# Patient Record
Sex: MALE | Race: WHITE | NOT HISPANIC OR LATINO | Employment: OTHER | URBAN - NONMETROPOLITAN AREA
[De-identification: names, ages, dates, MRNs, and addresses within clinical notes are randomized per-mention and may not be internally consistent; named-entity substitution may affect disease eponyms.]

---

## 2017-01-13 ENCOUNTER — LAB (OUTPATIENT)
Dept: INTERNAL MEDICINE | Facility: CLINIC | Age: 72
End: 2017-01-13

## 2017-01-13 ENCOUNTER — PATIENT OUTREACH (OUTPATIENT)
Dept: INTERNAL MEDICINE | Facility: CLINIC | Age: 72
End: 2017-01-13

## 2017-01-13 DIAGNOSIS — R79.9 ABNORMAL BLOOD CHEMISTRY: Primary | ICD-10-CM

## 2017-01-13 LAB
CRP SERPL-MCNC: <0.12 MG/DL (ref 0–10)
HBA1C MFR BLD: 9.8 % (ref 4.8–5.6)

## 2017-01-13 PROCEDURE — 86140 C-REACTIVE PROTEIN: CPT | Performed by: INTERNAL MEDICINE

## 2017-01-13 PROCEDURE — 83036 HEMOGLOBIN GLYCOSYLATED A1C: CPT | Performed by: INTERNAL MEDICINE

## 2017-01-13 PROCEDURE — 36415 COLL VENOUS BLD VENIPUNCTURE: CPT | Performed by: INTERNAL MEDICINE

## 2017-01-16 ENCOUNTER — OFFICE VISIT (OUTPATIENT)
Dept: INTERNAL MEDICINE | Facility: CLINIC | Age: 72
End: 2017-01-16

## 2017-01-16 VITALS
DIASTOLIC BLOOD PRESSURE: 88 MMHG | SYSTOLIC BLOOD PRESSURE: 148 MMHG | OXYGEN SATURATION: 95 % | TEMPERATURE: 97.7 F | BODY MASS INDEX: 24.5 KG/M2 | WEIGHT: 175 LBS | HEIGHT: 71 IN | HEART RATE: 80 BPM | RESPIRATION RATE: 14 BRPM

## 2017-01-16 DIAGNOSIS — B35.1 ONYCHOMYCOSIS: ICD-10-CM

## 2017-01-16 DIAGNOSIS — N40.0 ENLARGED PROSTATE: ICD-10-CM

## 2017-01-16 DIAGNOSIS — E78.5 HYPERLIPIDEMIA, UNSPECIFIED HYPERLIPIDEMIA TYPE: Primary | ICD-10-CM

## 2017-01-16 DIAGNOSIS — Z79.4 TYPE 2 DIABETES MELLITUS WITH COMPLICATION, WITH LONG-TERM CURRENT USE OF INSULIN (HCC): ICD-10-CM

## 2017-01-16 DIAGNOSIS — E11.8 TYPE 2 DIABETES MELLITUS WITH COMPLICATION, WITH LONG-TERM CURRENT USE OF INSULIN (HCC): ICD-10-CM

## 2017-01-16 DIAGNOSIS — F41.1 GENERALIZED ANXIETY DISORDER: ICD-10-CM

## 2017-01-16 DIAGNOSIS — G45.9 TRANSIENT CEREBRAL ISCHEMIA, UNSPECIFIED TYPE: ICD-10-CM

## 2017-01-16 DIAGNOSIS — I10 ESSENTIAL HYPERTENSION: ICD-10-CM

## 2017-01-16 DIAGNOSIS — I48.91 ATRIAL FIBRILLATION, UNSPECIFIED TYPE (HCC): ICD-10-CM

## 2017-01-16 LAB — GLUCOSE BLDC GLUCOMTR-MCNC: 86 MG/DL (ref 70–130)

## 2017-01-16 PROCEDURE — 99214 OFFICE O/P EST MOD 30 MIN: CPT | Performed by: INTERNAL MEDICINE

## 2017-01-16 PROCEDURE — 82962 GLUCOSE BLOOD TEST: CPT | Performed by: INTERNAL MEDICINE

## 2017-01-16 RX ORDER — LISINOPRIL 40 MG/1
TABLET ORAL
Qty: 30 TABLET | Refills: 1 | Status: SHIPPED | OUTPATIENT
Start: 2017-01-16 | End: 2017-08-18 | Stop reason: SDUPTHER

## 2017-01-16 NOTE — PROGRESS NOTES
Subjective   Que Lagunas is a 71 y.o. male.     Chief Complaint   Patient presents with   • Diabetes     Here for follow up       History of Present Illness   Recent TIA doing well. CT scan showed a small vessel ischemic changes, holter monitor showed a paroxysmal atrial fibrillation, carotid artery duplex minimal plaques echocardiogram ejection fraction normal. A1c in the hospital 9.8. Patient states his sugar now reasonable morning about 125 after meal 150. Patient lost 4 pounds recently. Diabetes much improved. Hypertension stable medication. Hyperlipidemia stable medication.     Current Outpatient Prescriptions:   •  HUMALOG 100 UNIT/ML injection, inject 5 units 3 times daily before meals, Disp: 10 mL, Rfl: 5  •  HUMALOG KWIKPEN 100 UNIT/ML solution pen-injector, INJECT 10 UNITS SUBCUTANEOUSLY BEFORE MEALS AS DIRECTED, Disp: 15 mL, Rfl: 5  •  hydrochlorothiazide (HYDRODIURIL) 25 MG tablet, Take 1 tablet by mouth  daily as directed, Disp: 90 tablet, Rfl: 3  •  lisinopril (PRINIVIL,ZESTRIL) 40 MG tablet, TAKE 1 TABLET BY MOUTH ONE TIME A DAY , Disp: 30 tablet, Rfl: 1  •  metFORMIN (GLUCOPHAGE) 500 MG tablet, Take 1 tablet by mouth Daily With Breakfast., Disp: 90 tablet, Rfl: 3  •  PARoxetine (PAXIL) 20 MG tablet, Take 1 tablet by mouth one  time a day as directed, Disp: 90 tablet, Rfl: 3  •  pravastatin (PRAVACHOL) 40 MG tablet, Take 1 tablet by mouth daily., Disp: 30 tablet, Rfl: 5  •  rivaroxaban (XARELTO) 20 MG tablet, Take 1 tablet by mouth daily., Disp: 30 tablet, Rfl: 11  •  insulin glargine (LANTUS) 100 UNIT/ML injection, Inject under the skin, Disp: , Rfl:     The following portions of the patient's history were reviewed and updated as appropriate: allergies, current medications, past family history, past medical history, past social history, past surgical history and problem list.    Review of Systems   Constitutional: Negative.    Respiratory: Negative.    Cardiovascular: Negative.     Gastrointestinal: Negative.    Musculoskeletal: Negative.    Skin: Negative.    Neurological: Negative.    Psychiatric/Behavioral: Negative.        Objective   Physical Exam   Constitutional: He is oriented to person, place, and time. He appears well-nourished.   Neck: Neck supple.   Cardiovascular: Normal rate, regular rhythm and normal heart sounds.    Pulmonary/Chest: Effort normal and breath sounds normal.   Abdominal: Bowel sounds are normal.   Neurological: He is alert and oriented to person, place, and time.   Skin: Skin is warm.   Psychiatric: He has a normal mood and affect.       All tests have been reviewed.    Assessment/Plan               TIA continue xarelto, Echo and carotid duplex and CT head only small vessel ischemia   Hypertension continue on medication   BPH,s/p laser surgery doing better follow up with dr bhatia,PSA in 9/2013 normal  Diabetes a1c 9.8 on humalog 20u each meal bid with meal, toujeo qhs, need to adjust after patient writes down log book reviewed very good for a month, repeat fast glucose 86, repeat in 3 mo  dyslipidemia continue medicine  vitD low continue vitD3 2000u daily  Onychomycosis improved after lamisil  Anxiety continue medication  colon:divertic polyp and hemorroid done4/2015  Vaccinations up-to-date  Hematuria seen by uro s/p scope  Balancing low, PT patient refuses  follow up in 3 months log book reviwed today

## 2017-01-18 RX ORDER — HYDROCHLOROTHIAZIDE 25 MG/1
TABLET ORAL
Qty: 30 TABLET | Refills: 1 | Status: SHIPPED | OUTPATIENT
Start: 2017-01-18 | End: 2017-09-25 | Stop reason: SDUPTHER

## 2017-03-27 RX ORDER — PRAVASTATIN SODIUM 40 MG
TABLET ORAL
Qty: 90 TABLET | Refills: 1 | Status: SHIPPED | OUTPATIENT
Start: 2017-03-27 | End: 2017-08-15 | Stop reason: SDUPTHER

## 2017-03-28 RX ORDER — INSULIN GLARGINE 100 [IU]/ML
INJECTION, SOLUTION SUBCUTANEOUS
Qty: 10 ML | Refills: 3 | Status: SHIPPED | OUTPATIENT
Start: 2017-03-28 | End: 2017-11-15 | Stop reason: SDUPTHER

## 2017-07-24 ENCOUNTER — OFFICE VISIT (OUTPATIENT)
Dept: CARDIOLOGY | Facility: CLINIC | Age: 72
End: 2017-07-24

## 2017-07-24 VITALS
DIASTOLIC BLOOD PRESSURE: 80 MMHG | BODY MASS INDEX: 25.17 KG/M2 | SYSTOLIC BLOOD PRESSURE: 126 MMHG | HEART RATE: 70 BPM | HEIGHT: 71 IN | WEIGHT: 179.8 LBS

## 2017-07-24 DIAGNOSIS — I25.10 CORONARY ARTERY DISEASE INVOLVING NATIVE CORONARY ARTERY OF NATIVE HEART WITHOUT ANGINA PECTORIS: Primary | ICD-10-CM

## 2017-07-24 DIAGNOSIS — E78.2 MIXED HYPERLIPIDEMIA: ICD-10-CM

## 2017-07-24 DIAGNOSIS — I10 ESSENTIAL HYPERTENSION: ICD-10-CM

## 2017-07-24 DIAGNOSIS — I48.91 ATRIAL FIBRILLATION, UNSPECIFIED TYPE (HCC): ICD-10-CM

## 2017-07-24 PROCEDURE — 99214 OFFICE O/P EST MOD 30 MIN: CPT | Performed by: INTERNAL MEDICINE

## 2017-07-24 NOTE — PROGRESS NOTES
[facility] at [facility]  Office Progress Note  Que Lagunas  1945  [phone]      Visit Date: 07/24/2017    PCP: Evans Blackburn MD  [address] [place] [id number]    IDENTIFICATION: A 72 y.o. 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 TIA:   a. April 2016, evaluation at [facility] with paroxysmal atrial fibrillation and negative CT of the head and neck.   2. Abnormal EKG:  a. Bifascicular block and no prior ischemia evaluation.   b. 4/16 echo EF 60%  3. Dyslipidemia  a. April 2016: LDL 54, total 114.   b. 12/20/16 lipids: , , HDL 37, LDL 61  4. Diabetes mellitus, onset at age 49 and is requiring insulin x12 years.  a. A1c of 9.7, February 2014.   b. 6/16 A1c 8.5  c. 1/13/17 A1c 9.8  5. Hypertension, presumed essential.   6. Prostatism.   7. Remote tonsillectomy and eye surgery.  8. Nicotine addiction, cessation 2011 with a 56 pack year history previously.        Allergies  No Known Allergies    Current Medications    Current Outpatient Prescriptions:   •  hydrochlorothiazide (HYDRODIURIL) 25 MG tablet, TAKE 1 TABLET BY MOUTH ONE TIME A DAY AS DIRECTED, Disp: 30 tablet, Rfl: 1  •  LANTUS 100 UNIT/ML injection, inject 45 units every night at bedtime (Patient taking differently: inject 40 units every night at bedtime), Disp: 10 mL, Rfl: 3  •  lisinopril (PRINIVIL,ZESTRIL) 40 MG tablet, TAKE 1 TABLET BY MOUTH ONE TIME A DAY , Disp: 30 tablet, Rfl: 1  •  metFORMIN (GLUCOPHAGE) 500 MG tablet, Take 1 tablet by mouth Daily With Breakfast., Disp: 90 tablet, Rfl: 3  •  PARoxetine (PAXIL) 20 MG tablet, Take 1 tablet by mouth one  time a day as directed, Disp: 90 tablet, Rfl: 3  •  pravastatin (PRAVACHOL) 40 MG tablet, Take 1 tablet by mouth one  time a day, Disp: 90 tablet, Rfl: 1  •  rivaroxaban (XARELTO) 20 MG tablet, Take 1  "tablet by mouth daily., Disp: 30 tablet, Rfl: 11  •  HUMALOG 100 UNIT/ML injection, inject 5 units 3 times daily before meals, Disp: 10 mL, Rfl: 5  •  HUMALOG KWIKPEN 100 UNIT/ML solution pen-injector, INJECT 10 UNITS SUBCUTANEOUSLY BEFORE MEALS AS DIRECTED, Disp: 15 mL, Rfl: 5  •  Insulin Glargine (TOUJEO SOLOSTAR) 300 UNIT/ML solution pen-injector, Inject 40 unit marking on U-100 syringe under the skin Every Night., Disp: 5 pen, Rfl: 6      History of Present Illness   Poor control of DM. 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 Takes lantus, but cannot always take humalog. He had taken too much toujeo in the past not realizing it was stronger than lantus so is hesitant to take that again. States its difficult for him to eat healthy and afford medicine.   Pt denies any chest pain, dyspnea, dyspnea on exertion, orthopnea, PND, palpitations, lower extremity edema, or claudication.    ROS:  All systems have been reviewed and are negative with the exception of those mentioned in the HPI.    OBJECTIVE:  Vitals:    07/24/17 0943   BP: 126/80   BP Location: Right arm   Patient Position: Sitting   Pulse: 70   Weight: 179 lb 12.8 oz (81.6 kg)   Height: 71\" (180.3 cm)     Physical Exam   Constitutional: He is oriented to person, place, and time. He appears well-developed and well-nourished. No distress.   Neck: Neck supple. No JVD present.   Cardiovascular: Normal rate, regular rhythm, normal heart sounds and intact distal pulses.    No murmur heard.  Pulses:       Radial pulses are 2+ on the right side, and 2+ on the left side.        Dorsalis pedis pulses are 2+ on the right side, and 2+ on the left side.        Posterior tibial pulses are 2+ on the right side, and 2+ on the left side.   Pulmonary/Chest: Effort normal and breath sounds normal. He has no wheezes. He has no rales.   Abdominal: Soft. Bowel sounds are normal. There is no tenderness. There " is no guarding.   Musculoskeletal: He exhibits no edema or tenderness.   Neurological: He is alert and oriented to person, place, and time.   Nursing note and vitals reviewed.      Diagnostic Data:  Procedures      ASSESSMENT:   Diagnosis Plan   1. Coronary artery disease involving native coronary artery of native heart without angina pectoris     2. Mixed hyperlipidemia     3. Essential hypertension     4. Atrial fibrillation, unspecified type         PLAN:  1. Medically managed with statin, ACEi  2. Statin therapy with pravstatin  3. Controlled, continue antihypertensives  4. [id number] 5, a/c with Xarelto, samples given today  5. DM poor control, counseled to avoid pasta, potatoes, bread, sweets. Counseled on the importance of glycemic control and goal A1C <7.    Evans Blackburn MD, thank you for referring Mr. Lagunas for evaluation.  I have forwarded my electronically generated recommendations to you for review.  Please do not hesitate to call with any questions.    Scribed for Charles Topete MD by Libby Feliciano PA-C. 7/24/2017  9:58 AM  I, Charles Topete MD, personally performed the services described in this documentation as scribed by the above named individual in my presence, and it is both accurate and complete.  7/24/2017  12:58 PM    Charles Topete MD, [facility]

## 2017-07-31 RX ORDER — PAROXETINE HYDROCHLORIDE 20 MG/1
TABLET, FILM COATED ORAL
Qty: 30 TABLET | Refills: 3 | Status: SHIPPED | OUTPATIENT
Start: 2017-07-31 | End: 2017-12-11 | Stop reason: SDUPTHER

## 2017-08-15 RX ORDER — PRAVASTATIN SODIUM 40 MG
TABLET ORAL
Qty: 30 TABLET | Refills: 1 | Status: SHIPPED | OUTPATIENT
Start: 2017-08-15 | End: 2017-10-19 | Stop reason: SDUPTHER

## 2017-08-18 RX ORDER — LISINOPRIL 40 MG/1
TABLET ORAL
Qty: 30 TABLET | Refills: 1 | Status: SHIPPED | OUTPATIENT
Start: 2017-08-18 | End: 2017-11-27 | Stop reason: SDUPTHER

## 2017-09-26 RX ORDER — HYDROCHLOROTHIAZIDE 25 MG/1
TABLET ORAL
Qty: 30 TABLET | Refills: 0 | Status: SHIPPED | OUTPATIENT
Start: 2017-09-26 | End: 2017-10-29 | Stop reason: SDUPTHER

## 2017-10-11 ENCOUNTER — OFFICE VISIT (OUTPATIENT)
Dept: UROLOGY | Facility: CLINIC | Age: 72
End: 2017-10-11

## 2017-10-11 VITALS
HEIGHT: 71 IN | OXYGEN SATURATION: 95 % | HEART RATE: 101 BPM | TEMPERATURE: 96.2 F | BODY MASS INDEX: 24.5 KG/M2 | WEIGHT: 175 LBS

## 2017-10-11 DIAGNOSIS — N13.8 BPH WITH URINARY OBSTRUCTION: Primary | ICD-10-CM

## 2017-10-11 DIAGNOSIS — N40.1 BPH WITH URINARY OBSTRUCTION: Primary | ICD-10-CM

## 2017-10-11 DIAGNOSIS — N32.81 OVERACTIVE BLADDER: ICD-10-CM

## 2017-10-11 LAB
BILIRUB BLD-MCNC: NEGATIVE MG/DL
CLARITY, POC: CLEAR
COLOR UR: YELLOW
GLUCOSE UR STRIP-MCNC: NEGATIVE MG/DL
KETONES UR QL: NEGATIVE
LEUKOCYTE EST, POC: NEGATIVE
NITRITE UR-MCNC: NEGATIVE MG/ML
PH UR: 6 [PH] (ref 5–8)
PROT UR STRIP-MCNC: NEGATIVE MG/DL
PSA SERPL-MCNC: 0.98 NG/ML (ref 0.06–4)
RBC # UR STRIP: NEGATIVE /UL
SP GR UR: 1.01 (ref 1–1.03)
UROBILINOGEN UR QL: NORMAL

## 2017-10-11 PROCEDURE — 99213 OFFICE O/P EST LOW 20 MIN: CPT | Performed by: UROLOGY

## 2017-10-11 PROCEDURE — 81003 URINALYSIS AUTO W/O SCOPE: CPT | Performed by: UROLOGY

## 2017-10-11 NOTE — PROGRESS NOTES
Chief Complaint  Annual Exam (PATIENT IS HERE FOR A YEARLY FOLLOW UP)        GABRIEL Lagunas is a 72 y.o. male who returns today for annual checkup originally concerned about gross hematuria when he was taking Xarelto and Plavix.  With his past history of tobacco use he underwent cystoscopy but no tumor was seen.  He's had a previous laser treatment on his prostate per Dr. Fischer and today describes an AUA index of only 10.  He does have urgency incontinence and is wearing a diaper although he admits to drinking caffeine constantly during the day.    Vitals:    10/11/17 1611   Pulse: 101   Temp: 96.2 °F (35.7 °C)   SpO2: 95%       Past Medical History  Past Medical History:   Diagnosis Date   • Abnormal EKG     Bifascicular block and no prior ischemia evaluation.    • Anemia    • Anxiety    • Atrial fibrillation     CHADS VASc=3.  Continue Xarelto 20.  vas continue Zaroxolyn 20 has a relative 20 Knobloch can add Lantus 40   • CAD (coronary artery disease)    • Contact dermatitis    • Diabetes    • Dyslipidemia    • Hypertension    • Prostatism    • Stroke    • Tattoo        Past Surgical History  Past Surgical History:   Procedure Laterality Date   • EYE SURGERY     • PROSTATE SURGERY     • TONSILLECTOMY         Medications  has a current medication list which includes the following prescription(s): humalog, humalog kwikpen, hydrochlorothiazide, insulin glargine, lantus, lisinopril, metformin, paroxetine, pravastatin, and rivaroxaban.      Allergies  No Known Allergies    Social History  Social History     Social History Narrative       Family History  He has no family history of bladder or kidney cancer  He has no family history of kidney stones      AUA Symptom Score:      Review of Systems  Review of Systems   Constitutional: Negative.    Genitourinary: Positive for frequency and urgency.   All other systems reviewed and are negative.      Physical Exam  Physical Exam   Constitutional: He is oriented to person,  place, and time. He appears well-developed and well-nourished.   HENT:   Head: Normocephalic and atraumatic.   Neck: Normal range of motion.   Pulmonary/Chest: Effort normal. No respiratory distress.   Abdominal: Soft. He exhibits no distension and no mass. There is no tenderness. No hernia.   Genitourinary: Rectum normal and prostate normal.   Musculoskeletal: Normal range of motion.   Lymphadenopathy:     He has no cervical adenopathy.   Neurological: He is alert and oriented to person, place, and time.   Skin: Skin is warm and dry.   Psychiatric: He has a normal mood and affect. His behavior is normal.   Vitals reviewed.      Labs Recent and today in the office:  Results for orders placed or performed in visit on 10/11/17   POC Urinalysis Dipstick, Automated   Result Value Ref Range    Color Yellow Yellow, Straw, Dark Yellow, Nasima    Clarity, UA Clear Clear    Glucose, UA Negative Negative, 1000 mg/dL (3+) mg/dL    Bilirubin Negative Negative    Ketones, UA Negative Negative    Specific Gravity  1.015 1.005 - 1.030    Blood, UA Negative Negative    pH, Urine 6.0 5.0 - 8.0    Protein, POC Negative Negative mg/dL    Urobilinogen, UA Normal Normal    Leukocytes Negative Negative    Nitrite, UA Negative Negative         Assessment & Plan  BPH: He is currently voiding with minimal difficulty except for the urgency and urge incontinence.    Overactive bladder: I've offered anticholinergic medication but he will need to cut back on his large intake of caffeine first.  Digital rectal exam is benign and PSA is obtained.

## 2017-10-19 RX ORDER — PRAVASTATIN SODIUM 40 MG
TABLET ORAL
Qty: 30 TABLET | Refills: 2 | Status: SHIPPED | OUTPATIENT
Start: 2017-10-19 | End: 2018-03-05 | Stop reason: SDUPTHER

## 2017-10-30 RX ORDER — HYDROCHLOROTHIAZIDE 25 MG/1
TABLET ORAL
Qty: 30 TABLET | Refills: 0 | Status: SHIPPED | OUTPATIENT
Start: 2017-10-30 | End: 2018-04-12

## 2017-11-15 RX ORDER — INSULIN GLARGINE 100 [IU]/ML
INJECTION, SOLUTION SUBCUTANEOUS
Qty: 10 ML | Refills: 2 | Status: SHIPPED | OUTPATIENT
Start: 2017-11-15 | End: 2018-02-15 | Stop reason: SDUPTHER

## 2017-11-27 RX ORDER — LISINOPRIL 40 MG/1
TABLET ORAL
Qty: 30 TABLET | Refills: 0 | Status: SHIPPED | OUTPATIENT
Start: 2017-11-27 | End: 2017-12-31 | Stop reason: SDUPTHER

## 2017-12-11 RX ORDER — PAROXETINE HYDROCHLORIDE 20 MG/1
TABLET, FILM COATED ORAL
Qty: 30 TABLET | Refills: 2 | Status: SHIPPED | OUTPATIENT
Start: 2017-12-11 | End: 2018-03-19 | Stop reason: SDUPTHER

## 2017-12-18 ENCOUNTER — OFFICE VISIT (OUTPATIENT)
Dept: UROLOGY | Facility: CLINIC | Age: 72
End: 2017-12-18

## 2017-12-18 VITALS
HEIGHT: 71 IN | DIASTOLIC BLOOD PRESSURE: 80 MMHG | BODY MASS INDEX: 24.5 KG/M2 | TEMPERATURE: 97.8 F | SYSTOLIC BLOOD PRESSURE: 140 MMHG | OXYGEN SATURATION: 98 % | WEIGHT: 175 LBS | HEART RATE: 95 BPM

## 2017-12-18 DIAGNOSIS — N48.89 PENILE SWELLING: Primary | ICD-10-CM

## 2017-12-18 DIAGNOSIS — R73.9 HYPERGLYCEMIA: ICD-10-CM

## 2017-12-18 LAB
BILIRUB BLD-MCNC: NEGATIVE MG/DL
BUN SERPL-MCNC: 13 MG/DL (ref 7–20)
BUN/CREAT SERPL: 18.6 (ref 6.3–21.9)
CALCIUM SERPL-MCNC: 9.7 MG/DL (ref 8.4–10.2)
CHLORIDE SERPL-SCNC: 97 MMOL/L (ref 98–107)
CLARITY, POC: CLEAR
CO2 SERPL-SCNC: 30 MMOL/L (ref 26–30)
COLOR UR: YELLOW
CREAT SERPL-MCNC: 0.7 MG/DL (ref 0.6–1.3)
GFR SERPLBLD CREATININE-BSD FMLA CKD-EPI: 111 ML/MIN/1.73
GFR SERPLBLD CREATININE-BSD FMLA CKD-EPI: 134 ML/MIN/1.73
GLUCOSE SERPL-MCNC: 232 MG/DL (ref 74–98)
GLUCOSE UR STRIP-MCNC: ABNORMAL MG/DL
KETONES UR QL: NEGATIVE
LEUKOCYTE EST, POC: NEGATIVE
NITRITE UR-MCNC: NEGATIVE MG/ML
PH UR: 6 [PH] (ref 5–8)
POTASSIUM SERPL-SCNC: 4.5 MMOL/L (ref 3.5–5.1)
PROT UR STRIP-MCNC: NEGATIVE MG/DL
RBC # UR STRIP: NEGATIVE /UL
SODIUM SERPL-SCNC: 138 MMOL/L (ref 137–145)
SP GR UR: 1.02 (ref 1–1.03)
UROBILINOGEN UR QL: NORMAL

## 2017-12-18 PROCEDURE — 81003 URINALYSIS AUTO W/O SCOPE: CPT | Performed by: UROLOGY

## 2017-12-18 PROCEDURE — 99213 OFFICE O/P EST LOW 20 MIN: CPT | Performed by: UROLOGY

## 2017-12-18 RX ORDER — NYSTATIN AND TRIAMCINOLONE ACETONIDE 100000; 1 [USP'U]/G; MG/G
OINTMENT TOPICAL
Qty: 30 G | Refills: 1 | Status: SHIPPED | OUTPATIENT
Start: 2017-12-18 | End: 2018-04-12

## 2017-12-18 NOTE — PROGRESS NOTES
Chief Complaint  Groin Swelling (Penile)        GABRIEL Lagunas is a 72 y.o. male who is today complaining of a painful itchy swelling of the penis.  He is alert and acute balanitis although he has been circumcised there is some edema of the residual foreskin but not enough to cause a paraphimosis.  Of more importance is the heavy glucosuria and suggestion that his diabetes is under poor control.  He states that he try to see Dr. Blackburn but he's out for the week.    Vitals:    12/18/17 1424   BP: 140/80   Pulse: 95   Temp: 97.8 °F (36.6 °C)   SpO2: 98%       Past Medical History  Past Medical History:   Diagnosis Date   • Abnormal EKG     Bifascicular block and no prior ischemia evaluation.    • Anemia    • Anxiety    • Atrial fibrillation     CHADS VASc=3.  Continue Xarelto 20.  vas continue Zaroxolyn 20 has a relative 20 Knobloch can add Lantus 40   • CAD (coronary artery disease)    • Contact dermatitis    • Diabetes    • Dyslipidemia    • Hypertension    • Prostatism    • Stroke    • Tattoo        Past Surgical History  Past Surgical History:   Procedure Laterality Date   • EYE SURGERY     • PROSTATE SURGERY     • TONSILLECTOMY         Medications  has a current medication list which includes the following prescription(s): humalog, humalog kwikpen, hydrochlorothiazide, insulin glargine, lantus, lisinopril, metformin, paroxetine, pravastatin, and rivaroxaban.      Allergies  No Known Allergies    Social History  Social History     Social History Narrative       Family History  He has no family history of bladder or kidney cancer  He has no family history of kidney stones      AUA Symptom Score:      Review of Systems  Review of Systems    Physical Exam  Physical Exam   Constitutional: He is oriented to person, place, and time. He appears well-developed and well-nourished.   HENT:   Head: Normocephalic and atraumatic.   Neck: Normal range of motion.   Pulmonary/Chest: Effort normal. No respiratory distress.   Abdominal:  Soft. He exhibits no distension and no mass. There is no tenderness. No hernia.   Genitourinary:         Musculoskeletal: Normal range of motion.   Lymphadenopathy:     He has no cervical adenopathy.   Neurological: He is alert and oriented to person, place, and time.   Skin: Skin is warm and dry.   Psychiatric: He has a normal mood and affect. His behavior is normal.   Vitals reviewed.      Labs Recent and today in the office:  Results for orders placed or performed in visit on 12/18/17   POC Urinalysis Dipstick, Automated   Result Value Ref Range    Color Yellow Yellow, Straw, Dark Yellow, Nasima    Clarity, UA Clear Clear    Glucose, UA 3+ (A) Negative, 1000 mg/dL (3+) mg/dL    Bilirubin Negative Negative    Ketones, UA Negative Negative    Specific Gravity  1.025 1.005 - 1.030    Blood, UA Negative Negative    pH, Urine 6.0 5.0 - 8.0    Protein, POC Negative Negative mg/dL    Urobilinogen, UA Normal Normal    Leukocytes Negative Negative    Nitrite, UA Negative Negative         Assessment & Plan  Acute balanitis/hyperglycemia: BMP is obtained and he is encouraged to see Dr. Bere parnell for better control of his diabetes.  I think his main problem is he can't afford his insulin.  Mycolog cream as prescribed.

## 2018-01-03 RX ORDER — LISINOPRIL 40 MG/1
40 TABLET ORAL DAILY
Qty: 30 TABLET | Refills: 0 | Status: SHIPPED | OUTPATIENT
Start: 2018-01-03 | End: 2018-02-01 | Stop reason: SDUPTHER

## 2018-02-01 RX ORDER — LISINOPRIL 40 MG/1
40 TABLET ORAL DAILY
Qty: 30 TABLET | Refills: 0 | Status: SHIPPED | OUTPATIENT
Start: 2018-02-01 | End: 2018-02-26 | Stop reason: SDUPTHER

## 2018-02-01 NOTE — TELEPHONE ENCOUNTER
PATIENT CALLED STATING THAT HE IS NEEDING LISINOPRIL REFILL AND WOULD LIKE ADDITIONAL REFILLS SO THAT HE DOES NOT HAVE TO CALL BACK EVERY MONTH.

## 2018-02-15 ENCOUNTER — TELEPHONE (OUTPATIENT)
Dept: INTERNAL MEDICINE | Facility: CLINIC | Age: 73
End: 2018-02-15

## 2018-02-15 RX ORDER — INSULIN GLARGINE 100 [IU]/ML
40 INJECTION, SOLUTION SUBCUTANEOUS NIGHTLY
Qty: 10 ML | Refills: 2 | Status: SHIPPED | OUTPATIENT
Start: 2018-02-15 | End: 2018-02-15 | Stop reason: SDUPTHER

## 2018-02-15 RX ORDER — INSULIN GLARGINE 100 [IU]/ML
40 INJECTION, SOLUTION SUBCUTANEOUS NIGHTLY
Qty: 10 ML | Refills: 5 | Status: SHIPPED | OUTPATIENT
Start: 2018-02-15 | End: 2018-04-12 | Stop reason: SDUPTHER

## 2018-02-26 RX ORDER — LISINOPRIL 40 MG/1
TABLET ORAL
Qty: 30 TABLET | Refills: 0 | Status: SHIPPED | OUTPATIENT
Start: 2018-02-26 | End: 2018-04-09 | Stop reason: SDUPTHER

## 2018-03-05 RX ORDER — PRAVASTATIN SODIUM 40 MG
40 TABLET ORAL NIGHTLY
Qty: 10 TABLET | Refills: 0 | Status: SHIPPED | OUTPATIENT
Start: 2018-03-05 | End: 2018-03-13 | Stop reason: SDUPTHER

## 2018-03-13 RX ORDER — PRAVASTATIN SODIUM 40 MG
TABLET ORAL
Qty: 10 TABLET | Refills: 0 | Status: SHIPPED | OUTPATIENT
Start: 2018-03-13 | End: 2018-04-01 | Stop reason: SDUPTHER

## 2018-03-19 RX ORDER — PAROXETINE HYDROCHLORIDE 20 MG/1
20 TABLET, FILM COATED ORAL DAILY
Qty: 90 TABLET | Refills: 3 | Status: SHIPPED | OUTPATIENT
Start: 2018-03-19 | End: 2018-04-12 | Stop reason: SDUPTHER

## 2018-03-22 RX ORDER — PEN NEEDLE, DIABETIC 30 GX5/16"
NEEDLE, DISPOSABLE MISCELLANEOUS
Qty: 100 EACH | Refills: 5 | Status: SHIPPED | OUTPATIENT
Start: 2018-03-22 | End: 2020-03-04 | Stop reason: SDUPTHER

## 2018-04-02 RX ORDER — PRAVASTATIN SODIUM 40 MG
TABLET ORAL
Qty: 10 TABLET | Refills: 0 | Status: SHIPPED | OUTPATIENT
Start: 2018-04-02 | End: 2018-04-12 | Stop reason: SDUPTHER

## 2018-04-09 ENCOUNTER — TELEPHONE (OUTPATIENT)
Dept: INTERNAL MEDICINE | Facility: CLINIC | Age: 73
End: 2018-04-09

## 2018-04-09 RX ORDER — LISINOPRIL 40 MG/1
40 TABLET ORAL DAILY
Qty: 30 TABLET | Refills: 0 | Status: SHIPPED | OUTPATIENT
Start: 2018-04-09 | End: 2018-04-12 | Stop reason: SDUPTHER

## 2018-04-12 ENCOUNTER — OFFICE VISIT (OUTPATIENT)
Dept: INTERNAL MEDICINE | Facility: CLINIC | Age: 73
End: 2018-04-12

## 2018-04-12 VITALS
OXYGEN SATURATION: 96 % | TEMPERATURE: 97.5 F | WEIGHT: 178 LBS | BODY MASS INDEX: 24.92 KG/M2 | RESPIRATION RATE: 14 BRPM | HEART RATE: 80 BPM | SYSTOLIC BLOOD PRESSURE: 142 MMHG | HEIGHT: 71 IN | DIASTOLIC BLOOD PRESSURE: 88 MMHG

## 2018-04-12 DIAGNOSIS — Z12.11 COLON CANCER SCREENING: ICD-10-CM

## 2018-04-12 DIAGNOSIS — K63.5 POLYP OF COLON, UNSPECIFIED PART OF COLON, UNSPECIFIED TYPE: ICD-10-CM

## 2018-04-12 DIAGNOSIS — E11.8 TYPE 2 DIABETES MELLITUS WITH COMPLICATION, WITH LONG-TERM CURRENT USE OF INSULIN (HCC): ICD-10-CM

## 2018-04-12 DIAGNOSIS — I10 ESSENTIAL HYPERTENSION: ICD-10-CM

## 2018-04-12 DIAGNOSIS — G45.9 TRANSIENT CEREBRAL ISCHEMIA, UNSPECIFIED TYPE: ICD-10-CM

## 2018-04-12 DIAGNOSIS — F41.1 GENERALIZED ANXIETY DISORDER: ICD-10-CM

## 2018-04-12 DIAGNOSIS — N40.0 ENLARGED PROSTATE: ICD-10-CM

## 2018-04-12 DIAGNOSIS — E55.9 VITAMIN D DEFICIENCY: ICD-10-CM

## 2018-04-12 DIAGNOSIS — I25.10 CORONARY ARTERY DISEASE INVOLVING NATIVE CORONARY ARTERY OF NATIVE HEART WITHOUT ANGINA PECTORIS: ICD-10-CM

## 2018-04-12 DIAGNOSIS — Z79.4 TYPE 2 DIABETES MELLITUS WITH COMPLICATION, WITH LONG-TERM CURRENT USE OF INSULIN (HCC): ICD-10-CM

## 2018-04-12 DIAGNOSIS — E78.2 MIXED HYPERLIPIDEMIA: Primary | ICD-10-CM

## 2018-04-12 DIAGNOSIS — B35.1 ONYCHOMYCOSIS: ICD-10-CM

## 2018-04-12 DIAGNOSIS — I48.91 ATRIAL FIBRILLATION, UNSPECIFIED TYPE (HCC): ICD-10-CM

## 2018-04-12 PROCEDURE — 99214 OFFICE O/P EST MOD 30 MIN: CPT | Performed by: INTERNAL MEDICINE

## 2018-04-12 RX ORDER — PRAVASTATIN SODIUM 40 MG
40 TABLET ORAL EVERY EVENING
Qty: 30 TABLET | Refills: 11 | Status: SHIPPED | OUTPATIENT
Start: 2018-04-12 | End: 2018-07-17 | Stop reason: SDUPTHER

## 2018-04-12 RX ORDER — LISINOPRIL 40 MG/1
40 TABLET ORAL DAILY
Qty: 30 TABLET | Refills: 11 | Status: SHIPPED | OUTPATIENT
Start: 2018-04-12 | End: 2018-08-10 | Stop reason: SDUPTHER

## 2018-04-12 RX ORDER — PAROXETINE HYDROCHLORIDE 20 MG/1
20 TABLET, FILM COATED ORAL DAILY
Qty: 90 TABLET | Refills: 3 | Status: SHIPPED | OUTPATIENT
Start: 2018-04-12 | End: 2018-08-10 | Stop reason: SDUPTHER

## 2018-04-12 RX ORDER — INSULIN GLARGINE 100 [IU]/ML
40 INJECTION, SOLUTION SUBCUTANEOUS NIGHTLY
Qty: 15 ML | Refills: 11 | Status: SHIPPED | OUTPATIENT
Start: 2018-04-12 | End: 2018-07-17 | Stop reason: SDUPTHER

## 2018-04-12 NOTE — PROGRESS NOTES
"Subjective   Que Lagunas is a 73 y.o. male.     Chief Complaint   Patient presents with   • Follow-up   • Diabetes       History of Present Illness   Patient here for follow-up.  Atrial fibrillation stable medication.  Hypertension stable medication.  BPH is stable PSA within normal limits.  Diabetes on medication need blood tests.  Dyslipidemia on medication stable.  Vitamin D low on supplement to stable..  Anxiety stable medication.    Current Outpatient Prescriptions:   •  insulin glargine (LANTUS) 100 UNIT/ML injection, Inject 40 Units under the skin Every Night., Disp: 15 mL, Rfl: 11  •  insulin lispro (HUMALOG) 100 UNIT/ML injection, Inject 10 Units under the skin 3 (Three) Times a Day Before Meals., Disp: 10 mL, Rfl: 11  •  Insulin Pen Needle (PEN NEEDLES 3/16\") 31G X 5 MM misc, Use with insulin daily E11.9, Disp: 100 each, Rfl: 5  •  lisinopril (PRINIVIL,ZESTRIL) 40 MG tablet, Take 1 tablet by mouth Daily. 200001, Disp: 30 tablet, Rfl: 11  •  metFORMIN (GLUCOPHAGE) 500 MG tablet, Take 1 tablet by mouth Daily With Breakfast., Disp: 30 tablet, Rfl: 11  •  PARoxetine (PAXIL) 20 MG tablet, Take 1 tablet by mouth Daily., Disp: 90 tablet, Rfl: 3  •  pravastatin (PRAVACHOL) 40 MG tablet, Take 1 tablet by mouth Every Evening., Disp: 30 tablet, Rfl: 11  •  rivaroxaban (XARELTO) 20 MG tablet, Take 1 tablet by mouth Daily., Disp: 30 tablet, Rfl: 11    The following portions of the patient's history were reviewed and updated as appropriate: allergies, current medications, past family history, past medical history, past social history, past surgical history and problem list.    Review of Systems   Constitutional: Negative.    Respiratory: Negative.    Cardiovascular: Negative.    Gastrointestinal: Negative.    Musculoskeletal: Negative.    Skin: Negative.    Neurological: Negative.    Psychiatric/Behavioral: Negative.        Objective   Physical Exam   Constitutional: He is oriented to person, place, and time. He " appears well-nourished.   Neck: Neck supple.   Cardiovascular: Normal rate, regular rhythm and normal heart sounds.    Pulmonary/Chest: Effort normal and breath sounds normal.   Abdominal: Bowel sounds are normal.   Neurological: He is alert and oriented to person, place, and time.   Skin: Skin is warm.   Psychiatric: He has a normal mood and affect.       All tests have been reviewed.    Assessment/Plan   There are no diagnoses linked to this encounter.           TIA continue xarelto, Echo and carotid duplex and CT head only small vessel ischemia   A.fib continue med , follow up with cardio  Hypertension continue on medication, good diet , no soda  BPH,s/p laser surgery doing better follow up with dr mclain,   Diabetes  on humalog 10u each meal tid with meal, lantus 40u qhs  dyslipidemia continue medicine  vitD low continue vitD3 2000u daily  Onychomycosis improved after lamisil  Anxiety continue medication  colon:divertic polyp and hemorroid done4/2015  Vaccinations up-to-date  Hematuria seen by uro s/p scope  Balancing low, PT patient refuses      2week after labs

## 2018-04-30 ENCOUNTER — OFFICE VISIT (OUTPATIENT)
Dept: CARDIOLOGY | Facility: CLINIC | Age: 73
End: 2018-04-30

## 2018-04-30 VITALS
SYSTOLIC BLOOD PRESSURE: 158 MMHG | DIASTOLIC BLOOD PRESSURE: 90 MMHG | HEIGHT: 71 IN | WEIGHT: 178 LBS | BODY MASS INDEX: 24.92 KG/M2 | HEART RATE: 86 BPM

## 2018-04-30 DIAGNOSIS — E78.2 MIXED HYPERLIPIDEMIA: ICD-10-CM

## 2018-04-30 DIAGNOSIS — E11.65 TYPE 2 DIABETES MELLITUS WITH HYPERGLYCEMIA, WITH LONG-TERM CURRENT USE OF INSULIN (HCC): ICD-10-CM

## 2018-04-30 DIAGNOSIS — I10 ESSENTIAL HYPERTENSION: ICD-10-CM

## 2018-04-30 DIAGNOSIS — Z79.4 TYPE 2 DIABETES MELLITUS WITH HYPERGLYCEMIA, WITH LONG-TERM CURRENT USE OF INSULIN (HCC): ICD-10-CM

## 2018-04-30 DIAGNOSIS — I25.10 CORONARY ARTERY DISEASE INVOLVING NATIVE CORONARY ARTERY OF NATIVE HEART WITHOUT ANGINA PECTORIS: Primary | ICD-10-CM

## 2018-04-30 DIAGNOSIS — I48.0 PAROXYSMAL ATRIAL FIBRILLATION (HCC): ICD-10-CM

## 2018-04-30 PROCEDURE — 99214 OFFICE O/P EST MOD 30 MIN: CPT | Performed by: INTERNAL MEDICINE

## 2018-04-30 NOTE — PROGRESS NOTES
"[facility] at [facility]  Office Progress Note  Que Lagunas  1945  [phone]      Visit Date: 4/30/2018     PCP: Evans Blackburn MD  [address] [place] [id number]    IDENTIFICATION: A 73 y.o. male former [employer] employee,  ,retired  from [place], fishing enthusiast     Chief Complaint   Patient presents with   • Coronary Artery Disease   • Hypertension   • Hyperlipidemia       PROBLEM LIST:   1. TIA:   a. April 2016, evaluation at [facility] with paroxysmal atrial fibrillation and negative CT of the head and neck.   2. Abnormal EKG:  a. Bifascicular block and no prior ischemia evaluation.   b. 4/16 echo EF 60%  3. Dyslipidemia  a. April 2016: LDL 54, total 114.   b. 12/20/16 lipids: , , HDL 37, LDL 61  4. Diabetes mellitus, onset at age 49 and is requiring insulin x12 years.  a. A1c of 9.7, February 2014.   b. 6/16 A1c 8.5  c. 1/13/17 A1c 9.8  5. Hypertension, presumed essential.   6. Prostatism.   7. Remote tonsillectomy and eye surgery.  8. Nicotine addiction, cessation 2011 with a 56 pack year history previously.        Allergies  No Known Allergies    Current Medications    Current Outpatient Prescriptions:   •  insulin glargine (LANTUS) 100 UNIT/ML injection, Inject 40 Units under the skin Every Night., Disp: 15 mL, Rfl: 11  •  insulin lispro (HUMALOG) 100 UNIT/ML injection, Inject 10 Units under the skin 3 (Three) Times a Day Before Meals., Disp: 10 mL, Rfl: 11  •  Insulin Pen Needle (PEN NEEDLES 3/16\") 31G X 5 MM misc, Use with insulin daily E11.9, Disp: 100 each, Rfl: 5  •  lisinopril (PRINIVIL,ZESTRIL) 40 MG tablet, Take 1 tablet by mouth Daily. 200001, Disp: 30 tablet, Rfl: 11  •  metFORMIN (GLUCOPHAGE) 500 MG tablet, Take 1 tablet by mouth Daily With Breakfast., Disp: 30 tablet, Rfl: 11  •  PARoxetine (PAXIL) 20 MG tablet, Take 1 tablet by mouth Daily., Disp: 90 tablet, Rfl: 3  •  pravastatin (PRAVACHOL) 40 MG tablet, Take 1 " "tablet by mouth Every Evening., Disp: 30 tablet, Rfl: 11  •  rivaroxaban (XARELTO) 20 MG tablet, Take 1 tablet by mouth Daily., Disp: 30 tablet, Rfl: 11      History of Present Illness   Poor control of DM has trouble affording insulin. Takes lantus, but cannot always take humalog.. States its difficult for him to eat healthy and afford medicine.   Pt denies any new chest pain, dyspnea, dyspnea on exertion, orthopnea, PND, palpitations, lower extremity edema, or claudication.    ROS:  All systems have been reviewed and are negative with the exception of those mentioned in the HPI.    OBJECTIVE:  Vitals:    04/30/18 1011   BP: 158/90   BP Location: Right arm   Patient Position: Sitting   Pulse: 86   Weight: 80.7 kg (178 lb)   Height: 180.3 cm (71\")     Physical Exam   Constitutional: He is oriented to person, place, and time. He appears well-developed and well-nourished. No distress.   Neck: Neck supple. No JVD present.   Cardiovascular: Normal rate, regular rhythm, normal heart sounds and intact distal pulses.    No murmur heard.  Pulses:       Radial pulses are 2+ on the right side, and 2+ on the left side.        Dorsalis pedis pulses are 2+ on the right side, and 2+ on the left side.        Posterior tibial pulses are 2+ on the right side, and 2+ on the left side.   Pulmonary/Chest: Effort normal and breath sounds normal. He has no wheezes. He has no rales.   Abdominal: Soft. Bowel sounds are normal. There is no tenderness. There is no guarding.   Musculoskeletal: He exhibits no edema or tenderness.   Neurological: He is alert and oriented to person, place, and time.   Nursing note and vitals reviewed.      Diagnostic Data:  Procedures      ASSESSMENT:   Diagnosis Plan   1. Coronary artery disease involving native coronary artery of native heart without angina pectoris     2. Mixed hyperlipidemia     3. Essential hypertension     4. Paroxysmal atrial fibrillation     5. Type 2 diabetes mellitus with " hyperglycemia, with long-term current use of insulin         PLAN:  1. Medically managed with statin, ACEi  2. Statin therapy with pravstatin  3. Controlled, continue antihypertensives  4. [id number] 5, a/c with Xarelto, samples given today  5. DM poor control, counseled to avoid pasta, potatoes, bread, sweets. Counseled on the importance of glycemic control and goal A1C <7.    Evans Blackburn MD, thank you for referring Mr. Lagunas for evaluation.  I have forwarded my electronically generated recommendations to you for review.  Please do not hesitate to call with any questions.    Scribed for Charles Topete MD by Libby Feliciano PA-C. 4/30/2018  10:38 AM  I, Charles Topete MD, personally performed the services described in this documentation as scribed by the above named individual in my presence, and it is both accurate and complete.  4/30/2018  10:38 AM    Charles Topete MD, FACC

## 2018-05-01 ENCOUNTER — OFFICE VISIT (OUTPATIENT)
Dept: INTERNAL MEDICINE | Facility: CLINIC | Age: 73
End: 2018-05-01

## 2018-05-01 VITALS
OXYGEN SATURATION: 92 % | HEART RATE: 80 BPM | DIASTOLIC BLOOD PRESSURE: 80 MMHG | HEIGHT: 71 IN | TEMPERATURE: 97 F | SYSTOLIC BLOOD PRESSURE: 122 MMHG | BODY MASS INDEX: 24.83 KG/M2 | RESPIRATION RATE: 14 BRPM

## 2018-05-01 DIAGNOSIS — E11.8 TYPE 2 DIABETES MELLITUS WITH COMPLICATION, WITH LONG-TERM CURRENT USE OF INSULIN (HCC): ICD-10-CM

## 2018-05-01 DIAGNOSIS — N40.0 ENLARGED PROSTATE: ICD-10-CM

## 2018-05-01 DIAGNOSIS — I48.91 ATRIAL FIBRILLATION, UNSPECIFIED TYPE (HCC): ICD-10-CM

## 2018-05-01 DIAGNOSIS — G45.9 TRANSIENT CEREBRAL ISCHEMIA, UNSPECIFIED TYPE: ICD-10-CM

## 2018-05-01 DIAGNOSIS — I10 ESSENTIAL HYPERTENSION: ICD-10-CM

## 2018-05-01 DIAGNOSIS — Z79.4 TYPE 2 DIABETES MELLITUS WITH COMPLICATION, WITH LONG-TERM CURRENT USE OF INSULIN (HCC): ICD-10-CM

## 2018-05-01 DIAGNOSIS — I25.10 CORONARY ARTERY DISEASE INVOLVING NATIVE CORONARY ARTERY OF NATIVE HEART WITHOUT ANGINA PECTORIS: ICD-10-CM

## 2018-05-01 DIAGNOSIS — Z12.11 COLON CANCER SCREENING: ICD-10-CM

## 2018-05-01 DIAGNOSIS — F41.1 GENERALIZED ANXIETY DISORDER: ICD-10-CM

## 2018-05-01 DIAGNOSIS — E55.9 VITAMIN D DEFICIENCY: ICD-10-CM

## 2018-05-01 DIAGNOSIS — B35.1 ONYCHOMYCOSIS: ICD-10-CM

## 2018-05-01 DIAGNOSIS — E78.2 MIXED HYPERLIPIDEMIA: Primary | ICD-10-CM

## 2018-05-01 LAB
25(OH)D3+25(OH)D2 SERPL-MCNC: 13.9 NG/ML
ALBUMIN SERPL-MCNC: 4.3 G/DL (ref 3.5–5)
ALBUMIN/GLOB SERPL: 1.5 G/DL (ref 1–2)
ALP SERPL-CCNC: 95 U/L (ref 38–126)
ALT SERPL-CCNC: 29 U/L (ref 13–69)
APPEARANCE UR: CLEAR
AST SERPL-CCNC: 19 U/L (ref 15–46)
BASOPHILS # BLD AUTO: 0.06 10*3/MM3 (ref 0–0.2)
BASOPHILS NFR BLD AUTO: 0.7 % (ref 0–2.5)
BILIRUB SERPL-MCNC: 1 MG/DL (ref 0.2–1.3)
BILIRUB UR QL STRIP: NEGATIVE
BUN SERPL-MCNC: 15 MG/DL (ref 7–20)
BUN/CREAT SERPL: 21.4 (ref 6.3–21.9)
CALCIUM SERPL-MCNC: 10.2 MG/DL (ref 8.4–10.2)
CHLORIDE SERPL-SCNC: 96 MMOL/L (ref 98–107)
CHOLEST SERPL-MCNC: 132 MG/DL (ref 0–199)
CO2 SERPL-SCNC: 30 MMOL/L (ref 26–30)
COLOR UR: YELLOW
CREAT SERPL-MCNC: 0.7 MG/DL (ref 0.6–1.3)
EOSINOPHIL # BLD AUTO: 0.23 10*3/MM3 (ref 0–0.7)
EOSINOPHIL NFR BLD AUTO: 2.7 % (ref 0–7)
ERYTHROCYTE [DISTWIDTH] IN BLOOD BY AUTOMATED COUNT: 13.7 % (ref 11.5–14.5)
GFR SERPLBLD CREATININE-BSD FMLA CKD-EPI: 110 ML/MIN/1.73
GFR SERPLBLD CREATININE-BSD FMLA CKD-EPI: 134 ML/MIN/1.73
GLOBULIN SER CALC-MCNC: 2.8 GM/DL
GLUCOSE SERPL-MCNC: 193 MG/DL (ref 74–98)
GLUCOSE UR QL: ABNORMAL
HBA1C MFR BLD: 8.9 %
HCT VFR BLD AUTO: 46.9 % (ref 42–52)
HDLC SERPL-MCNC: 59 MG/DL (ref 40–60)
HGB BLD-MCNC: 15.3 G/DL (ref 14–18)
HGB UR QL STRIP: NEGATIVE
IMM GRANULOCYTES # BLD: 0.03 10*3/MM3 (ref 0–0.06)
IMM GRANULOCYTES NFR BLD: 0.4 % (ref 0–0.6)
KETONES UR QL STRIP: NEGATIVE
LDLC SERPL CALC-MCNC: 62 MG/DL (ref 0–99)
LEUKOCYTE ESTERASE UR QL STRIP: NEGATIVE
LYMPHOCYTES # BLD AUTO: 3.09 10*3/MM3 (ref 0.6–3.4)
LYMPHOCYTES NFR BLD AUTO: 36.7 % (ref 10–50)
MCH RBC QN AUTO: 29.8 PG (ref 27–31)
MCHC RBC AUTO-ENTMCNC: 32.6 G/DL (ref 30–37)
MCV RBC AUTO: 91.4 FL (ref 80–94)
MICROALBUMIN UR-MCNC: 4.6 UG/ML
MONOCYTES # BLD AUTO: 0.8 10*3/MM3 (ref 0–0.9)
MONOCYTES NFR BLD AUTO: 9.5 % (ref 0–12)
NEUTROPHILS # BLD AUTO: 4.2 10*3/MM3 (ref 2–6.9)
NEUTROPHILS NFR BLD AUTO: 50 % (ref 37–80)
NITRITE UR QL STRIP: NEGATIVE
NRBC BLD AUTO-RTO: 0 /100 WBC (ref 0–0)
PH UR STRIP: 7 [PH] (ref 5–8)
PLATELET # BLD AUTO: 324 10*3/MM3 (ref 130–400)
POTASSIUM SERPL-SCNC: 4.3 MMOL/L (ref 3.5–5.1)
PROT SERPL-MCNC: 7.1 G/DL (ref 6.3–8.2)
PROT UR QL STRIP: NEGATIVE
RBC # BLD AUTO: 5.13 10*6/MM3 (ref 4.7–6.1)
SODIUM SERPL-SCNC: 139 MMOL/L (ref 137–145)
SP GR UR: 1.02 (ref 1–1.03)
TRIGL SERPL-MCNC: 56 MG/DL
TSH SERPL DL<=0.005 MIU/L-ACNC: 2.55 MIU/ML (ref 0.47–4.68)
UROBILINOGEN UR STRIP-MCNC: ABNORMAL MG/DL
VLDLC SERPL CALC-MCNC: 11.2 MG/DL
WBC # BLD AUTO: 8.41 10*3/MM3 (ref 4.8–10.8)

## 2018-05-01 PROCEDURE — 99214 OFFICE O/P EST MOD 30 MIN: CPT | Performed by: INTERNAL MEDICINE

## 2018-05-06 ENCOUNTER — RESULTS ENCOUNTER (OUTPATIENT)
Dept: INTERNAL MEDICINE | Facility: CLINIC | Age: 73
End: 2018-05-06

## 2018-05-06 DIAGNOSIS — I10 ESSENTIAL HYPERTENSION: ICD-10-CM

## 2018-05-06 DIAGNOSIS — I25.10 CORONARY ARTERY DISEASE INVOLVING NATIVE CORONARY ARTERY OF NATIVE HEART WITHOUT ANGINA PECTORIS: ICD-10-CM

## 2018-05-06 DIAGNOSIS — E55.9 VITAMIN D DEFICIENCY: ICD-10-CM

## 2018-05-06 DIAGNOSIS — E11.8 TYPE 2 DIABETES MELLITUS WITH COMPLICATION, WITH LONG-TERM CURRENT USE OF INSULIN (HCC): ICD-10-CM

## 2018-05-06 DIAGNOSIS — Z79.4 TYPE 2 DIABETES MELLITUS WITH COMPLICATION, WITH LONG-TERM CURRENT USE OF INSULIN (HCC): ICD-10-CM

## 2018-05-06 DIAGNOSIS — N40.0 ENLARGED PROSTATE: ICD-10-CM

## 2018-05-06 DIAGNOSIS — E78.2 MIXED HYPERLIPIDEMIA: ICD-10-CM

## 2018-05-21 ENCOUNTER — OFFICE VISIT (OUTPATIENT)
Dept: GASTROENTEROLOGY | Facility: CLINIC | Age: 73
End: 2018-05-21

## 2018-05-21 VITALS
BODY MASS INDEX: 25.34 KG/M2 | SYSTOLIC BLOOD PRESSURE: 145 MMHG | HEART RATE: 89 BPM | DIASTOLIC BLOOD PRESSURE: 95 MMHG | HEIGHT: 71 IN | WEIGHT: 181 LBS | TEMPERATURE: 96.2 F | RESPIRATION RATE: 18 BRPM

## 2018-05-21 DIAGNOSIS — Z12.11 COLON CANCER SCREENING: Primary | ICD-10-CM

## 2018-05-21 PROCEDURE — S0260 H&P FOR SURGERY: HCPCS | Performed by: NURSE PRACTITIONER

## 2018-05-21 RX ORDER — MELATONIN
1000 DAILY
COMMUNITY

## 2018-05-21 NOTE — PROGRESS NOTES
Chief Complaint   Patient presents with   • Colon Cancer Screening     HPI     The patient denies recent change in bowel habits. There is no diarrhea or constipation. There is no history of abdominal pain. There is no history of overt GI bleed (hematemesis melena or hematochezia). The patient denies nausea or vomiting. There is no history of reflux. The patient denies dysphagia or odynophagia. There is no history of recent significant weight loss. There is no history of liver disease in the past. There is no family history of colon cancer. Previously it was noted in the chart patient had a family history of colon cancer and stomach cancer in the patient's mother and father. The patient states there is NO family history of colon cancer or stomach cancer in his mother or father, or any other family member. The patient's last colonoscopy was in 2015 with polyps, but no dysplasia.    Review of Systems   Constitutional: Negative for appetite change, chills, fatigue, fever and unexpected weight change.   HENT: Negative for mouth sores, nosebleeds and trouble swallowing.    Eyes: Negative for discharge and redness.   Respiratory: Negative for apnea, cough and shortness of breath.    Cardiovascular: Positive for palpitations. Negative for chest pain and leg swelling.   Gastrointestinal: Negative for abdominal distention, abdominal pain, anal bleeding, blood in stool, constipation, diarrhea, nausea and vomiting.   Endocrine: Negative for cold intolerance, heat intolerance and polydipsia.   Genitourinary: Negative for dysuria, hematuria and urgency.   Musculoskeletal: Negative for arthralgias, joint swelling and myalgias.   Skin: Negative for rash.   Allergic/Immunologic: Negative for food allergies and immunocompromised state.   Neurological: Negative for dizziness, seizures, syncope and headaches.   Hematological: Negative for adenopathy. Bruises/bleeds easily.   Psychiatric/Behavioral: Negative for dysphoric mood. The  patient is not nervous/anxious and is not hyperactive.      Patient Active Problem List   Diagnosis   • Colon polyp   • Diabetes mellitus   • Enlarged prostate   • Generalized anxiety disorder   • Hyperlipidemia   • Hypertension   • Onychomycosis   • TIA (transient ischemic attack)   • CAD (coronary artery disease)   • Atrial fibrillation   • Vitamin D deficiency   • Colon cancer screening     Past Medical History:   Diagnosis Date   • Abnormal EKG     Bifascicular block and no prior ischemia evaluation.    • Anemia    • Anxiety    • Atrial fibrillation     CHADS VASc=3.  Continue Xarelto 20.  vas continue Zaroxolyn 20 has a relative 20 Knobloch can add Lantus 40   • CAD (coronary artery disease)    • Colon polyp 04/09/2015   • Contact dermatitis    • Diabetes    • Dyslipidemia    • Hypertension    • Palpitations    • Prostatism    • Stroke    • Tattoo      Past Surgical History:   Procedure Laterality Date   • COLONOSCOPY  04/09/2015   • EYE SURGERY     • PROSTATE SURGERY     • TONSILLECTOMY  1947     Family History   Problem Relation Age of Onset   • Diabetes Other    • Cancer Other         NO PROSTATE CANCER HISTORY   • Hypertension Other    • Cancer Other    • Diabetes Other    • Liver disease Mother    • Liver disease Father    • Stomach cancer Father    • Colon cancer Neg Hx      Social History   Substance Use Topics   • Smoking status: Former Smoker     Types: Cigarettes     Quit date: 2001   • Smokeless tobacco: Never Used      Comment: STOPPED 5 YEARS AGO   • Alcohol use No       Current Outpatient Prescriptions:   •  cholecalciferol (VITAMIN D3) 1000 units tablet, Take 1,000 Units by mouth Daily., Disp: , Rfl:   •  insulin glargine (LANTUS) 100 UNIT/ML injection, Inject 40 Units under the skin Every Night., Disp: 15 mL, Rfl: 11  •  insulin lispro (HUMALOG) 100 UNIT/ML injection, Inject 10 Units under the skin 3 (Three) Times a Day Before Meals., Disp: 10 mL, Rfl: 11  •  Insulin Pen Needle (PEN NEEDLES  "3/16\") 31G X 5 MM misc, Use with insulin daily E11.9, Disp: 100 each, Rfl: 5  •  lisinopril (PRINIVIL,ZESTRIL) 40 MG tablet, Take 1 tablet by mouth Daily. 200001, Disp: 30 tablet, Rfl: 11  •  metFORMIN (GLUCOPHAGE) 500 MG tablet, Take 1 tablet by mouth Daily With Breakfast., Disp: 30 tablet, Rfl: 11  •  PARoxetine (PAXIL) 20 MG tablet, Take 1 tablet by mouth Daily., Disp: 90 tablet, Rfl: 3  •  pravastatin (PRAVACHOL) 40 MG tablet, Take 1 tablet by mouth Every Evening., Disp: 30 tablet, Rfl: 11  •  rivaroxaban (XARELTO) 20 MG tablet, Take 1 tablet by mouth Daily., Disp: 30 tablet, Rfl: 11    No Known Allergies     /95   Pulse 89   Temp 96.2 °F (35.7 °C)   Resp 18   Ht 180.3 cm (71\")   Wt 82.1 kg (181 lb)   BMI 25.24 kg/m²     Physical Exam   Constitutional: He is oriented to person, place, and time. He appears well-developed and well-nourished. No distress.   HENT:   Head: Normocephalic and atraumatic.   Right Ear: Hearing and external ear normal.   Left Ear: Hearing and external ear normal.   Nose: Nose normal.   Mouth/Throat: Oropharynx is clear and moist and mucous membranes are normal. Mucous membranes are not pale, not dry and not cyanotic. No oral lesions. No oropharyngeal exudate.   Eyes: Conjunctivae and EOM are normal. Right eye exhibits no discharge. Left eye exhibits no discharge.   Neck: Trachea normal. Neck supple. No JVD present. No edema present. No thyroid mass and no thyromegaly present.   Cardiovascular: Normal rate, regular rhythm, S2 normal and normal heart sounds.  Exam reveals no gallop, no S3 and no friction rub.    No murmur heard.  Pulmonary/Chest: Effort normal and breath sounds normal. No respiratory distress. He exhibits no tenderness.   Abdominal: Normal appearance and bowel sounds are normal. He exhibits no distension, no ascites and no mass. There is no splenomegaly or hepatomegaly. There is no tenderness. There is no rigidity, no rebound and no guarding. No hernia. "     Vascular Status -  His right foot exhibits no edema. His left foot exhibits no edema.  Lymphadenopathy:     He has no cervical adenopathy.        Left: No supraclavicular adenopathy present.   Neurological: He is alert and oriented to person, place, and time. He has normal strength. No cranial nerve deficit or sensory deficit.   Skin: No rash noted. He is not diaphoretic. No cyanosis. No pallor. Nails show no clubbing.   Psychiatric: He has a normal mood and affect.   Nursing note and vitals reviewed.  Stigmata of chronic liver disease:  None.  Asterixis:  None.  Laboratory Tests:   Upon review of records:     Dated 4/30/2018 glucose 193 BUN 15 creatinine 0.7 sodium 139 potassium 4.3 chloride 96 CO2 30 calcium 10.2 albumin 4.3 total bilirubin 1.0 alkaline phosphatase 95 AST 19 ALT 29 WBC 8.41 hemoglobin 15.3 hematocrit 46.9 platelet count 324 MCV 91.4 TSH 2.550 hemoglobin A1c 8.9 vitamin D 13.9    Procedures:  Upon review of records:    Colonoscopy dated 4/9/2015 reveals left-sided diverticulosis.  Colon polyps.  Internal hemorrhoids.  Transverse colon polyp biopsy reveals tubular adenoma, no high grade dysplasia or invasive neoplasia identified.  Ascending colon polyp biopsy reveals tubular adenoma.  No high grade dysplasia or invasive neoplasia identified.    Assessment:      ICD-10-CM ICD-9-CM   1. Colon cancer screening Z12.11 V76.51     Discussion:  1. Last colonoscopy was in 2015 with polyps, no high grade dysplasia identified. No family history of colon cancer.    Plan/  Patient Instructions   1. Colonoscopy: Description of the procedure, risks, benefits, alternatives and options, including nonoperative options, were discussed with the patient in detail. The patient understands and wishes to wait at this time. Of interest, the patient does not have a family history of colon cancer as was previously noted in chart. It is recommended to have a screening colonoscopy in 5 years if history of polyps without  dysplasia and no family history of colon cancer.  2. The patient will call back if any change in bowel habits, bright red blood per rectum, melena, or hemoccult positive stools.  3. Follow up: The patient will call back     SUKI Carrillo

## 2018-05-21 NOTE — PATIENT INSTRUCTIONS
1. Colonoscopy: Description of the procedure, risks, benefits, alternatives and options, including nonoperative options, were discussed with the patient in detail. The patient understands and wishes to wait at this time. Of interest, the patient does not have a family history of colon cancer as was previously noted in chart. It is recommended to have a screening colonoscopy in 5 years if history of polyps without dysplasia and no family history of colon cancer.  2. The patient will call back if any change in bowel habits, bright red blood per rectum, melena, or hemoccult positive stools.  3. Follow up: The patient will call back

## 2018-07-02 ENCOUNTER — TELEPHONE (OUTPATIENT)
Dept: INTERNAL MEDICINE | Facility: CLINIC | Age: 73
End: 2018-07-02

## 2018-07-02 NOTE — TELEPHONE ENCOUNTER
Patient is requesting a prescription for Lancets and meter. Patient uses [facility] in [place].

## 2018-07-03 RX ORDER — BLOOD-GLUCOSE METER
KIT MISCELLANEOUS
Qty: 100 EACH | Refills: 5 | Status: SHIPPED | OUTPATIENT
Start: 2018-07-03 | End: 2018-07-06 | Stop reason: SDUPTHER

## 2018-07-03 RX ORDER — BLOOD-GLUCOSE METER
KIT MISCELLANEOUS
Qty: 100 EACH | Refills: 5 | Status: SHIPPED | OUTPATIENT
Start: 2018-07-03 | End: 2018-07-03 | Stop reason: SDUPTHER

## 2018-07-06 ENCOUNTER — TELEPHONE (OUTPATIENT)
Dept: INTERNAL MEDICINE | Facility: CLINIC | Age: 73
End: 2018-07-06

## 2018-07-06 DIAGNOSIS — E13.8 DIABETES MELLITUS OF OTHER TYPE WITH COMPLICATION, UNSPECIFIED LONG TERM INSULIN USE STATUS: Primary | ICD-10-CM

## 2018-07-06 RX ORDER — BLOOD-GLUCOSE METER
KIT MISCELLANEOUS
Qty: 100 EACH | Refills: 5 | Status: SHIPPED | OUTPATIENT
Start: 2018-07-06 | End: 2018-08-10

## 2018-07-06 NOTE — TELEPHONE ENCOUNTER
Needs a prescription for test strips for FreeStyle Lite and it has to have the diagnosis code E11.9 in the prescription.

## 2018-07-17 ENCOUNTER — TELEPHONE (OUTPATIENT)
Dept: INTERNAL MEDICINE | Facility: CLINIC | Age: 73
End: 2018-07-17

## 2018-07-17 RX ORDER — INSULIN GLARGINE 100 [IU]/ML
40 INJECTION, SOLUTION SUBCUTANEOUS NIGHTLY
Qty: 15 ML | Refills: 11 | Status: SHIPPED | OUTPATIENT
Start: 2018-07-17 | End: 2019-07-17 | Stop reason: SDUPTHER

## 2018-07-17 RX ORDER — PRAVASTATIN SODIUM 40 MG
40 TABLET ORAL EVERY EVENING
Qty: 30 TABLET | Refills: 11 | Status: SHIPPED | OUTPATIENT
Start: 2018-07-17 | End: 2019-03-06 | Stop reason: SDUPTHER

## 2018-07-17 NOTE — TELEPHONE ENCOUNTER
PT CAME IN TODAY AND HE NEEDS THE FOLLOWING REFILLS CALLED INTO [facility] IN [place].    CONFIRMED PT PH #[phone]    LANTUS 100 UNIT/ML     PRAVACHOL 40 MG    PT ONLY HAS 1 DOSE OF LANTUS LEFT FOR TONIGHT.  TWO DOSES OF PRAVACHOL LEFT.

## 2018-08-08 LAB
25(OH)D3+25(OH)D2 SERPL-MCNC: 32.8 NG/ML
ALBUMIN SERPL-MCNC: 4.2 G/DL (ref 3.5–5)
ALBUMIN/GLOB SERPL: 1.6 G/DL (ref 1–2)
ALP SERPL-CCNC: 90 U/L (ref 38–126)
ALT SERPL-CCNC: 27 U/L (ref 13–69)
APPEARANCE UR: CLEAR
AST SERPL-CCNC: 22 U/L (ref 15–46)
BASOPHILS # BLD AUTO: 0.07 10*3/MM3 (ref 0–0.2)
BASOPHILS NFR BLD AUTO: 0.9 % (ref 0–2.5)
BILIRUB SERPL-MCNC: 0.9 MG/DL (ref 0.2–1.3)
BILIRUB UR QL STRIP: NEGATIVE
BUN SERPL-MCNC: 13 MG/DL (ref 7–20)
BUN/CREAT SERPL: 21.7 (ref 6.3–21.9)
CALCIUM SERPL-MCNC: 9.9 MG/DL (ref 8.4–10.2)
CHLORIDE SERPL-SCNC: 98 MMOL/L (ref 98–107)
CO2 SERPL-SCNC: 29 MMOL/L (ref 26–30)
COLOR UR: YELLOW
CREAT SERPL-MCNC: 0.6 MG/DL (ref 0.6–1.3)
EOSINOPHIL # BLD AUTO: 0.2 10*3/MM3 (ref 0–0.7)
EOSINOPHIL NFR BLD AUTO: 2.5 % (ref 0–7)
ERYTHROCYTE [DISTWIDTH] IN BLOOD BY AUTOMATED COUNT: 13.5 % (ref 11.5–14.5)
GLOBULIN SER CALC-MCNC: 2.6 GM/DL
GLUCOSE SERPL-MCNC: 139 MG/DL (ref 74–98)
GLUCOSE UR QL: ABNORMAL
HBA1C MFR BLD: 8.3 %
HCT VFR BLD AUTO: 46 % (ref 42–52)
HGB BLD-MCNC: 15.2 G/DL (ref 14–18)
HGB UR QL STRIP: NEGATIVE
IMM GRANULOCYTES # BLD: 0.02 10*3/MM3 (ref 0–0.06)
IMM GRANULOCYTES NFR BLD: 0.2 % (ref 0–0.6)
KETONES UR QL STRIP: NEGATIVE
LEUKOCYTE ESTERASE UR QL STRIP: NEGATIVE
LYMPHOCYTES # BLD AUTO: 3.19 10*3/MM3 (ref 0.6–3.4)
LYMPHOCYTES NFR BLD AUTO: 39.7 % (ref 10–50)
MCH RBC QN AUTO: 29.7 PG (ref 27–31)
MCHC RBC AUTO-ENTMCNC: 33 G/DL (ref 30–37)
MCV RBC AUTO: 89.8 FL (ref 80–94)
MONOCYTES # BLD AUTO: 0.7 10*3/MM3 (ref 0–0.9)
MONOCYTES NFR BLD AUTO: 8.7 % (ref 0–12)
NEUTROPHILS # BLD AUTO: 3.86 10*3/MM3 (ref 2–6.9)
NEUTROPHILS NFR BLD AUTO: 48 % (ref 37–80)
NITRITE UR QL STRIP: NEGATIVE
NRBC BLD AUTO-RTO: 0 /100 WBC (ref 0–0)
PH UR STRIP: 7 [PH] (ref 5–8)
PLATELET # BLD AUTO: 318 10*3/MM3 (ref 130–400)
POTASSIUM SERPL-SCNC: 5.6 MMOL/L (ref 3.5–5.1)
PROT SERPL-MCNC: 6.8 G/DL (ref 6.3–8.2)
PROT UR QL STRIP: NEGATIVE
RBC # BLD AUTO: 5.12 10*6/MM3 (ref 4.7–6.1)
SODIUM SERPL-SCNC: 137 MMOL/L (ref 137–145)
SP GR UR: 1.01 (ref 1–1.03)
TSH SERPL DL<=0.005 MIU/L-ACNC: 1.75 MIU/ML (ref 0.47–4.68)
UROBILINOGEN UR STRIP-MCNC: ABNORMAL MG/DL
WBC # BLD AUTO: 8.04 10*3/MM3 (ref 4.8–10.8)

## 2018-08-10 ENCOUNTER — OFFICE VISIT (OUTPATIENT)
Dept: INTERNAL MEDICINE | Facility: CLINIC | Age: 73
End: 2018-08-10

## 2018-08-10 VITALS
WEIGHT: 171 LBS | DIASTOLIC BLOOD PRESSURE: 80 MMHG | SYSTOLIC BLOOD PRESSURE: 122 MMHG | RESPIRATION RATE: 14 BRPM | OXYGEN SATURATION: 96 % | HEART RATE: 88 BPM | HEIGHT: 71 IN | BODY MASS INDEX: 23.94 KG/M2 | TEMPERATURE: 97.2 F

## 2018-08-10 DIAGNOSIS — E55.9 VITAMIN D DEFICIENCY: ICD-10-CM

## 2018-08-10 DIAGNOSIS — E11.8 TYPE 2 DIABETES MELLITUS WITH COMPLICATION, WITH LONG-TERM CURRENT USE OF INSULIN (HCC): ICD-10-CM

## 2018-08-10 DIAGNOSIS — G45.9 TRANSIENT CEREBRAL ISCHEMIA, UNSPECIFIED TYPE: ICD-10-CM

## 2018-08-10 DIAGNOSIS — Z79.4 TYPE 2 DIABETES MELLITUS WITH COMPLICATION, WITH LONG-TERM CURRENT USE OF INSULIN (HCC): ICD-10-CM

## 2018-08-10 DIAGNOSIS — I10 ESSENTIAL HYPERTENSION: ICD-10-CM

## 2018-08-10 DIAGNOSIS — E78.2 MIXED HYPERLIPIDEMIA: Primary | ICD-10-CM

## 2018-08-10 DIAGNOSIS — E87.5 HYPERKALEMIA: ICD-10-CM

## 2018-08-10 DIAGNOSIS — I48.91 ATRIAL FIBRILLATION, UNSPECIFIED TYPE (HCC): ICD-10-CM

## 2018-08-10 DIAGNOSIS — I25.10 CORONARY ARTERY DISEASE INVOLVING NATIVE CORONARY ARTERY OF NATIVE HEART WITHOUT ANGINA PECTORIS: ICD-10-CM

## 2018-08-10 DIAGNOSIS — F41.1 GENERALIZED ANXIETY DISORDER: ICD-10-CM

## 2018-08-10 PROCEDURE — 99214 OFFICE O/P EST MOD 30 MIN: CPT | Performed by: INTERNAL MEDICINE

## 2018-08-10 RX ORDER — PAROXETINE HYDROCHLORIDE 20 MG/1
20 TABLET, FILM COATED ORAL DAILY
Qty: 30 TABLET | Refills: 11 | Status: SHIPPED | OUTPATIENT
Start: 2018-08-10 | End: 2019-03-06 | Stop reason: SDUPTHER

## 2018-08-10 RX ORDER — LISINOPRIL 40 MG/1
40 TABLET ORAL DAILY
Qty: 30 TABLET | Refills: 11 | Status: SHIPPED | OUTPATIENT
Start: 2018-08-10 | End: 2019-03-06 | Stop reason: SDUPTHER

## 2018-08-10 NOTE — PROGRESS NOTES
"Subjective   Que Lagunas is a 73 y.o. male.     Chief Complaint   Patient presents with   • Follow-up     3 month follow up        History of Present Illness   Patient here for follow-up.  Hypertension stable medication.  The diabetes morning sugar decreased patient is taking Lantus 35 units and Humalog 10 units each meal.  But A1c 8.3. The major meal is lunch.  Vitamin D stable on supplement.  Anxiety stable medication.  Weight loss the 10 pound on purpose TIA history A. fib stable on medication.  Potassium elevated need to repeat.    Current Outpatient Prescriptions:   •  cholecalciferol (VITAMIN D3) 1000 units tablet, Take 1,000 Units by mouth Daily., Disp: , Rfl:   •  insulin glargine (LANTUS) 100 UNIT/ML injection, Inject 40 Units under the skin Every Night., Disp: 15 mL, Rfl: 11  •  insulin lispro (HUMALOG) 100 UNIT/ML injection, Inject 10 Units under the skin into the appropriate area as directed 3 (Three) Times a Day Before Meals., Disp: 10 mL, Rfl: 11  •  Insulin Pen Needle (PEN NEEDLES 3/16\") 31G X 5 MM misc, Use with insulin daily E11.9, Disp: 100 each, Rfl: 5  •  lisinopril (PRINIVIL,ZESTRIL) 40 MG tablet, Take 1 tablet by mouth Daily. 200001, Disp: 30 tablet, Rfl: 11  •  metFORMIN (GLUCOPHAGE) 500 MG tablet, Take 1 tablet by mouth Daily With Breakfast., Disp: 30 tablet, Rfl: 11  •  PARoxetine (PAXIL) 20 MG tablet, Take 1 tablet by mouth Daily., Disp: 30 tablet, Rfl: 11  •  pravastatin (PRAVACHOL) 40 MG tablet, Take 1 tablet by mouth Every Evening., Disp: 30 tablet, Rfl: 11  •  rivaroxaban (XARELTO) 20 MG tablet, Take 1 tablet by mouth Daily., Disp: 30 tablet, Rfl: 11    The following portions of the patient's history were reviewed and updated as appropriate: allergies, current medications, past family history, past medical history, past social history, past surgical history and problem list.    Review of Systems   Constitutional: Negative.    Respiratory: Negative.    Cardiovascular: Negative.  "   Gastrointestinal: Negative.    Musculoskeletal: Negative.    Skin: Negative.    Neurological: Negative.    Psychiatric/Behavioral: Negative.        Objective   Physical Exam   Constitutional: He is oriented to person, place, and time. He appears well-nourished.   Neck: Neck supple.   Cardiovascular: Normal rate, regular rhythm and normal heart sounds.    Pulmonary/Chest: Effort normal and breath sounds normal.   Abdominal: Bowel sounds are normal.   Neurological: He is alert and oriented to person, place, and time.   Skin: Skin is warm.   Psychiatric: He has a normal mood and affect.       All tests have been reviewed.    Assessment/Plan   There are no diagnoses linked to this encounter.           TIA continue xarelto, Echo and carotid duplex and CT head only small vessel ischemia   A.fib continue med , follow up with cardio  Hypertension continue on medication, good diet , no soda  BPH,s/p laser surgery doing better, hematuria s/p scope,  follow up with dr mclain,   Diabetes  on humalog 10u each meal tid with meal, increase lunch humalog to 15u, decrease lantus to 30u qhs  dyslipidemia continue medicine  vitD low continue  vitD3 1000u daily  Onychomycosis improved after lamisil  Anxiety continue medication  colon:divertic polyp and hemorroid done 4/2015  Vaccinations up-to-date, patient declined shingrix  Hyperkalemia repeat  Balancing low, PT patient refuses  Weight loss on purpose       8week after labs need log book

## 2018-09-19 ENCOUNTER — TELEPHONE (OUTPATIENT)
Dept: INTERNAL MEDICINE | Facility: CLINIC | Age: 73
End: 2018-09-19

## 2018-09-19 RX ORDER — BLOOD-GLUCOSE METER
KIT MISCELLANEOUS
Qty: 1 EACH | Refills: 0 | Status: SHIPPED | OUTPATIENT
Start: 2018-09-19 | End: 2019-09-10

## 2018-09-19 RX ORDER — LANCETS 30 GAUGE
EACH MISCELLANEOUS
Qty: 100 EACH | Refills: 12 | Status: SHIPPED | OUTPATIENT
Start: 2018-09-19

## 2018-09-19 NOTE — TELEPHONE ENCOUNTER
Patient came in the office and said that his Insurance will not cover the test strips that were prescribed and he needs some new ones called in to [facility].

## 2018-10-10 ENCOUNTER — OFFICE VISIT (OUTPATIENT)
Dept: INTERNAL MEDICINE | Facility: CLINIC | Age: 73
End: 2018-10-10

## 2018-10-10 VITALS
SYSTOLIC BLOOD PRESSURE: 128 MMHG | HEART RATE: 78 BPM | HEIGHT: 71 IN | RESPIRATION RATE: 16 BRPM | DIASTOLIC BLOOD PRESSURE: 90 MMHG | OXYGEN SATURATION: 98 % | TEMPERATURE: 98.5 F | BODY MASS INDEX: 25.34 KG/M2 | WEIGHT: 181 LBS

## 2018-10-10 DIAGNOSIS — G45.9 TIA (TRANSIENT ISCHEMIC ATTACK): ICD-10-CM

## 2018-10-10 DIAGNOSIS — I48.91 ATRIAL FIBRILLATION, UNSPECIFIED TYPE (HCC): ICD-10-CM

## 2018-10-10 DIAGNOSIS — Z79.4 TYPE 2 DIABETES MELLITUS WITH COMPLICATION, WITH LONG-TERM CURRENT USE OF INSULIN (HCC): ICD-10-CM

## 2018-10-10 DIAGNOSIS — I25.10 CORONARY ARTERY DISEASE INVOLVING NATIVE CORONARY ARTERY OF NATIVE HEART WITHOUT ANGINA PECTORIS: ICD-10-CM

## 2018-10-10 DIAGNOSIS — E11.8 TYPE 2 DIABETES MELLITUS WITH COMPLICATION, WITH LONG-TERM CURRENT USE OF INSULIN (HCC): ICD-10-CM

## 2018-10-10 DIAGNOSIS — Z23 NEED FOR VACCINATION FOR STREP PNEUMONIAE: ICD-10-CM

## 2018-10-10 DIAGNOSIS — K63.5 POLYP OF COLON, UNSPECIFIED PART OF COLON, UNSPECIFIED TYPE: ICD-10-CM

## 2018-10-10 DIAGNOSIS — I10 ESSENTIAL HYPERTENSION: ICD-10-CM

## 2018-10-10 DIAGNOSIS — F41.1 GENERALIZED ANXIETY DISORDER: ICD-10-CM

## 2018-10-10 DIAGNOSIS — N40.0 ENLARGED PROSTATE: ICD-10-CM

## 2018-10-10 DIAGNOSIS — E78.2 MIXED HYPERLIPIDEMIA: Primary | ICD-10-CM

## 2018-10-10 DIAGNOSIS — E55.9 VITAMIN D DEFICIENCY: ICD-10-CM

## 2018-10-10 LAB
EXPIRATION DATE: NORMAL
HBA1C MFR BLD: 7.9 %
Lab: NORMAL
POTASSIUM SERPL-SCNC: 4.4 MMOL/L (ref 3.5–5.1)

## 2018-10-10 PROCEDURE — G0009 ADMIN PNEUMOCOCCAL VACCINE: HCPCS | Performed by: INTERNAL MEDICINE

## 2018-10-10 PROCEDURE — 99214 OFFICE O/P EST MOD 30 MIN: CPT | Performed by: INTERNAL MEDICINE

## 2018-10-10 PROCEDURE — 83036 HEMOGLOBIN GLYCOSYLATED A1C: CPT | Performed by: INTERNAL MEDICINE

## 2018-10-10 PROCEDURE — 90732 PPSV23 VACC 2 YRS+ SUBQ/IM: CPT | Performed by: INTERNAL MEDICINE

## 2018-10-10 RX ORDER — LANCETS
1 EACH MISCELLANEOUS 3 TIMES DAILY
Qty: 100 EACH | Refills: 5 | Status: SHIPPED | OUTPATIENT
Start: 2018-10-10 | End: 2022-08-03 | Stop reason: SDUPTHER

## 2018-10-10 RX ORDER — BLOOD-GLUCOSE METER
1 KIT MISCELLANEOUS AS NEEDED
Qty: 1 EACH | Refills: 0 | Status: SHIPPED | OUTPATIENT
Start: 2018-10-10

## 2018-10-10 NOTE — PROGRESS NOTES
"Subjective   Que Lagunas is a 73 y.o. male.     Chief Complaint   Patient presents with   • Hyperlipidemia   • Follow-up   • Hypertension       History of Present Illness   Patient here for follow-up of.  A. fib stable medication.  History of a TIA on medication stable.  The diabetes reviewed home after meal and fasting sugar.  Numbers are reasonable.  Anxiety stable on medication.  Potassium elevated and needs to be repeated.  Flu shot already done.  Dyslipidemia stable medication.    Current Outpatient Prescriptions:   •  cholecalciferol (VITAMIN D3) 1000 units tablet, Take 1,000 Units by mouth Daily., Disp: , Rfl:   •  glucose blood test strip, Use to test twice daily. E11.9, Disp: 100 each, Rfl: 12  •  glucose blood test strip, Use as instructed, Disp: 100 each, Rfl: 5  •  glucose monitor monitoring kit, Use to test twice daily. E11.9, Disp: 1 each, Rfl: 0  •  glucose monitor monitoring kit, 1 each As Needed (prn)., Disp: 1 each, Rfl: 0  •  insulin glargine (LANTUS) 100 UNIT/ML injection, Inject 40 Units under the skin Every Night., Disp: 15 mL, Rfl: 11  •  insulin lispro (HUMALOG) 100 UNIT/ML injection, Inject 10 Units under the skin into the appropriate area as directed 3 (Three) Times a Day Before Meals., Disp: 10 mL, Rfl: 11  •  Insulin Pen Needle (PEN NEEDLES 3/16\") 31G X 5 MM misc, Use with insulin daily E11.9, Disp: 100 each, Rfl: 5  •  Lancets misc, Use to test twice daily. E11.9, Disp: 100 each, Rfl: 12  •  LIFESCAN UNISTIK II LANCETS misc, 1 Units 3 (Three) Times a Day., Disp: 100 each, Rfl: 5  •  lisinopril (PRINIVIL,ZESTRIL) 40 MG tablet, Take 1 tablet by mouth Daily. 200001, Disp: 30 tablet, Rfl: 11  •  metFORMIN (GLUCOPHAGE) 500 MG tablet, Take 1 tablet by mouth Daily With Breakfast., Disp: 30 tablet, Rfl: 11  •  PARoxetine (PAXIL) 20 MG tablet, Take 1 tablet by mouth Daily., Disp: 30 tablet, Rfl: 11  •  pravastatin (PRAVACHOL) 40 MG tablet, Take 1 tablet by mouth Every Evening., Disp: 30 " tablet, Rfl: 11  •  rivaroxaban (XARELTO) 20 MG tablet, Take 1 tablet by mouth Daily., Disp: 30 tablet, Rfl: 11    The following portions of the patient's history were reviewed and updated as appropriate: allergies, current medications, past family history, past medical history, past social history, past surgical history and problem list.    Review of Systems   Constitutional: Negative.    Respiratory: Negative.    Cardiovascular: Negative.    Gastrointestinal: Negative.    Musculoskeletal: Negative.    Skin: Negative.    Neurological: Negative.    Psychiatric/Behavioral: Negative.        Objective   Physical Exam   Constitutional: He is oriented to person, place, and time. He appears well-nourished.   Neck: Neck supple.   Cardiovascular: Normal rate, regular rhythm and normal heart sounds.    Pulmonary/Chest: Effort normal and breath sounds normal.   Abdominal: Bowel sounds are normal.   Neurological: He is alert and oriented to person, place, and time.   Skin: Skin is warm.   Psychiatric: He has a normal mood and affect.       All tests have been reviewed.    Assessment/Plan   There are no diagnoses linked to this encounter.            TIA continue xarelto, Echo and carotid duplex and CT head only small vessel ischemia   A.fib continue med , follow up with cardio  Hypertension continue on medication, good diet , no soda  BPH,s/p laser surgery doing better, hematuria s/p scope,  follow up with dr mclain,   Diabetes  on humalog 10u each meal morning and dinner meal, lunch humalog 15u,  lantus 30u qhs  dyslipidemia continue medicine  vitD low continue  vitD3 1000u daily  Onychomycosis improved after lamisil  Anxiety continue medication  colon:divertic polyp and hemorroid done 4/2015  Vaccinations up-to-date, patient declined shingrix, pneumovax today  Hyperkalemia repeat  Balancing low, PT patient refuses        8week wellness

## 2018-11-01 ENCOUNTER — OFFICE VISIT (OUTPATIENT)
Dept: INTERNAL MEDICINE | Facility: CLINIC | Age: 73
End: 2018-11-01

## 2018-11-01 VITALS
DIASTOLIC BLOOD PRESSURE: 92 MMHG | HEART RATE: 63 BPM | SYSTOLIC BLOOD PRESSURE: 150 MMHG | OXYGEN SATURATION: 96 % | BODY MASS INDEX: 25.2 KG/M2 | HEIGHT: 71 IN | WEIGHT: 180 LBS

## 2018-11-01 DIAGNOSIS — I10 ESSENTIAL HYPERTENSION: ICD-10-CM

## 2018-11-01 DIAGNOSIS — G89.29 CHRONIC PAIN OF LEFT KNEE: Primary | ICD-10-CM

## 2018-11-01 DIAGNOSIS — Z79.4 TYPE 2 DIABETES MELLITUS WITH COMPLICATION, WITH LONG-TERM CURRENT USE OF INSULIN (HCC): ICD-10-CM

## 2018-11-01 DIAGNOSIS — M25.562 CHRONIC PAIN OF LEFT KNEE: Primary | ICD-10-CM

## 2018-11-01 DIAGNOSIS — L72.3 SEBACEOUS CYST: ICD-10-CM

## 2018-11-01 DIAGNOSIS — E11.8 TYPE 2 DIABETES MELLITUS WITH COMPLICATION, WITH LONG-TERM CURRENT USE OF INSULIN (HCC): ICD-10-CM

## 2018-11-01 PROCEDURE — 99214 OFFICE O/P EST MOD 30 MIN: CPT | Performed by: INTERNAL MEDICINE

## 2018-11-01 NOTE — PROGRESS NOTES
"Subjective   Que Lagunas is a 73 y.o. male.     Chief Complaint   Patient presents with   • Knee Pain     left knee        Knee Pain    Incident onset: 4 mo without injury  There was no injury mechanism. The pain is present in the left knee. The pain is at a severity of 4/10. The pain has been intermittent since onset. Associated symptoms include an inability to bear weight. The symptoms are aggravated by movement. He has tried nothing for the symptoms. The treatment provided no relief.    left knee give out and without lock up.  Blood pressure moderately elevated today.  Patient is on blood pressure medicine.  Also complains left cheek cyst patient wants to be removed.  Patient also has diabetes and need the samples.  Morning sugar is around .    Current Outpatient Prescriptions:   •  cholecalciferol (VITAMIN D3) 1000 units tablet, Take 1,000 Units by mouth Daily., Disp: , Rfl:   •  glucose blood test strip, Use to test twice daily. E11.9, Disp: 100 each, Rfl: 12  •  glucose blood test strip, Use as instructed, Disp: 100 each, Rfl: 5  •  glucose monitor monitoring kit, Use to test twice daily. E11.9, Disp: 1 each, Rfl: 0  •  glucose monitor monitoring kit, 1 each As Needed (prn)., Disp: 1 each, Rfl: 0  •  insulin glargine (LANTUS) 100 UNIT/ML injection, Inject 40 Units under the skin Every Night., Disp: 15 mL, Rfl: 11  •  insulin lispro (HUMALOG) 100 UNIT/ML injection, Inject 10 Units under the skin into the appropriate area as directed 3 (Three) Times a Day Before Meals., Disp: 10 mL, Rfl: 11  •  Insulin Pen Needle (PEN NEEDLES 3/16\") 31G X 5 MM misc, Use with insulin daily E11.9, Disp: 100 each, Rfl: 5  •  Lancets misc, Use to test twice daily. E11.9, Disp: 100 each, Rfl: 12  •  LIFESCAN UNISTIK II LANCETS misc, 1 Units 3 (Three) Times a Day., Disp: 100 each, Rfl: 5  •  lisinopril (PRINIVIL,ZESTRIL) 40 MG tablet, Take 1 tablet by mouth Daily. 200001, Disp: 30 tablet, Rfl: 11  •  metFORMIN (GLUCOPHAGE) " 500 MG tablet, Take 1 tablet by mouth Daily With Breakfast., Disp: 30 tablet, Rfl: 11  •  PARoxetine (PAXIL) 20 MG tablet, Take 1 tablet by mouth Daily., Disp: 30 tablet, Rfl: 11  •  pravastatin (PRAVACHOL) 40 MG tablet, Take 1 tablet by mouth Every Evening., Disp: 30 tablet, Rfl: 11  •  rivaroxaban (XARELTO) 20 MG tablet, Take 1 tablet by mouth Daily., Disp: 30 tablet, Rfl: 11    The following portions of the patient's history were reviewed and updated as appropriate: allergies, current medications, past family history, past medical history, past social history, past surgical history and problem list.    Review of Systems   Constitutional: Negative.    Respiratory: Negative.    Cardiovascular: Negative.    Gastrointestinal: Negative.    Musculoskeletal: Positive for arthralgias.   Skin: Negative.         Left cheek mass   Neurological: Negative.    Psychiatric/Behavioral: Negative.        Objective   Physical Exam   Constitutional: He is oriented to person, place, and time. He appears well-developed and well-nourished.   HENT:   Head: Normocephalic and atraumatic.   Neck: Neck supple.   Cardiovascular: Normal rate, regular rhythm and normal heart sounds.    Pulmonary/Chest: Effort normal and breath sounds normal.   Abdominal: Soft. Bowel sounds are normal.   Musculoskeletal: He exhibits tenderness (mild).   Neurological: He is alert and oriented to person, place, and time.   Skin: Skin is warm.   Left cheek probable sebaceous cyst   Psychiatric: He has a normal mood and affect. His behavior is normal.       All tests have been reviewed.    Assessment/Plan   Diagnoses and all orders for this visit:    Chronic pain of left knee  -     XR Knee 3 View Left        Tylenol prn and PT   Diabetes sample given  Decrease night time insulin to 35u   Left cheek cyst refer to derm   HTN watch and diet

## 2018-11-09 ENCOUNTER — OFFICE VISIT (OUTPATIENT)
Dept: UROLOGY | Facility: CLINIC | Age: 73
End: 2018-11-09

## 2018-11-09 VITALS
WEIGHT: 180 LBS | OXYGEN SATURATION: 98 % | DIASTOLIC BLOOD PRESSURE: 82 MMHG | SYSTOLIC BLOOD PRESSURE: 140 MMHG | HEIGHT: 71 IN | BODY MASS INDEX: 25.2 KG/M2 | HEART RATE: 70 BPM

## 2018-11-09 DIAGNOSIS — Z87.438 HISTORY OF BALANITIS: Primary | ICD-10-CM

## 2018-11-09 DIAGNOSIS — N40.1 BPH WITH URINARY OBSTRUCTION: ICD-10-CM

## 2018-11-09 DIAGNOSIS — N13.8 BPH WITH URINARY OBSTRUCTION: ICD-10-CM

## 2018-11-09 LAB
BILIRUB BLD-MCNC: NEGATIVE MG/DL
CLARITY, POC: CLEAR
COLOR UR: YELLOW
GLUCOSE UR STRIP-MCNC: NEGATIVE MG/DL
KETONES UR QL: NEGATIVE
LEUKOCYTE EST, POC: NEGATIVE
NITRITE UR-MCNC: NEGATIVE MG/ML
PH UR: 6 [PH] (ref 5–8)
PROT UR STRIP-MCNC: NEGATIVE MG/DL
PSA SERPL-MCNC: 0.96 NG/ML (ref 0.06–4)
RBC # UR STRIP: NEGATIVE /UL
SP GR UR: 1.01 (ref 1–1.03)
UROBILINOGEN UR QL: NORMAL

## 2018-11-09 PROCEDURE — 99213 OFFICE O/P EST LOW 20 MIN: CPT | Performed by: UROLOGY

## 2018-11-09 PROCEDURE — 81003 URINALYSIS AUTO W/O SCOPE: CPT | Performed by: UROLOGY

## 2018-11-09 RX ORDER — NYSTATIN AND TRIAMCINOLONE ACETONIDE 100000; 1 [USP'U]/G; MG/G
OINTMENT TOPICAL
Qty: 30 G | Refills: 1 | Status: SHIPPED | OUTPATIENT
Start: 2018-11-09 | End: 2020-08-19

## 2018-11-09 NOTE — PROGRESS NOTES
"Chief Complaint  History of Balanitis (Yearly)        GABRIEL Lagunas is a 73 y.o. male who  returns today for annual checkup with a history of enlarged prostate and bladder outlet obstruction.  He's been voiding much better since a laser prostatectomy but unfortunately has urgent incontinence and has to wear a diaper.  Between being down from leaking and having glucosuria from poorly controlled diabetes he's had problems with balanitis in the past.  Fortunately his diabetes under better control and his urine today is negative for glucose.    Vitals:    11/09/18 1037   BP: 140/82   Pulse: 70   SpO2: 98%       Past Medical History  Past Medical History:   Diagnosis Date   • Abnormal EKG     Bifascicular block and no prior ischemia evaluation.    • Anemia    • Anxiety    • Atrial fibrillation (CMS/HCC)     CHADS VASc=3.  Continue Xarelto 20.  vas continue Zaroxolyn 20 has a relative 20 Knobloch can add Lantus 40   • CAD (coronary artery disease)    • Colon polyp 04/09/2015   • Contact dermatitis    • Diabetes (CMS/HCC)    • Dyslipidemia    • Hypertension    • Palpitations    • Prostatism    • Stroke (CMS/HCC)    • Tattoo        Past Surgical History  Past Surgical History:   Procedure Laterality Date   • COLONOSCOPY  04/09/2015   • EYE SURGERY     • PROSTATE SURGERY     • TONSILLECTOMY  1947       Medications  has a current medication list which includes the following prescription(s): cholecalciferol, glucose blood, glucose blood, glucose monitor, glucose monitor, insulin glargine, insulin lispro, pen needles 3/16\", lancets, lifescan unistik ii lancets, lisinopril, metformin, paroxetine, pravastatin, and rivaroxaban.      Allergies  No Known Allergies    Social History  Social History     Social History Narrative   • No narrative on file       Family History  He has no family history of bladder or kidney cancer  He has no family history of kidney stones      AUA Symptom Score:      Review of Systems  Review of " Systems    Physical Exam  Physical Exam   Constitutional: He is oriented to person, place, and time. He appears well-developed and well-nourished.   HENT:   Head: Normocephalic and atraumatic.   Neck: Normal range of motion.   Pulmonary/Chest: Effort normal. No respiratory distress.   Abdominal: Soft. He exhibits no distension and no mass. There is no tenderness. No hernia.   Genitourinary: Rectum normal, prostate normal and penis normal.   Musculoskeletal: Normal range of motion.   Lymphadenopathy:     He has no cervical adenopathy.   Neurological: He is alert and oriented to person, place, and time.   Skin: Skin is warm and dry.   Psychiatric: He has a normal mood and affect. His behavior is normal.   Vitals reviewed.      Labs Recent and today in the office:  Results for orders placed or performed in visit on 11/09/18   POC Urinalysis Dipstick, Automated   Result Value Ref Range    Color Yellow Yellow, Straw, Dark Yellow, Nasima    Clarity, UA Clear Clear    Specific Gravity  1.015 1.005 - 1.030    pH, Urine 6.0 5.0 - 8.0    Leukocytes Negative Negative    Nitrite, UA Negative Negative    Protein, POC Negative Negative mg/dL    Glucose, UA Negative Negative, 1000 mg/dL (3+) mg/dL    Ketones, UA Negative Negative    Urobilinogen, UA Normal Normal    Bilirubin Negative Negative    Blood, UA Negative Negative         Assessment & Plan  #1 BPH with bladder outlet obstruction: Currently voiding with modest difficulty describing an AUA obstructive index of 16.  His digital rectal exam is benign and a PSA is submitted.    #2 balanitis/phimosis: His foreskin is easily reduced and the balanitis is minimal on the Mycolog cream.  He is encouraged to continue the better control of his diabetes.  He can return on an annual basis.

## 2018-11-16 ENCOUNTER — TREATMENT (OUTPATIENT)
Dept: PHYSICAL THERAPY | Facility: CLINIC | Age: 73
End: 2018-11-16

## 2018-11-16 DIAGNOSIS — M25.562 CHRONIC PAIN OF LEFT KNEE: Primary | ICD-10-CM

## 2018-11-16 DIAGNOSIS — G89.29 CHRONIC PAIN OF LEFT KNEE: Primary | ICD-10-CM

## 2018-11-16 PROCEDURE — 97110 THERAPEUTIC EXERCISES: CPT | Performed by: PHYSICAL THERAPIST

## 2018-11-16 PROCEDURE — G8978 MOBILITY CURRENT STATUS: HCPCS | Performed by: PHYSICAL THERAPIST

## 2018-11-16 PROCEDURE — 97162 PT EVAL MOD COMPLEX 30 MIN: CPT | Performed by: PHYSICAL THERAPIST

## 2018-11-16 PROCEDURE — G8979 MOBILITY GOAL STATUS: HCPCS | Performed by: PHYSICAL THERAPIST

## 2018-11-16 PROCEDURE — 97140 MANUAL THERAPY 1/> REGIONS: CPT | Performed by: PHYSICAL THERAPIST

## 2018-11-16 NOTE — PROGRESS NOTES
Physical Therapy Initial Evaluation and Plan of Care      Patient: Que Lagunas   : 1945  Diagnosis/ICD-10 Code:  Chronic pain of left knee [M25.562, G89.29]  Referring practitioner: Evans Blackburn MD    Subjective Evaluation    History of Present Illness  Mechanism of injury: Patient states he stepped in a hole about 3 months ago while walking the dog and it's hurt since then. States it occasionally gives out on him and when he loses his balance now he falls. Has had 4-5 falls in the past 6 months.       Patient Occupation: Retired   Pain  Current pain ratin  At best pain ratin  At worst pain ratin  Location: Left knee cap  Quality: dull ache  Aggravating factors: ambulation and sleeping (movement after prolonged rest)    Patient Goals  Patient goals for therapy: decreased edema, decreased pain, improved balance, increased motion, return to sport/leisure activities, independence with ADLs/IADLs and increased strength  Patient goal: Prevent falls            Objective       Tenderness   Left Knee   Tenderness in the inferior patella and medial patella.     Active Range of Motion   Left Knee   Flexion: 110 degrees   Extension: WFL    Right Knee   Flexion: 130 degrees   Extension: WFL    Patellar Mobility   Left Knee Hypomobile in the left medial and left lateral patellar tendon(s).     Strength/Myotome Testing     Left Knee   Flexion: 4-  Extension: 4  Quadriceps contraction: good    Tests     Additional Tests Details  Neg for ligamentous instability or onset of symptoms     Functional Assessment     Comments  Patient had great difficulty getting into tandem stance but was able to hold it for 20 sec bilaterally.   Patient demonstrates difficulty with balance during transitions such as sit<>stand and static balance.          Assessment & Plan     Assessment  Impairments: abnormal coordination, abnormal gait, abnormal muscle firing, abnormal muscle tone, abnormal or restricted ROM,  activity intolerance, impaired balance, impaired physical strength, lacks appropriate home exercise program and pain with function  Assessment details: Patient is a 73 year old male presenting with left knee pain for 3 months following a fall. Patient reports multiple falls and decreased balance as he has aged. Patient presents with decreased knee flexion with mild pain, decreased knee and hip strength, decreased static and dynamic balance, and risk of falls. Patient is appropriate for physical therapy to address the above issues.   Prognosis: good  Prognosis details: Short Term Goals (2 weeks):  1. Patient will be independent with home exercise program.  2. Patient will demonstrate improved hip strength by 50%.  3. Patient will demonstrate knee mobility of WFL.     Long Term Goals (6 weeks):  1. Patient will be able to walk at least 20 minutes with knee pain no greater than 2/10.  2. Patient will demonstrate functional squat with knee pain no greater than 2/10.  3. Patient will be able to assume and hold single leg stance on a static surface for at least 10 seconds.     Functional Limitations: walking, uncomfortable because of pain and sitting  Plan  Therapy options: will be seen for skilled physical therapy services  Planned modality interventions: ultrasound, traction, thermotherapy (hydrocollator packs), TENS, high voltage pulsed current (spasm management), high voltage pulsed current (pain management), electrical stimulation/Russian stimulation and cryotherapy  Planned therapy interventions: therapeutic activities, stretching, strengthening, spinal/joint mobilization, soft tissue mobilization, postural training, neuromuscular re-education, manual therapy, motor coordination training, abdominal trunk stabilization, ADL retraining, body mechanics training, balance/weight-bearing training, fine motor coordination training, flexibility, functional ROM exercises, gait training, home exercise program, IADL retraining  and joint mobilization  Frequency: 2-3x/week.  Duration in visits: 12  Treatment plan discussed with: patient        Manual Therapy:         mins  60769;  Therapeutic Exercise:    33     mins  85364;     Neuromuscular Lisa:        mins  05933;    Therapeutic Activity:     8     mins  00984;     Gait Training:           mins  13468;     Ultrasound:          mins  61059;    Electrical Stimulation:         mins  29262 ( );  Dry Needling          mins self-pay    Timed Treatment:   41   mins   Total Treatment:     58   mins    PT SIGNATURE: Pallavi Villa, PT   DATE TREATMENT INITIATED: 11/16/2018    Medicare Initial Certification  Certification Period: 2/14/2019  I certify that the therapy services are furnished while this patient is under my care.  The services outlined above are required by this patient, and will be reviewed every 90 days.     PHYSICIAN: Evans Blackburn MD      DATE:     Please sign and return via fax to [phone].. Thank you, [facility].

## 2018-11-26 DIAGNOSIS — D23.30 CYST, DERMOID, FACE: Primary | ICD-10-CM

## 2018-11-27 ENCOUNTER — TREATMENT (OUTPATIENT)
Dept: PHYSICAL THERAPY | Facility: CLINIC | Age: 73
End: 2018-11-27

## 2018-11-27 DIAGNOSIS — G89.29 CHRONIC PAIN OF LEFT KNEE: Primary | ICD-10-CM

## 2018-11-27 DIAGNOSIS — M25.562 CHRONIC PAIN OF LEFT KNEE: Primary | ICD-10-CM

## 2018-11-27 PROCEDURE — 97110 THERAPEUTIC EXERCISES: CPT | Performed by: PHYSICAL THERAPIST

## 2018-11-27 PROCEDURE — 97530 THERAPEUTIC ACTIVITIES: CPT | Performed by: PHYSICAL THERAPIST

## 2018-11-27 NOTE — PROGRESS NOTES
Subjective   Que Lagunas reports: Pain was much reduced after initial visit. Still having a little pain at knee cap but not as much. Hasn't had any instances of buckling or falls. Did not do home exercises due to travelling for the holiday.     Objective   Patient demonstrates appropriate step response to perturbations.     Tandem walk: pt demo's difficulty with foot placement and balance with multiple missteps.   Patient able to walk backwards without upset in balance.     See Exercise, Manual, and Modality Logs for complete treatment.       Assessment/Plan  Patient has responded well to knee exercises. Will continue working on knee strength and mobility as well as static and dynamic balance and fall prevention.   Progress per Plan of Care           Manual Therapy:         mins  76410;  Therapeutic Exercise:    42     mins  09099;     Neuromuscular Lisa:        mins  55899;    Therapeutic Activity:     12     mins  44471;     Gait Training:           mins  32749;     Ultrasound:          mins  12239;   Iontophoresis          mins  59285   Electrical Stimulation:         mins  30533 ( );  Dry Needling          mins self-pay  Fluidotherapy          mins 55018    Timed Treatment:   54   mins   Total Treatment:     54   mins    Pallavi Villa, PT  Physical Therapist

## 2018-11-29 ENCOUNTER — TREATMENT (OUTPATIENT)
Dept: PHYSICAL THERAPY | Facility: CLINIC | Age: 73
End: 2018-11-29

## 2018-11-29 DIAGNOSIS — G89.29 CHRONIC PAIN OF LEFT KNEE: Primary | ICD-10-CM

## 2018-11-29 DIAGNOSIS — M25.562 CHRONIC PAIN OF LEFT KNEE: Primary | ICD-10-CM

## 2018-11-29 PROCEDURE — 97530 THERAPEUTIC ACTIVITIES: CPT | Performed by: PHYSICAL THERAPIST

## 2018-11-29 PROCEDURE — 97110 THERAPEUTIC EXERCISES: CPT | Performed by: PHYSICAL THERAPIST

## 2018-11-29 NOTE — PROGRESS NOTES
Subjective   Que Lagunas reports: Has been very sore throughout legs since last visit. No knee buckling. Knee pain continues to be better.     Objective   Left knee AAROM: 125 deg     Tandem walk:  Patient had one major loss of balance laterally and req max assist x1 to stay upright. Several minor loss of balance.     See Exercise, Manual, and Modality Logs for complete treatment.       Assessment/Plan  Will continue working on knee mobility and strengthening as well as static and walking balance.   Progress per Plan of Care           Manual Therapy:         mins  50788;  Therapeutic Exercise:    43     mins  67654;     Neuromuscular Lisa:        mins  18551;    Therapeutic Activity:     13     mins  05975;     Gait Training:           mins  58886;     Ultrasound:          mins  96892;   Iontophoresis          mins  21683   Electrical Stimulation:         mins  92380 ( );  Dry Needling          mins self-pay  Fluidotherapy          mins 82855    Timed Treatment:   56   mins   Total Treatment:     56   mins    Pallavi Villa, PT  Physical Therapist

## 2018-12-06 ENCOUNTER — TREATMENT (OUTPATIENT)
Dept: PHYSICAL THERAPY | Facility: CLINIC | Age: 73
End: 2018-12-06

## 2018-12-06 DIAGNOSIS — M25.562 CHRONIC PAIN OF LEFT KNEE: Primary | ICD-10-CM

## 2018-12-06 DIAGNOSIS — G89.29 CHRONIC PAIN OF LEFT KNEE: Primary | ICD-10-CM

## 2018-12-06 PROCEDURE — 97530 THERAPEUTIC ACTIVITIES: CPT | Performed by: PHYSICAL THERAPIST

## 2018-12-06 NOTE — PROGRESS NOTES
Subjective   Que Lagunas reports: Physical therapy has not been helpful so far and he would like to stop at this time. States nothing is worse but it has not gotten better either.     Objective   Knee flexion 120 deg    Left hip flex, abd: 4+/5, ext 4/5    Patient able to squat approximately 40% of normal range.    See Exercise, Manual, and Modality Logs for complete treatment.       Assessment/Plan  Discussed expectations with this type of therapy, we have only had 2 treatment visits and would usually require more to see changes. Patient has reported improved symptoms in past treatment sessions and has made objective improvements. This was discussed with patient and he determined he would not like to continue although still appropriate for physical therapy. He was agreeable, however, to continuing home exercise program and additional exercises were provided. Patient agreed to perform these as instructed and potentially follow up with therapy in 2-3 weeks.     Holding therapy at this time at patient's request. Will discharge if not returned in 4 weeks.            Manual Therapy:         mins  67419;  Therapeutic Exercise:         mins  84754;     Neuromuscular Lisa:        mins  87477;    Therapeutic Activity:     16     mins  60664;     Gait Training:           mins  10337;     Ultrasound:          mins  46428;   Iontophoresis          mins  87081   Electrical Stimulation:         mins  66562 ( );  Dry Needling          mins self-pay  Fluidotherapy          mins 57832    Timed Treatment:   16   mins   Total Treatment:     16   mins    Pallavi Villa PT  Physical Therapist

## 2018-12-10 ENCOUNTER — OFFICE VISIT (OUTPATIENT)
Dept: INTERNAL MEDICINE | Facility: CLINIC | Age: 73
End: 2018-12-10

## 2018-12-10 VITALS
WEIGHT: 180 LBS | HEART RATE: 65 BPM | SYSTOLIC BLOOD PRESSURE: 144 MMHG | OXYGEN SATURATION: 99 % | HEIGHT: 71 IN | BODY MASS INDEX: 25.2 KG/M2 | DIASTOLIC BLOOD PRESSURE: 88 MMHG

## 2018-12-10 DIAGNOSIS — M25.562 CHRONIC PAIN OF LEFT KNEE: ICD-10-CM

## 2018-12-10 DIAGNOSIS — L72.3 SEBACEOUS CYST: ICD-10-CM

## 2018-12-10 DIAGNOSIS — G45.9 TIA (TRANSIENT ISCHEMIC ATTACK): ICD-10-CM

## 2018-12-10 DIAGNOSIS — B35.1 ONYCHOMYCOSIS: ICD-10-CM

## 2018-12-10 DIAGNOSIS — E11.8 TYPE 2 DIABETES MELLITUS WITH COMPLICATION, WITH LONG-TERM CURRENT USE OF INSULIN (HCC): ICD-10-CM

## 2018-12-10 DIAGNOSIS — K63.5 POLYP OF COLON, UNSPECIFIED PART OF COLON, UNSPECIFIED TYPE: ICD-10-CM

## 2018-12-10 DIAGNOSIS — E78.2 MIXED HYPERLIPIDEMIA: Primary | ICD-10-CM

## 2018-12-10 DIAGNOSIS — F41.1 GENERALIZED ANXIETY DISORDER: ICD-10-CM

## 2018-12-10 DIAGNOSIS — I10 ESSENTIAL HYPERTENSION: ICD-10-CM

## 2018-12-10 DIAGNOSIS — G89.29 CHRONIC PAIN OF LEFT KNEE: ICD-10-CM

## 2018-12-10 DIAGNOSIS — E55.9 VITAMIN D DEFICIENCY: ICD-10-CM

## 2018-12-10 DIAGNOSIS — Z79.4 TYPE 2 DIABETES MELLITUS WITH COMPLICATION, WITH LONG-TERM CURRENT USE OF INSULIN (HCC): ICD-10-CM

## 2018-12-10 DIAGNOSIS — N40.0 ENLARGED PROSTATE: ICD-10-CM

## 2018-12-10 DIAGNOSIS — I25.10 CORONARY ARTERY DISEASE INVOLVING NATIVE CORONARY ARTERY OF NATIVE HEART WITHOUT ANGINA PECTORIS: ICD-10-CM

## 2018-12-10 DIAGNOSIS — Z87.891 PERSONAL HISTORY OF TOBACCO USE, PRESENTING HAZARDS TO HEALTH: ICD-10-CM

## 2018-12-10 DIAGNOSIS — I48.91 ATRIAL FIBRILLATION, UNSPECIFIED TYPE (HCC): ICD-10-CM

## 2018-12-10 PROCEDURE — G0439 PPPS, SUBSEQ VISIT: HCPCS | Performed by: INTERNAL MEDICINE

## 2018-12-10 RX ORDER — AMLODIPINE BESYLATE 2.5 MG/1
2.5 TABLET ORAL DAILY
Qty: 30 TABLET | Refills: 1 | Status: SHIPPED | OUTPATIENT
Start: 2018-12-10 | End: 2019-02-05 | Stop reason: SDUPTHER

## 2018-12-10 NOTE — PROGRESS NOTES
QUICK REFERENCE INFORMATION:  The ABCs of the Annual Wellness Visit    Subsequent Medicare Wellness Visit    HEALTH RISK ASSESSMENT    1945    Recent Hospitalizations:  No hospitalization(s) within the last year..        Current Medical Providers:  Patient Care Team:  Evans Blackburn MD as PCP - General  Evans Blackburn MD as PCP - Family Medicine  Evans Blackburn MD as PCP - Claims Attributed  Alayna Lynch MD as Surgeon (General Surgery)        Smoking Status:  Social History     Tobacco Use   Smoking Status Former Smoker   • Types: Cigarettes   • Last attempt to quit:    • Years since quittin.9   Smokeless Tobacco Never Used   Tobacco Comment    STOPPED 5 YEARS AGO       Alcohol Consumption:  Social History     Substance and Sexual Activity   Alcohol Use No       Depression Screen:   PHQ-2/PHQ-9 Depression Screening 12/10/2018   Little interest or pleasure in doing things 0   Feeling down, depressed, or hopeless 0   Total Score 0       Health Habits and Functional and Cognitive Screening:  Functional & Cognitive Status 12/10/2018   Do you have difficulty preparing food and eating? No   Do you have difficulty bathing yourself, getting dressed or grooming yourself? No   Do you have difficulty using the toilet? No   Do you have difficulty moving around from place to place? No   Do you have trouble with steps or getting out of a bed or a chair? No   In the past year have you fallen or experienced a near fall? No   Current Diet Well Balanced Diet   Dental Exam Up to date   Eye Exam Up to date   Exercise (times per week) 3 times per week   Current Exercise Activities Include Cardiovasular Workout on Exercise Equipment   Do you need help using the phone?  No   Are you deaf or do you have serious difficulty hearing?  No   Do you need help with transportation? No   Do you need help shopping? No   Do you need help preparing meals?  No   Do you need help with housework?  No   Do you need help with laundry? No    Do you need help taking your medications? No   Do you need help managing money? No   Do you ever drive or ride in a car without wearing a seat belt? No   Have you felt unusual stress, anger or loneliness in the last month? No   Who do you live with? Alone   If you need help, do you have trouble finding someone available to you? No   Have you been bothered in the last four weeks by sexual problems? No   Do you have difficulty concentrating, remembering or making decisions? No           Does the patient have evidence of cognitive impairment? No    Aspirin use counseling: Does not need ASA (and currently is not on it)      Recent Lab Results:  CMP:  Lab Results   Component Value Date     (H) 08/08/2018    BUN 13 08/08/2018    CREATININE 0.60 08/08/2018    EGFRIFNONA 132 08/08/2018    EGFRIFAFRI >150 08/08/2018    BCR 21.7 08/08/2018     08/08/2018    K 4.4 10/10/2018    CO2 29.0 08/08/2018    CALCIUM 9.9 08/08/2018    PROTENTOTREF 6.8 08/08/2018    ALBUMIN 4.20 08/08/2018    LABGLOBREF 2.6 08/08/2018    LABIL2 1.6 08/08/2018    BILITOT 0.9 08/08/2018    ALKPHOS 90 08/08/2018    AST 22 08/08/2018    ALT 27 08/08/2018     Lipid Panel:  Lab Results   Component Value Date    CHOL 121 12/20/2016    TRIG 56 04/30/2018    HDL 59 04/30/2018    VLDL 11.2 04/30/2018    LDLHDL 1.52 06/29/2016     HbA1c:  Lab Results   Component Value Date    HGBA1C 7.9 10/10/2018       Visual Acuity:  No exam data present    Age-appropriate Screening Schedule:  Refer to the list below for future screening recommendations based on patient's age, sex and/or medical conditions. Orders for these recommended tests are listed in the plan section. The patient has been provided with a written plan.    Health Maintenance   Topic Date Due   • TDAP/TD VACCINES (1 - Tdap) 03/18/1963   • ZOSTER VACCINE (1 of 2) 03/18/1994   • DIABETIC FOOT EXAM  05/18/2016   • DIABETIC EYE EXAM  05/18/2016   • HEMOGLOBIN A1C  04/10/2019   • LIPID PANEL   "04/30/2019   • URINE MICROALBUMIN  04/30/2019   • COLONOSCOPY  04/09/2025   • INFLUENZA VACCINE  Completed   • PNEUMOCOCCAL VACCINES (65+ LOW/MEDIUM RISK)  Completed        Subjective   History of Present Illness    Que Lagunas is a 73 y.o. male who presents for an Subsequent Wellness Visit.    The following portions of the patient's history were reviewed and updated as appropriate: allergies, current medications, past family history, past medical history, past social history, past surgical history and problem list.    Outpatient Medications Prior to Visit   Medication Sig Dispense Refill   • cholecalciferol (VITAMIN D3) 1000 units tablet Take 1,000 Units by mouth Daily.     • glucose blood test strip Use to test twice daily. E11.9 100 each 12   • glucose blood test strip Use as instructed 100 each 5   • glucose monitor monitoring kit Use to test twice daily. E11.9 1 each 0   • glucose monitor monitoring kit 1 each As Needed (prn). 1 each 0   • insulin glargine (LANTUS) 100 UNIT/ML injection Inject 40 Units under the skin Every Night. 15 mL 11   • insulin lispro (HUMALOG) 100 UNIT/ML injection Inject 10 Units under the skin into the appropriate area as directed 3 (Three) Times a Day Before Meals. 10 mL 11   • Insulin Pen Needle (PEN NEEDLES 3/16\") 31G X 5 MM misc Use with insulin daily E11.9 100 each 5   • Lancets misc Use to test twice daily. E11.9 100 each 12   • LIFESCAN UNISTIK II LANCETS misc 1 Units 3 (Three) Times a Day. 100 each 5   • lisinopril (PRINIVIL,ZESTRIL) 40 MG tablet Take 1 tablet by mouth Daily. 200001 30 tablet 11   • metFORMIN (GLUCOPHAGE) 500 MG tablet Take 1 tablet by mouth Daily With Breakfast. 30 tablet 11   • nystatin-triamcinolone (MYCOLOG) 741038-5.1 UNIT/GM-% ointment Apply to affected area 2 times daily 30 g 1   • PARoxetine (PAXIL) 20 MG tablet Take 1 tablet by mouth Daily. 30 tablet 11   • pravastatin (PRAVACHOL) 40 MG tablet Take 1 tablet by mouth Every Evening. 30 tablet 11   • " "rivaroxaban (XARELTO) 20 MG tablet Take 1 tablet by mouth Daily. 30 tablet 11     No facility-administered medications prior to visit.        Patient Active Problem List   Diagnosis   • Colon polyp   • Diabetes mellitus (CMS/HCC)   • Enlarged prostate   • Generalized anxiety disorder   • Hyperlipidemia   • Hypertension   • Onychomycosis   • TIA (transient ischemic attack)   • CAD (coronary artery disease)   • Atrial fibrillation (CMS/HCC)   • Vitamin D deficiency   • Colon cancer screening   • Chronic pain of left knee   • Sebaceous cyst       Advance Care Planning:  has an advance directive - a copy HAS NOT been provided    Identification of Risk Factors:  Risk factors include: financial stress.    Review of Systems   Constitutional: Negative.    Respiratory: Negative.    Cardiovascular: Negative.    Gastrointestinal: Negative.    Skin: Negative.    Psychiatric/Behavioral: Negative.        Compared to one year ago, the patient feels his physical health is the same.  Compared to one year ago, the patient feels his mental health is the same.    Objective     Physical Exam   Constitutional: He is oriented to person, place, and time. He appears well-developed and well-nourished.   Neck: Neck supple.   Cardiovascular: Normal rate, regular rhythm and normal heart sounds.   Pulmonary/Chest: Effort normal and breath sounds normal.   Abdominal: Soft. Bowel sounds are normal.   Neurological: He is alert and oriented to person, place, and time.   Psychiatric: He has a normal mood and affect. His behavior is normal.       Vitals:    12/10/18 0920   BP: 144/88   Pulse: 65   SpO2: 99%   Weight: 81.6 kg (180 lb)   Height: 180.3 cm (71\")       Patient's Body mass index is 25.1 kg/m². BMI is within normal parameters. No follow-up required.      Assessment/Plan   Patient Self-Management and Personalized Health Advice  The patient has been provided with information about: exercise and preventive services including:   · Advance " "directive.    Visit Diagnoses:  No diagnosis found.    No orders of the defined types were placed in this encounter.      Outpatient Encounter Medications as of 12/10/2018   Medication Sig Dispense Refill   • cholecalciferol (VITAMIN D3) 1000 units tablet Take 1,000 Units by mouth Daily.     • glucose blood test strip Use to test twice daily. E11.9 100 each 12   • glucose blood test strip Use as instructed 100 each 5   • glucose monitor monitoring kit Use to test twice daily. E11.9 1 each 0   • glucose monitor monitoring kit 1 each As Needed (prn). 1 each 0   • insulin glargine (LANTUS) 100 UNIT/ML injection Inject 40 Units under the skin Every Night. 15 mL 11   • insulin lispro (HUMALOG) 100 UNIT/ML injection Inject 10 Units under the skin into the appropriate area as directed 3 (Three) Times a Day Before Meals. 10 mL 11   • Insulin Pen Needle (PEN NEEDLES 3/16\") 31G X 5 MM misc Use with insulin daily E11.9 100 each 5   • Lancets misc Use to test twice daily. E11.9 100 each 12   • MedikidzCAN UNISTIK II LANCETS misc 1 Units 3 (Three) Times a Day. 100 each 5   • lisinopril (PRINIVIL,ZESTRIL) 40 MG tablet Take 1 tablet by mouth Daily. 200001 30 tablet 11   • metFORMIN (GLUCOPHAGE) 500 MG tablet Take 1 tablet by mouth Daily With Breakfast. 30 tablet 11   • nystatin-triamcinolone (MYCOLOG) 100709-1.1 UNIT/GM-% ointment Apply to affected area 2 times daily 30 g 1   • PARoxetine (PAXIL) 20 MG tablet Take 1 tablet by mouth Daily. 30 tablet 11   • pravastatin (PRAVACHOL) 40 MG tablet Take 1 tablet by mouth Every Evening. 30 tablet 11   • rivaroxaban (XARELTO) 20 MG tablet Take 1 tablet by mouth Daily. 30 tablet 11     No facility-administered encounter medications on file as of 12/10/2018.        Reviewed use of high risk medication in the elderly: yes  Reviewed for potential of harmful drug interactions in the elderly: no    Follow Up:  No Follow-up on file.     An After Visit Summary and PPPS with all of these plans were " given to the patient.           Left cheek cyst refer to derm      TIA continue xarelto, Echo and carotid duplex and CT head only small vessel ischemia   A.fib continue med , follow up with cardio  Hypertension continue on medication, good diet , no soda, add amlodipine 2.5mg daily--  BPH,s/p laser surgery doing better, hematuria s/p scope,  follow up with dr mclain,   Diabetes  on humalog 10u each meal morning and dinner meal, lunch humalog 10u,  tresiba 35u qhs  dyslipidemia continue medicine  vitD low continue  vitD3 1000u daily  Onychomycosis improved after lamisil  Anxiety continue medication  colon:divertic polyp and hemorroid done 4/2015  Vaccinations up-to-date, patient declined shingrix, pneumovax done , [id number]  Balancing low, PT patient refuses  AAA screen   1.5 mo after lab

## 2018-12-13 ENCOUNTER — HOSPITAL ENCOUNTER (OUTPATIENT)
Dept: ULTRASOUND IMAGING | Facility: HOSPITAL | Age: 73
Discharge: HOME | End: 2018-12-13
Admitting: INTERNAL MEDICINE

## 2018-12-13 PROCEDURE — 76706 US ABDL AORTA SCREEN AAA: CPT

## 2019-01-07 ENCOUNTER — TELEPHONE (OUTPATIENT)
Dept: CARDIOLOGY | Facility: CLINIC | Age: 74
End: 2019-01-07

## 2019-01-07 NOTE — TELEPHONE ENCOUNTER
Pt is having cyst removal on left cheek. Hold xarelto x 3 days. Pt will call back with fax number.

## 2019-02-01 ENCOUNTER — OFFICE VISIT (OUTPATIENT)
Dept: INTERNAL MEDICINE | Facility: CLINIC | Age: 74
End: 2019-02-01

## 2019-02-01 VITALS
HEIGHT: 71 IN | WEIGHT: 178 LBS | OXYGEN SATURATION: 98 % | BODY MASS INDEX: 24.92 KG/M2 | HEART RATE: 73 BPM | DIASTOLIC BLOOD PRESSURE: 88 MMHG | SYSTOLIC BLOOD PRESSURE: 132 MMHG

## 2019-02-01 DIAGNOSIS — E11.8 TYPE 2 DIABETES MELLITUS WITH COMPLICATION, WITH LONG-TERM CURRENT USE OF INSULIN (HCC): ICD-10-CM

## 2019-02-01 DIAGNOSIS — Z79.4 TYPE 2 DIABETES MELLITUS WITH COMPLICATION, WITH LONG-TERM CURRENT USE OF INSULIN (HCC): ICD-10-CM

## 2019-02-01 DIAGNOSIS — I10 ESSENTIAL HYPERTENSION: ICD-10-CM

## 2019-02-01 DIAGNOSIS — E13.9 DIABETES MELLITUS DUE TO ABNORMAL INSULIN (HCC): Primary | ICD-10-CM

## 2019-02-01 DIAGNOSIS — I48.91 ATRIAL FIBRILLATION, UNSPECIFIED TYPE (HCC): ICD-10-CM

## 2019-02-01 LAB — HBA1C MFR BLD: 8.8 %

## 2019-02-01 PROCEDURE — 83036 HEMOGLOBIN GLYCOSYLATED A1C: CPT | Performed by: INTERNAL MEDICINE

## 2019-02-01 PROCEDURE — 99213 OFFICE O/P EST LOW 20 MIN: CPT | Performed by: INTERNAL MEDICINE

## 2019-02-01 NOTE — PROGRESS NOTES
"Subjective   Que Lagunas is a 73 y.o. male.     Chief Complaint   Patient presents with   • Follow-up       History of Present Illness   Patient here for follow-up of.  Blood pressure normalized after adding amlodipine.  Weight is still similar.  Diabetes patient states morning sugar is low patient cut down to a tresiba from 35 units to 25 units.  Patient is taking Humalog 10 units with meals 3 times a day.  A1c today 8.8.  Atrial fibrillation stable seeing cardiologist.    Current Outpatient Medications:   •  amLODIPine (NORVASC) 2.5 MG tablet, Take 1 tablet by mouth Daily., Disp: 30 tablet, Rfl: 1  •  cholecalciferol (VITAMIN D3) 1000 units tablet, Take 1,000 Units by mouth Daily., Disp: , Rfl:   •  glucose blood test strip, Use to test twice daily. E11.9, Disp: 100 each, Rfl: 12  •  glucose blood test strip, Use as instructed, Disp: 100 each, Rfl: 5  •  glucose monitor monitoring kit, Use to test twice daily. E11.9, Disp: 1 each, Rfl: 0  •  glucose monitor monitoring kit, 1 each As Needed (prn)., Disp: 1 each, Rfl: 0  •  insulin glargine (LANTUS) 100 UNIT/ML injection, Inject 40 Units under the skin Every Night., Disp: 15 mL, Rfl: 11  •  insulin lispro (HUMALOG) 100 UNIT/ML injection, Inject 10 Units under the skin into the appropriate area as directed 3 (Three) Times a Day Before Meals., Disp: 10 mL, Rfl: 11  •  Insulin Pen Needle (PEN NEEDLES 3/16\") 31G X 5 MM misc, Use with insulin daily E11.9, Disp: 100 each, Rfl: 5  •  Lancets misc, Use to test twice daily. E11.9, Disp: 100 each, Rfl: 12  •  LIFESCAN UNISTIK II LANCETS misc, 1 Units 3 (Three) Times a Day., Disp: 100 each, Rfl: 5  •  lisinopril (PRINIVIL,ZESTRIL) 40 MG tablet, Take 1 tablet by mouth Daily. 200001, Disp: 30 tablet, Rfl: 11  •  metFORMIN (GLUCOPHAGE) 500 MG tablet, Take 1 tablet by mouth Daily With Breakfast., Disp: 30 tablet, Rfl: 11  •  nystatin-triamcinolone (MYCOLOG) 946772-8.1 UNIT/GM-% ointment, Apply to affected area 2 times daily, " Disp: 30 g, Rfl: 1  •  PARoxetine (PAXIL) 20 MG tablet, Take 1 tablet by mouth Daily., Disp: 30 tablet, Rfl: 11  •  pravastatin (PRAVACHOL) 40 MG tablet, Take 1 tablet by mouth Every Evening., Disp: 30 tablet, Rfl: 11  •  rivaroxaban (XARELTO) 20 MG tablet, Take 1 tablet by mouth Daily., Disp: 30 tablet, Rfl: 11    The following portions of the patient's history were reviewed and updated as appropriate: allergies, current medications, past family history, past medical history, past social history, past surgical history and problem list.    Review of Systems   Constitutional: Negative.    Respiratory: Negative.    Cardiovascular: Negative.    Gastrointestinal: Negative.    Musculoskeletal: Negative.    Skin: Negative.    Neurological: Negative.    Psychiatric/Behavioral: Negative.        Objective   Physical Exam   Constitutional: He is oriented to person, place, and time. He appears well-nourished.   Neck: Neck supple.   Cardiovascular: Normal rate, regular rhythm and normal heart sounds.   Pulmonary/Chest: Effort normal and breath sounds normal.   Abdominal: Bowel sounds are normal.   Neurological: He is alert and oriented to person, place, and time.   Skin: Skin is warm.   Psychiatric: He has a normal mood and affect.       All tests have been reviewed.    Assessment/Plan   There are no diagnoses linked to this encounter.               TIA continue xarelto, Echo and carotid duplex and CT head only small vessel ischemia   A.fib continue med , follow up with cardio  Hypertension continue on medication,    BPH,s/p laser surgery doing better, hematuria s/p scope,  follow up with dr mclain,   Diabetes  on humalog 10u increase to 13u  each meal morning and dinner meal, lunch   tresiba  25u qhs patient is also going to calibrate his own glucometer and instructed patient to check postprandial fingerstick blood sugar 2 hours after meals.--  dyslipidemia continue medicine  vitD low continue  vitD3 1000u daily  Onychomycosis  improved after lamisil  Anxiety continue medication  colon:divertic polyp and hemorroid done 4/2015  Vaccinations up-to-date, patient declined shingrix, pneumovax done , [id number]  Balancing low, PT patient refuses  AAA screen   1.5 mo after lab

## 2019-02-04 ENCOUNTER — OFFICE VISIT (OUTPATIENT)
Dept: CARDIOLOGY | Facility: CLINIC | Age: 74
End: 2019-02-04

## 2019-02-04 VITALS
BODY MASS INDEX: 24.64 KG/M2 | SYSTOLIC BLOOD PRESSURE: 124 MMHG | HEART RATE: 70 BPM | HEIGHT: 71 IN | WEIGHT: 176 LBS | DIASTOLIC BLOOD PRESSURE: 78 MMHG | OXYGEN SATURATION: 98 %

## 2019-02-04 DIAGNOSIS — I25.10 CORONARY ARTERY DISEASE INVOLVING NATIVE CORONARY ARTERY OF NATIVE HEART WITHOUT ANGINA PECTORIS: Primary | ICD-10-CM

## 2019-02-04 DIAGNOSIS — E11.65 TYPE 2 DIABETES MELLITUS WITH HYPERGLYCEMIA, WITH LONG-TERM CURRENT USE OF INSULIN (HCC): ICD-10-CM

## 2019-02-04 DIAGNOSIS — I48.19 PERSISTENT ATRIAL FIBRILLATION (HCC): ICD-10-CM

## 2019-02-04 DIAGNOSIS — Z79.4 TYPE 2 DIABETES MELLITUS WITH HYPERGLYCEMIA, WITH LONG-TERM CURRENT USE OF INSULIN (HCC): ICD-10-CM

## 2019-02-04 DIAGNOSIS — I10 ESSENTIAL HYPERTENSION: ICD-10-CM

## 2019-02-04 DIAGNOSIS — E78.2 MIXED HYPERLIPIDEMIA: ICD-10-CM

## 2019-02-04 PROCEDURE — 99213 OFFICE O/P EST LOW 20 MIN: CPT | Performed by: INTERNAL MEDICINE

## 2019-02-04 NOTE — PROGRESS NOTES
[facility] at [facility]  Office Progress Note  Que Lagunas  1945  [phone]      Visit Date: 2/4/2019     PCP: Evans Blackburn MD  [address] [place], [place] [id number]    IDENTIFICATION: A 73 y.o. male former [employer] employee,  ,retired  from [place], fishing enthusiast     Chief Complaint   Patient presents with   • Coronary Artery Disease       PROBLEM LIST:   1. TIA:   a. April 2016, evaluation at [facility] with paroxysmal atrial fibrillation and negative CT of the head and neck.   2. Abnormal EKG:  a. Bifascicular block and no prior ischemia evaluation.   b. 4/16 echo EF 60%  3. Dyslipidemia  a. April 2016: LDL 54, total 114.   b. 12/20/16 lipids: , , HDL 37, LDL 61  c. 4/30/18  TG 56 HDL 59 LDL 62  4. Diabetes mellitus, onset at age 49 and is requiring insulin x12 years.  a. A1c of 9.7, February 2014.   b. 6/16 A1c 8.5  c. 2/19 8.8  5.  Hypertension, presumed essential.   6. 12/13/18 AAA screen WNL  7. Prostatism.   8. Remote tonsillectomy and eye surgery.  9. Nicotine addiction, cessation 2011 with a 56 pack year history previously.        Allergies  No Known Allergies    Current Medications    Current Outpatient Medications:   •  amLODIPine (NORVASC) 2.5 MG tablet, Take 1 tablet by mouth Daily., Disp: 30 tablet, Rfl: 1  •  cholecalciferol (VITAMIN D3) 1000 units tablet, Take 1,000 Units by mouth Daily., Disp: , Rfl:   •  glucose blood test strip, Use to test twice daily. E11.9, Disp: 100 each, Rfl: 12  •  glucose blood test strip, Use as instructed, Disp: 100 each, Rfl: 5  •  glucose monitor monitoring kit, Use to test twice daily. E11.9, Disp: 1 each, Rfl: 0  •  glucose monitor monitoring kit, 1 each As Needed (prn)., Disp: 1 each, Rfl: 0  •  insulin glargine (LANTUS) 100 UNIT/ML injection, Inject 40 Units under the skin Every Night., Disp: 15 mL, Rfl: 11  •  insulin lispro (HUMALOG) 100 UNIT/ML injection, Inject 10 Units under  "the skin into the appropriate area as directed 3 (Three) Times a Day Before Meals. (Patient taking differently: Inject 13 Units under the skin into the appropriate area as directed 3 (Three) Times a Day Before Meals.), Disp: 10 mL, Rfl: 11  •  Insulin Pen Needle (PEN NEEDLES 3/16\") 31G X 5 MM misc, Use with insulin daily E11.9, Disp: 100 each, Rfl: 5  •  Lancets misc, Use to test twice daily. E11.9, Disp: 100 each, Rfl: 12  •  LIFESCAN UNISTIK II LANCETS misc, 1 Units 3 (Three) Times a Day., Disp: 100 each, Rfl: 5  •  lisinopril (PRINIVIL,ZESTRIL) 40 MG tablet, Take 1 tablet by mouth Daily. 200001, Disp: 30 tablet, Rfl: 11  •  metFORMIN (GLUCOPHAGE) 500 MG tablet, Take 1 tablet by mouth Daily With Breakfast., Disp: 30 tablet, Rfl: 11  •  nystatin-triamcinolone (MYCOLOG) 267306-6.1 UNIT/GM-% ointment, Apply to affected area 2 times daily (Patient taking differently: As Needed. Apply to affected area 2 times daily), Disp: 30 g, Rfl: 1  •  PARoxetine (PAXIL) 20 MG tablet, Take 1 tablet by mouth Daily., Disp: 30 tablet, Rfl: 11  •  pravastatin (PRAVACHOL) 40 MG tablet, Take 1 tablet by mouth Every Evening., Disp: 30 tablet, Rfl: 11  •  rivaroxaban (XARELTO) 20 MG tablet, Take 1 tablet by mouth Daily., Disp: 30 tablet, Rfl: 11      History of Present Illness   Poor control of DM has trouble affording insulin. Now w balance issues.  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  Pt denies any new chest pain, dyspnea, dyspnea on exertion, orthopnea, PND, palpitations, lower extremity edema, or claudication.    ROS:  All systems have been reviewed and are negative with the exception of those mentioned in the HPI.    OBJECTIVE:  Vitals:    02/04/19 0959   BP: 124/78   BP Location: Right arm   Patient Position: Sitting   Pulse: 70   SpO2: 98%   Weight: 79.8 kg (176 lb)   Height: 180.3 cm (71\")     Physical Exam   Constitutional: He is oriented to person, place, and time. He appears well-developed and well-nourished. No distress. "   Neck: Neck supple. No JVD present.   Cardiovascular: Normal rate, regular rhythm, normal heart sounds and intact distal pulses.   No murmur heard.  Pulses:       Radial pulses are 2+ on the right side, and 2+ on the left side.        Dorsalis pedis pulses are 2+ on the right side, and 2+ on the left side.        Posterior tibial pulses are 2+ on the right side, and 2+ on the left side.   Pulmonary/Chest: Effort normal and breath sounds normal. He has no wheezes. He has no rales.   Abdominal: Soft. Bowel sounds are normal. There is no tenderness. There is no guarding.   Musculoskeletal: He exhibits no edema or tenderness.   Neurological: He is alert and oriented to person, place, and time.   Nursing note and vitals reviewed.      Diagnostic Data:  Procedures      ASSESSMENT:   Diagnosis Plan   1. Coronary artery disease involving native coronary artery of native heart without angina pectoris     2. Mixed hyperlipidemia     3. Essential hypertension     4. Persistent atrial fibrillation (CMS/HCC)     5. Type 2 diabetes mellitus with hyperglycemia, with long-term current use of insulin (CMS/[facility])         PLAN:  1. Medically managed with statin, ACEi  2. Statin therapy with pravastatin  3. Controlled, continue antihypertensives  4. [id number] 5, a/c with Xarelto, samples given today  5. DM poor control, counseled to avoid pasta, potatoes, bread, sweets. Counseled on the importance of glycemic control and goal A1C <7.    Evans Blackburn MD, thank you for referring Mr. Lagunas for evaluation.  I have forwarded my electronically generated recommendations to you for review.  Please do not hesitate to call with any questions.    Scribed for Charles Topete MD by Libby Feliciano PA-C. 2/4/2019  10:13 AM   I, Charles Topete MD, personally performed the services described in this documentation as scribed by the above named individual in my presence, and it is both accurate and complete.  2/4/2019  10:13 AM    Charles Topete  MD, FACC

## 2019-02-05 RX ORDER — AMLODIPINE BESYLATE 2.5 MG/1
TABLET ORAL
Qty: 30 TABLET | Refills: 5 | Status: SHIPPED | OUTPATIENT
Start: 2019-02-05 | End: 2019-03-06 | Stop reason: SDUPTHER

## 2019-03-06 RX ORDER — AMLODIPINE BESYLATE 2.5 MG/1
2.5 TABLET ORAL DAILY
Qty: 90 TABLET | Refills: 2 | Status: SHIPPED | OUTPATIENT
Start: 2019-03-06 | End: 2019-12-04 | Stop reason: SDUPTHER

## 2019-03-06 RX ORDER — LISINOPRIL 40 MG/1
40 TABLET ORAL DAILY
Qty: 90 TABLET | Refills: 2 | OUTPATIENT
Start: 2019-03-06 | End: 2019-12-01

## 2019-03-06 RX ORDER — PAROXETINE HYDROCHLORIDE 20 MG/1
20 TABLET, FILM COATED ORAL DAILY
Qty: 90 TABLET | Refills: 2 | Status: SHIPPED | OUTPATIENT
Start: 2019-03-06 | End: 2019-09-10

## 2019-03-06 RX ORDER — PRAVASTATIN SODIUM 40 MG
40 TABLET ORAL EVERY EVENING
Qty: 90 TABLET | Refills: 2 | Status: SHIPPED | OUTPATIENT
Start: 2019-03-06 | End: 2019-09-10

## 2019-06-06 RX ORDER — INSULIN GLARGINE 100 [IU]/ML
INJECTION, SOLUTION SUBCUTANEOUS
Qty: 10 ML | Refills: 10 | Status: SHIPPED | OUTPATIENT
Start: 2019-06-06 | End: 2020-07-22 | Stop reason: SDUPTHER

## 2019-06-27 RX ORDER — RIVAROXABAN 20 MG/1
TABLET, FILM COATED ORAL
Qty: 30 TABLET | Refills: 10 | Status: SHIPPED | OUTPATIENT
Start: 2019-06-27 | End: 2020-07-06

## 2019-06-27 RX ORDER — PAROXETINE HYDROCHLORIDE 20 MG/1
TABLET, FILM COATED ORAL
Qty: 90 TABLET | Refills: 1 | Status: SHIPPED | OUTPATIENT
Start: 2019-06-27 | End: 2020-03-06

## 2019-06-27 RX ORDER — PRAVASTATIN SODIUM 40 MG
TABLET ORAL
Qty: 90 TABLET | Refills: 1 | Status: SHIPPED | OUTPATIENT
Start: 2019-06-27 | End: 2020-03-23

## 2019-07-17 ENCOUNTER — TELEPHONE (OUTPATIENT)
Dept: INTERNAL MEDICINE | Facility: CLINIC | Age: 74
End: 2019-07-17

## 2019-07-17 RX ORDER — INSULIN GLARGINE 100 [IU]/ML
40 INJECTION, SOLUTION SUBCUTANEOUS DAILY
Qty: 3 EACH | Refills: 0 | COMMUNITY
Start: 2019-07-17 | End: 2019-09-10

## 2019-07-17 NOTE — TELEPHONE ENCOUNTER
Patient stopped by to request samples of Lantus,  Humalog, and test strips.   We did not have any Humalog.   He was given boxes of Lantus    Katiuska got them together for him

## 2019-08-23 DIAGNOSIS — M25.562 ARTHRALGIA OF LEFT KNEE: Primary | ICD-10-CM

## 2019-08-26 ENCOUNTER — OFFICE VISIT (OUTPATIENT)
Dept: ORTHOPEDIC SURGERY | Facility: CLINIC | Age: 74
End: 2019-08-26

## 2019-08-26 VITALS — RESPIRATION RATE: 18 BRPM | BODY MASS INDEX: 24.64 KG/M2 | WEIGHT: 176 LBS | HEIGHT: 71 IN

## 2019-08-26 DIAGNOSIS — M25.562 ARTHRALGIA OF LEFT KNEE: Primary | ICD-10-CM

## 2019-08-26 DIAGNOSIS — M17.10 ARTHRITIS OF KNEE: ICD-10-CM

## 2019-08-26 PROCEDURE — 99203 OFFICE O/P NEW LOW 30 MIN: CPT | Performed by: ORTHOPAEDIC SURGERY

## 2019-08-26 NOTE — PROGRESS NOTES
Subjective   Patient ID: Que Lagunas is a 74 y.o. male  Pain of the Left Knee (Patient is here today for left knee pain, he states the pain has been there for years but lately the knee wants to give out on him and hurts all night. )             History of Present Illness  74-year-old former   combined with chronic pain in his left knee anteriorly and medially especially with walking running activities, says the knee feels as though it wants to give way at times does have a constant burning pain in the anterior knee when laying down at night, has noticed some loss of motion over the last year or 2.  Was told by Dr. Seymour in the past he may be arthritis in his knee never given an injection does take anticoagulants for atrial fibrillation.      Review of Systems   Constitutional: Negative for fever.   HENT: Negative for voice change.    Eyes: Negative for visual disturbance.   Respiratory: Negative for shortness of breath.    Cardiovascular: Negative for chest pain.   Gastrointestinal: Negative for abdominal pain.   Genitourinary: Negative for dysuria.   Musculoskeletal: Positive for arthralgias. Negative for gait problem and joint swelling.   Skin: Negative for rash.   Neurological: Negative for speech difficulty.   Hematological: Does not bruise/bleed easily.   Psychiatric/Behavioral: Negative for confusion.       Past Medical History:   Diagnosis Date   • Abnormal EKG     Bifascicular block and no prior ischemia evaluation.    • Anemia    • Anxiety    • Arthritis    • Atrial fibrillation (CMS/HCC)     CHADS VASc=3.  Continue Xarelto 20.  vas continue Zaroxolyn 20 has a relative 20 Knobloch can add Lantus 40   • CAD (coronary artery disease)    • Colon polyp 04/09/2015   • Contact dermatitis    • Diabetes (CMS/HCC)    • Dyslipidemia    • Hypertension    • Palpitations    • Prostatism    • Stroke (CMS/HCC)    • Tattoo         Past Surgical History:   Procedure Laterality Date   • COLONOSCOPY  04/09/2015  "  • EYE SURGERY     • PROSTATE SURGERY     • TONSILLECTOMY         Family History   Problem Relation Age of Onset   • Diabetes Other    • Cancer Other         NO PROSTATE CANCER HISTORY   • Hypertension Other    • Cancer Other    • Diabetes Other    • Liver disease Mother    • Liver disease Father    • Stomach cancer Father    • Colon cancer Neg Hx        Social History     Socioeconomic History   • Marital status:      Spouse name: Not on file   • Number of children: Not on file   • Years of education: Not on file   • Highest education level: Not on file   Occupational History     Employer: [employer]   Tobacco Use   • Smoking status: Former Smoker     Types: Cigarettes     Last attempt to quit:      Years since quittin.6   • Smokeless tobacco: Never Used   • Tobacco comment: STOPPED 5 YEARS AGO   Substance and Sexual Activity   • Alcohol use: No   • Drug use: No   • Sexual activity: Defer       I have reviewed all of the above social hx, family hx, surgical hx, medications, allergies & ROS and confirm that it is accurate.    No Known Allergies      Current Outpatient Medications:   •  amLODIPine (NORVASC) 2.5 MG tablet, Take 1 tablet by mouth Daily., Disp: 90 tablet, Rfl: 2  •  cholecalciferol (VITAMIN D3) 1000 units tablet, Take 1,000 Units by mouth Daily., Disp: , Rfl:   •  glucose blood (SOLUS V2 TEST) test strip, Use as instructed, Disp: 100 each, Rfl: 12  •  glucose monitor monitoring kit, Use to test twice daily. E11.9, Disp: 1 each, Rfl: 0  •  glucose monitor monitoring kit, 1 each As Needed (prn)., Disp: 1 each, Rfl: 0  •  insulin glargine (LANTUS) 100 UNIT/ML injection, Inject 40 Units under the skin into the appropriate area as directed Daily., Disp: 3 each, Rfl: 0  •  insulin lispro (HUMALOG) 100 UNIT/ML injection, Inject 10 Units under the skin into the appropriate area as directed 3 (Three) Times a Day Before Meals., Disp: 10 mL, Rfl: 11  •  Insulin Pen Needle (PEN NEEDLES 3/16\") 31G X " "5 MM misc, Use with insulin daily E11.9, Disp: 100 each, Rfl: 5  •  Lancets misc, Use to test twice daily. E11.9, Disp: 100 each, Rfl: 12  •  LANTUS 100 UNIT/ML injection, inject 40 units under the skin every night, Disp: 10 mL, Rfl: 10  •  LIFESCAN UNISTIK II LANCETS misc, 1 Units 3 (Three) Times a Day., Disp: 100 each, Rfl: 5  •  lisinopril (PRINIVIL,ZESTRIL) 40 MG tablet, Take 1 tablet by mouth Daily. 200001, Disp: 90 tablet, Rfl: 2  •  metFORMIN (GLUCOPHAGE) 500 MG tablet, Take 1 tablet by mouth Daily With Breakfast., Disp: 90 tablet, Rfl: 2  •  metFORMIN (GLUCOPHAGE) 500 MG tablet, TAKE 1 TABLET BY MOUTH IN THE MORNING with breakfast, Disp: 90 tablet, Rfl: 1  •  nystatin-triamcinolone (MYCOLOG) 322356-3.1 UNIT/GM-% ointment, Apply to affected area 2 times daily (Patient taking differently: As Needed. Apply to affected area 2 times daily), Disp: 30 g, Rfl: 1  •  PARoxetine (PAXIL) 20 MG tablet, Take 1 tablet by mouth Daily., Disp: 90 tablet, Rfl: 2  •  PARoxetine (PAXIL) 20 MG tablet, TAKE 1 TABLET BY MOUTH ONE TIME A DAY , Disp: 90 tablet, Rfl: 1  •  pravastatin (PRAVACHOL) 40 MG tablet, Take 1 tablet by mouth Every Evening., Disp: 90 tablet, Rfl: 2  •  pravastatin (PRAVACHOL) 40 MG tablet, TAKE 1 TABLET BY MOUTH IN THE EVENING , Disp: 90 tablet, Rfl: 1  •  XARELTO 20 MG tablet, TAKE 1 TABLET BY MOUTH ONE TIME A DAY , Disp: 30 tablet, Rfl: 10    Objective   Resp 18   Ht 180.3 cm (71\")   Wt 79.8 kg (176 lb)   BMI 24.55 kg/m²    Physical Exam  Constitutional: Patient is oriented to person, place, and time. Patient appears well-developed and well-nourished.   HENT:Head: Normocephalic and atraumatic.   Eyes: EOM are normal. Pupils are equal, round, and reactive to light.   Neck: Normal range of motion. Neck supple.   Cardiovascular: Normal rate.    Pulmonary/Chest: Effort normal and breath sounds normal.   Abdominal: Soft.   Neurological: Patient is alert and oriented to person, place, and time.   Skin: Skin is " warm and dry.   Psychiatric: Patient has a normal mood and affect.   Nursing note and vitals reviewed.       [unfilled]   Left knee: Slight effusion loss of extension 10 degrees loss of flexion 20 degrees no instability Lockman sign negative no palpable Baker's cyst calf supple quadriceps and calf circumference appears symmetric, Juancarlos sign positive for anteromedial joint line pain, positive patellofemoral crepitus.    Assessment/Plan   Review of Radiographic Studies:    Radiographic images today of affected area I personally viewed and showed no sign of acute fracture or dislocation.      Procedures     Max was seen today for pain.    Diagnoses and all orders for this visit:    Arthralgia of left knee  -     MRI Knee Left Without Contrast    Arthritis of knee       Orthopedic activities reviewed and patient expressed appreciation, Risk, benefits, and merits of treatment alternatives reviewed with the patient and questions answered and Using illustrations and models, the nature of the pathology was explained to the patient      Recommendations/Plan:   Work/Activity Status: May perform usual activities as tolerated    Patient agreeable to call or return sooner for any concerns.             Impression:  Left anterior knee pain mechanical symptoms rule out internal meniscal tear and/or chondromalacia  Plan:  MR left knee, knee bracing while outdoor activities with his dog, discuss further treatment with either surgical care or injection of steroid depending on his MRI results.

## 2019-09-03 ENCOUNTER — HOSPITAL ENCOUNTER (OUTPATIENT)
Dept: MRI IMAGING | Facility: HOSPITAL | Age: 74
Discharge: HOME | End: 2019-09-03
Admitting: ORTHOPAEDIC SURGERY

## 2019-09-03 PROCEDURE — 73721 MRI JNT OF LWR EXTRE W/O DYE: CPT

## 2019-09-10 ENCOUNTER — OFFICE VISIT (OUTPATIENT)
Dept: ORTHOPEDIC SURGERY | Facility: CLINIC | Age: 74
End: 2019-09-10

## 2019-09-10 VITALS — RESPIRATION RATE: 18 BRPM | WEIGHT: 176 LBS | HEIGHT: 71 IN | BODY MASS INDEX: 24.64 KG/M2

## 2019-09-10 DIAGNOSIS — M17.10 ARTHRITIS OF KNEE: Primary | ICD-10-CM

## 2019-09-10 PROCEDURE — 99213 OFFICE O/P EST LOW 20 MIN: CPT | Performed by: ORTHOPAEDIC SURGERY

## 2019-09-10 RX ORDER — HEPATITIS A VACCINE, INACTIVATED 50 [IU]/ML
INJECTION, SUSPENSION INTRAMUSCULAR
COMMUNITY
Start: 2019-09-06 | End: 2020-08-28

## 2019-09-10 NOTE — PROGRESS NOTES
Subjective   Patient ID: Que Lagunas is a 74 y.o. male  Follow-up and Pain of the Left Knee (MRI results)             History of Present Illness  74-year-old status post MR left knee which showed degenerative change anterior lateral meniscus with no discrete displaced tear fracture subchondral laboratory change, he complains more of balance and weakness problems with his left leg especially when first arising minimal pain with continued activity denies wrist pain swelling or significant associated hip pain back pain or paresthesias.      Review of Systems   Constitutional: Negative for diaphoresis, fever and unexpected weight change.   HENT: Negative for dental problem and sore throat.    Eyes: Negative for visual disturbance.   Respiratory: Negative for shortness of breath.    Cardiovascular: Negative for chest pain.   Gastrointestinal: Negative for abdominal pain, constipation, diarrhea, nausea and vomiting.   Genitourinary: Negative for difficulty urinating and frequency.   Musculoskeletal: Positive for arthralgias (left knee).   Neurological: Negative for headaches.   Hematological: Does not bruise/bleed easily.       Past Medical History:   Diagnosis Date   • Abnormal EKG     Bifascicular block and no prior ischemia evaluation.    • Anemia    • Anxiety    • Arthritis    • Atrial fibrillation (CMS/HCC)     CHADS VASc=3.  Continue Xarelto 20.  vas continue Zaroxolyn 20 has a relative 20 Knobloch can add Lantus 40   • CAD (coronary artery disease)    • Colon polyp 04/09/2015   • Contact dermatitis    • Diabetes (CMS/HCC)    • Dyslipidemia    • Hypertension    • Palpitations    • Prostatism    • Stroke (CMS/HCC)    • Tattoo         Past Surgical History:   Procedure Laterality Date   • COLONOSCOPY  04/09/2015   • EYE SURGERY     • PROSTATE SURGERY     • TONSILLECTOMY  1947       Family History   Problem Relation Age of Onset   • Diabetes Other    • Cancer Other         NO PROSTATE CANCER HISTORY   •  "Hypertension Other    • Cancer Other    • Diabetes Other    • Liver disease Mother    • Liver disease Father    • Stomach cancer Father    • Colon cancer Neg Hx        Social History     Socioeconomic History   • Marital status:      Spouse name: Not on file   • Number of children: Not on file   • Years of education: Not on file   • Highest education level: Not on file   Occupational History     Employer: [employer]   Tobacco Use   • Smoking status: Former Smoker     Types: Cigarettes     Last attempt to quit:      Years since quittin.7   • Smokeless tobacco: Never Used   • Tobacco comment: STOPPED 5 YEARS AGO   Substance and Sexual Activity   • Alcohol use: No   • Drug use: No   • Sexual activity: Defer       I have reviewed the above medical and surgical history, family history, social history, medications, allergies and review of systems.    No Known Allergies      Current Outpatient Medications:   •  amLODIPine (NORVASC) 2.5 MG tablet, Take 1 tablet by mouth Daily., Disp: 90 tablet, Rfl: 2  •  cholecalciferol (VITAMIN D3) 1000 units tablet, Take 1,000 Units by mouth Daily., Disp: , Rfl:   •  glucose blood (SOLUS V2 TEST) test strip, Use as instructed, Disp: 100 each, Rfl: 12  •  glucose monitor monitoring kit, 1 each As Needed (prn)., Disp: 1 each, Rfl: 0  •  insulin lispro (HUMALOG) 100 UNIT/ML injection, Inject 10 Units under the skin into the appropriate area as directed 3 (Three) Times a Day Before Meals., Disp: 10 mL, Rfl: 11  •  Insulin Pen Needle (PEN NEEDLES 3/16\") 31G X 5 MM misc, Use with insulin daily E11.9, Disp: 100 each, Rfl: 5  •  Lancets misc, Use to test twice daily. E11.9, Disp: 100 each, Rfl: 12  •  LANTUS 100 UNIT/ML injection, inject 40 units under the skin every night, Disp: 10 mL, Rfl: 10  •  LIFESCAN UNISTIK II LANCETS misc, 1 Units 3 (Three) Times a Day., Disp: 100 each, Rfl: 5  •  lisinopril (PRINIVIL,ZESTRIL) 40 MG tablet, Take 1 tablet by mouth Daily. , Disp: 90 " "tablet, Rfl: 2  •  metFORMIN (GLUCOPHAGE) 500 MG tablet, TAKE 1 TABLET BY MOUTH IN THE MORNING with breakfast, Disp: 90 tablet, Rfl: 1  •  nystatin-triamcinolone (MYCOLOG) 876597-9.1 UNIT/GM-% ointment, Apply to affected area 2 times daily (Patient taking differently: As Needed. Apply to affected area 2 times daily), Disp: 30 g, Rfl: 1  •  PARoxetine (PAXIL) 20 MG tablet, TAKE 1 TABLET BY MOUTH ONE TIME A DAY , Disp: 90 tablet, Rfl: 1  •  pravastatin (PRAVACHOL) 40 MG tablet, TAKE 1 TABLET BY MOUTH IN THE EVENING , Disp: 90 tablet, Rfl: 1  •  VAQTA 50 UNIT/ML injection, , Disp: , Rfl:   •  XARELTO 20 MG tablet, TAKE 1 TABLET BY MOUTH ONE TIME A DAY , Disp: 30 tablet, Rfl: 10    Objective   Resp 18   Ht 180.3 cm (71\")   Wt 79.8 kg (176 lb)   BMI 24.55 kg/m²    Physical Exam  Constitutional: Patient is oriented to person, place, and time. Patient appears well-developed and well-nourished.   HENT:Head: Normocephalic and atraumatic.   Eyes: EOM are normal. Pupils are equal, round, and reactive to light.   Neck: Normal range of motion. Neck supple.   Cardiovascular: Normal rate.    Pulmonary/Chest: Effort normal and breath sounds normal.   Abdominal: Soft.   Neurological: Patient is alert and oriented to person, place, and time.   Skin: Skin is warm and dry.   Psychiatric: Patient has a normal mood and affect.   Nursing note and vitals reviewed.       [unfilled]   Left knee: No effusion full range of motion minimal patellofemoral crepitus Juancarlos sign negative for anterolateral joint line pain, calf supple no instability and some quadriceps atrophy noted at both left and right legs.    Assessment/Plan   Review of Radiographic Studies:    No new imaging done today.  MR images left knee were directly examined with the patient demonstrating degenerative changes lateral compartment with degenerative anterior horn lateral meniscal tear without displacement.      Procedures     Max was seen today for follow-up and " pain.    Diagnoses and all orders for this visit:    Arthritis of knee  -     Ambulatory Referral to Physical Therapy Evaluate and treat       Physical therapy referral given      Recommendations/Plan:   Exercise, medications, injections, other patient advice, and return appointment as noted.    Patient agreeable to call or return sooner for any concerns.         I reviewed the patient's radiographs indicating advanced osteoarthritis discussed the natural history treatment options and pros and cons and risks and complications of surgical and nonsurgical care.  I also reviewed advantages and disadvantages risks and complications of steroid injections versus viscus gel injections.  The patient had opportunity to ask questions which were answered.    Impression:  Left anterolateral knee pain degenerative anterior lateral meniscal tear with degenerative arthritis minimally symptomatic no effusion full range of motion, probable underlying balance disorder with quad weakness  Plan:  Therapy referral first quad strengthening balance return when necessary for steroid injection if needed

## 2019-10-07 ENCOUNTER — OFFICE VISIT (OUTPATIENT)
Dept: ORTHOPEDIC SURGERY | Facility: CLINIC | Age: 74
End: 2019-10-07

## 2019-10-07 VITALS — HEIGHT: 71 IN | BODY MASS INDEX: 24.64 KG/M2 | WEIGHT: 176 LBS | RESPIRATION RATE: 18 BRPM

## 2019-10-07 DIAGNOSIS — M17.10 ARTHRITIS OF KNEE: Primary | ICD-10-CM

## 2019-10-07 PROCEDURE — 20610 DRAIN/INJ JOINT/BURSA W/O US: CPT | Performed by: ORTHOPAEDIC SURGERY

## 2019-10-07 RX ORDER — TRIAMCINOLONE ACETONIDE 40 MG/ML
40 INJECTION, SUSPENSION INTRA-ARTICULAR; INTRAMUSCULAR
Status: COMPLETED | OUTPATIENT
Start: 2019-10-07 | End: 2019-10-07

## 2019-10-07 RX ORDER — LIDOCAINE HYDROCHLORIDE 10 MG/ML
2 INJECTION, SOLUTION INFILTRATION; PERINEURAL
Status: COMPLETED | OUTPATIENT
Start: 2019-10-07 | End: 2019-10-07

## 2019-10-07 RX ADMIN — TRIAMCINOLONE ACETONIDE 40 MG: 40 INJECTION, SUSPENSION INTRA-ARTICULAR; INTRAMUSCULAR at 09:10

## 2019-10-07 RX ADMIN — LIDOCAINE HYDROCHLORIDE 2 ML: 10 INJECTION, SOLUTION INFILTRATION; PERINEURAL at 09:10

## 2019-10-07 NOTE — PROGRESS NOTES
"Subjective   Que Lagunas is a 74 y.o. male here today for injection therapy.    Chief Complaint   Patient presents with   • Left Knee - Follow-up, Pain     Patient is here today for a cortisone injection.       Past Medical History:   Diagnosis Date   • Abnormal EKG     Bifascicular block and no prior ischemia evaluation.    • Anemia    • Anxiety    • Arthritis    • Atrial fibrillation (CMS/HCC)     CHADS VASc=3.  Continue Xarelto 20.  vas continue Zaroxolyn 20 has a relative 20 Knobloch can add Lantus 40   • CAD (coronary artery disease)    • Colon polyp 04/09/2015   • Contact dermatitis    • Diabetes (CMS/HCC)    • Dyslipidemia    • Hypertension    • Palpitations    • Prostatism    • Stroke (CMS/HCC)    • Tattoo          Past Surgical History:   Procedure Laterality Date   • COLONOSCOPY  04/09/2015   • EYE SURGERY     • PROSTATE SURGERY     • TONSILLECTOMY  1947       No Known Allergies    Objective   Resp 18   Ht 180.3 cm (71\")   Wt 79.8 kg (176 lb)   BMI 24.55 kg/m²    Physical Exam    Skin exam stable with no erythema, ecchymosis or rash.  No new swelling.  No motor or sensory changes.  Distal pulse intact.    Large Joint Arthrocentesis: L knee  Date/Time: 10/7/2019 9:10 AM  Consent given by: patient  Site marked: site marked  Timeout: Immediately prior to procedure a time out was called to verify the correct patient, procedure, equipment, support staff and site/side marked as required   Supporting Documentation  Indications: pain   Procedure Details  Location: knee - L knee  Preparation: Patient was prepped and draped in the usual sterile fashion  Needle size: 22 G  Approach: posterior  Medications administered: 40 mg triamcinolone acetonide 40 MG/ML; 2 mL lidocaine 1 %  Patient tolerance: patient tolerated the procedure well with no immediate complications          Assessment/Plan      Diagnosis Plan   1. Arthritis of knee         Discussion of orthopaedic goals and activities and patient and/or " guardian expressed appreciation.  Call or notify for any adverse effect from injection therapy  Ice, heat, and/or modalities as beneficial  Watch for signs and symptoms of infection  Guided on proper techniques for mobility, strength, agility and/or conditioning exercises    Recommendations:  May perform usual activities as tolerated, May return to routine exercise and physical work as tolerated. and No strenuous activity.      Patient agreeable to call or return sooner for any concerns.

## 2019-11-05 NOTE — PROGRESS NOTES
"[facility]  Office Progress Note  Que Lagunas  1945      Visit Date: 11/06/19    PCP: Evans Blackburn MD  [address]  [place] [address]    IDENTIFICATION: A 74 y.o. male former [employer] employee,  ,retired  from [employer], former fishing enthusiast         PROBLEM LIST:   1. TIA:   a. April 2016, evaluation at [facility] with paroxysmal atrial fibrillation and negative CT of the head and neck.   2. Abnormal EKG:  a. Bifascicular block and no prior ischemia evaluation.   b. 4/16 echo EF 60%  3. Dyslipidemia  a. April 2016: LDL 54, total 114.   b. 12/20/16 lipids: , , HDL 37, LDL 61  c. 4/30/18  TG 56 HDL 59 LDL 62  4. Diabetes mellitus, onset at age 49 and is requiring insulin x12 years.  a. A1c of 9.7, February 2014.   b. 6/16 A1c 8.5  c. 2/19 8.8  5.  Hypertension, presumed essential.   6. 12/13/18 AAA screen WNL  7. Prostatism.   8. Remote tonsillectomy and eye surgery.  9. Nicotine addiction, cessation 2011 with a 56 pack year history previously.        Chief Complaint   Patient presents with   • Hyperlipidemia     follow up   • Hypertension       Allergies  No Known Allergies    Current Medications    Current Outpatient Medications:   •  amLODIPine (NORVASC) 2.5 MG tablet, Take 1 tablet by mouth Daily., Disp: 90 tablet, Rfl: 2  •  glucose blood (SOLUS V2 TEST) test strip, Use as instructed, Disp: 100 each, Rfl: 12  •  glucose monitor monitoring kit, 1 each As Needed (prn)., Disp: 1 each, Rfl: 0  •  insulin lispro (HUMALOG) 100 UNIT/ML injection, Inject 10 Units under the skin into the appropriate area as directed 3 (Three) Times a Day Before Meals., Disp: 10 mL, Rfl: 11  •  Insulin Pen Needle (PEN NEEDLES 3/16\") 31G X 5 MM misc, Use with insulin daily E11.9, Disp: 100 each, Rfl: 5  •  Lancets misc, Use to test twice daily. E11.9, Disp: 100 each, Rfl: 12  •  LANTUS 100 UNIT/ML injection, inject 40 units under the skin " "every night, Disp: 10 mL, Rfl: 10  •  LIFESCAN UNISTIK II LANCETS misc, 1 Units 3 (Three) Times a Day., Disp: 100 each, Rfl: 5  •  lisinopril (PRINIVIL,ZESTRIL) 40 MG tablet, Take 1 tablet by mouth Daily. 200001, Disp: 90 tablet, Rfl: 2  •  metFORMIN (GLUCOPHAGE) 500 MG tablet, TAKE 1 TABLET BY MOUTH IN THE MORNING with breakfast, Disp: 90 tablet, Rfl: 1  •  PARoxetine (PAXIL) 20 MG tablet, TAKE 1 TABLET BY MOUTH ONE TIME A DAY , Disp: 90 tablet, Rfl: 1  •  pravastatin (PRAVACHOL) 40 MG tablet, TAKE 1 TABLET BY MOUTH IN THE EVENING , Disp: 90 tablet, Rfl: 1  •  VAQTA 50 UNIT/ML injection, , Disp: , Rfl:   •  XARELTO 20 MG tablet, TAKE 1 TABLET BY MOUTH ONE TIME A DAY , Disp: 30 tablet, Rfl: 10  •  cholecalciferol (VITAMIN D3) 1000 units tablet, Take 1,000 Units by mouth Daily., Disp: , Rfl:   •  nystatin-triamcinolone (MYCOLOG) 594114-8.1 UNIT/GM-% ointment, Apply to affected area 2 times daily (Patient taking differently: As Needed. Apply to affected area 2 times daily), Disp: 30 g, Rfl: 1      History of Present Illness   Que Lagunas is a 74 y.o. year old male here for follow up.  He is slowing down significantly since the passing of his wife.  He has not had close follow-up of his diabetes at home.  His difficulty with cost of many of his medications    Pt denies any chest pain, dyspnea, dyspnea on exertion, orthopnea, PND, palpitations, lower extremity edema, or claudication.      OBJECTIVE:  Vitals:    11/06/19 0935   BP: 122/76   BP Location: Right arm   Patient Position: Sitting   Pulse: 76   Weight: 81.2 kg (179 lb)   Height: 180.3 cm (71\")     Physical Exam   Constitutional: He appears well-developed and well-nourished.   Neck: Normal range of motion. Neck supple. No hepatojugular reflux and no JVD present. Carotid bruit is not present. No tracheal deviation present. No thyromegaly present.   Cardiovascular: Normal rate, regular rhythm, S1 normal, S2 normal, intact distal pulses and normal pulses. PMI " is not displaced. Exam reveals no gallop, no distant heart sounds, no friction rub, no midsystolic click and no opening snap.   No murmur heard.  Pulses:       Radial pulses are 2+ on the right side, and 2+ on the left side.        Dorsalis pedis pulses are 2+ on the right side, and 2+ on the left side.        Posterior tibial pulses are 2+ on the right side, and 2+ on the left side.   Pulmonary/Chest: Effort normal and breath sounds normal. He has no wheezes. He has no rales.   Abdominal: Soft. Bowel sounds are normal. He exhibits no mass. There is no tenderness. There is no guarding.       Diagnostic Data:  Procedures      ASSESSMENT:   Diagnosis Plan   1. Type 1 diabetes mellitus with hyperglycemia (CMS/[facility])  Hemoglobin A1c    Comprehensive Metabolic Panel   2. CVD (cerebrovascular disease)     3. Essential hypertension     4. Mixed hyperlipidemia     5. Paroxysmal atrial fibrillation (CMS/[facility])         PLAN:  Diabetes will obtain A1c CMP today    CVD with no new stroke symptoms continue risk factor modification medical management    Hypertension controlled on lisinopril    Dyslipidemia on statin therapy    Paroxysmal A. fib maintaining rate control anticoagulation with Xarelto        Evans Blackburn MD, thank you for referring Mr. Lagunas for evaluation.  I have forwarded my electronically generated recommendations to you for review.  Please do not hesitate to call with any questions.      Charles Topete MD, [facility]

## 2019-11-06 ENCOUNTER — OFFICE VISIT (OUTPATIENT)
Dept: CARDIOLOGY | Facility: CLINIC | Age: 74
End: 2019-11-06

## 2019-11-06 VITALS
WEIGHT: 179 LBS | DIASTOLIC BLOOD PRESSURE: 76 MMHG | SYSTOLIC BLOOD PRESSURE: 122 MMHG | BODY MASS INDEX: 25.06 KG/M2 | HEIGHT: 71 IN | HEART RATE: 76 BPM

## 2019-11-06 DIAGNOSIS — E10.65 TYPE 1 DIABETES MELLITUS WITH HYPERGLYCEMIA (HCC): Primary | ICD-10-CM

## 2019-11-06 DIAGNOSIS — I10 ESSENTIAL HYPERTENSION: ICD-10-CM

## 2019-11-06 DIAGNOSIS — I48.0 PAROXYSMAL ATRIAL FIBRILLATION (HCC): ICD-10-CM

## 2019-11-06 DIAGNOSIS — I67.9 CVD (CEREBROVASCULAR DISEASE): ICD-10-CM

## 2019-11-06 DIAGNOSIS — E78.2 MIXED HYPERLIPIDEMIA: ICD-10-CM

## 2019-11-06 LAB
ALBUMIN SERPL-MCNC: 4.4 G/DL (ref 3.5–5.2)
ALBUMIN/GLOB SERPL: 2 G/DL
ALP SERPL-CCNC: 91 U/L (ref 39–117)
ALT SERPL-CCNC: 14 U/L (ref 1–41)
AST SERPL-CCNC: 14 U/L (ref 1–40)
BILIRUB SERPL-MCNC: 0.6 MG/DL (ref 0.2–1.2)
BUN SERPL-MCNC: 13 MG/DL (ref 8–23)
BUN/CREAT SERPL: 17.8 (ref 7–25)
CALCIUM SERPL-MCNC: 9.3 MG/DL (ref 8.6–10.5)
CHLORIDE SERPL-SCNC: 99 MMOL/L (ref 98–107)
CO2 SERPL-SCNC: 29.7 MMOL/L (ref 22–29)
CREAT SERPL-MCNC: 0.73 MG/DL (ref 0.76–1.27)
GLOBULIN SER CALC-MCNC: 2.2 GM/DL
GLUCOSE SERPL-MCNC: 209 MG/DL (ref 65–99)
HBA1C MFR BLD: 9 % (ref 4.8–5.6)
POTASSIUM SERPL-SCNC: 4.8 MMOL/L (ref 3.5–5.2)
PROT SERPL-MCNC: 6.6 G/DL (ref 6–8.5)
SODIUM SERPL-SCNC: 140 MMOL/L (ref 136–145)

## 2019-11-06 PROCEDURE — 99214 OFFICE O/P EST MOD 30 MIN: CPT | Performed by: INTERNAL MEDICINE

## 2019-12-01 ENCOUNTER — HOSPITAL ENCOUNTER (EMERGENCY)
Facility: HOSPITAL | Age: 74
Discharge: HOME | End: 2019-12-01
Attending: EMERGENCY MEDICINE | Admitting: EMERGENCY MEDICINE

## 2019-12-01 VITALS
DIASTOLIC BLOOD PRESSURE: 74 MMHG | HEART RATE: 99 BPM | RESPIRATION RATE: 18 BRPM | HEIGHT: 72 IN | BODY MASS INDEX: 24.84 KG/M2 | OXYGEN SATURATION: 95 % | WEIGHT: 183.4 LBS | SYSTOLIC BLOOD PRESSURE: 135 MMHG | TEMPERATURE: 98 F

## 2019-12-01 DIAGNOSIS — T78.3XXA ANGIOEDEMA, INITIAL ENCOUNTER: Primary | ICD-10-CM

## 2019-12-01 LAB — S PYO AG THROAT QL: NEGATIVE

## 2019-12-01 PROCEDURE — 96375 TX/PRO/DX INJ NEW DRUG ADDON: CPT

## 2019-12-01 PROCEDURE — 99283 EMERGENCY DEPT VISIT LOW MDM: CPT

## 2019-12-01 PROCEDURE — 25010000002 METHYLPREDNISOLONE PER 125 MG: Performed by: EMERGENCY MEDICINE

## 2019-12-01 PROCEDURE — 87880 STREP A ASSAY W/OPTIC: CPT | Performed by: EMERGENCY MEDICINE

## 2019-12-01 PROCEDURE — 87081 CULTURE SCREEN ONLY: CPT | Performed by: EMERGENCY MEDICINE

## 2019-12-01 PROCEDURE — 96374 THER/PROPH/DIAG INJ IV PUSH: CPT

## 2019-12-01 PROCEDURE — 25010000002 DIPHENHYDRAMINE PER 50 MG: Performed by: EMERGENCY MEDICINE

## 2019-12-01 RX ORDER — DIPHENHYDRAMINE HYDROCHLORIDE 50 MG/ML
25 INJECTION INTRAMUSCULAR; INTRAVENOUS ONCE
Status: COMPLETED | OUTPATIENT
Start: 2019-12-01 | End: 2019-12-01

## 2019-12-01 RX ORDER — FAMOTIDINE 10 MG/ML
20 INJECTION, SOLUTION INTRAVENOUS ONCE
Status: COMPLETED | OUTPATIENT
Start: 2019-12-01 | End: 2019-12-01

## 2019-12-01 RX ORDER — METHYLPREDNISOLONE SODIUM SUCCINATE 125 MG/2ML
125 INJECTION, POWDER, LYOPHILIZED, FOR SOLUTION INTRAMUSCULAR; INTRAVENOUS ONCE
Status: COMPLETED | OUTPATIENT
Start: 2019-12-01 | End: 2019-12-01

## 2019-12-01 RX ADMIN — FAMOTIDINE 20 MG: 10 INJECTION, SOLUTION INTRAVENOUS at 06:36

## 2019-12-01 RX ADMIN — DIPHENHYDRAMINE HYDROCHLORIDE 25 MG: 50 INJECTION INTRAMUSCULAR; INTRAVENOUS at 06:35

## 2019-12-01 RX ADMIN — METHYLPREDNISOLONE SODIUM SUCCINATE 125 MG: 125 INJECTION, POWDER, FOR SOLUTION INTRAMUSCULAR; INTRAVENOUS at 06:36

## 2019-12-01 NOTE — ED PROVIDER NOTES
TRIAGE CHIEF COMPLAINT:     Nursing and triage notes reviewed    Chief Complaint   Patient presents with   • Sore Throat      HPI: Que Lagunas is a 74 y.o. male who presents to the emergency department complaining of a feeling like his throat is swelling.  Patient states he started to notice the sensation prior to going to sleep last night which has progressively worsened throughout the evening.  Patient denies having similar symptoms in the past.  He states it is slightly uncomfortable to swallow but denies out right pain.  Denies fevers or chills.  No cough or congestion.  Patient is able to breathe without difficulty.  He does take lisinopril.    REVIEW OF SYSTEMS: All other systems reviewed and are negative     PAST MEDICAL HISTORY:   Past Medical History:   Diagnosis Date   • Abnormal EKG     Bifascicular block and no prior ischemia evaluation.    • Anemia    • Anxiety    • Arthritis    • Atrial fibrillation (CMS/HCC)     CHADS VASc=3.  Continue Xarelto 20.  vas continue Zaroxolyn 20 has a relative 20 Knobloch can add Lantus 40   • CAD (coronary artery disease)    • Colon polyp 04/09/2015   • Contact dermatitis    • Diabetes (CMS/HCC)    • Dyslipidemia    • Hypertension    • Palpitations    • Prostatism    • Stroke (CMS/HCC)    • Tattoo         FAMILY HISTORY:   Family History   Problem Relation Age of Onset   • Diabetes Other    • Cancer Other         NO PROSTATE CANCER HISTORY   • Hypertension Other    • Cancer Other    • Diabetes Other    • Liver disease Mother    • Liver disease Father    • Stomach cancer Father    • Colon cancer Neg Hx         SOCIAL HISTORY:   Social History     Socioeconomic History   • Marital status:      Spouse name: Not on file   • Number of children: Not on file   • Years of education: Not on file   • Highest education level: Not on file   Occupational History     Employer: [employer]   Tobacco Use   • Smoking status: Former Smoker     Types: Cigarettes     Last attempt to  "quit: 2001     Years since quittin.9   • Smokeless tobacco: Never Used   • Tobacco comment: STOPPED 5 YEARS AGO   Substance and Sexual Activity   • Alcohol use: No   • Drug use: No   • Sexual activity: Defer        SURGICAL HISTORY:   Past Surgical History:   Procedure Laterality Date   • COLONOSCOPY  2015   • EYE SURGERY     • PROSTATE SURGERY     • TONSILLECTOMY          CURRENT MEDICATIONS:      Medication List      CONTINUE taking these medications    glucose monitor monitoring kit  1 each As Needed (prn).     * Lancets misc  Use to test twice daily. E11.9     * LIFESCAN UNISTIK II LANCETS misc  1 Units 3 (Three) Times a Day.     Pen Needles 3/16\" 31G X 5 MM misc  Use with insulin daily E11.9         * This list has 2 medication(s) that are the same as other medications   prescribed for you. Read the directions carefully, and ask your doctor or   other care provider to review them with you.            ASK your doctor about these medications    amLODIPine 2.5 MG tablet  Commonly known as:  NORVASC  Take 1 tablet by mouth Daily.     cholecalciferol 25 MCG (1000 UT) tablet  Commonly known as:  VITAMIN D3     glucose blood test strip  Commonly known as:  SOLUS V2 TEST  Use as instructed     insulin lispro 100 UNIT/ML injection  Commonly known as:  HUMALOG  Inject 10 Units under the skin into the appropriate area as directed 3   (Three) Times a Day Before Meals.     LANTUS 100 UNIT/ML injection  Generic drug:  insulin glargine  inject 40 units under the skin every night     lisinopril 40 MG tablet  Commonly known as:  PRINIVIL,ZESTRIL  Take 1 tablet by mouth Daily.      metFORMIN 500 MG tablet  Commonly known as:  GLUCOPHAGE  TAKE 1 TABLET BY MOUTH IN THE MORNING with breakfast     nystatin-triamcinolone 500800-9.1 UNIT/GM-% ointment  Commonly known as:  MYCOLOG  Apply to affected area 2 times daily     PARoxetine 20 MG tablet  Commonly known as:  PAXIL  TAKE 1 TABLET BY MOUTH ONE TIME A DAY   "   pravastatin 40 MG tablet  Commonly known as:  PRAVACHOL  TAKE 1 TABLET BY MOUTH IN THE EVENING     VAQTA 50 UNIT/ML injection  Generic drug:  hepatitis A vaccine     XARELTO 20 MG tablet  Generic drug:  rivaroxaban  TAKE 1 TABLET BY MOUTH ONE TIME A DAY           ALLERGIES: Patient has no known allergies.     PHYSICAL EXAM:   VITAL SIGNS:   Vitals:    12/01/19 0543   BP: (!) 135/109   Pulse: 109   Resp: 18   Temp: 98 °F (36.7 °C)   SpO2: 96%      CONSTITUTIONAL: Awake, oriented, appears non-toxic   HENT: Atraumatic, normocephalic, oral mucosa pink and moist, airway patent.  There is some mild swelling in the posterior pharynx.  There is no erythema.  Airway is patent.  EYES: Conjunctiva clear   NECK: Trachea midline   CARDIOVASCULAR: Normal heart rate, Normal rhythm, No murmurs, rubs, gallops   PULMONARY/CHEST: Clear to auscultation, no rhonchi, wheezes, or rales. Symmetrical breath sounds.  ABDOMINAL: Non-distended, soft, non-tender - no rebound or guarding.   NEUROLOGIC: Non-focal, moving all four extremities, no gross sensory or motor deficits.   EXTREMITIES: No clubbing, cyanosis, or edema   SKIN: Warm, Dry, No erythema, No rash     ED COURSE / MEDICAL DECISION MAKING:   Que Lagunas is a 74 y.o. male who presents to the emergency department for evaluation of feeling of swelling in his throat.  Patient nondistressed on arrival with stable vital signs.  Exam reveals a small amount of swelling in the posterior pharynx but no obvious erythema.  Given his medication lisinopril I have some concern for angioedema.  Will give patient some steroids, Benadryl, Pepcid.  Will observe and have him stop his lisinopril.  Care of the patient checked out to oncoming physician during observation.  Disposition is pending patient response to therapy.    DECISION TO DISCHARGE/ADMIT: see ED care timeline     FINAL IMPRESSION:   1 --angioedema  2 --   3 --     Electronically signed by: Sole oGng MD, 12/1/2019 6:30 AM        Sole Gong MD  12/01/19 0646

## 2019-12-03 ENCOUNTER — TELEPHONE (OUTPATIENT)
Dept: INTERNAL MEDICINE | Facility: CLINIC | Age: 74
End: 2019-12-03

## 2019-12-03 LAB — BACTERIA SPEC AEROBE CULT: NORMAL

## 2019-12-03 NOTE — TELEPHONE ENCOUNTER
Pt had an allergic reaction to lisinopril and ended up in the er.  Pt wants to know what blood pressure medication he wants to change him to?

## 2019-12-04 ENCOUNTER — OFFICE VISIT (OUTPATIENT)
Dept: INTERNAL MEDICINE | Facility: CLINIC | Age: 74
End: 2019-12-04

## 2019-12-04 VITALS
HEART RATE: 78 BPM | DIASTOLIC BLOOD PRESSURE: 94 MMHG | WEIGHT: 178.8 LBS | HEIGHT: 72 IN | TEMPERATURE: 97.3 F | SYSTOLIC BLOOD PRESSURE: 156 MMHG | RESPIRATION RATE: 18 BRPM | OXYGEN SATURATION: 92 % | BODY MASS INDEX: 24.22 KG/M2

## 2019-12-04 DIAGNOSIS — Z79.4 TYPE 2 DIABETES MELLITUS WITH OTHER SPECIFIED COMPLICATION, WITH LONG-TERM CURRENT USE OF INSULIN (HCC): ICD-10-CM

## 2019-12-04 DIAGNOSIS — E78.2 MIXED HYPERLIPIDEMIA: Primary | ICD-10-CM

## 2019-12-04 DIAGNOSIS — I10 ESSENTIAL HYPERTENSION: ICD-10-CM

## 2019-12-04 DIAGNOSIS — G89.29 CHRONIC PAIN OF LEFT KNEE: ICD-10-CM

## 2019-12-04 DIAGNOSIS — F41.1 GENERALIZED ANXIETY DISORDER: ICD-10-CM

## 2019-12-04 DIAGNOSIS — G45.9 TIA (TRANSIENT ISCHEMIC ATTACK): ICD-10-CM

## 2019-12-04 DIAGNOSIS — E55.9 VITAMIN D DEFICIENCY: ICD-10-CM

## 2019-12-04 DIAGNOSIS — M25.562 CHRONIC PAIN OF LEFT KNEE: ICD-10-CM

## 2019-12-04 DIAGNOSIS — E11.69 TYPE 2 DIABETES MELLITUS WITH OTHER SPECIFIED COMPLICATION, WITH LONG-TERM CURRENT USE OF INSULIN (HCC): ICD-10-CM

## 2019-12-04 DIAGNOSIS — I25.10 CORONARY ARTERY DISEASE INVOLVING NATIVE CORONARY ARTERY OF NATIVE HEART WITHOUT ANGINA PECTORIS: ICD-10-CM

## 2019-12-04 DIAGNOSIS — I48.91 ATRIAL FIBRILLATION, UNSPECIFIED TYPE (HCC): ICD-10-CM

## 2019-12-04 PROCEDURE — 99214 OFFICE O/P EST MOD 30 MIN: CPT | Performed by: INTERNAL MEDICINE

## 2019-12-04 RX ORDER — AMLODIPINE BESYLATE 5 MG/1
5 TABLET ORAL DAILY
Qty: 30 TABLET | Refills: 1 | Status: SHIPPED | OUTPATIENT
Start: 2019-12-04 | End: 2020-02-12

## 2019-12-04 NOTE — PROGRESS NOTES
"Subjective   Que Lagunas is a 74 y.o. male.     Chief Complaint   Patient presents with   • Allergic Reaction     pt states he had an allergic reaction to lisinopril he states his throat swelled       History of Present Illness   Patient here complaining allergic reaction to ACE inhibitor.  Patient was seen at the emergency room diagnosed angioedema.  Medicine was discontinued now blood pressure is high.  Also diabetes is out of control A1c 9.0.  Patient is taking Lantus 30 units at nighttime which causes low sugar often.  Patient is taking short acting mealtime insulin 13 units each meal.  Hyperlipidemia stable.  Coronary artery disease disease is stable patient is seeing cardiologist.  Knee joint pain patient is seeing orthopedist still has some pain.    Current Outpatient Medications:   •  amLODIPine (NORVASC) 5 MG tablet, Take 1 tablet by mouth Daily., Disp: 30 tablet, Rfl: 1  •  cholecalciferol (VITAMIN D3) 1000 units tablet, Take 1,000 Units by mouth Daily., Disp: , Rfl:   •  glucose blood (SOLUS V2 TEST) test strip, Use as instructed, Disp: 100 each, Rfl: 12  •  glucose monitor monitoring kit, 1 each As Needed (prn)., Disp: 1 each, Rfl: 0  •  insulin lispro (HUMALOG) 100 UNIT/ML injection, Inject 10 Units under the skin into the appropriate area as directed 3 (Three) Times a Day Before Meals., Disp: 10 mL, Rfl: 11  •  Insulin Pen Needle (PEN NEEDLES 3/16\") 31G X 5 MM misc, Use with insulin daily E11.9, Disp: 100 each, Rfl: 5  •  Lancets misc, Use to test twice daily. E11.9, Disp: 100 each, Rfl: 12  •  LANTUS 100 UNIT/ML injection, inject 40 units under the skin every night, Disp: 10 mL, Rfl: 10  •  LIFESCAN UNISTIK II LANCETS misc, 1 Units 3 (Three) Times a Day., Disp: 100 each, Rfl: 5  •  metFORMIN (GLUCOPHAGE) 500 MG tablet, TAKE 1 TABLET BY MOUTH IN THE MORNING with breakfast, Disp: 90 tablet, Rfl: 1  •  nystatin-triamcinolone (MYCOLOG) 360893-6.1 UNIT/GM-% ointment, Apply to affected area 2 times " daily (Patient taking differently: As Needed. Apply to affected area 2 times daily), Disp: 30 g, Rfl: 1  •  PARoxetine (PAXIL) 20 MG tablet, TAKE 1 TABLET BY MOUTH ONE TIME A DAY , Disp: 90 tablet, Rfl: 1  •  pravastatin (PRAVACHOL) 40 MG tablet, TAKE 1 TABLET BY MOUTH IN THE EVENING , Disp: 90 tablet, Rfl: 1  •  VAQTA 50 UNIT/ML injection, , Disp: , Rfl:   •  XARELTO 20 MG tablet, TAKE 1 TABLET BY MOUTH ONE TIME A DAY , Disp: 30 tablet, Rfl: 10    The following portions of the patient's history were reviewed and updated as appropriate: allergies, current medications, past family history, past medical history, past social history, past surgical history and problem list.    Review of Systems   Constitutional: Negative.    Respiratory: Negative.    Cardiovascular: Negative.    Gastrointestinal: Negative.    Musculoskeletal: Negative.    Skin: Negative.    Neurological: Negative.    Psychiatric/Behavioral: Negative.        Objective   Physical Exam   Constitutional: He is oriented to person, place, and time. He appears well-developed and well-nourished.   Neck: Neck supple.   Cardiovascular: Normal rate, regular rhythm and normal heart sounds.   Pulmonary/Chest: Effort normal and breath sounds normal.   Abdominal: Bowel sounds are normal.   Neurological: He is alert and oriented to person, place, and time.   Skin: Skin is warm.   Psychiatric: He has a normal mood and affect.       All tests have been reviewed.    Assessment/Plan   Diagnoses and all orders for this visit:    Mixed hyperlipidemia continue medication    Essential hypertension discontinue lisinopril increase amlodipine to 5 mg daily from 2.5 mg daily    TIA (transient ischemic attack) continue medication    Coronary artery disease involving native coronary artery of native heart without angina pectoris continue follow-up with cardiologist    Atrial fibrillation, unspecified type (CMS/[facility])    Type 2 diabetes mellitus with other specified complication, with  long-term current use of insulin (CMS/[facility]) reduce nighttime insulin to 25 units from 30 units and mealtime insulin to 15 units from 13 units    Chronic pain of left knee follow-up with orthopedist    Generalized anxiety disorder    Vitamin D deficiency    Other orders  -     amLODIPine (NORVASC) 5 MG tablet; Take 1 tablet by mouth Daily.    Follow-up in 3 weeks with logbook morning sugar and a 2 hours after meal sugar.             TIA continue xarelto, Echo and carotid duplex and CT head only small vessel ischemia   A.fib continue med , follow up with cardio  Hypertension continue on medication,    BPH,s/p laser surgery doing better, hematuria s/p scope,  follow up with dr mclain,   Diabetes  on humalog 10u increase to 13u  each meal morning and dinner meal, lunch   tresiba  25u qhs patient is also going to calibrate his own glucometer and instructed patient to check postprandial fingerstick blood sugar 2 hours after meals.--  dyslipidemia continue medicine  vitD low continue  vitD3 1000u daily  Onychomycosis improved after lamisil  Anxiety continue medication  colon:divertic polyp and hemorroid done 4/2015  Vaccinations up-to-date, patient declined shingrix, pneumovax done , [id number]  Balancing low, PT patient refuses  AAA screen

## 2019-12-06 RX ORDER — PAROXETINE HYDROCHLORIDE 20 MG/1
TABLET, FILM COATED ORAL
Qty: 90 TABLET | Refills: 0 | Status: SHIPPED | OUTPATIENT
Start: 2019-12-06 | End: 2020-01-28

## 2019-12-17 RX ORDER — LISINOPRIL 40 MG/1
TABLET ORAL
Qty: 90 TABLET | Refills: 1 | OUTPATIENT
Start: 2019-12-17

## 2020-01-28 ENCOUNTER — OFFICE VISIT (OUTPATIENT)
Dept: INTERNAL MEDICINE | Facility: CLINIC | Age: 75
End: 2020-01-28

## 2020-01-28 VITALS
TEMPERATURE: 97.7 F | DIASTOLIC BLOOD PRESSURE: 84 MMHG | SYSTOLIC BLOOD PRESSURE: 124 MMHG | WEIGHT: 184 LBS | HEIGHT: 72 IN | BODY MASS INDEX: 24.92 KG/M2 | HEART RATE: 63 BPM | OXYGEN SATURATION: 94 %

## 2020-01-28 DIAGNOSIS — B35.1 ONYCHOMYCOSIS: ICD-10-CM

## 2020-01-28 DIAGNOSIS — E55.9 VITAMIN D DEFICIENCY: ICD-10-CM

## 2020-01-28 DIAGNOSIS — I10 ESSENTIAL HYPERTENSION: ICD-10-CM

## 2020-01-28 DIAGNOSIS — M25.562 CHRONIC PAIN OF LEFT KNEE: ICD-10-CM

## 2020-01-28 DIAGNOSIS — G25.81 RESTLESS LEG SYNDROME: ICD-10-CM

## 2020-01-28 DIAGNOSIS — S83.207A TEAR OF MENISCUS OF LEFT KNEE, UNSPECIFIED MENISCUS, UNSPECIFIED TEAR TYPE, UNSPECIFIED WHETHER OLD OR CURRENT TEAR: ICD-10-CM

## 2020-01-28 DIAGNOSIS — I48.91 ATRIAL FIBRILLATION, UNSPECIFIED TYPE (HCC): ICD-10-CM

## 2020-01-28 DIAGNOSIS — N40.0 ENLARGED PROSTATE: ICD-10-CM

## 2020-01-28 DIAGNOSIS — K63.5 POLYP OF COLON, UNSPECIFIED PART OF COLON, UNSPECIFIED TYPE: ICD-10-CM

## 2020-01-28 DIAGNOSIS — G45.9 TIA (TRANSIENT ISCHEMIC ATTACK): ICD-10-CM

## 2020-01-28 DIAGNOSIS — E78.2 MIXED HYPERLIPIDEMIA: Primary | ICD-10-CM

## 2020-01-28 DIAGNOSIS — I25.10 CORONARY ARTERY DISEASE INVOLVING NATIVE CORONARY ARTERY OF NATIVE HEART WITHOUT ANGINA PECTORIS: ICD-10-CM

## 2020-01-28 DIAGNOSIS — F41.1 GENERALIZED ANXIETY DISORDER: ICD-10-CM

## 2020-01-28 DIAGNOSIS — G89.29 CHRONIC PAIN OF LEFT KNEE: ICD-10-CM

## 2020-01-28 DIAGNOSIS — Z79.4 TYPE 2 DIABETES MELLITUS WITH OTHER SPECIFIED COMPLICATION, WITH LONG-TERM CURRENT USE OF INSULIN (HCC): ICD-10-CM

## 2020-01-28 DIAGNOSIS — E11.69 TYPE 2 DIABETES MELLITUS WITH OTHER SPECIFIED COMPLICATION, WITH LONG-TERM CURRENT USE OF INSULIN (HCC): ICD-10-CM

## 2020-01-28 LAB — HBA1C MFR BLD: 7.3 %

## 2020-01-28 PROCEDURE — 99214 OFFICE O/P EST MOD 30 MIN: CPT | Performed by: INTERNAL MEDICINE

## 2020-01-28 PROCEDURE — 83036 HEMOGLOBIN GLYCOSYLATED A1C: CPT | Performed by: INTERNAL MEDICINE

## 2020-01-28 RX ORDER — PRAMIPEXOLE DIHYDROCHLORIDE 0.25 MG/1
0.25 TABLET ORAL
Qty: 30 TABLET | Refills: 1 | Status: SHIPPED | OUTPATIENT
Start: 2020-01-28 | End: 2020-10-08

## 2020-01-28 NOTE — PROGRESS NOTES
"Subjective   Que Lagunas is a 74 y.o. male.     Chief Complaint   Patient presents with   • Follow-up     1 mo f/u, pt states that he took his blood sugar this morning and it was 97.        History of Present Illness   Patient here for follow-up.  Diabetes improved on medications.  A1c 7.3.  Hypertension stable on medication.  Hyperlipidemia stable on medication.  TIA history stable now.  Coronary artery disease is stable without chest pain or short of breath.  Patient complains of restless leg and nighttime and also left knee joint pain gradually getting worse.  Patient does have give out some time MRI showed anterior horn meniscus tear seen by orthopedist no need for surgery by orthopedist per patient.    Current Outpatient Medications:   •  amLODIPine (NORVASC) 5 MG tablet, Take 1 tablet by mouth Daily., Disp: 30 tablet, Rfl: 1  •  cholecalciferol (VITAMIN D3) 1000 units tablet, Take 1,000 Units by mouth Daily., Disp: , Rfl:   •  glucose blood (SOLUS V2 TEST) test strip, Use as instructed, Disp: 100 each, Rfl: 12  •  glucose monitor monitoring kit, 1 each As Needed (prn)., Disp: 1 each, Rfl: 0  •  insulin lispro (HUMALOG) 100 UNIT/ML injection, Inject 10 Units under the skin into the appropriate area as directed 3 (Three) Times a Day Before Meals., Disp: 10 mL, Rfl: 11  •  Insulin Pen Needle (PEN NEEDLES 3/16\") 31G X 5 MM misc, Use with insulin daily E11.9, Disp: 100 each, Rfl: 5  •  Lancets misc, Use to test twice daily. E11.9, Disp: 100 each, Rfl: 12  •  LANTUS 100 UNIT/ML injection, inject 40 units under the skin every night, Disp: 10 mL, Rfl: 10  •  LIFESCAN UNISTIK II LANCETS misc, 1 Units 3 (Three) Times a Day., Disp: 100 each, Rfl: 5  •  metFORMIN (GLUCOPHAGE) 500 MG tablet, TAKE 1 TABLET BY MOUTH IN THE MORNING with breakfast, Disp: 90 tablet, Rfl: 1  •  nystatin-triamcinolone (MYCOLOG) 917405-8.1 UNIT/GM-% ointment, Apply to affected area 2 times daily (Patient taking differently: As Needed. Apply " to affected area 2 times daily), Disp: 30 g, Rfl: 1  •  PARoxetine (PAXIL) 20 MG tablet, TAKE 1 TABLET BY MOUTH ONE TIME A DAY , Disp: 90 tablet, Rfl: 1  •  pravastatin (PRAVACHOL) 40 MG tablet, TAKE 1 TABLET BY MOUTH IN THE EVENING , Disp: 90 tablet, Rfl: 1  •  VAQTA 50 UNIT/ML injection, , Disp: , Rfl:   •  XARELTO 20 MG tablet, TAKE 1 TABLET BY MOUTH ONE TIME A DAY , Disp: 30 tablet, Rfl: 10    The following portions of the patient's history were reviewed and updated as appropriate: allergies, current medications, past family history, past medical history, past social history, past surgical history and problem list.    Review of Systems   Constitutional: Negative.    Respiratory: Negative.    Cardiovascular: Negative.    Gastrointestinal: Negative.    Musculoskeletal: Positive for arthralgias.   Skin: Negative.    Neurological: Negative.         Restless leg and nighttime   Psychiatric/Behavioral: Negative.        Objective   Physical Exam   Constitutional: He is oriented to person, place, and time. He appears well-nourished.   Neck: Neck supple.   Cardiovascular: Normal rate, regular rhythm and normal heart sounds.   Pulmonary/Chest: Effort normal and breath sounds normal.   Abdominal: Bowel sounds are normal.   Musculoskeletal: He exhibits tenderness.   Neurological: He is alert and oriented to person, place, and time.   Skin: Skin is warm.   Psychiatric: He has a normal mood and affect.       All tests have been reviewed.    Assessment/Plan   Diagnoses and all orders for this visit:    Mixed hyperlipidemia continue medication    Essential hypertension continue medication    TIA (transient ischemic attack) continue medication    Coronary artery disease involving native coronary artery of native heart without angina pectoris  Continue medication  Atrial fibrillation, unspecified type (CMS/HCC) continue medication    Polyp of colon, unspecified part of colon, unspecified type    Vitamin D deficiency continue  supplement    Type 2 diabetes mellitus with other specified complication, with long-term current use of insulin (CMS/[facility]) A1c 7.3 continue medication    Onychomycosis    Chronic pain of left knee we will discuss more patient may need a surgery    Enlarged prostate    Generalized anxiety disorder continue medication    Restless leg syndrome initiate Mirapex    1 months for wellness and to follow-up       historical data   TIA continue xarelto, Echo and carotid duplex and CT head only small vessel ischemia   A.fib continue med , follow up with cardio  Hypertension continue on medication,    BPH,s/p laser surgery doing better, hematuria s/p scope,  follow up with dr mclain,   Diabetes  on humalog 10u increase to 13u  each meal morning and dinner meal, lunch   tresiba  25u qhs patient is also going to calibrate his own glucometer and instructed patient to check postprandial fingerstick blood sugar 2 hours after meals.--  dyslipidemia continue medicine  vitD low continue  vitD3 1000u daily  Onychomycosis improved after lamisil  Anxiety continue medication  colon:divertic polyp and hemorroid done 4/2015  Vaccinations up-to-date, patient declined shingrix, pneumovax done , [id number]  Balancing low, PT patient refuses  AAA screen  negative

## 2020-02-12 RX ORDER — AMLODIPINE BESYLATE 5 MG/1
TABLET ORAL
Qty: 30 TABLET | Refills: 0 | Status: SHIPPED | OUTPATIENT
Start: 2020-02-12 | End: 2020-03-16

## 2020-02-28 ENCOUNTER — RESULTS ENCOUNTER (OUTPATIENT)
Dept: INTERNAL MEDICINE | Facility: CLINIC | Age: 75
End: 2020-02-28

## 2020-02-28 ENCOUNTER — OFFICE VISIT (OUTPATIENT)
Dept: INTERNAL MEDICINE | Facility: CLINIC | Age: 75
End: 2020-02-28

## 2020-02-28 VITALS
WEIGHT: 178.8 LBS | RESPIRATION RATE: 16 BRPM | SYSTOLIC BLOOD PRESSURE: 122 MMHG | TEMPERATURE: 97.8 F | DIASTOLIC BLOOD PRESSURE: 74 MMHG | BODY MASS INDEX: 24.22 KG/M2 | HEIGHT: 72 IN | HEART RATE: 82 BPM | OXYGEN SATURATION: 91 %

## 2020-02-28 DIAGNOSIS — G45.9 TIA (TRANSIENT ISCHEMIC ATTACK): ICD-10-CM

## 2020-02-28 DIAGNOSIS — S83.207A TEAR OF MENISCUS OF LEFT KNEE, UNSPECIFIED MENISCUS, UNSPECIFIED TEAR TYPE, UNSPECIFIED WHETHER OLD OR CURRENT TEAR: ICD-10-CM

## 2020-02-28 DIAGNOSIS — Z12.11 COLON CANCER SCREENING: ICD-10-CM

## 2020-02-28 DIAGNOSIS — E11.69 TYPE 2 DIABETES MELLITUS WITH OTHER SPECIFIED COMPLICATION, WITH LONG-TERM CURRENT USE OF INSULIN (HCC): ICD-10-CM

## 2020-02-28 DIAGNOSIS — E55.9 VITAMIN D DEFICIENCY: ICD-10-CM

## 2020-02-28 DIAGNOSIS — I10 ESSENTIAL HYPERTENSION: ICD-10-CM

## 2020-02-28 DIAGNOSIS — N40.0 ENLARGED PROSTATE: ICD-10-CM

## 2020-02-28 DIAGNOSIS — G25.81 RESTLESS LEG SYNDROME: ICD-10-CM

## 2020-02-28 DIAGNOSIS — I48.91 ATRIAL FIBRILLATION, UNSPECIFIED TYPE (HCC): ICD-10-CM

## 2020-02-28 DIAGNOSIS — Z87.891 PERSONAL HISTORY OF TOBACCO USE, PRESENTING HAZARDS TO HEALTH: ICD-10-CM

## 2020-02-28 DIAGNOSIS — G89.29 CHRONIC PAIN OF LEFT KNEE: ICD-10-CM

## 2020-02-28 DIAGNOSIS — E78.2 MIXED HYPERLIPIDEMIA: Primary | ICD-10-CM

## 2020-02-28 DIAGNOSIS — Z79.4 TYPE 2 DIABETES MELLITUS WITH OTHER SPECIFIED COMPLICATION, WITH LONG-TERM CURRENT USE OF INSULIN (HCC): ICD-10-CM

## 2020-02-28 DIAGNOSIS — I25.10 CORONARY ARTERY DISEASE INVOLVING NATIVE CORONARY ARTERY OF NATIVE HEART WITHOUT ANGINA PECTORIS: ICD-10-CM

## 2020-02-28 DIAGNOSIS — K63.5 POLYP OF COLON, UNSPECIFIED PART OF COLON, UNSPECIFIED TYPE: ICD-10-CM

## 2020-02-28 DIAGNOSIS — B35.1 ONYCHOMYCOSIS: ICD-10-CM

## 2020-02-28 DIAGNOSIS — M25.562 CHRONIC PAIN OF LEFT KNEE: ICD-10-CM

## 2020-02-28 DIAGNOSIS — F41.1 GENERALIZED ANXIETY DISORDER: ICD-10-CM

## 2020-02-28 DIAGNOSIS — L72.3 SEBACEOUS CYST: ICD-10-CM

## 2020-02-28 PROCEDURE — G0439 PPPS, SUBSEQ VISIT: HCPCS | Performed by: INTERNAL MEDICINE

## 2020-02-28 NOTE — PROGRESS NOTES
The ABCs of the Annual Wellness Visit  Subsequent Medicare Wellness Visit    Chief Complaint   Patient presents with   • Medicare Wellness-subsequent       Subjective   History of Present Illness:  Que Lagunas is a 74 y.o. male who presents for a Subsequent Medicare Wellness Visit.    HEALTH RISK ASSESSMENT    Recent Hospitalizations:  No hospitalization(s) within the last year.    Current Medical Providers:  Patient Care Team:  Evans Blackburn MD as PCP - General  Evans Blackburn MD as PCP - Family Medicine  Evans Blackburn MD as PCP - Claims Attributed  Alayna Lynch MD as Surgeon (General Surgery)    Smoking Status:  Social History     Tobacco Use   Smoking Status Former Smoker   • Types: Cigarettes   • Last attempt to quit:    • Years since quittin.1   Smokeless Tobacco Never Used   Tobacco Comment    STOPPED 5 YEARS AGO       Alcohol Consumption:  Social History     Substance and Sexual Activity   Alcohol Use No       Depression Screen:   PHQ-2/PHQ-9 Depression Screening 2020   Little interest or pleasure in doing things 0   Feeling down, depressed, or hopeless 0   Total Score 0       Fall Risk Screen:  TIMADI Fall Risk Assessment was completed, and patient is at HIGH risk for falls. Assessment completed on:2020    Health Habits and Functional and Cognitive Screening:  Functional & Cognitive Status 2020   Do you have difficulty preparing food and eating? No   Do you have difficulty bathing yourself, getting dressed or grooming yourself? No   Do you have difficulty using the toilet? No   Do you have difficulty moving around from place to place? No   Do you have trouble with steps or getting out of a bed or a chair? No   Current Diet Well Balanced Diet   Dental Exam Up to date   Eye Exam Up to date   Exercise (times per week) 0 times per week   Current Exercise Activities Include None   Do you need help using the phone?  No   Are you deaf or do you have serious difficulty hearing?   No   Do you need help with transportation? No   Do you need help shopping? No   Do you need help preparing meals?  No   Do you need help with housework?  No   Do you need help with laundry? No   Do you need help taking your medications? No   Do you need help managing money? No   Do you ever drive or ride in a car without wearing a seat belt? No   Have you felt unusual stress, anger or loneliness in the last month? No   Who do you live with? Alone   If you need help, do you have trouble finding someone available to you? No   Have you been bothered in the last four weeks by sexual problems? No   Do you have difficulty concentrating, remembering or making decisions? No         Does the patient have evidence of cognitive impairment? No    Asprin use counseling:Does not need ASA (and currently is not on it)    Age-appropriate Screening Schedule:  Refer to the list below for future screening recommendations based on patient's age, sex and/or medical conditions. Orders for these recommended tests are listed in the plan section. The patient has been provided with a written plan.    Health Maintenance   Topic Date Due   • TDAP/TD VACCINES (1 - Tdap) 03/18/1956   • ZOSTER VACCINE (1 of 2) 03/18/1994   • LIPID PANEL  04/30/2019   • URINE MICROALBUMIN  04/30/2019   • HEMOGLOBIN A1C  07/28/2020   • DIABETIC EYE EXAM  10/25/2020   • DIABETIC FOOT EXAM  02/28/2021   • COLONOSCOPY  04/09/2025   • INFLUENZA VACCINE  Completed          The following portions of the patient's history were reviewed and updated as appropriate: allergies, current medications, past family history, past medical history, past social history, past surgical history and problem list.    Outpatient Medications Prior to Visit   Medication Sig Dispense Refill   • amLODIPine (NORVASC) 5 MG tablet TAKE ONE TABLET BY MOUTH DAILY 30 tablet 0   • cholecalciferol (VITAMIN D3) 1000 units tablet Take 1,000 Units by mouth Daily.     • glucose blood (SOLUS V2 TEST) test  "strip Use as instructed 100 each 12   • glucose monitor monitoring kit 1 each As Needed (prn). 1 each 0   • insulin lispro (HUMALOG) 100 UNIT/ML injection Inject 10 Units under the skin into the appropriate area as directed 3 (Three) Times a Day Before Meals. 10 mL 11   • Insulin Pen Needle (PEN NEEDLES 3/16\") 31G X 5 MM misc Use with insulin daily E11.9 100 each 5   • Lancets misc Use to test twice daily. E11.9 100 each 12   • LANTUS 100 UNIT/ML injection inject 40 units under the skin every night 10 mL 10   • LIFESCAN UNISTIK II LANCETS misc 1 Units 3 (Three) Times a Day. 100 each 5   • metFORMIN (GLUCOPHAGE) 500 MG tablet TAKE 1 TABLET BY MOUTH IN THE MORNING with breakfast 90 tablet 1   • nystatin-triamcinolone (MYCOLOG) 391656-6.1 UNIT/GM-% ointment Apply to affected area 2 times daily (Patient taking differently: As Needed. Apply to affected area 2 times daily) 30 g 1   • PARoxetine (PAXIL) 20 MG tablet TAKE 1 TABLET BY MOUTH ONE TIME A DAY  90 tablet 1   • pramipexole (MIRAPEX) 0.25 MG tablet Take 1 tablet by mouth every night at bedtime. 30 tablet 1   • pravastatin (PRAVACHOL) 40 MG tablet TAKE 1 TABLET BY MOUTH IN THE EVENING  90 tablet 1   • VAQTA 50 UNIT/ML injection      • XARELTO 20 MG tablet TAKE 1 TABLET BY MOUTH ONE TIME A DAY  30 tablet 10     No facility-administered medications prior to visit.        Patient Active Problem List   Diagnosis   • Colon polyp   • Diabetes mellitus (CMS/HCC)   • Enlarged prostate   • Generalized anxiety disorder   • Hyperlipidemia   • Hypertension   • Onychomycosis   • TIA (transient ischemic attack)   • CAD (coronary artery disease)   • Atrial fibrillation (CMS/HCC)   • Vitamin D deficiency   • Colon cancer screening   • Chronic pain of left knee   • Sebaceous cyst   • Tear of meniscus of left knee   • Restless leg syndrome       Advanced Care Planning:  ACP discussion was held with the patient during this visit. Patient has an advance directive that is valid in EMR.  " "Patient has an advance directive, copy requested.    Review of Systems   Constitutional: Negative.    Respiratory: Negative.    Cardiovascular: Negative.    Gastrointestinal: Negative.    Skin: Negative.    Psychiatric/Behavioral: Negative.        Compared to one year ago, the patient feels his physical health is the same.  Compared to one year ago, the patient feels his mental health is the same.    Reviewed chart for potential of high risk medication in the elderly: yes  Reviewed chart for potential of harmful drug interactions in the elderly:yes    Objective         Vitals:    02/28/20 1416   BP: 122/74   Pulse: 82   Resp: 16   Temp: 97.8 °F (36.6 °C)   TempSrc: Temporal   SpO2: 91%   Weight: 81.1 kg (178 lb 12.8 oz)   Height: 182.9 cm (72.01\")   PainSc: 0-No pain       Body mass index is 24.24 kg/m².  Discussed the patient's BMI with him. The BMI is in the acceptable range.    Physical Exam   Constitutional: He is oriented to person, place, and time. He appears well-developed and well-nourished.   Neck: Neck supple.   Cardiovascular: Normal rate, regular rhythm and normal heart sounds.   Pulmonary/Chest: Effort normal and breath sounds normal.   Abdominal: Soft. Bowel sounds are normal.   Neurological: He is alert and oriented to person, place, and time.   Psychiatric: He has a normal mood and affect. His behavior is normal.       Lab Results   Component Value Date    HGBA1C 7.3 01/28/2020        Assessment/Plan   Medicare Risks and Personalized Health Plan  CMS Preventative Services Quick Reference  Advance Directive Discussion    The above risks/problems have been discussed with the patient.  Pertinent information has been shared with the patient in the After Visit Summary.  Follow up plans and orders are seen below in the Assessment/Plan Section.    Diagnoses and all orders for this visit:    1. Mixed hyperlipidemia (Primary)  -     Lipid Panel  -     CK    2. Essential hypertension  -     CBC & " Differential    3. TIA (transient ischemic attack)    4. Coronary artery disease involving native coronary artery of native heart without angina pectoris    5. Atrial fibrillation, unspecified type (CMS/HCC)  -     TSH    6. Polyp of colon, unspecified part of colon, unspecified type    7. Vitamin D deficiency  -     Vitamin D 25 Hydroxy    8. Type 2 diabetes mellitus with other specified complication, with long-term current use of insulin (CMS/HCC)  -     MicroAlbumin, Urine, Random - Urine, Clean Catch    9. Onychomycosis    10. Chronic pain of left knee, meniscus tear, seen by ortho, steroid shot no help. Giving out still.     11. Sebaceous cyst    12. Tear of meniscus of left knee, unspecified meniscus, unspecified tear type, unspecified whether old or current tear, exer quads and knee brace.     13. Enlarged prostate  -     PSA DIAGNOSTIC    14. Generalized anxiety disorder    15. Restless leg syndrome    16. Personal history of tobacco use, presenting hazards to health  -     CT Chest Low Dose Wo; Future    17. Colon cancer screening  -     Cologuard - Stool, Per Rectum; Future      Follow Up:  Return in about 6 months (around 8/28/2020) for Recheck.     An After Visit Summary and PPPS were given to the patient.           ---- historical data   TIA continue xarelto, Echo and carotid duplex and CT head only small vessel ischemia    A.fib continue med , follow up with cardio  BPH,s/p laser surgery doing better, hematuria s/p scope,  follow up with dr mclain,   Diabetes  on humalog 10u increase to 13u  each meal morning and dinner meal, lunch   tresiba  25u qhs patient is also going to calibrate his own glucometer and instructed patient to check postprandial fingerstick blood sugar 2 hours after meals.--  vitD low continue  vitD3 1000u daily  Onychomycosis improved after lamisil  colon:divertic polyp and hemorroid done 4/2015, patient refuses to repeat, prefer to do cologuard   Vaccinations up-to-date, patient  declined shingrix, pneumovax done ,   Balancing low, PT patient refuses  AAA screen  negative

## 2020-02-29 LAB
25(OH)D3+25(OH)D2 SERPL-MCNC: 14.9 NG/ML (ref 30–100)
BASOPHILS # BLD AUTO: 0.06 10*3/MM3 (ref 0–0.2)
BASOPHILS NFR BLD AUTO: 0.7 % (ref 0–1.5)
CHOLEST SERPL-MCNC: 126 MG/DL (ref 0–200)
CK SERPL-CCNC: 121 U/L (ref 20–200)
EOSINOPHIL # BLD AUTO: 0.14 10*3/MM3 (ref 0–0.4)
EOSINOPHIL NFR BLD AUTO: 1.7 % (ref 0.3–6.2)
ERYTHROCYTE [DISTWIDTH] IN BLOOD BY AUTOMATED COUNT: 12.9 % (ref 12.3–15.4)
HCT VFR BLD AUTO: 44.9 % (ref 37.5–51)
HDLC SERPL-MCNC: 52 MG/DL (ref 40–60)
HGB BLD-MCNC: 15.3 G/DL (ref 13–17.7)
IMM GRANULOCYTES # BLD AUTO: 0.03 10*3/MM3 (ref 0–0.05)
IMM GRANULOCYTES NFR BLD AUTO: 0.4 % (ref 0–0.5)
LDLC SERPL CALC-MCNC: 60 MG/DL (ref 0–100)
LYMPHOCYTES # BLD AUTO: 2.54 10*3/MM3 (ref 0.7–3.1)
LYMPHOCYTES NFR BLD AUTO: 31.2 % (ref 19.6–45.3)
MCH RBC QN AUTO: 29.9 PG (ref 26.6–33)
MCHC RBC AUTO-ENTMCNC: 34.1 G/DL (ref 31.5–35.7)
MCV RBC AUTO: 87.9 FL (ref 79–97)
MICROALBUMIN UR-MCNC: 8.8 UG/ML
MONOCYTES # BLD AUTO: 0.76 10*3/MM3 (ref 0.1–0.9)
MONOCYTES NFR BLD AUTO: 9.3 % (ref 5–12)
NEUTROPHILS # BLD AUTO: 4.6 10*3/MM3 (ref 1.7–7)
NEUTROPHILS NFR BLD AUTO: 56.7 % (ref 42.7–76)
NRBC BLD AUTO-RTO: 0 /100 WBC (ref 0–0.2)
PLATELET # BLD AUTO: 333 10*3/MM3 (ref 140–450)
PSA SERPL-MCNC: 0.94 NG/ML (ref 0–4)
RBC # BLD AUTO: 5.11 10*6/MM3 (ref 4.14–5.8)
TRIGL SERPL-MCNC: 68 MG/DL (ref 0–150)
TSH SERPL DL<=0.005 MIU/L-ACNC: 1.59 UIU/ML (ref 0.27–4.2)
VLDLC SERPL CALC-MCNC: 13.6 MG/DL
WBC # BLD AUTO: 8.13 10*3/MM3 (ref 3.4–10.8)

## 2020-03-04 RX ORDER — PEN NEEDLE, DIABETIC 30 GX5/16"
NEEDLE, DISPOSABLE MISCELLANEOUS
Qty: 100 EACH | Refills: 5 | Status: SHIPPED | OUTPATIENT
Start: 2020-03-04

## 2020-03-05 ENCOUNTER — HOSPITAL ENCOUNTER (OUTPATIENT)
Dept: CT IMAGING | Facility: HOSPITAL | Age: 75
Discharge: HOME | End: 2020-03-05
Admitting: INTERNAL MEDICINE

## 2020-03-05 DIAGNOSIS — Z87.891 PERSONAL HISTORY OF TOBACCO USE, PRESENTING HAZARDS TO HEALTH: ICD-10-CM

## 2020-03-05 PROCEDURE — G0297 LDCT FOR LUNG CA SCREEN: HCPCS

## 2020-03-06 RX ORDER — PAROXETINE HYDROCHLORIDE 20 MG/1
TABLET, FILM COATED ORAL
Qty: 90 TABLET | Refills: 0 | Status: SHIPPED | OUTPATIENT
Start: 2020-03-06 | End: 2020-03-19

## 2020-03-16 RX ORDER — AMLODIPINE BESYLATE 5 MG/1
TABLET ORAL
Qty: 30 TABLET | Refills: 0 | Status: SHIPPED | OUTPATIENT
Start: 2020-03-16 | End: 2020-03-19 | Stop reason: SDUPTHER

## 2020-03-19 RX ORDER — PAROXETINE HYDROCHLORIDE 20 MG/1
TABLET, FILM COATED ORAL
Qty: 90 TABLET | Refills: 2 | Status: SHIPPED | OUTPATIENT
Start: 2020-03-19 | End: 2020-03-19 | Stop reason: SDUPTHER

## 2020-03-19 RX ORDER — PAROXETINE HYDROCHLORIDE 20 MG/1
20 TABLET, FILM COATED ORAL DAILY
Qty: 30 TABLET | Refills: 2 | Status: SHIPPED | OUTPATIENT
Start: 2020-03-19 | End: 2021-03-18

## 2020-03-19 RX ORDER — AMLODIPINE BESYLATE 5 MG/1
5 TABLET ORAL DAILY
Qty: 30 TABLET | Refills: 2 | Status: SHIPPED | OUTPATIENT
Start: 2020-03-19 | End: 2020-04-13

## 2020-03-19 NOTE — TELEPHONE ENCOUNTER
[facility] CALLED AND STATED THAT PATIENT TOLD THEM HE LOST HIS MEDICATIONS HE PICKED UP YESTERDAY. THEY'RE NEEDING TO KNOW IF A NEW RX FOR PARoxetine (PAXIL) 20 MG tablet AND amLODIPine (NORVASC) 5 MG tablet. PLEASE ADVISE.

## 2020-03-23 RX ORDER — PRAVASTATIN SODIUM 40 MG
TABLET ORAL
Qty: 90 TABLET | Refills: 1 | Status: SHIPPED | OUTPATIENT
Start: 2020-03-23 | End: 2021-02-23

## 2020-04-13 RX ORDER — AMLODIPINE BESYLATE 5 MG/1
TABLET ORAL
Qty: 30 TABLET | Refills: 4 | Status: SHIPPED | OUTPATIENT
Start: 2020-04-13 | End: 2020-10-19

## 2020-04-14 ENCOUNTER — TELEPHONE (OUTPATIENT)
Dept: CARDIOLOGY | Facility: CLINIC | Age: 75
End: 2020-04-14

## 2020-04-14 NOTE — TELEPHONE ENCOUNTER
"Patient called with concerns of being out of Xarelto samples and the [place] office being closed. RN advised patient that we have no samples here at main office and inquired about how much it cost to fill at pharmacy? Pt states \"well I don't know, I have never gotten the medication filled at a pharmacy before.\" RN advised patient to attempt to get it filled through his pharmacy where a script had been previously sent and if he had any issues to call our office back. Pt verbalized understanding  "

## 2020-04-22 NOTE — TELEPHONE ENCOUNTER
Patient called and stated that refills are needed for the following prescription(s):    insulin lispro (HUMALOG) 100 UNIT/ML injection  metFORMIN (GLUCOPHAGE) 500 MG tablet    Pharmacy: [facility] in [place]  PH: [phone]  FX:[phone]    Patient callback: [phone]    Please advise.

## 2020-07-06 RX ORDER — RIVAROXABAN 20 MG/1
TABLET, FILM COATED ORAL
Qty: 30 TABLET | Refills: 11 | Status: SHIPPED | OUTPATIENT
Start: 2020-07-06 | End: 2021-01-13

## 2020-07-22 RX ORDER — INSULIN GLARGINE 100 [IU]/ML
40 INJECTION, SOLUTION SUBCUTANEOUS NIGHTLY
Qty: 10 ML | Refills: 11 | Status: SHIPPED | OUTPATIENT
Start: 2020-07-22 | End: 2020-10-30 | Stop reason: SDUPTHER

## 2020-07-22 RX ORDER — INSULIN GLARGINE 100 [IU]/ML
40 INJECTION, SOLUTION SUBCUTANEOUS NIGHTLY
Qty: 10 ML | Refills: 11 | Status: SHIPPED | OUTPATIENT
Start: 2020-07-22 | End: 2020-07-22 | Stop reason: SDUPTHER

## 2020-08-04 NOTE — TELEPHONE ENCOUNTER
Caller: Que Lagunas    Relationship: Self    Best call back number: [phone]    Medication needed:   Requested Prescriptions     Pending Prescriptions Disp Refills   • metFORMIN (GLUCOPHAGE) 500 MG tablet 90 tablet 1     Sig: Take 1 with breakfast in am       When do you need the refill by:08/04/2020    What details did the patient provide when requesting the medication: patient stated he will be out by tomorrow     Does the patient have less than a 3 day supply:  [x] Yes  [] No    What is the patient's preferred pharmacy: [id number] - [place], [place] - [address] - [phone]  - [phone]

## 2020-08-19 ENCOUNTER — OFFICE VISIT (OUTPATIENT)
Dept: CARDIOLOGY | Facility: CLINIC | Age: 75
End: 2020-08-19

## 2020-08-19 VITALS
OXYGEN SATURATION: 97 % | HEIGHT: 72 IN | BODY MASS INDEX: 22.67 KG/M2 | TEMPERATURE: 97.6 F | SYSTOLIC BLOOD PRESSURE: 110 MMHG | HEART RATE: 60 BPM | DIASTOLIC BLOOD PRESSURE: 72 MMHG | WEIGHT: 167.4 LBS

## 2020-08-19 DIAGNOSIS — Z79.4 TYPE 2 DIABETES MELLITUS WITH HYPERGLYCEMIA, WITH LONG-TERM CURRENT USE OF INSULIN (HCC): Primary | ICD-10-CM

## 2020-08-19 DIAGNOSIS — I10 ESSENTIAL HYPERTENSION: ICD-10-CM

## 2020-08-19 DIAGNOSIS — E78.2 MIXED HYPERLIPIDEMIA: ICD-10-CM

## 2020-08-19 DIAGNOSIS — I48.0 PAROXYSMAL ATRIAL FIBRILLATION (HCC): ICD-10-CM

## 2020-08-19 DIAGNOSIS — E11.65 TYPE 2 DIABETES MELLITUS WITH HYPERGLYCEMIA, WITH LONG-TERM CURRENT USE OF INSULIN (HCC): Primary | ICD-10-CM

## 2020-08-19 PROCEDURE — 93000 ELECTROCARDIOGRAM COMPLETE: CPT | Performed by: INTERNAL MEDICINE

## 2020-08-19 PROCEDURE — 99213 OFFICE O/P EST LOW 20 MIN: CPT | Performed by: INTERNAL MEDICINE

## 2020-08-19 NOTE — PROGRESS NOTES
"[facility] at [facility]  Office Progress Note  Que Lagunas  1945      Visit Date: 08/19/20    PCP: Evans Blackburn MD  [address]  [facility] [place] [address]    IDENTIFICATION: A 75 y.o. male former [employer] employee,  ,retired  from [place], former fishing enthusiast         PROBLEM LIST:   1. TIA:   a. April 2016, [facility] with paroxysmal atrial fibrillation and negative CT of the head and neck.   2. Abnormal EKG:  a. Bifascicular block and no prior ischemia evaluation.   b. 4/16 echo EF 60%  3. Dyslipidemia  a. 2/20 126/68/52/60  4. Diabetes mellitus, onset at age 49 and is requiring insulin x12 years.  a. A1c 11/19 9.0  5.  Hypertension, presumed essential.   6. 12/13/18 AAA screen WNL  7. Prostatism.   8. Remote tonsillectomy and eye surgery.  9. Nicotine addiction, cessation 2011 with a 56 pack year history previously.        Chief Complaint   Patient presents with   • Coronary Artery Disease       Allergies  Allergies   Allergen Reactions   • Lisinopril Anaphylaxis       Current Medications    Current Outpatient Medications:   •  amLODIPine (NORVASC) 5 MG tablet, TAKE ONE TABLET BY MOUTH DAILY, Disp: 30 tablet, Rfl: 4  •  cholecalciferol (VITAMIN D3) 1000 units tablet, Take 1,000 Units by mouth Daily., Disp: , Rfl:   •  glucose blood (SOLUS V2 TEST) test strip, Use as instructed, Disp: 100 each, Rfl: 12  •  glucose monitor monitoring kit, 1 each As Needed (prn)., Disp: 1 each, Rfl: 0  •  insulin glargine (Lantus) 100 UNIT/ML injection, Inject 40 Units under the skin into the appropriate area as directed Every Night., Disp: 10 mL, Rfl: 11  •  insulin lispro (HumaLOG) 100 UNIT/ML injection, Inject 13 Units under the skin into the appropriate area with meals, Disp: 10 mL, Rfl: 11  •  Insulin Pen Needle (PEN NEEDLES 3/16\") 31G X 5 MM misc, Use with insulin daily E11.9, Disp: 100 each, Rfl: 5  •  Insulin Syringe-Needle U-100 (Easy Touch " "Insulin Syringe) 30G X 5/16\" 0.5 ML misc, use as directed with insulin 4 times a day, Disp: 100 each, Rfl: 11  •  Lancets misc, Use to test twice daily. E11.9, Disp: 100 each, Rfl: 12  •  LIFESCAN UNISTIK II LANCETS misc, 1 Units 3 (Three) Times a Day., Disp: 100 each, Rfl: 5  •  metFORMIN (GLUCOPHAGE) 500 MG tablet, Take 1 with breakfast in am, Disp: 90 tablet, Rfl: 2  •  PARoxetine (PAXIL) 20 MG tablet, Take 1 tablet by mouth Daily., Disp: 30 tablet, Rfl: 2  •  pramipexole (MIRAPEX) 0.25 MG tablet, Take 1 tablet by mouth every night at bedtime., Disp: 30 tablet, Rfl: 1  •  pravastatin (PRAVACHOL) 40 MG tablet, TAKE ONE TABLET BY MOUTH EVERY EVENING, Disp: 90 tablet, Rfl: 1  •  VAQTA 50 UNIT/ML injection, , Disp: , Rfl:   •  XARELTO 20 MG tablet, TAKE 1 TABLET BY MOUTH ONE TIME A DAY , Disp: 30 tablet, Rfl: 11      History of Present Illness   Que Lagunas is a 75 y.o. year old male here for follow up.  Describes no new chest symptoms.  He states he is quite bored with covered pandemic and self-isolation.  States this is worsened now that he is a .  He had difficulty with his medications during pandemic and was in short supply  Is attempted be compliant with diabetic diet unfortunately with the pandemic he has had difficulty obtaining fresh produce    OBJECTIVE:  Vitals:    08/19/20 0846   BP: 110/72   BP Location: Right arm   Patient Position: Sitting   Pulse: 60   Temp: 97.6 °F (36.4 °C)   SpO2: 97%   Weight: 75.9 kg (167 lb 6.4 oz)   Height: 182.9 cm (72\")     Physical Exam   Constitutional: He appears well-developed and well-nourished.   Neck: Normal range of motion. Neck supple. No hepatojugular reflux and no JVD present. Carotid bruit is not present. No tracheal deviation present. No thyromegaly present.   Cardiovascular: Normal rate, regular rhythm, S1 normal, S2 normal, intact distal pulses and normal pulses. PMI is not displaced. Exam reveals no gallop, no distant heart sounds, no friction rub, " no midsystolic click and no opening snap.   No murmur heard.  Pulses:       Radial pulses are 2+ on the right side, and 2+ on the left side.        Dorsalis pedis pulses are 2+ on the right side, and 2+ on the left side.        Posterior tibial pulses are 2+ on the right side, and 2+ on the left side.   Pulmonary/Chest: Effort normal and breath sounds normal. He has no wheezes. He has no rales.   Abdominal: Soft. Bowel sounds are normal. He exhibits no mass. There is no tenderness. There is no guarding.       Diagnostic Data:    ECG 12 Lead  Date/Time: 8/19/2020 9:06 AM  Performed by: Charles Topete MD  Authorized by: Charles Topete MD   Comparison: compared with previous ECG from 6/16/2016  Rhythm: sinus rhythm  Conduction: right bundle branch block    Clinical impression: abnormal EKG              ASSESSMENT:   Diagnosis Plan   1. Type 2 diabetes mellitus with hyperglycemia, with long-term current use of insulin (CMS/[facility])     2. Essential hypertension     3. Mixed hyperlipidemia     4. Paroxysmal atrial fibrillation (CMS/[facility])         PLAN:  Diabetes will obtain A1c CMP today    CVD with no new stroke symptoms continue risk factor modification medical management    Hypertension controlled on  Amlodipine, historically on lisinopril unclear when this was discontinued    Dyslipidemia on statin therapy    Paroxysmal A. fib maintaining rate control anticoagulation with Evans Gonzalez MD, thank you for referring Mr. Lagunas for evaluation.  I have forwarded my electronically generated recommendations to you for review.  Please do not hesitate to call with any questions.      Charles Topete MD, [facility]

## 2020-08-28 ENCOUNTER — OFFICE VISIT (OUTPATIENT)
Dept: INTERNAL MEDICINE | Facility: CLINIC | Age: 75
End: 2020-08-28

## 2020-08-28 VITALS
DIASTOLIC BLOOD PRESSURE: 80 MMHG | OXYGEN SATURATION: 97 % | RESPIRATION RATE: 16 BRPM | BODY MASS INDEX: 23.57 KG/M2 | WEIGHT: 168.4 LBS | HEART RATE: 56 BPM | TEMPERATURE: 96.4 F | SYSTOLIC BLOOD PRESSURE: 120 MMHG | HEIGHT: 71 IN

## 2020-08-28 DIAGNOSIS — E55.9 VITAMIN D DEFICIENCY: ICD-10-CM

## 2020-08-28 DIAGNOSIS — E78.2 MIXED HYPERLIPIDEMIA: Primary | ICD-10-CM

## 2020-08-28 DIAGNOSIS — M25.562 CHRONIC PAIN OF LEFT KNEE: ICD-10-CM

## 2020-08-28 DIAGNOSIS — I25.10 CORONARY ARTERY DISEASE INVOLVING NATIVE CORONARY ARTERY OF NATIVE HEART WITHOUT ANGINA PECTORIS: ICD-10-CM

## 2020-08-28 DIAGNOSIS — R63.4 WEIGHT LOSS: ICD-10-CM

## 2020-08-28 DIAGNOSIS — N40.0 ENLARGED PROSTATE: ICD-10-CM

## 2020-08-28 DIAGNOSIS — I48.91 ATRIAL FIBRILLATION, UNSPECIFIED TYPE (HCC): ICD-10-CM

## 2020-08-28 DIAGNOSIS — Z79.4 TYPE 2 DIABETES MELLITUS WITH OTHER SPECIFIED COMPLICATION, WITH LONG-TERM CURRENT USE OF INSULIN (HCC): ICD-10-CM

## 2020-08-28 DIAGNOSIS — F41.1 GENERALIZED ANXIETY DISORDER: ICD-10-CM

## 2020-08-28 DIAGNOSIS — G25.81 RESTLESS LEG SYNDROME: ICD-10-CM

## 2020-08-28 DIAGNOSIS — G45.9 TIA (TRANSIENT ISCHEMIC ATTACK): ICD-10-CM

## 2020-08-28 DIAGNOSIS — Z23 NEED FOR IMMUNIZATION AGAINST INFLUENZA: ICD-10-CM

## 2020-08-28 DIAGNOSIS — Z12.11 COLON CANCER SCREENING: ICD-10-CM

## 2020-08-28 DIAGNOSIS — G89.29 CHRONIC PAIN OF LEFT KNEE: ICD-10-CM

## 2020-08-28 DIAGNOSIS — E11.69 TYPE 2 DIABETES MELLITUS WITH OTHER SPECIFIED COMPLICATION, WITH LONG-TERM CURRENT USE OF INSULIN (HCC): ICD-10-CM

## 2020-08-28 DIAGNOSIS — I10 ESSENTIAL HYPERTENSION: ICD-10-CM

## 2020-08-28 PROCEDURE — 90653 IIV ADJUVANT VACCINE IM: CPT | Performed by: INTERNAL MEDICINE

## 2020-08-28 PROCEDURE — G0008 ADMIN INFLUENZA VIRUS VAC: HCPCS | Performed by: INTERNAL MEDICINE

## 2020-08-28 PROCEDURE — 99214 OFFICE O/P EST MOD 30 MIN: CPT | Performed by: INTERNAL MEDICINE

## 2020-08-28 NOTE — PROGRESS NOTES
"Subjective   Que Lagunas is a 75 y.o. male.     Chief Complaint   Patient presents with   • Hyperlipidemia     6 mo f/u    • Hypertension       History of Present Illness   Patient here for follow-up.  Hyperlipidemia stable on medication.  Hypertension stable on medication.  CAD stable on medication.  Atrial fibrillation stable on medication.  Diabetes is stable on medication.  Anxiety improved.  Patient does have a weight loss patient states he is paying attention on good diet.  Patient here to do colonoscopy.    Current Outpatient Medications:   •  amLODIPine (NORVASC) 5 MG tablet, TAKE ONE TABLET BY MOUTH DAILY, Disp: 30 tablet, Rfl: 4  •  cholecalciferol (VITAMIN D3) 1000 units tablet, Take 1,000 Units by mouth Daily., Disp: , Rfl:   •  glucose blood (SOLUS V2 TEST) test strip, Use as instructed, Disp: 100 each, Rfl: 12  •  glucose monitor monitoring kit, 1 each As Needed (prn)., Disp: 1 each, Rfl: 0  •  insulin glargine (Lantus) 100 UNIT/ML injection, Inject 40 Units under the skin into the appropriate area as directed Every Night., Disp: 10 mL, Rfl: 11  •  insulin lispro (HumaLOG) 100 UNIT/ML injection, Inject 13 Units under the skin into the appropriate area with meals, Disp: 10 mL, Rfl: 11  •  Insulin Pen Needle (PEN NEEDLES 3/16\") 31G X 5 MM misc, Use with insulin daily E11.9, Disp: 100 each, Rfl: 5  •  Insulin Syringe-Needle U-100 (Easy Touch Insulin Syringe) 30G X 5/16\" 0.5 ML misc, use as directed with insulin 4 times a day, Disp: 100 each, Rfl: 11  •  Lancets misc, Use to test twice daily. E11.9, Disp: 100 each, Rfl: 12  •  LIFESCAN UNISTIK II LANCETS misc, 1 Units 3 (Three) Times a Day., Disp: 100 each, Rfl: 5  •  metFORMIN (GLUCOPHAGE) 500 MG tablet, Take 1 with breakfast in am, Disp: 90 tablet, Rfl: 2  •  PARoxetine (PAXIL) 20 MG tablet, Take 1 tablet by mouth Daily., Disp: 30 tablet, Rfl: 2  •  pramipexole (MIRAPEX) 0.25 MG tablet, Take 1 tablet by mouth every night at bedtime., Disp: 30 " tablet, Rfl: 1  •  pravastatin (PRAVACHOL) 40 MG tablet, TAKE ONE TABLET BY MOUTH EVERY EVENING, Disp: 90 tablet, Rfl: 1  •  XARELTO 20 MG tablet, TAKE 1 TABLET BY MOUTH ONE TIME A DAY , Disp: 30 tablet, Rfl: 11    The following portions of the patient's history were reviewed and updated as appropriate: allergies, current medications, past family history, past medical history, past social history, past surgical history and problem list.    Review of Systems   Constitutional: Negative.    Respiratory: Negative.    Cardiovascular: Negative.    Gastrointestinal: Negative.    Musculoskeletal: Negative.    Skin: Negative.    Neurological: Negative.    Psychiatric/Behavioral: Negative.        Objective   Physical Exam   Constitutional: He is oriented to person, place, and time. He appears well-developed and well-nourished.   Neck: Neck supple.   Cardiovascular: Normal rate, regular rhythm and normal heart sounds.   Pulmonary/Chest: Effort normal and breath sounds normal.   Abdominal: Bowel sounds are normal.   Neurological: He is alert and oriented to person, place, and time.   Skin: Skin is warm.   Psychiatric: He has a normal mood and affect.       All tests have been reviewed.    Assessment/Plan   Diagnoses and all orders for this visit:    Mixed hyperlipidemia continue medication  -     CK  -     Comprehensive Metabolic Panel  -     Lipid Panel    Essential hypertension continue medication  -     Hepatitis C Antibody    TIA (transient ischemic attack)    Coronary artery disease involving native coronary artery of native heart without angina pectoris continue medication    Atrial fibrillation, unspecified type (CMS/HCC) continue medication    Type 2 diabetes mellitus with other specified complication, with long-term current use of insulin (CMS/HCC)  -     Hemoglobin A1c    Generalized anxiety disorder continue medication    Restless leg syndrome continue medication    Enlarged prostate    Vitamin D  deficiency    Chronic pain of left knee stable now    Weight loss  -     C-reactive Protein  -     Sedimentation Rate    Colon cancer screening  -     Ambulatory Referral to Gastroenterology    Need for immunization against influenza  -     Fluad Quad 65+ yrs ([id number])    weight loss on purpose per patient  6 mo

## 2020-08-29 LAB
ALBUMIN SERPL-MCNC: 4.3 G/DL (ref 3.5–5.2)
ALBUMIN/GLOB SERPL: 2.5 G/DL
ALP SERPL-CCNC: 91 U/L (ref 39–117)
ALT SERPL-CCNC: 10 U/L (ref 1–41)
AST SERPL-CCNC: 10 U/L (ref 1–40)
BILIRUB SERPL-MCNC: 0.5 MG/DL (ref 0–1.2)
BUN SERPL-MCNC: 18 MG/DL (ref 8–23)
BUN/CREAT SERPL: 25.4 (ref 7–25)
CALCIUM SERPL-MCNC: 9.2 MG/DL (ref 8.6–10.5)
CHLORIDE SERPL-SCNC: 102 MMOL/L (ref 98–107)
CHOLEST SERPL-MCNC: 129 MG/DL (ref 0–200)
CK SERPL-CCNC: 43 U/L (ref 20–200)
CO2 SERPL-SCNC: 26.8 MMOL/L (ref 22–29)
CREAT SERPL-MCNC: 0.71 MG/DL (ref 0.76–1.27)
CRP SERPL-MCNC: 0.07 MG/DL (ref 0–0.5)
ERYTHROCYTE [SEDIMENTATION RATE] IN BLOOD BY WESTERGREN METHOD: 2 MM/HR (ref 0–20)
GLOBULIN SER CALC-MCNC: 1.7 GM/DL
GLUCOSE SERPL-MCNC: 154 MG/DL (ref 65–99)
HBA1C MFR BLD: 9.01 % (ref 4.8–5.6)
HCV AB S/CO SERPL IA: <0.1 S/CO RATIO (ref 0–0.9)
HDLC SERPL-MCNC: 46 MG/DL (ref 40–60)
LDLC SERPL CALC-MCNC: 69 MG/DL (ref 0–100)
POTASSIUM SERPL-SCNC: 4.1 MMOL/L (ref 3.5–5.2)
PROT SERPL-MCNC: 6 G/DL (ref 6–8.5)
SODIUM SERPL-SCNC: 140 MMOL/L (ref 136–145)
TRIGL SERPL-MCNC: 69 MG/DL (ref 0–150)
VLDLC SERPL CALC-MCNC: 13.8 MG/DL

## 2020-09-02 ENCOUNTER — TELEPHONE (OUTPATIENT)
Dept: INTERNAL MEDICINE | Facility: CLINIC | Age: 75
End: 2020-09-02

## 2020-09-09 ENCOUNTER — OFFICE VISIT (OUTPATIENT)
Dept: GASTROENTEROLOGY | Facility: CLINIC | Age: 75
End: 2020-09-09

## 2020-09-09 VITALS
HEART RATE: 68 BPM | SYSTOLIC BLOOD PRESSURE: 169 MMHG | HEIGHT: 72 IN | WEIGHT: 170 LBS | BODY MASS INDEX: 23.03 KG/M2 | DIASTOLIC BLOOD PRESSURE: 74 MMHG | TEMPERATURE: 97.5 F | RESPIRATION RATE: 16 BRPM

## 2020-09-09 DIAGNOSIS — Z86.010 PERSONAL HISTORY OF COLONIC POLYPS: Primary | Chronic | ICD-10-CM

## 2020-09-09 PROBLEM — Z86.0100 PERSONAL HISTORY OF COLONIC POLYPS: Status: ACTIVE | Noted: 2020-09-09

## 2020-09-09 PROCEDURE — S0260 H&P FOR SURGERY: HCPCS | Performed by: NURSE PRACTITIONER

## 2020-09-09 NOTE — PROGRESS NOTES
Follow Up Note     Date: 2020   Patient Name: Que Lagunas  MRN: [id number]  : 1945     Primary Care Provider: Evans Blackburn MD     Chief Complaint:    Chief Complaint   Patient presents with   • Follow-up     History of present illness:   2020  Que Lagunas is a 75 y.o. male who is here today to discuss follow up colonoscopy. The patient's last colonoscopy was in  with 2 polyps removed, <10mm, tubular adenomas, no dysplasia. There is no family history of colon cancer. The patient denies recent change in bowel habits. There is no diarrhea or constipation. There is no history of abdominal pain. There is no history of overt GI bleed (hematemesis melena or hematochezia). The patient denies nausea or vomiting. There is no history of reflux. The patient denies dysphagia or odynophagia. There is no history of recent significant weight loss. There is no history of liver disease in the past.     Subjective      Past Medical History:   Diagnosis Date   • Abnormal EKG     Bifascicular block and no prior ischemia evaluation.    • Anemia    • Anxiety    • Arthritis    • Atrial fibrillation (CMS/HCC)     CHADS VASc=3.  Continue Xarelto 20.  vas continue Zaroxolyn 20 has a relative 20 Knobloch can add Lantus 40   • CAD (coronary artery disease)    • Colon polyp 2015   • Contact dermatitis    • Diabetes (CMS/HCC)    • Dyslipidemia    • Hypertension    • Palpitations    • Prostatism    • Stroke (CMS/HCC)    • Tattoo      Past Surgical History:   Procedure Laterality Date   • COLONOSCOPY  2015   • EYE SURGERY     • PROSTATE SURGERY     • TONSILLECTOMY       Family History   Problem Relation Age of Onset   • Diabetes Other    • Cancer Other         NO PROSTATE CANCER HISTORY   • Hypertension Other    • Cancer Other    • Diabetes Other    • Liver disease Mother    • Liver disease Father    • Colon cancer Neg Hx    • Stomach cancer Neg Hx      Social History     Socioeconomic History  "  • Marital status:      Spouse name: Not on file   • Number of children: Not on file   • Years of education: Not on file   • Highest education level: Not on file   Occupational History     Employer: [employer]   Tobacco Use   • Smoking status: Former Smoker     Types: Cigarettes     Last attempt to quit:      Years since quittin.7   • Smokeless tobacco: Never Used   • Tobacco comment: STOPPED 5 YEARS AGO   Substance and Sexual Activity   • Alcohol use: No   • Drug use: No   • Sexual activity: Defer       Current Outpatient Medications:   •  amLODIPine (NORVASC) 5 MG tablet, TAKE ONE TABLET BY MOUTH DAILY, Disp: 30 tablet, Rfl: 4  •  cholecalciferol (VITAMIN D3) 1000 units tablet, Take 1,000 Units by mouth Daily., Disp: , Rfl:   •  insulin glargine (Lantus) 100 UNIT/ML injection, Inject 40 Units under the skin into the appropriate area as directed Every Night., Disp: 10 mL, Rfl: 11  •  insulin lispro (HumaLOG) 100 UNIT/ML injection, Inject 13 Units under the skin into the appropriate area with meals, Disp: 10 mL, Rfl: 11  •  Insulin Syringe-Needle U-100 (Easy Touch Insulin Syringe) 30G X \" 0.5 ML misc, use as directed with insulin 4 times a day, Disp: 100 each, Rfl: 11  •  metFORMIN (GLUCOPHAGE) 500 MG tablet, Take 1 with breakfast in am, Disp: 90 tablet, Rfl: 2  •  PARoxetine (PAXIL) 20 MG tablet, Take 1 tablet by mouth Daily., Disp: 30 tablet, Rfl: 2  •  pramipexole (MIRAPEX) 0.25 MG tablet, Take 1 tablet by mouth every night at bedtime., Disp: 30 tablet, Rfl: 1  •  pravastatin (PRAVACHOL) 40 MG tablet, TAKE ONE TABLET BY MOUTH EVERY EVENING, Disp: 90 tablet, Rfl: 1  •  XARELTO 20 MG tablet, TAKE 1 TABLET BY MOUTH ONE TIME A DAY , Disp: 30 tablet, Rfl: 11  •  glucose blood (SOLUS V2 TEST) test strip, Use as instructed, Disp: 100 each, Rfl: 12  •  glucose monitor monitoring kit, 1 each As Needed (prn)., Disp: 1 each, Rfl: 0  •  Insulin Pen Needle (PEN NEEDLES 3/16\") 31G X 5 MM misc, Use with insulin " daily E11.9, Disp: 100 each, Rfl: 5  •  Lancets misc, Use to test twice daily. E11.9, Disp: 100 each, Rfl: 12  •  LIFESCAN UNISTIK II LANCETS misc, 1 Units 3 (Three) Times a Day., Disp: 100 each, Rfl: 5     Allergies   Allergen Reactions   • Lisinopril Anaphylaxis     Review of Systems   Constitutional: Negative for appetite change and unexpected weight loss.   HENT: Negative for trouble swallowing.    Eyes: Negative for blurred vision.   Respiratory: Negative for choking and chest tightness.    Cardiovascular: Negative for leg swelling.   Gastrointestinal: Negative for abdominal distention, abdominal pain, anal bleeding, blood in stool, constipation, diarrhea, nausea, rectal pain, vomiting, GERD and indigestion.   Endocrine: Negative for polyphagia.   Genitourinary: Negative for hematuria.   Musculoskeletal: Negative for arthralgias and myalgias.   Skin: Negative for rash.   Allergic/Immunologic: Negative for food allergies.   Neurological: Negative for dizziness, syncope and confusion.   Hematological: Does not bruise/bleed easily.   Psychiatric/Behavioral: Negative for depressed mood.      The following portions of the patient's history were reviewed and updated as appropriate: allergies, current medications, past family history, past medical history, past social history, past surgical history and problem list.  Objective     Physical Exam   Constitutional: He is oriented to person, place, and time. He appears well-developed and well-nourished. No distress.   HENT:   Head: Normocephalic and atraumatic.   Right Ear: Hearing and external ear normal.   Left Ear: Hearing and external ear normal.   Nose: Nose normal.   Mouth/Throat: Oropharynx is clear and moist and mucous membranes are normal. Mucous membranes are not pale, not dry and not cyanotic. No oral lesions. No oropharyngeal exudate.   Eyes: Conjunctivae and EOM are normal. Right eye exhibits no discharge. Left eye exhibits no discharge.   Neck: Trachea  "normal. Neck supple. No JVD present. No edema present. No thyroid mass and no thyromegaly present.   Cardiovascular: Normal rate, regular rhythm, S2 normal and normal heart sounds. Exam reveals no gallop, no S3 and no friction rub.   No murmur heard.  Pulmonary/Chest: Effort normal and breath sounds normal. No respiratory distress. He exhibits no tenderness.   Abdominal: Normal appearance and bowel sounds are normal. He exhibits no distension, no ascites and no mass. There is no splenomegaly or hepatomegaly. There is no tenderness. There is no rigidity, no rebound and no guarding. No hernia.     Vascular Status -  His right foot exhibits no edema. His left foot exhibits no edema.  Lymphadenopathy:     He has no cervical adenopathy.        Left: No supraclavicular adenopathy present.   Neurological: He is alert and oriented to person, place, and time. He has normal strength. No cranial nerve deficit or sensory deficit.   Skin: No rash noted. He is not diaphoretic. No cyanosis. No pallor. Nails show no clubbing.   Psychiatric: He has a normal mood and affect.   Nursing note and vitals reviewed.    Vitals:    09/09/20 1154   BP: 169/74   Pulse: 68   Resp: 16   Temp: 97.5 °F (36.4 °C)   Weight: 77.1 kg (170 lb)   Height: 182.9 cm (72\")     Results Review:   I reviewed the patient's new clinical results.    Office Visit on 08/28/2020   Component Date Value Ref Range Status   • Creatine Kinase 08/28/2020 43  20 - 200 U/L Final   • Glucose 08/28/2020 154* 65 - 99 mg/dL Final   • BUN 08/28/2020 18  8 - 23 mg/dL Final   • Creatinine 08/28/2020 0.71* 0.76 - 1.27 mg/dL Final   • eGFR Non African Am 08/28/2020 108  >60 mL/min/1.73 Final    Comment: The MDRD GFR formula is only valid for adults with stable  renal function between ages 18 and 70.     • eGFR  Am 08/28/2020 131  >60 mL/min/1.73 Final   • BUN/Creatinine Ratio 08/28/2020 25.4* 7.0 - 25.0 Final   • Sodium 08/28/2020 140  136 - 145 mmol/L Final   • Potassium " 08/28/2020 4.1  3.5 - 5.2 mmol/L Final   • Chloride 08/28/2020 102  98 - 107 mmol/L Final   • Total CO2 08/28/2020 26.8  22.0 - 29.0 mmol/L Final   • Calcium 08/28/2020 9.2  8.6 - 10.5 mg/dL Final   • Total Protein 08/28/2020 6.0  6.0 - 8.5 g/dL Final   • Albumin 08/28/2020 4.30  3.50 - 5.20 g/dL Final   • Globulin 08/28/2020 1.7  gm/dL Final   • A/G Ratio 08/28/2020 2.5  g/dL Final   • Total Bilirubin 08/28/2020 0.5  0.0 - 1.2 mg/dL Final   • Alkaline Phosphatase 08/28/2020 91  39 - 117 U/L Final   • AST (SGOT) 08/28/2020 10  1 - 40 U/L Final   • ALT (SGPT) 08/28/2020 10  1 - 41 U/L Final   • Total Cholesterol 08/28/2020 129  0 - 200 mg/dL Final   • Triglycerides 08/28/2020 69  0 - 150 mg/dL Final   • HDL Cholesterol 08/28/2020 46  40 - 60 mg/dL Final   • VLDL Cholesterol 08/28/2020 13.8  mg/dL Final   • LDL Cholesterol  08/28/2020 69  0 - 100 mg/dL Final   • Hemoglobin A1C 08/28/2020 9.01* 4.80 - 5.60 % Final    Comment: Hemoglobin A1C Ranges:  Increased Risk for Diabetes  5.7% to 6.4%  Diabetes                     >= 6.5%  Diabetic Goal                < 7.0%     • C-Reactive Protein 08/28/2020 0.07  0.00 - 0.50 mg/dL Final   • Sed Rate 08/28/2020 2  0 - 20 mm/hr Final   • Hep C Virus Ab 08/28/2020 <0.1  0.0 - 0.9 s/co ratio Final    Comment:                                   Negative:     < 0.8                               Indeterminate: 0.8 - 0.9                                    Positive:     > 0.9   The CDC recommends that a positive HCV antibody result   be followed up with a HCV Nucleic Acid Amplification   test (536256).        No radiology results for the last 90 days.     3/17/2020 Cologuard with insufficient stool DNA collected, no result obtained.    Colonoscopy dated 4/9/2015 reveals left-sided diverticulosis.  Colon polyps.  Internal hemorrhoids.  Transverse colon polyp biopsy reveals tubular adenoma, no high grade dysplasia or invasive neoplasia identified.  Ascending colon polyp biopsy reveals  tubular adenoma.  No high grade dysplasia or invasive neoplasia identified.    Assessment / Plan      1. Personal history of colonic polyps  The patient's last colonoscopy was in April 2015 with 2 polyps removed, <10mm, tubular adenoma without dysplasia. Per current American College of Gastroenterology guidelines, follow up colonoscopy recommended in 5-10 years. The patient is not having change in bowel habits, constipation, diarrhea, abdominal pain or rectal bleeding. There is no history of anemia. The patient elects to have follow up colonoscopy at 10 years, colonoscopy due April 2025. The patient will consider sooner if any problems or concerns.    Patient Instructions   1. Per current ACG guidelines, 2 tubular adenomas <10 mm with no family history of colon cancer need follow up colonoscopy in 5-10 years. The patient elects to have colonoscopy in 10 years. If he has any changes in bowel habits or concerns, he will call back and we will schedule sooner.     Christina Lopez, APRN  9/9/2020    Please note that portions of this note may have been completed with a voice recognition program. Efforts were made to edit the dictations, but occasionally words are mistranscribed.

## 2020-09-09 NOTE — PATIENT INSTRUCTIONS
1. Per current ACG guidelines, 2 tubular adenomas <10 mm with no family history of colon cancer need follow up colonoscopy in 5-10 years. The patient elects to have colonoscopy in 10 years. If he has any changes in bowel habits or concerns, he will call back and we will schedule sooner.

## 2020-10-08 ENCOUNTER — OFFICE VISIT (OUTPATIENT)
Dept: INTERNAL MEDICINE | Facility: CLINIC | Age: 75
End: 2020-10-08

## 2020-10-08 VITALS
SYSTOLIC BLOOD PRESSURE: 118 MMHG | TEMPERATURE: 96.4 F | DIASTOLIC BLOOD PRESSURE: 80 MMHG | HEIGHT: 72 IN | RESPIRATION RATE: 16 BRPM | BODY MASS INDEX: 23.16 KG/M2 | OXYGEN SATURATION: 99 % | HEART RATE: 88 BPM | WEIGHT: 171 LBS

## 2020-10-08 DIAGNOSIS — R63.4 WEIGHT LOSS: ICD-10-CM

## 2020-10-08 DIAGNOSIS — I10 ESSENTIAL HYPERTENSION: ICD-10-CM

## 2020-10-08 DIAGNOSIS — E11.69 TYPE 2 DIABETES MELLITUS WITH OTHER SPECIFIED COMPLICATION, WITH LONG-TERM CURRENT USE OF INSULIN (HCC): ICD-10-CM

## 2020-10-08 DIAGNOSIS — Z79.4 TYPE 2 DIABETES MELLITUS WITH OTHER SPECIFIED COMPLICATION, WITH LONG-TERM CURRENT USE OF INSULIN (HCC): ICD-10-CM

## 2020-10-08 DIAGNOSIS — E78.2 MIXED HYPERLIPIDEMIA: Primary | ICD-10-CM

## 2020-10-08 PROCEDURE — 99214 OFFICE O/P EST MOD 30 MIN: CPT | Performed by: INTERNAL MEDICINE

## 2020-10-08 RX ORDER — INSULIN DEGLUDEC INJECTION 100 U/ML
40 INJECTION, SOLUTION SUBCUTANEOUS NIGHTLY
Qty: 2 PEN | Refills: 0 | COMMUNITY
Start: 2020-10-08 | End: 2020-10-29

## 2020-10-19 RX ORDER — AMLODIPINE BESYLATE 5 MG/1
TABLET ORAL
Qty: 90 TABLET | Refills: 3 | Status: SHIPPED | OUTPATIENT
Start: 2020-10-19 | End: 2022-01-18

## 2020-10-29 ENCOUNTER — OFFICE VISIT (OUTPATIENT)
Dept: INTERNAL MEDICINE | Facility: CLINIC | Age: 75
End: 2020-10-29

## 2020-10-29 VITALS
HEART RATE: 85 BPM | TEMPERATURE: 97.8 F | WEIGHT: 174.8 LBS | OXYGEN SATURATION: 98 % | HEIGHT: 72 IN | BODY MASS INDEX: 23.68 KG/M2 | SYSTOLIC BLOOD PRESSURE: 122 MMHG | DIASTOLIC BLOOD PRESSURE: 88 MMHG

## 2020-10-29 DIAGNOSIS — I10 ESSENTIAL HYPERTENSION: ICD-10-CM

## 2020-10-29 DIAGNOSIS — E10.65 TYPE 1 DIABETES MELLITUS WITH HYPERGLYCEMIA (HCC): ICD-10-CM

## 2020-10-29 DIAGNOSIS — G25.81 RESTLESS LEG SYNDROME: Primary | ICD-10-CM

## 2020-10-29 PROBLEM — R63.4 WEIGHT LOSS: Status: RESOLVED | Noted: 2020-10-08 | Resolved: 2020-10-29

## 2020-10-29 LAB — HBA1C MFR BLD: 7.3 %

## 2020-10-29 PROCEDURE — 83036 HEMOGLOBIN GLYCOSYLATED A1C: CPT | Performed by: INTERNAL MEDICINE

## 2020-10-29 PROCEDURE — 99214 OFFICE O/P EST MOD 30 MIN: CPT | Performed by: INTERNAL MEDICINE

## 2020-10-29 RX ORDER — INSULIN GLARGINE 100 [IU]/ML
40 INJECTION, SOLUTION SUBCUTANEOUS NIGHTLY
Qty: 10 ML | Refills: 11 | OUTPATIENT
Start: 2020-10-29

## 2020-10-29 RX ORDER — PRAMIPEXOLE DIHYDROCHLORIDE 0.12 MG/1
0.12 TABLET ORAL
Qty: 30 TABLET | Refills: 1 | Status: SHIPPED | OUTPATIENT
Start: 2020-10-29 | End: 2020-12-21

## 2020-10-29 NOTE — PROGRESS NOTES
"Subjective   Que Lagunas is a 75 y.o. male.     Chief Complaint   Patient presents with   • Follow-up     3 wk f/u for insulin; pt has a log he brought in today        History of Present Illness   Patient here for follow-up.  Diabetes improved change of insulin.  After nearly sugar at home around 50.  Patient denies any hypoglycemic episodes.  Patient also states morning sugar is normal even though patient did not bring any log book here.  Blood pressure stable on medication.  Hyperlipidemia stable on medication.  Patient also complains restless leg at nighttime.  Requesting medication.  Weight is stable now.    Current Outpatient Medications:   •  amLODIPine (NORVASC) 5 MG tablet, TAKE 1 TABLET BY MOUTH EVERY DAY, Disp: 90 tablet, Rfl: 3  •  cholecalciferol (VITAMIN D3) 1000 units tablet, Take 1,000 Units by mouth Daily., Disp: , Rfl:   •  glucose blood (SOLUS V2 TEST) test strip, Use as instructed, Disp: 100 each, Rfl: 12  •  glucose monitor monitoring kit, 1 each As Needed (prn)., Disp: 1 each, Rfl: 0  •  insulin glargine (Lantus) 100 UNIT/ML injection, Inject 40 Units under the skin into the appropriate area as directed Every Night., Disp: 10 mL, Rfl: 11  •  insulin lispro (HumaLOG) 100 UNIT/ML injection, Inject 13 Units under the skin into the appropriate area with meals, Disp: 20 mL, Rfl: 0  •  Insulin Pen Needle (PEN NEEDLES 3/16\") 31G X 5 MM misc, Use with insulin daily E11.9, Disp: 100 each, Rfl: 5  •  Insulin Syringe-Needle U-100 (Easy Touch Insulin Syringe) 30G X 5/16\" 0.5 ML misc, use as directed with insulin 4 times a day, Disp: 100 each, Rfl: 11  •  Lancets misc, Use to test twice daily. E11.9, Disp: 100 each, Rfl: 12  •  LIFESCAN UNISTIK II LANCETS misc, 1 Units 3 (Three) Times a Day., Disp: 100 each, Rfl: 5  •  metFORMIN (GLUCOPHAGE) 500 MG tablet, Take 1 with breakfast in am, Disp: 90 tablet, Rfl: 2  •  PARoxetine (PAXIL) 20 MG tablet, Take 1 tablet by mouth Daily., Disp: 30 tablet, Rfl: 2  •  " pravastatin (PRAVACHOL) 40 MG tablet, TAKE ONE TABLET BY MOUTH EVERY EVENING, Disp: 90 tablet, Rfl: 1  •  XARELTO 20 MG tablet, TAKE 1 TABLET BY MOUTH ONE TIME A DAY , Disp: 30 tablet, Rfl: 11    The following portions of the patient's history were reviewed and updated as appropriate: allergies, current medications, past family history, past medical history, past social history, past surgical history and problem list.    Review of Systems   Constitutional: Negative.    Respiratory: Negative.    Cardiovascular: Negative.    Gastrointestinal: Negative.    Musculoskeletal: Negative.    Skin: Negative.    Neurological: Negative.    Psychiatric/Behavioral: Negative.        Objective   Physical Exam  Neck:      Musculoskeletal: Neck supple.   Cardiovascular:      Rate and Rhythm: Normal rate and regular rhythm.      Heart sounds: Normal heart sounds.   Pulmonary:      Effort: Pulmonary effort is normal.      Breath sounds: Normal breath sounds.   Abdominal:      General: Bowel sounds are normal.   Skin:     General: Skin is warm.   Neurological:      Mental Status: He is alert and oriented to person, place, and time.         All tests have been reviewed.    Assessment/Plan   There are no diagnoses linked to this encounter.          Mixed hyperlipidemia continue medication     Essential hypertension continue medication     Type 2 diabetes mellitus with other specified complication, with long-term current use of insulin (CMS/[facility])  change Lantus to 24 units twice a day and Humalog 14 units with meals.    A1c today 7.3     Restless leg syndrome initiate medication    Weight loss normal           9 weeks follow-up again.  Patient is due for wellness check with others

## 2020-10-30 NOTE — TELEPHONE ENCOUNTER
Patient requesting lantus be resent to pharmacy with new dosing instructions. Medication is pended.

## 2020-11-10 RX ORDER — SYRINGE-NEEDLE,INSULIN,0.5 ML 27GX1/2"
SYRINGE, EMPTY DISPOSABLE MISCELLANEOUS
Qty: 100 EACH | Refills: 11 | Status: SHIPPED | OUTPATIENT
Start: 2020-11-10

## 2020-11-10 NOTE — TELEPHONE ENCOUNTER
Patient is needing pen needles sent in to go along with his lantus insulin. He needs this sent to [facility] pharmacy.

## 2020-12-20 DIAGNOSIS — G25.81 RESTLESS LEG SYNDROME: ICD-10-CM

## 2020-12-21 RX ORDER — PRAMIPEXOLE DIHYDROCHLORIDE 0.12 MG/1
TABLET ORAL
Qty: 90 TABLET | Refills: 3 | Status: SHIPPED | OUTPATIENT
Start: 2020-12-21 | End: 2021-05-17 | Stop reason: DRUGHIGH

## 2020-12-29 ENCOUNTER — OFFICE VISIT (OUTPATIENT)
Dept: INTERNAL MEDICINE | Facility: CLINIC | Age: 75
End: 2020-12-29

## 2020-12-29 VITALS
BODY MASS INDEX: 23.86 KG/M2 | WEIGHT: 176.12 LBS | DIASTOLIC BLOOD PRESSURE: 84 MMHG | SYSTOLIC BLOOD PRESSURE: 122 MMHG | HEIGHT: 72 IN | TEMPERATURE: 96.9 F | HEART RATE: 85 BPM | OXYGEN SATURATION: 96 %

## 2020-12-29 DIAGNOSIS — R35.1 NOCTURIA: ICD-10-CM

## 2020-12-29 DIAGNOSIS — I48.91 ATRIAL FIBRILLATION, UNSPECIFIED TYPE (HCC): ICD-10-CM

## 2020-12-29 DIAGNOSIS — E55.9 VITAMIN D DEFICIENCY: ICD-10-CM

## 2020-12-29 DIAGNOSIS — G25.81 RESTLESS LEG SYNDROME: ICD-10-CM

## 2020-12-29 DIAGNOSIS — E11.69 TYPE 2 DIABETES MELLITUS WITH OTHER SPECIFIED COMPLICATION, WITH LONG-TERM CURRENT USE OF INSULIN (HCC): ICD-10-CM

## 2020-12-29 DIAGNOSIS — I10 ESSENTIAL HYPERTENSION: ICD-10-CM

## 2020-12-29 DIAGNOSIS — F41.1 GENERALIZED ANXIETY DISORDER: ICD-10-CM

## 2020-12-29 DIAGNOSIS — I25.10 CORONARY ARTERY DISEASE INVOLVING NATIVE CORONARY ARTERY OF NATIVE HEART WITHOUT ANGINA PECTORIS: ICD-10-CM

## 2020-12-29 DIAGNOSIS — E78.2 MIXED HYPERLIPIDEMIA: Primary | ICD-10-CM

## 2020-12-29 DIAGNOSIS — Z79.4 TYPE 2 DIABETES MELLITUS WITH OTHER SPECIFIED COMPLICATION, WITH LONG-TERM CURRENT USE OF INSULIN (HCC): ICD-10-CM

## 2020-12-29 PROCEDURE — 99214 OFFICE O/P EST MOD 30 MIN: CPT | Performed by: INTERNAL MEDICINE

## 2020-12-29 NOTE — PROGRESS NOTES
"Subjective   Que Lagunas is a 75 y.o. male.     Chief Complaint   Patient presents with   • Hypertension     2 mo f/u    • Hyperlipidemia   • Diabetes       History of Present Illness   Patient here for follow-up of.  Hypertension stable on medication.  CAD stable no chest pain or short of breath.  Diabetes is stable on medication.  Atrial fibrillation stable on medication.  Restless leg syndrome improved after medication.  Anxiety stable on medication.  Hyperlipidemia stable on medication    Current Outpatient Medications:   •  amLODIPine (NORVASC) 5 MG tablet, TAKE 1 TABLET BY MOUTH EVERY DAY, Disp: 90 tablet, Rfl: 3  •  cholecalciferol (VITAMIN D3) 1000 units tablet, Take 1,000 Units by mouth Daily., Disp: , Rfl:   •  glucose blood (SOLUS V2 TEST) test strip, Use as instructed, Disp: 100 each, Rfl: 12  •  glucose monitor monitoring kit, 1 each As Needed (prn)., Disp: 1 each, Rfl: 0  •  Insulin Glargine (LANTUS SOLOSTAR) 100 UNIT/ML injection pen, Inject 24 Units under the skin into the appropriate area as directed 2 (Two) Times a Day., Disp: 5 pen, Rfl: 11  •  insulin lispro (HumaLOG) 100 UNIT/ML injection, Inject 13 Units under the skin into the appropriate area with meals, Disp: 20 mL, Rfl: 0  •  Insulin Pen Needle (PEN NEEDLES 3/16\") 31G X 5 MM misc, Use with insulin daily E11.9, Disp: 100 each, Rfl: 5  •  Insulin Syringe-Needle U-100 (Easy Touch Insulin Syringe) 30G X 5/16\" 0.5 ML misc, use as directed with insulin 4 times a day, Disp: 100 each, Rfl: 11  •  Lancets misc, Use to test twice daily. E11.9, Disp: 100 each, Rfl: 12  •  LIFESCAN UNISTIK II LANCETS misc, 1 Units 3 (Three) Times a Day., Disp: 100 each, Rfl: 5  •  metFORMIN (GLUCOPHAGE) 500 MG tablet, Take 1 with breakfast in am, Disp: 90 tablet, Rfl: 2  •  PARoxetine (PAXIL) 20 MG tablet, Take 1 tablet by mouth Daily., Disp: 30 tablet, Rfl: 2  •  pramipexole (MIRAPEX) 0.125 MG tablet, TAKE 1 TABLET BY MOUTH AT BEDTIME , Disp: 90 tablet, Rfl: 3  • "  pravastatin (PRAVACHOL) 40 MG tablet, TAKE ONE TABLET BY MOUTH EVERY EVENING, Disp: 90 tablet, Rfl: 1  •  XARELTO 20 MG tablet, TAKE 1 TABLET BY MOUTH ONE TIME A DAY , Disp: 30 tablet, Rfl: 11    The following portions of the patient's history were reviewed and updated as appropriate: allergies, current medications, past family history, past medical history, past social history, past surgical history and problem list.    Review of Systems   Constitutional: Negative.    Respiratory: Negative.    Cardiovascular: Negative.    Gastrointestinal: Negative.    Musculoskeletal: Negative.    Skin: Negative.    Neurological: Negative.    Psychiatric/Behavioral: Negative.        Objective   Physical Exam  Neck:      Musculoskeletal: Neck supple.   Cardiovascular:      Rate and Rhythm: Normal rate and regular rhythm.      Heart sounds: Normal heart sounds.   Pulmonary:      Effort: Pulmonary effort is normal.      Breath sounds: Normal breath sounds.   Abdominal:      General: Bowel sounds are normal.   Skin:     General: Skin is warm.   Neurological:      Mental Status: He is alert and oriented to person, place, and time.         All tests have been reviewed.    Assessment/Plan   Diagnoses and all orders for this visit:    Mixed hyperlipidemia continue medication  -     CBC & Differential  -     Comprehensive Metabolic Panel  -     Lipid Panel  -     TSH  -     PSA DIAGNOSTIC  -     CK  -     Hemoglobin A1c  -     Vitamin B12  -     MicroAlbumin, Urine, Random - Urine, Clean Catch    Essential hypertension continue medication  -     CBC & Differential  -     Comprehensive Metabolic Panel  -     Lipid Panel  -     TSH  -     PSA DIAGNOSTIC  -     CK  -     Hemoglobin A1c  -     Vitamin B12  -     MicroAlbumin, Urine, Random - Urine, Clean Catch    Coronary artery disease involving native coronary artery of native heart without angina pectoris continue medication    Type 2 diabetes mellitus with other specified complication,  with long-term current use of insulin (CMS/HCC) continue medication  -     CBC & Differential  -     Comprehensive Metabolic Panel  -     Lipid Panel  -     TSH  -     PSA DIAGNOSTIC  -     CK  -     Hemoglobin A1c  -     Vitamin B12  -     MicroAlbumin, Urine, Random - Urine, Clean Catch    Atrial fibrillation, unspecified type (CMS/HCC)  -     CBC & Differential  -     Comprehensive Metabolic Panel  -     Lipid Panel  -     TSH  -     PSA DIAGNOSTIC  -     CK  -     Hemoglobin A1c  -     Vitamin B12  -     MicroAlbumin, Urine, Random - Urine, Clean Catch    Restless leg syndrome continue medication    Generalized anxiety disorder continue medication    Vitamin D deficiency  -     Vitamin D 25 Hydroxy    Nocturia   -     PSA DIAGNOSTIC    3 months for annual wellness check           ---- historical data   TIA continue xarelto, Echo and carotid duplex and CT head only small vessel ischemia    A.fib continue med , follow up with cardio  BPH,s/p laser surgery doing better, hematuria s/p scope,  follow up with dr mclain,   Diabetes  on humalog 10u increase to 13u  each meal morning and dinner meal, lunch   tresiba  25u qhs patient is also going to calibrate his own glucometer and instructed patient to check postprandial fingerstick blood sugar 2 hours after meals.--  vitD low continue  vitD3 1000u daily  Onychomycosis improved after lamisil  colon:divertic polyp and hemorroid done 4/2015, patient refuses to repeat, prefer to do cologuard  patient declined 12/29/20  Vaccinations up-to-date, patient declined shingrix, pneumovax done ,   Balancing low, PT patient refuses  AAA screen  negative

## 2021-01-13 RX ORDER — RIVAROXABAN 20 MG/1
TABLET, FILM COATED ORAL
Qty: 30 TABLET | Refills: 11 | Status: SHIPPED | OUTPATIENT
Start: 2021-01-13 | End: 2022-04-11

## 2021-02-22 ENCOUNTER — TELEPHONE (OUTPATIENT)
Dept: INTERNAL MEDICINE | Facility: CLINIC | Age: 76
End: 2021-02-22

## 2021-02-22 NOTE — TELEPHONE ENCOUNTER
PATIENT CALLED TO SEE IF THERE IS ANY LANTUS IN THE OFFICE. PATIENT IS OUT OF THIS INSULIN.    PATIENT'S CONTACT [phone]

## 2021-02-23 RX ORDER — PRAVASTATIN SODIUM 40 MG
TABLET ORAL
Qty: 90 TABLET | Refills: 3 | Status: SHIPPED | OUTPATIENT
Start: 2021-02-23 | End: 2022-04-13 | Stop reason: SDUPTHER

## 2021-03-01 ENCOUNTER — OFFICE VISIT (OUTPATIENT)
Dept: INTERNAL MEDICINE | Facility: CLINIC | Age: 76
End: 2021-03-01

## 2021-03-01 VITALS
OXYGEN SATURATION: 98 % | HEIGHT: 71 IN | BODY MASS INDEX: 24.36 KG/M2 | DIASTOLIC BLOOD PRESSURE: 82 MMHG | SYSTOLIC BLOOD PRESSURE: 120 MMHG | HEART RATE: 82 BPM | TEMPERATURE: 96.8 F | WEIGHT: 174 LBS

## 2021-03-01 DIAGNOSIS — I10 ESSENTIAL HYPERTENSION: Primary | ICD-10-CM

## 2021-03-01 DIAGNOSIS — M25.562 CHRONIC PAIN OF LEFT KNEE: ICD-10-CM

## 2021-03-01 DIAGNOSIS — F41.1 GENERALIZED ANXIETY DISORDER: ICD-10-CM

## 2021-03-01 DIAGNOSIS — B35.1 ONYCHOMYCOSIS: ICD-10-CM

## 2021-03-01 DIAGNOSIS — I48.91 ATRIAL FIBRILLATION, UNSPECIFIED TYPE (HCC): ICD-10-CM

## 2021-03-01 DIAGNOSIS — S83.207A TEAR OF MENISCUS OF LEFT KNEE, UNSPECIFIED MENISCUS, UNSPECIFIED TEAR TYPE, UNSPECIFIED WHETHER OLD OR CURRENT TEAR: ICD-10-CM

## 2021-03-01 DIAGNOSIS — E11.69 TYPE 2 DIABETES MELLITUS WITH OTHER SPECIFIED COMPLICATION, WITH LONG-TERM CURRENT USE OF INSULIN (HCC): ICD-10-CM

## 2021-03-01 DIAGNOSIS — N40.0 ENLARGED PROSTATE: ICD-10-CM

## 2021-03-01 DIAGNOSIS — Z86.010 PERSONAL HISTORY OF COLONIC POLYPS: ICD-10-CM

## 2021-03-01 DIAGNOSIS — I25.10 CORONARY ARTERY DISEASE INVOLVING NATIVE CORONARY ARTERY OF NATIVE HEART WITHOUT ANGINA PECTORIS: ICD-10-CM

## 2021-03-01 DIAGNOSIS — E55.9 VITAMIN D DEFICIENCY: ICD-10-CM

## 2021-03-01 DIAGNOSIS — G25.81 RESTLESS LEG SYNDROME: ICD-10-CM

## 2021-03-01 DIAGNOSIS — Z79.4 TYPE 2 DIABETES MELLITUS WITH OTHER SPECIFIED COMPLICATION, WITH LONG-TERM CURRENT USE OF INSULIN (HCC): ICD-10-CM

## 2021-03-01 DIAGNOSIS — G89.29 CHRONIC PAIN OF LEFT KNEE: ICD-10-CM

## 2021-03-01 DIAGNOSIS — L72.3 SEBACEOUS CYST: ICD-10-CM

## 2021-03-01 DIAGNOSIS — L98.9 SKIN LESION: ICD-10-CM

## 2021-03-01 DIAGNOSIS — E78.2 MIXED HYPERLIPIDEMIA: ICD-10-CM

## 2021-03-01 DIAGNOSIS — G45.9 TIA (TRANSIENT ISCHEMIC ATTACK): ICD-10-CM

## 2021-03-01 DIAGNOSIS — R26.89 LOSS OF BALANCE: ICD-10-CM

## 2021-03-01 PROCEDURE — 1170F FXNL STATUS ASSESSED: CPT | Performed by: INTERNAL MEDICINE

## 2021-03-01 PROCEDURE — 1159F MED LIST DOCD IN RCRD: CPT | Performed by: INTERNAL MEDICINE

## 2021-03-01 PROCEDURE — 99213 OFFICE O/P EST LOW 20 MIN: CPT | Performed by: INTERNAL MEDICINE

## 2021-03-01 PROCEDURE — G0439 PPPS, SUBSEQ VISIT: HCPCS | Performed by: INTERNAL MEDICINE

## 2021-03-01 NOTE — PROGRESS NOTES
Beatrice this is a.DN patient here for Medicare wellness also has medical problems to be discussed.  Patient complains 6 months the left side of face lesion comes and goes now healing.  Also balancing problem patient has to walk with a walker now.  Diabetes stable now needed blood tests.  Hypertension stable on medication.  GERD stable on medication.  Restless leg stable on medication.  T  Subsequent Medicare Wellness Visit    Chief Complaint   Patient presents with   • Medicare Wellness-subsequent   • Skin Problem     6 months; left side of face; painful, tender, red, pustual        Subjective   History of Present Illness:  Que Lagunas is a 75 y.o. male who presents for a Subsequent Medicare Wellness Visit.    HEALTH RISK ASSESSMENT    Recent Hospitalizations:  No hospitalization(s) within the last year.    Current Medical Providers:  Patient Care Team:  Evans Blackburn MD as PCP - General (Internal Medicine)  Evans Blackburn MD as PCP - Family Medicine  Alayna Lynch MD as Surgeon (General Surgery)    Smoking Status:  Social History     Tobacco Use   Smoking Status Former Smoker   • Types: Cigarettes   • Quit date:    • Years since quittin.1   Smokeless Tobacco Never Used   Tobacco Comment    STOPPED 5 YEARS AGO       Alcohol Consumption:  Social History     Substance and Sexual Activity   Alcohol Use No       Depression Screen:   PHQ-2/PHQ-9 Depression Screening 3/1/2021   Little interest or pleasure in doing things 0   Feeling down, depressed, or hopeless 0   Total Score 0       Fall Risk Screen:  GLENNA Fall Risk Assessment was completed, and patient is at HIGH risk for falls. Assessment completed on:3/1/2021    Health Habits and Functional and Cognitive Screening:  Functional & Cognitive Status 3/1/2021   Do you have difficulty preparing food and eating? No   Do you have difficulty bathing yourself, getting dressed or grooming yourself? No   Do you have difficulty using the toilet? No   Do you  have difficulty moving around from place to place? No   Do you have trouble with steps or getting out of a bed or a chair? No   Current Diet Well Balanced Diet   Dental Exam Not up to date        Dental Exam Comment -   Eye Exam Up to date   Exercise (times per week) 2 times per week   Current Exercise Activities Include Aerobics   Do you need help using the phone?  No   Are you deaf or do you have serious difficulty hearing?  No   Do you need help with transportation? No   Do you need help shopping? No   Do you need help preparing meals?  No   Do you need help with housework?  No   Do you need help with laundry? No   Do you need help taking your medications? No   Do you need help managing money? No   Do you ever drive or ride in a car without wearing a seat belt? No   Have you felt unusual stress, anger or loneliness in the last month? Yes   Who do you live with? Alone   If you need help, do you have trouble finding someone available to you? No   Have you been bothered in the last four weeks by sexual problems? No   Do you have difficulty concentrating, remembering or making decisions? Yes         Does the patient have evidence of cognitive impairment? No    Asprin use counseling:Does not need ASA (and currently is not on it)    Age-appropriate Screening Schedule:  Refer to the list below for future screening recommendations based on patient's age, sex and/or medical conditions. Orders for these recommended tests are listed in the plan section. The patient has been provided with a written plan.    Health Maintenance   Topic Date Due   • TDAP/TD VACCINES (1 - Tdap) Never done   • ZOSTER VACCINE (1 of 2) Never done   • DIABETIC EYE EXAM  10/25/2020   • HEMOGLOBIN A1C  09/01/2021   • LIPID PANEL  03/01/2022   • URINE MICROALBUMIN  03/01/2022   • COLONOSCOPY  04/09/2025   • INFLUENZA VACCINE  Completed          The following portions of the patient's history were reviewed and updated as appropriate: allergies, current  "medications, past family history, past medical history, past social history, past surgical history and problem list.    Outpatient Medications Prior to Visit   Medication Sig Dispense Refill   • amLODIPine (NORVASC) 5 MG tablet TAKE 1 TABLET BY MOUTH EVERY DAY 90 tablet 3   • esomeprazole (nexIUM) 20 MG capsule Take 20 mg by mouth Every Morning Before Breakfast.     • glucose blood (SOLUS V2 TEST) test strip Use as instructed 100 each 12   • glucose monitor monitoring kit 1 each As Needed (prn). 1 each 0   • Insulin Glargine (LANTUS SOLOSTAR) 100 UNIT/ML injection pen Inject 24 Units under the skin into the appropriate area as directed 2 (Two) Times a Day. 5 pen 11   • insulin lispro (HumaLOG) 100 UNIT/ML injection Inject 13 Units under the skin into the appropriate area with meals 20 mL 0   • Insulin Pen Needle (PEN NEEDLES 3/16\") 31G X 5 MM misc Use with insulin daily E11.9 100 each 5   • Insulin Syringe-Needle U-100 (Easy Touch Insulin Syringe) 30G X 5/16\" 0.5 ML misc use as directed with insulin 4 times a day 100 each 11   • Lancets misc Use to test twice daily. E11.9 100 each 12   • LIFESCAN UNISTIK II LANCETS misc 1 Units 3 (Three) Times a Day. 100 each 5   • metFORMIN (GLUCOPHAGE) 500 MG tablet Take 1 with breakfast in am 90 tablet 2   • PARoxetine (PAXIL) 20 MG tablet Take 1 tablet by mouth Daily. 30 tablet 2   • pramipexole (MIRAPEX) 0.125 MG tablet TAKE 1 TABLET BY MOUTH AT BEDTIME  90 tablet 3   • pravastatin (PRAVACHOL) 40 MG tablet TAKE 1 TABLET BY MOUTH IN THE EVENING  90 tablet 3   • Xarelto 20 MG tablet TAKE 1 TABLET BY MOUTH ONE TIME A DAY  30 tablet 11   • cholecalciferol (VITAMIN D3) 1000 units tablet Take 1,000 Units by mouth Daily.       No facility-administered medications prior to visit.       Patient Active Problem List   Diagnosis   • Diabetes mellitus (CMS/HCC)   • Enlarged prostate   • Generalized anxiety disorder   • Hyperlipidemia   • Hypertension   • Onychomycosis   • TIA (transient " "ischemic attack)   • CAD (coronary artery disease)   • Atrial fibrillation (CMS/HCC)   • Vitamin D deficiency   • Colon cancer screening   • Chronic pain of left knee   • Sebaceous cyst   • Tear of meniscus of left knee   • Restless leg syndrome   • Personal history of colonic polyps       Advanced Care Planning:  ACP discussion was held with the patient during this visit. Patient has an advance directive in EMR which is still valid.     Review of Systems   Constitutional: Negative.    Respiratory: Negative.    Cardiovascular: Negative.    Gastrointestinal: Negative.    Musculoskeletal: Negative.    Skin: Negative.    Neurological: Negative.    Psychiatric/Behavioral: Negative.        Compared to one year ago, the patient feels his physical health is the same.  Compared to one year ago, the patient feels his mental health is the same.    Reviewed chart for potential of high risk medication in the elderly: yes  Reviewed chart for potential of harmful drug interactions in the elderly:no    Objective         Vitals:    03/01/21 1033   BP: 120/82   Pulse: 82   Temp: 96.8 °F (36 °C)   SpO2: 98%   Weight: 78.9 kg (174 lb)   Height: 180.3 cm (71\")       Body mass index is 24.27 kg/m².  Discussed the patient's BMI with him. The BMI is in the acceptable range.    Physical Exam  Constitutional:       Appearance: He is well-developed.   HENT:      Head: Normocephalic.      Right Ear: Tympanic membrane normal.      Left Ear: Tympanic membrane and ear canal normal.   Neck:      Musculoskeletal: Neck supple.   Cardiovascular:      Rate and Rhythm: Normal rate and regular rhythm.      Heart sounds: Normal heart sounds.   Pulmonary:      Effort: Pulmonary effort is normal.      Breath sounds: Normal breath sounds.   Abdominal:      General: Bowel sounds are normal.      Palpations: Abdomen is soft.   Neurological:      Mental Status: He is alert and oriented to person, place, and time.   Psychiatric:         Mood and Affect: Mood " normal.         Behavior: Behavior normal.         Lab Results   Component Value Date    GLU 84 03/01/2021    CHLPL 134 03/01/2021    TRIG 39 03/01/2021    HDL 57 03/01/2021    LDL 67 03/01/2021    VLDL 10 03/01/2021    HGBA1C 11.60 (H) 03/01/2021        Assessment/Plan   Medicare Risks and Personalized Health Plan  CMS Preventative Services Quick Reference  Advance Directive Discussion    The above risks/problems have been discussed with the patient.  Pertinent information has been shared with the patient in the After Visit Summary.  Follow up plans and orders are seen below in the Assessment/Plan Section.    Diagnoses and all orders for this visit:    1. Essential hypertension (Primary) continue medication    2. Mixed hyperlipidemia continue medication    3. Coronary artery disease involving native coronary artery of native heart without angina pectoris continue medication    4. Atrial fibrillation, unspecified type (CMS/[facility]) continue medication    5. Vitamin D deficiency    6. Type 2 diabetes mellitus with other specified complication, with long-term current use of insulin (CMS/[facility]) continue medication    7. Personal history of colonic polyps    8. Enlarged prostate    9. Generalized anxiety disorder continue medication    10. Tear of meniscus of left knee, unspecified meniscus, unspecified tear type, unspecified whether old or current tear    11. Chronic pain of left knee    12. TIA (transient ischemic attack)    13. Restless leg syndrome    14. Sebaceous cyst    15. Onychomycosis    16. Skin lesion  -     Ambulatory Referral to Dermatology    17. Loss of balance  -     Ambulatory Referral to Neurology  -     MRI Brain Without Contrast      Follow Up:  Return in about 6 months (around 9/1/2021) for Recheck.     An After Visit Summary and PPPS were given to the patient.           ---- historical data    TIA continue xarelto, Echo and carotid duplex and CT head only small vessel ischemia    A.fib continue med ,  follow up with cardio  BPH,s/p laser surgery doing better, hematuria s/p scope,  follow up with dr mclain,   Diabetes  on humalog  tresiba   vitD low continue  vitD3 1000u daily  Onychomycosis improved after lamisil  colon:divertic polyp and hemorroid done 4/2015, patient refuses to repeat, prefer to do cologuard  patient declined 12/29/20  Vaccinations up-to-date, patient declined shingrix, pneumovax done ,   Balancing low, PT patient refuses  AAA screen  negative

## 2021-03-02 LAB
25(OH)D3+25(OH)D2 SERPL-MCNC: 22.7 NG/ML (ref 30–100)
ALBUMIN SERPL-MCNC: 4.5 G/DL (ref 3.5–5.2)
ALBUMIN/GLOB SERPL: 2 G/DL
ALP SERPL-CCNC: 114 U/L (ref 39–117)
ALT SERPL-CCNC: 26 U/L (ref 1–41)
AST SERPL-CCNC: 25 U/L (ref 1–40)
BASOPHILS # BLD AUTO: 0.1 10*3/MM3 (ref 0–0.2)
BASOPHILS NFR BLD AUTO: 1 % (ref 0–1.5)
BILIRUB SERPL-MCNC: 0.6 MG/DL (ref 0–1.2)
BUN SERPL-MCNC: 17 MG/DL (ref 8–23)
BUN/CREAT SERPL: 21.5 (ref 7–25)
CALCIUM SERPL-MCNC: 9.8 MG/DL (ref 8.6–10.5)
CHLORIDE SERPL-SCNC: 99 MMOL/L (ref 98–107)
CHOLEST SERPL-MCNC: 134 MG/DL (ref 0–200)
CK SERPL-CCNC: 62 U/L (ref 20–200)
CO2 SERPL-SCNC: 29.6 MMOL/L (ref 22–29)
CREAT SERPL-MCNC: 0.79 MG/DL (ref 0.76–1.27)
EOSINOPHIL # BLD AUTO: 0.27 10*3/MM3 (ref 0–0.4)
EOSINOPHIL NFR BLD AUTO: 2.8 % (ref 0.3–6.2)
ERYTHROCYTE [DISTWIDTH] IN BLOOD BY AUTOMATED COUNT: 12.9 % (ref 12.3–15.4)
GLOBULIN SER CALC-MCNC: 2.2 GM/DL
GLUCOSE SERPL-MCNC: 84 MG/DL (ref 65–99)
HBA1C MFR BLD: 11.6 % (ref 4.8–5.6)
HCT VFR BLD AUTO: 44 % (ref 37.5–51)
HDLC SERPL-MCNC: 57 MG/DL (ref 40–60)
HGB BLD-MCNC: 15.3 G/DL (ref 13–17.7)
IMM GRANULOCYTES # BLD AUTO: 0.06 10*3/MM3 (ref 0–0.05)
IMM GRANULOCYTES NFR BLD AUTO: 0.6 % (ref 0–0.5)
LDLC SERPL CALC-MCNC: 67 MG/DL (ref 0–100)
LYMPHOCYTES # BLD AUTO: 4 10*3/MM3 (ref 0.7–3.1)
LYMPHOCYTES NFR BLD AUTO: 41.5 % (ref 19.6–45.3)
MCH RBC QN AUTO: 30 PG (ref 26.6–33)
MCHC RBC AUTO-ENTMCNC: 34.8 G/DL (ref 31.5–35.7)
MCV RBC AUTO: 86.3 FL (ref 79–97)
MICROALBUMIN UR-MCNC: 4.2 UG/ML
MONOCYTES # BLD AUTO: 1 10*3/MM3 (ref 0.1–0.9)
MONOCYTES NFR BLD AUTO: 10.4 % (ref 5–12)
NEUTROPHILS # BLD AUTO: 4.2 10*3/MM3 (ref 1.7–7)
NEUTROPHILS NFR BLD AUTO: 43.7 % (ref 42.7–76)
NRBC BLD AUTO-RTO: 0.1 /100 WBC (ref 0–0.2)
PLATELET # BLD AUTO: 326 10*3/MM3 (ref 140–450)
POTASSIUM SERPL-SCNC: 4.3 MMOL/L (ref 3.5–5.2)
PROT SERPL-MCNC: 6.7 G/DL (ref 6–8.5)
PSA SERPL-MCNC: 0.89 NG/ML (ref 0–4)
RBC # BLD AUTO: 5.1 10*6/MM3 (ref 4.14–5.8)
SODIUM SERPL-SCNC: 141 MMOL/L (ref 136–145)
TRIGL SERPL-MCNC: 39 MG/DL (ref 0–150)
TSH SERPL DL<=0.005 MIU/L-ACNC: 3.19 UIU/ML (ref 0.27–4.2)
VIT B12 SERPL-MCNC: 736 PG/ML (ref 211–946)
VLDLC SERPL CALC-MCNC: 10 MG/DL (ref 5–40)
WBC # BLD AUTO: 9.63 10*3/MM3 (ref 3.4–10.8)

## 2021-03-15 ENCOUNTER — HOSPITAL ENCOUNTER (OUTPATIENT)
Dept: MRI IMAGING | Facility: HOSPITAL | Age: 76
Discharge: HOME | End: 2021-03-15
Admitting: INTERNAL MEDICINE

## 2021-03-15 PROCEDURE — 70551 MRI BRAIN STEM W/O DYE: CPT

## 2021-03-16 RX ORDER — PAROXETINE HYDROCHLORIDE 20 MG/1
TABLET, FILM COATED ORAL
Qty: 90 TABLET | Refills: 3 | OUTPATIENT
Start: 2021-03-16

## 2021-03-18 RX ORDER — PAROXETINE HYDROCHLORIDE 20 MG/1
20 TABLET, FILM COATED ORAL DAILY
Qty: 90 TABLET | Refills: 3 | OUTPATIENT
Start: 2021-03-18

## 2021-03-18 RX ORDER — PAROXETINE HYDROCHLORIDE 20 MG/1
TABLET, FILM COATED ORAL
Qty: 90 TABLET | Refills: 3 | Status: SHIPPED | OUTPATIENT
Start: 2021-03-18 | End: 2021-05-10

## 2021-03-18 NOTE — TELEPHONE ENCOUNTER
Caller: Que Lagunas    Relationship: Self    Best call back number: [phone]    Medication needed:   Requested Prescriptions     Pending Prescriptions Disp Refills   • PARoxetine (PAXIL) 20 MG tablet 90 tablet 3     Sig: Take 1 tablet by mouth Daily.       When do you need the refill by: 3/18/21    What additional details did the patient provide when requesting the medication: PATIENT IS OUT OF MEDICATION    Does the patient have less than a 3 day supply:  [x] Yes  [] No    What is the patient's preferred pharmacy: [id number] - [place], [place] - [address]  - [phone]  - [phone]

## 2021-03-25 ENCOUNTER — OFFICE VISIT (OUTPATIENT)
Dept: INTERNAL MEDICINE | Facility: CLINIC | Age: 76
End: 2021-03-25

## 2021-03-25 VITALS
HEART RATE: 79 BPM | BODY MASS INDEX: 23.94 KG/M2 | DIASTOLIC BLOOD PRESSURE: 80 MMHG | WEIGHT: 171 LBS | OXYGEN SATURATION: 96 % | HEIGHT: 71 IN | SYSTOLIC BLOOD PRESSURE: 122 MMHG | TEMPERATURE: 96.6 F

## 2021-03-25 DIAGNOSIS — R26.89 LOSS OF BALANCE: ICD-10-CM

## 2021-03-25 DIAGNOSIS — E11.69 TYPE 2 DIABETES MELLITUS WITH OTHER SPECIFIED COMPLICATION, WITH LONG-TERM CURRENT USE OF INSULIN (HCC): ICD-10-CM

## 2021-03-25 DIAGNOSIS — Z79.4 TYPE 2 DIABETES MELLITUS WITH OTHER SPECIFIED COMPLICATION, WITH LONG-TERM CURRENT USE OF INSULIN (HCC): ICD-10-CM

## 2021-03-25 DIAGNOSIS — R41.3 MEMORY LOSS: ICD-10-CM

## 2021-03-25 DIAGNOSIS — E55.9 VITAMIN D DEFICIENCY: Primary | ICD-10-CM

## 2021-03-25 PROCEDURE — 99214 OFFICE O/P EST MOD 30 MIN: CPT | Performed by: INTERNAL MEDICINE

## 2021-03-25 NOTE — PROGRESS NOTES
"Subjective   Que Lagunas is a 76 y.o. male.     Chief Complaint   Patient presents with   • Follow-up     recent lab work   • Diabetes     pharmacy did not have Humalog- unable to start medication        History of Present Illness   Patient here for follow-up for low vitamin D and very high sugar.  Patient states he is not taking Humalog due to not available from pharmacy.  Loss of balance is still with walker now.  Patient also complains forgetful.    Current Outpatient Medications:   •  amLODIPine (NORVASC) 5 MG tablet, TAKE 1 TABLET BY MOUTH EVERY DAY, Disp: 90 tablet, Rfl: 3  •  cholecalciferol (VITAMIN D3) 1000 units tablet, Take 1,000 Units by mouth Daily., Disp: , Rfl:   •  esomeprazole (nexIUM) 20 MG capsule, Take 20 mg by mouth Every Morning Before Breakfast., Disp: , Rfl:   •  glucose blood (SOLUS V2 TEST) test strip, Use as instructed, Disp: 100 each, Rfl: 12  •  glucose monitor monitoring kit, 1 each As Needed (prn)., Disp: 1 each, Rfl: 0  •  Insulin Glargine (LANTUS SOLOSTAR) 100 UNIT/ML injection pen, Inject 24 Units under the skin into the appropriate area as directed 2 (Two) Times a Day., Disp: 5 pen, Rfl: 11  •  Insulin Pen Needle (PEN NEEDLES 3/16\") 31G X 5 MM misc, Use with insulin daily E11.9, Disp: 100 each, Rfl: 5  •  Insulin Syringe-Needle U-100 (Easy Touch Insulin Syringe) 30G X 5/16\" 0.5 ML misc, use as directed with insulin 4 times a day, Disp: 100 each, Rfl: 11  •  Lancets misc, Use to test twice daily. E11.9, Disp: 100 each, Rfl: 12  •  LIFESCAN UNISTIK II LANCETS misc, 1 Units 3 (Three) Times a Day., Disp: 100 each, Rfl: 5  •  metFORMIN (GLUCOPHAGE) 500 MG tablet, Take 1 with breakfast in am, Disp: 90 tablet, Rfl: 2  •  PARoxetine (PAXIL) 20 MG tablet, TAKE 1 TABLET BY MOUTH EVERY DAY , Disp: 90 tablet, Rfl: 3  •  pramipexole (MIRAPEX) 0.125 MG tablet, TAKE 1 TABLET BY MOUTH AT BEDTIME , Disp: 90 tablet, Rfl: 3  •  pravastatin (PRAVACHOL) 40 MG tablet, TAKE 1 TABLET BY MOUTH IN THE " EVENING , Disp: 90 tablet, Rfl: 3  •  Xarelto 20 MG tablet, TAKE 1 TABLET BY MOUTH ONE TIME A DAY , Disp: 30 tablet, Rfl: 11  •  insulin lispro (HumaLOG) 100 UNIT/ML injection, Inject 13 Units under the skin into the appropriate area with meals, Disp: 20 mL, Rfl: 6    The following portions of the patient's history were reviewed and updated as appropriate: allergies, current medications, past family history, past medical history, past social history, past surgical history and problem list.    Review of Systems   Constitutional: Negative.    Respiratory: Negative.    Cardiovascular: Negative.    Gastrointestinal: Negative.    Musculoskeletal: Negative.    Skin: Negative.    Neurological: Negative.    Psychiatric/Behavioral: Negative.        Objective   Physical Exam  Cardiovascular:      Rate and Rhythm: Normal rate and regular rhythm.      Heart sounds: Normal heart sounds.   Pulmonary:      Effort: Pulmonary effort is normal.      Breath sounds: Normal breath sounds.   Abdominal:      General: Bowel sounds are normal.   Musculoskeletal:      Cervical back: Neck supple.   Skin:     General: Skin is warm.   Neurological:      Mental Status: He is alert and oriented to person, place, and time.         All tests have been reviewed.    Assessment/Plan   Diagnoses and all orders for this visit:    Vitamin D deficiency initiate vitamin D3 2000 unit daily    Type 2 diabetes mellitus with other specified complication, with long-term current use of insulin (CMS/[facility]) counseling for compliance sent the medicine to a different pharmacy.  Also spent time with family regarding patient's compliance patient may exhibit early signs of dementia.  -     insulin lispro (HumaLOG) 100 UNIT/ML injection; Inject 13 Units under the skin into the appropriate area with meals    Loss of balance pending to see neurology    Memory loss pending to see neurology      1 mo     Counseling and visiting and charting 30 minutes

## 2021-04-19 ENCOUNTER — OFFICE VISIT (OUTPATIENT)
Dept: NEUROLOGY | Facility: CLINIC | Age: 76
End: 2021-04-19

## 2021-04-19 ENCOUNTER — HOSPITAL ENCOUNTER (OUTPATIENT)
Dept: GENERAL RADIOLOGY | Facility: HOSPITAL | Age: 76
Discharge: HOME | End: 2021-04-19
Admitting: NURSE PRACTITIONER

## 2021-04-19 VITALS
HEART RATE: 91 BPM | WEIGHT: 176 LBS | BODY MASS INDEX: 23.84 KG/M2 | HEIGHT: 72 IN | OXYGEN SATURATION: 98 % | TEMPERATURE: 97.5 F | DIASTOLIC BLOOD PRESSURE: 76 MMHG | SYSTOLIC BLOOD PRESSURE: 118 MMHG

## 2021-04-19 DIAGNOSIS — R26.89 LOSS OF BALANCE: Primary | ICD-10-CM

## 2021-04-19 DIAGNOSIS — R26.89 LOSS OF BALANCE: ICD-10-CM

## 2021-04-19 DIAGNOSIS — M54.50 LOW BACK PAIN, UNSPECIFIED BACK PAIN LATERALITY, UNSPECIFIED CHRONICITY, UNSPECIFIED WHETHER SCIATICA PRESENT: ICD-10-CM

## 2021-04-19 DIAGNOSIS — R41.3 MEMORY LOSS: ICD-10-CM

## 2021-04-19 DIAGNOSIS — E78.2 MIXED HYPERLIPIDEMIA: ICD-10-CM

## 2021-04-19 DIAGNOSIS — Z79.4 TYPE 2 DIABETES MELLITUS WITH OTHER SPECIFIED COMPLICATION, WITH LONG-TERM CURRENT USE OF INSULIN (HCC): ICD-10-CM

## 2021-04-19 DIAGNOSIS — I48.91 ATRIAL FIBRILLATION, UNSPECIFIED TYPE (HCC): ICD-10-CM

## 2021-04-19 DIAGNOSIS — I10 ESSENTIAL HYPERTENSION: ICD-10-CM

## 2021-04-19 DIAGNOSIS — E11.69 TYPE 2 DIABETES MELLITUS WITH OTHER SPECIFIED COMPLICATION, WITH LONG-TERM CURRENT USE OF INSULIN (HCC): ICD-10-CM

## 2021-04-19 PROCEDURE — 99214 OFFICE O/P EST MOD 30 MIN: CPT | Performed by: NURSE PRACTITIONER

## 2021-04-19 PROCEDURE — 72100 X-RAY EXAM L-S SPINE 2/3 VWS: CPT

## 2021-04-19 RX ORDER — MEMANTINE HYDROCHLORIDE 5 MG/1
5 TABLET ORAL NIGHTLY
Qty: 30 TABLET | Refills: 1 | Status: SHIPPED | OUTPATIENT
Start: 2021-04-19 | End: 2021-04-19 | Stop reason: SDUPTHER

## 2021-04-19 RX ORDER — MEMANTINE HYDROCHLORIDE 5 MG/1
5 TABLET ORAL NIGHTLY
Qty: 30 TABLET | Refills: 1 | Status: SHIPPED | OUTPATIENT
Start: 2021-04-19 | End: 2021-05-17

## 2021-04-19 NOTE — PROGRESS NOTES
"     New Patient Office Visit      Patient Name: Que Lagunas  : 1945   MRN: [id number]     Referring Physician: Evans Blackburn MD    Chief Complaint:    Chief Complaint   Patient presents with   • Consult     NP, in office to consult on balance issue and memory changes.  Patient states he has had memory changes over the past 6 month.  Patient also states he has noticed balance issues for 6 months.  MMSE score today 22/30.       History of Present Illness: Que Lagunas is a 76 y.o. male who is here today to establish care with Neurology for balance and memory issues.  He is accompanied by his daughter  Jennifer today.  He has had balance problems for at least 6 months and was having frequent falls.  He is now using a walker and that has helped quite a bit as he has had no recent falls.  He denies feeling constantly imbalanced but if he does lose his balance he will fall.  He does have poorly controlled diabetes type 2 and is on insulin daily.  He says his back does hurt at times, mostly in the mornings when he gets up.  He denies constipation or blurry vision.  He denies dizziness.  Additional risk factors- HTN, CAD, diabetes, mood disorder, atrial fibrillation, history of TIA around a year ago (taking Pravastatin 40mg daily and Xarelto 20mg daily), RLS, GERD, former smoker.     He scored 22/30 on the MMSE today.  He says he has had a poor memory for a while but worse over the past 6 months.  He is concerned about his short term memory.  He lives with his daughter.  He performs most of his ADLs independently.  His daughter takes care of his finances because he states \"I can't do it\".  He has problems remembering due dates and amounts due on bills.  He does not drive and states \"I was cutting my corners too short so I decided to stop driving\".  He states \"I fear Alzheimer's because it is in the family\".     Pertinent Medical History: MRI brain without contrast on 3/16/2021 showed chronic small vessel " ischemic change with no acute intracranial abnormality.  Vitamin B12 and TSH on 3/1/2021 were normal; HgbA1c was elevated at 11.6 (his daughter says when he was living alone he got his medications all messed up and now his medications are all lined out and she is hoping his diabetes is now much better controlled).     Subjective      Review of Systems:   Review of Systems   Constitutional: Negative for fatigue, fever, unexpected weight gain and unexpected weight loss.   HENT: Negative for hearing loss, sore throat, swollen glands, tinnitus and trouble swallowing.    Eyes: Negative for blurred vision, double vision, photophobia and visual disturbance.   Respiratory: Negative for cough, chest tightness and shortness of breath.    Cardiovascular: Negative for chest pain, palpitations and leg swelling.   Gastrointestinal: Negative for constipation, diarrhea and nausea.   Endocrine: Negative for cold intolerance and heat intolerance.   Musculoskeletal: Positive for back pain and gait problem. Negative for neck pain and neck stiffness.   Skin: Negative for color change and rash.   Allergic/Immunologic: Negative for environmental allergies and food allergies.   Neurological: Positive for memory problem. Negative for dizziness, syncope, facial asymmetry, speech difficulty, weakness, headache and confusion.   Psychiatric/Behavioral: Negative for agitation, behavioral problems and depressed mood. The patient is not nervous/anxious.        Past Medical History:   Past Medical History:   Diagnosis Date   • Abnormal EKG     Bifascicular block and no prior ischemia evaluation.    • Anemia    • Anxiety    • Arthritis    • Atrial fibrillation (CMS/HCC)     CHADS VASc=3.  Continue Xarelto 20.  vas continue Zaroxolyn 20 has a relative 20 Knobloch can add Lantus 40   • CAD (coronary artery disease)    • Colon polyp 04/09/2015   • Contact dermatitis    • Diabetes (CMS/HCC)    • Dyslipidemia    • Hypertension    • Palpitations    •  "Prostatism    • Stroke (CMS/HCC)    • Tattoo        Past Surgical History:   Past Surgical History:   Procedure Laterality Date   • COLONOSCOPY  2015   • EYE SURGERY     • PROSTATE SURGERY     • TONSILLECTOMY         Family History:   Family History   Problem Relation Age of Onset   • Diabetes Other    • Cancer Other         NO PROSTATE CANCER HISTORY   • Hypertension Other    • Cancer Other    • Diabetes Other    • Liver disease Mother    • Liver disease Father    • Colon cancer Neg Hx    • Stomach cancer Neg Hx        Social History:   Social History     Socioeconomic History   • Marital status:      Spouse name: Not on file   • Number of children: Not on file   • Years of education: Not on file   • Highest education level: Not on file   Tobacco Use   • Smoking status: Former Smoker     Types: Cigarettes     Quit date:      Years since quittin.3   • Smokeless tobacco: Never Used   • Tobacco comment: STOPPED 5 YEARS AGO   Substance and Sexual Activity   • Alcohol use: No   • Drug use: No   • Sexual activity: Defer       Medications:     Current Outpatient Medications:   •  amLODIPine (NORVASC) 5 MG tablet, TAKE 1 TABLET BY MOUTH EVERY DAY, Disp: 90 tablet, Rfl: 3  •  cholecalciferol (VITAMIN D3) 1000 units tablet, Take 1,000 Units by mouth Daily., Disp: , Rfl:   •  esomeprazole (nexIUM) 20 MG capsule, Take 20 mg by mouth Every Morning Before Breakfast., Disp: , Rfl:   •  glucose blood (SOLUS V2 TEST) test strip, Use as instructed, Disp: 100 each, Rfl: 12  •  glucose monitor monitoring kit, 1 each As Needed (prn)., Disp: 1 each, Rfl: 0  •  Insulin Glargine (LANTUS SOLOSTAR) 100 UNIT/ML injection pen, Inject 24 Units under the skin into the appropriate area as directed 2 (Two) Times a Day., Disp: 5 pen, Rfl: 11  •  insulin lispro (HumaLOG) 100 UNIT/ML injection, Inject 13 Units under the skin into the appropriate area with meals, Disp: 20 mL, Rfl: 6  •  Insulin Pen Needle (PEN NEEDLES 3/16\") " "31G X 5 MM misc, Use with insulin daily E11.9, Disp: 100 each, Rfl: 5  •  Insulin Syringe-Needle U-100 (Easy Touch Insulin Syringe) 30G X 5/16\" 0.5 ML misc, use as directed with insulin 4 times a day, Disp: 100 each, Rfl: 11  •  Lancets misc, Use to test twice daily. E11.9, Disp: 100 each, Rfl: 12  •  LIFESCAN UNISTIK II LANCETS misc, 1 Units 3 (Three) Times a Day., Disp: 100 each, Rfl: 5  •  metFORMIN (GLUCOPHAGE) 500 MG tablet, Take 1 with breakfast in am, Disp: 90 tablet, Rfl: 2  •  PARoxetine (PAXIL) 20 MG tablet, TAKE 1 TABLET BY MOUTH EVERY DAY , Disp: 90 tablet, Rfl: 3  •  pramipexole (MIRAPEX) 0.125 MG tablet, TAKE 1 TABLET BY MOUTH AT BEDTIME , Disp: 90 tablet, Rfl: 3  •  pravastatin (PRAVACHOL) 40 MG tablet, TAKE 1 TABLET BY MOUTH IN THE EVENING , Disp: 90 tablet, Rfl: 3  •  Xarelto 20 MG tablet, TAKE 1 TABLET BY MOUTH ONE TIME A DAY , Disp: 30 tablet, Rfl: 11  •  memantine (NAMENDA) 5 MG tablet, Take 1 tablet by mouth Every Night., Disp: 30 tablet, Rfl: 1    Allergies:   Allergies   Allergen Reactions   • Lisinopril Anaphylaxis       Objective     Physical Exam:  Vital Signs:   Vitals:    04/19/21 1109   BP: 118/76   Pulse: 91   Temp: 97.5 °F (36.4 °C)   SpO2: 98%   Weight: 79.8 kg (176 lb)   Height: 182.9 cm (72\")   PainSc: 0-No pain     Body mass index is 23.87 kg/m².     Physical Exam  Vitals and nursing note reviewed.   Constitutional:       General: He is not in acute distress.     Appearance: Normal appearance. He is well-developed. He is not diaphoretic.   HENT:      Head: Normocephalic and atraumatic.   Eyes:      Conjunctiva/sclera: Conjunctivae normal.      Pupils: Pupils are equal, round, and reactive to light.   Cardiovascular:      Rate and Rhythm: Normal rate and regular rhythm.      Heart sounds: Normal heart sounds. No murmur heard.   No friction rub. No gallop.    Pulmonary:      Effort: Pulmonary effort is normal. No respiratory distress.      Breath sounds: Normal breath sounds. No " wheezing or rales.   Musculoskeletal:         General: Normal range of motion.   Skin:     General: Skin is warm and dry.      Findings: No rash.   Neurological:      Mental Status: He is alert.      Coordination: Romberg Test abnormal. Finger-Nose-Finger Test normal.      Gait: Tandem walk abnormal.      Deep Tendon Reflexes:      Reflex Scores:       Patellar reflexes are 3+ on the right side and 3+ on the left side.     Comments: Oriented to self and place.    Psychiatric:         Mood and Affect: Mood normal.         Speech: Speech normal.         Behavior: Behavior normal.         Thought Content: Thought content normal.         Neurologic Exam     Mental Status   Oriented to person.   Oriented to place. Oriented to country and city.   Disoriented to time. Disoriented to year, month, date and day.   Registration: recalls 2 of 3 objects.   Attention: normal. Concentration: normal.   Speech: speech is normal   Level of consciousness: alert  Knowledge: good.     Cranial Nerves   Cranial nerves II through XII intact.     CN II   Visual fields full to confrontation.     CN III, IV, VI   Pupils are equal, round, and reactive to light.  Right pupil: Size: 2 mm. Shape: regular. Reactivity: sluggish.   Left pupil: Size: 2 mm. Shape: regular. Reactivity: sluggish.   CN III: no CN III palsy  CN VI: no CN VI palsy  Nystagmus: none     CN V   Facial sensation intact.     CN VII   Facial expression full, symmetric.     CN VIII   CN VIII normal.     CN IX, X   CN IX normal.   CN X normal.     CN XI   CN XI normal.     CN XII   CN XII normal.     Motor Exam   Muscle bulk: normal  Overall muscle tone: normal    Strength   Right biceps: 5/5  Left biceps: 5/5  Right triceps: 5/5  Left triceps: 5/5  Right quadriceps: 5/5  Left quadriceps: 5/5  Right hamstrin/5  Left hamstrin/5    Sensory Exam   Light touch normal.     Gait, Coordination, and Reflexes     Gait  Gait: shuffling    Coordination   Romberg: positive  Finger to  nose coordination: normal  Tandem walking coordination: abnormal    Tremor   Resting tremor: absent  Intention tremor: absent  Action tremor: absent    Reflexes   Reflexes 2+ except as noted.   Right patellar: 3+  Left patellar: 3+Able to go from sitting to standing position on first attempt without assistance, able to complete a turn in 1-2 steps, slight decrease in bilateral arm swing, slight forward-leaning gait. Imbalance present.        Assessment / Plan      Assessment/Plan:   Diagnoses and all orders for this visit:    1. Loss of balance (Primary)  -     XR Spine Lumbar 2 or 3 View; Future  - Fall precautions discussed. Advised patient to always use his walker to help prevent falls.     2. Memory loss  -     Discontinue: memantine (NAMENDA) 5 MG tablet; Take 1 tablet by mouth Every Night.  Dispense: 30 tablet; Refill: 1  -     Ambulatory Referral to Neurology Dr. Cristhian Sheldon for neuropsychiatric memory testing/further evaluation.   -     memantine (NAMENDA) 5 MG tablet; Take 1 tablet by mouth Every Night.  Dispense: 30 tablet; Refill: 1. Indications and possible SEs discussed.   - Printed patient education on MCI and Dementia provided today.     3. Mixed hyperlipidemia  - LDL goal <70.    4. Essential hypertension    5. Atrial fibrillation, unspecified type (CMS/HCC)    6. Type 2 diabetes mellitus with other specified complication, with long-term current use of insulin (CMS/HCC)  - HgbA1c goal <7.0%.     7. BMI 23.0-23.9, adult    8. Low back pain, unspecified back pain laterality, unspecified chronicity, unspecified whether sciatica present  -     XR Spine Lumbar 2 or 3 View; Future         Follow Up:   Return in about 4 weeks (around 5/17/2021) for Recheck.    SUKI Smart, FNP-C  [facility]rology [facility]       Please note that portions of this note may have been completed with a voice recognition program. Efforts were made to edit the dictations, but occasionally  words are mistranscribed.

## 2021-04-19 NOTE — PATIENT INSTRUCTIONS
Fall Prevention in the Home, Adult  Falls can cause injuries. They can happen to people of all ages. There are many things you can do to make your home safe and to help prevent falls. Ask for help when making these changes, if needed.  What actions can I take to prevent falls?  General Instructions  · Use good lighting in all rooms. Replace any light bulbs that burn out.  · Turn on the lights when you go into a dark area. Use night-lights.  · Keep items that you use often in easy-to-reach places. Lower the shelves around your home if necessary.  · Set up your furniture so you have a clear path. Avoid moving your furniture around.  · Do not have throw rugs and other things on the floor that can make you trip.  · Avoid walking on wet floors.  · If any of your floors are uneven, fix them.  · Add color or contrast paint or tape to clearly kimberli and help you see:  ? Any grab bars or handrails.  ? First and last steps of stairways.  ? Where the edge of each step is.  · If you use a stepladder:  ? Make sure that it is fully opened. Do not climb a closed stepladder.  ? Make sure that both sides of the stepladder are locked into place.  ? Ask someone to hold the stepladder for you while you use it.  · If there are any pets around you, be aware of where they are.  What can I do in the bathroom?         · Keep the floor dry. Clean up any water that spills onto the floor as soon as it happens.  · Remove soap buildup in the tub or shower regularly.  · Use non-skid mats or decals on the floor of the tub or shower.  · Attach bath mats securely with double-sided, non-slip rug tape.  · If you need to sit down in the shower, use a plastic, non-slip stool.  · Install grab bars by the toilet and in the tub and shower. Do not use towel bars as grab bars.  What can I do in the bedroom?  · Make sure that you have a light by your bed that is easy to reach.  · Do not use any sheets or blankets that are too big for your bed. They should not  hang down onto the floor.  · Have a firm chair that has side arms. You can use this for support while you get dressed.  What can I do in the kitchen?  · Clean up any spills right away.  · If you need to reach something above you, use a strong step stool that has a grab bar.  · Keep electrical cords out of the way.  · Do not use floor polish or wax that makes floors slippery. If you must use wax, use non-skid floor wax.  What can I do with my stairs?  · Do not leave any items on the stairs.  · Make sure that you have a light switch at the top of the stairs and the bottom of the stairs. If you do not have them, ask someone to add them for you.  · Make sure that there are handrails on both sides of the stairs, and use them. Fix handrails that are broken or loose. Make sure that handrails are as long as the stairways.  · Install non-slip stair treads on all stairs in your home.  · Avoid having throw rugs at the top or bottom of the stairs. If you do have throw rugs, attach them to the floor with carpet tape.  · Choose a carpet that does not hide the edge of the steps on the stairway.  · Check any carpeting to make sure that it is firmly attached to the stairs. Fix any carpet that is loose or worn.  What can I do on the outside of my home?  · Use bright outdoor lighting.  · Regularly fix the edges of walkways and driveways and fix any cracks.  · Remove anything that might make you trip as you walk through a door, such as a raised step or threshold.  · Trim any bushes or trees on the path to your home.  · Regularly check to see if handrails are loose or broken. Make sure that both sides of any steps have handrails.  · Install guardrails along the edges of any raised decks and porches.  · Clear walking paths of anything that might make someone trip, such as tools or rocks.  · Have any leaves, snow, or ice cleared regularly.  · Use sand or salt on walking paths during winter.  · Clean up any spills in your garage right  away. This includes grease or oil spills.  What other actions can I take?  · Wear shoes that:  ? Have a low heel. Do not wear high heels.  ? Have rubber bottoms.  ? Are comfortable and fit you well.  ? Are closed at the toe. Do not wear open-toe sandals.  · Use tools that help you move around (mobility aids) if they are needed. These include:  ? Canes.  ? Walkers.  ? Scooters.  ? Crutches.  · Review your medicines with your doctor. Some medicines can make you feel dizzy. This can increase your chance of falling.  Ask your doctor what other things you can do to help prevent falls.  Where to find more information  · Centers for Disease Control and Prevention, [place]: https://cdc.gov  · National [place] on Aging: https://ob0pxwp.naomi.nih.gov  Contact a doctor if:  · You are afraid of falling at home.  · You feel weak, drowsy, or dizzy at home.  · You fall at home.  Summary  · There are many simple things that you can do to make your home safe and to help prevent falls.  · Ways to make your home safe include removing tripping hazards and installing grab bars in the bathroom.  · Ask for help when making these changes in your home.  This information is not intended to replace advice given to you by your health care provider. Make sure you discuss any questions you have with your health care provider.  Document Revised: 04/09/2020 Document Reviewed: 08/02/2018  Elsevier Patient Education © 2021 [employer]

## 2021-04-20 ENCOUNTER — TELEPHONE (OUTPATIENT)
Dept: NEUROLOGY | Facility: CLINIC | Age: 76
End: 2021-04-20

## 2021-04-20 NOTE — TELEPHONE ENCOUNTER
NENA - [facility] NEUROSCIENCES  [phone]    NENA WAS JUST WANTING TO LET WILBUR KNOW THAT THE PT HAS BEEN SCHEDULE FOR 02/17/22 @ 10:00 AM.

## 2021-04-27 ENCOUNTER — OFFICE VISIT (OUTPATIENT)
Dept: INTERNAL MEDICINE | Facility: CLINIC | Age: 76
End: 2021-04-27

## 2021-04-27 VITALS
SYSTOLIC BLOOD PRESSURE: 124 MMHG | HEART RATE: 88 BPM | TEMPERATURE: 96.9 F | HEIGHT: 72 IN | WEIGHT: 175 LBS | BODY MASS INDEX: 23.7 KG/M2 | DIASTOLIC BLOOD PRESSURE: 80 MMHG | OXYGEN SATURATION: 97 %

## 2021-04-27 DIAGNOSIS — E11.69 TYPE 2 DIABETES MELLITUS WITH OTHER SPECIFIED COMPLICATION, WITH LONG-TERM CURRENT USE OF INSULIN (HCC): Primary | ICD-10-CM

## 2021-04-27 DIAGNOSIS — Z79.4 TYPE 2 DIABETES MELLITUS WITH OTHER SPECIFIED COMPLICATION, WITH LONG-TERM CURRENT USE OF INSULIN (HCC): Primary | ICD-10-CM

## 2021-04-27 LAB — HBA1C MFR BLD: 8.8 %

## 2021-04-27 PROCEDURE — 83036 HEMOGLOBIN GLYCOSYLATED A1C: CPT | Performed by: INTERNAL MEDICINE

## 2021-04-27 PROCEDURE — 99214 OFFICE O/P EST MOD 30 MIN: CPT | Performed by: INTERNAL MEDICINE

## 2021-04-27 NOTE — PROGRESS NOTES
"Subjective   Que Lagunas is a 76 y.o. male.     Chief Complaint   Patient presents with   • Follow-up   • Diabetes     discuss insulin dose; taking 24 units twice daily instead of 20 units twice daily    • Memory Loss   • Diarrhea     after meals; worsening       History of Present Illness   Patient here for follow-up.  Morning sugar appears to be normal.  Patient last A1c was 11.2.  Patient is still taking Humalog 13 units with meals.  And Lantus patient taking 24 units twice a day supposed to be 20 units twice a day.  Patient also complains some diarrhea after eating.  Memory loss on Namenda by neurology pending to see memory clinic.  Loss of balance patient was seen by neurologist x-ray showed arthritis in the back mild.  Blood pressure normal weight normal.    Current Outpatient Medications:   •  amLODIPine (NORVASC) 5 MG tablet, TAKE 1 TABLET BY MOUTH EVERY DAY, Disp: 90 tablet, Rfl: 3  •  cholecalciferol (VITAMIN D3) 1000 units tablet, Take 1,000 Units by mouth Daily., Disp: , Rfl:   •  esomeprazole (nexIUM) 20 MG capsule, Take 20 mg by mouth Every Morning Before Breakfast., Disp: , Rfl:   •  glucose blood (SOLUS V2 TEST) test strip, Use as instructed, Disp: 100 each, Rfl: 12  •  glucose monitor monitoring kit, 1 each As Needed (prn)., Disp: 1 each, Rfl: 0  •  Insulin Glargine (LANTUS SOLOSTAR) 100 UNIT/ML injection pen, Inject 24 Units under the skin into the appropriate area as directed 2 (Two) Times a Day., Disp: 5 pen, Rfl: 11  •  insulin lispro (HumaLOG) 100 UNIT/ML injection, Inject 13 Units under the skin into the appropriate area with meals, Disp: 20 mL, Rfl: 6  •  Insulin Pen Needle (PEN NEEDLES 3/16\") 31G X 5 MM misc, Use with insulin daily E11.9, Disp: 100 each, Rfl: 5  •  Insulin Syringe-Needle U-100 (Easy Touch Insulin Syringe) 30G X 5/16\" 0.5 ML misc, use as directed with insulin 4 times a day, Disp: 100 each, Rfl: 11  •  Lancets misc, Use to test twice daily. E11.9, Disp: 100 each, Rfl: " 12  •  LIFESCAN UNISTIK II LANCETS misc, 1 Units 3 (Three) Times a Day., Disp: 100 each, Rfl: 5  •  memantine (NAMENDA) 5 MG tablet, Take 1 tablet by mouth Every Night., Disp: 30 tablet, Rfl: 1  •  metFORMIN (GLUCOPHAGE) 500 MG tablet, Take 1 with breakfast in am, Disp: 90 tablet, Rfl: 2  •  PARoxetine (PAXIL) 20 MG tablet, TAKE 1 TABLET BY MOUTH EVERY DAY , Disp: 90 tablet, Rfl: 3  •  pramipexole (MIRAPEX) 0.125 MG tablet, TAKE 1 TABLET BY MOUTH AT BEDTIME , Disp: 90 tablet, Rfl: 3  •  pravastatin (PRAVACHOL) 40 MG tablet, TAKE 1 TABLET BY MOUTH IN THE EVENING , Disp: 90 tablet, Rfl: 3  •  Xarelto 20 MG tablet, TAKE 1 TABLET BY MOUTH ONE TIME A DAY , Disp: 30 tablet, Rfl: 11    The following portions of the patient's history were reviewed and updated as appropriate: allergies, current medications, past family history, past medical history, past social history, past surgical history and problem list.    Review of Systems   Constitutional: Negative.    Respiratory: Negative.    Cardiovascular: Negative.    Gastrointestinal: Negative.    Musculoskeletal: Negative.    Skin: Negative.    Neurological: Negative.    Psychiatric/Behavioral: Negative.        Objective   Physical Exam  Cardiovascular:      Rate and Rhythm: Normal rate and regular rhythm.      Heart sounds: Normal heart sounds.   Pulmonary:      Effort: Pulmonary effort is normal.      Breath sounds: Normal breath sounds.   Abdominal:      General: Bowel sounds are normal.   Musculoskeletal:      Cervical back: Neck supple.   Skin:     General: Skin is warm.   Neurological:      Mental Status: He is alert and oriented to person, place, and time.         All tests have been reviewed.    Assessment/Plan   Diagnoses and all orders for this visit:    Type 2 diabetes mellitus with other specified complication, with long-term current use of insulin (CMS/[facility])  -     POC Glycosylated Hemoglobin (Hb A1C)                  Type 2 diabetes mellitus with other specified  complication, with long-term current use of insulin (CMS/[facility]) counseling for compliance sent the medicine to a different pharmacy.  Also spent time with family regarding patient's compliance patient may exhibit early signs of dementia.  -     insulin lispro (HumaLOG) 100 UNIT/ML injection; Inject 13 Units under the skin into the appropriate area with meals  Morning sugar on the low side will resume Lantus 20 units twice a day instead of 24 units patient was taking at home.  Encourage patient to do 2 hours after meal fingerstick blood sugar.     Loss of balance follow-up with neurology x-ray of lower back it showed arthritis     Memory loss  continue Namenda by neurology follow-up with memory clinic     Follow-up in 1 month with a log book

## 2021-05-10 RX ORDER — PAROXETINE HYDROCHLORIDE 20 MG/1
TABLET, FILM COATED ORAL
Qty: 90 TABLET | Refills: 3 | Status: SHIPPED | OUTPATIENT
Start: 2021-05-10 | End: 2021-09-24 | Stop reason: SDUPTHER

## 2021-05-17 ENCOUNTER — OFFICE VISIT (OUTPATIENT)
Dept: NEUROLOGY | Facility: CLINIC | Age: 76
End: 2021-05-17

## 2021-05-17 VITALS
HEIGHT: 72 IN | BODY MASS INDEX: 24.06 KG/M2 | SYSTOLIC BLOOD PRESSURE: 120 MMHG | OXYGEN SATURATION: 95 % | TEMPERATURE: 97.8 F | DIASTOLIC BLOOD PRESSURE: 78 MMHG | HEART RATE: 70 BPM | WEIGHT: 177.6 LBS

## 2021-05-17 DIAGNOSIS — R41.3 MEMORY LOSS: ICD-10-CM
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
## 2021-05-17 DIAGNOSIS — R25.1 TREMOR: ICD-10-CM

## 2021-05-17 DIAGNOSIS — R26.89 LOSS OF BALANCE: Primary | ICD-10-CM

## 2021-05-17 DIAGNOSIS — G25.81 RESTLESS LEG SYNDROME: ICD-10-CM

## 2021-05-17 PROCEDURE — 99213 OFFICE O/P EST LOW 20 MIN: CPT | Performed by: NURSE PRACTITIONER

## 2021-05-17 RX ORDER — MEMANTINE HYDROCHLORIDE 10 MG/1
10 TABLET ORAL NIGHTLY
Qty: 90 TABLET | Refills: 3 | Status: SHIPPED | OUTPATIENT
Start: 2021-05-17 | End: 2021-10-25 | Stop reason: SDUPTHER

## 2021-05-17 RX ORDER — DONEPEZIL HYDROCHLORIDE 5 MG/1
5 TABLET, FILM COATED ORAL NIGHTLY
Qty: 30 TABLET | Refills: 2 | Status: SHIPPED | OUTPATIENT
Start: 2021-05-31 | End: 2021-07-26

## 2021-05-17 RX ORDER — PRAMIPEXOLE DIHYDROCHLORIDE 0.5 MG/1
0.5 TABLET ORAL
Qty: 90 TABLET | Refills: 0 | Status: SHIPPED | OUTPATIENT
Start: 2021-05-17 | End: 2021-07-26

## 2021-05-17 NOTE — PATIENT INSTRUCTIONS
Tremor  A tremor is trembling or shaking that you cannot control. Most tremors affect the hands or arms. Tremors can also affect the head, vocal cords, face, and other parts of the body. There are many types of tremors. Common types include:  · Essential tremor. These usually occur in people older than 40. It may run in families and can happen in otherwise healthy people.  · Resting tremor. These occur when the muscles are at rest, such as when your hands are resting in your lap. People with Parkinson's disease often have resting tremors.  · Postural tremor. These occur when you try to hold a pose, such as keeping your hands outstretched.  · Kinetic tremor. These occur during purposeful movement, such as trying to touch a finger to your nose.  · Task-specific tremor. These may occur when you perform certain tasks such as writing, speaking, or standing.  · Psychogenic tremor. These dramatically lessen or disappear when you are distracted. They can happen in people of all ages.  Some types of tremors have no known cause. Tremors can also be a symptom of nervous system problems (neurological disorders) that may occur with aging. Some tremors go away with treatment, while others do not.  Follow these instructions at home:  Lifestyle         · Limit alcohol intake to no more than 1 drink a day for nonpregnant women and 2 drinks a day for men. One drink equals 12 oz of beer, 5 oz of wine, or 1½ oz of hard liquor.  · Do not use any products that contain nicotine or tobacco, such as cigarettes and e-cigarettes. If you need help quitting, ask your health care provider.  · Avoid extreme heat and extreme cold.  · Limit your caffeine intake, as told by your health care provider.  · Try to get 8 hours of sleep each night.  · Find ways to manage your stress, such as meditation or yoga.  General instructions  · Take over-the-counter and prescription medicines only as told by your health care provider.  · Keep all follow-up visits  as told by your health care provider. This is important.  Contact a health care provider if you:  · Develop a tremor after starting a new medicine.  · Have a tremor along with other symptoms such as:  ? Numbness.  ? Tingling.  ? Pain.  ? Weakness.  · Notice that your tremor gets worse.  · Notice that your tremor interferes with your day-to-day life.  Summary  · A tremor is trembling or shaking that you cannot control.  · Most tremors affect the hands or arms.  · Some types of tremors have no known cause. Others may be a symptom of nervous system problems (neurological disorders).  · Make sure you discuss any tremors you have with your health care provider.  This information is not intended to replace advice given to you by your health care provider. Make sure you discuss any questions you have with your health care provider.  Document Revised: 11/30/2018 Document Reviewed: 10/18/2018  Elsevier Patient Education © 2021 [employer]

## 2021-05-17 NOTE — PROGRESS NOTES
"     Follow Up Office Visit      Patient Name: Que Lagunas  : 1945   MRN: [id number]     Chief Complaint:    Chief Complaint   Patient presents with   • Follow-up     pt is in office today for follow up on loss of balance       History of Present Illness: Que Lagunas is a 76 y.o. male who is here today to follow up for poor balance and memory issues.  He was last seen on 2021.  He is accompanied by his daughter Jennifer again today.  He is taking Memantine 5mg QHS and tolerating that well-he denies any stomach upset.  His appetite is good.  His daughter feels his memory is about the same.  She says he can't always tell her if he has eaten during the day or what he has eaten-she will look in the trash can to see what he has eaten during the day if he is not able to tell her.  He is also suppose to be checking his blood sugar at specific times during the day and some days he does and other days he doesn't.  He denies any bladder or bowel incontinence.  He is still using his rolling walker when he is outside of the home.  He says he had 1 minor fall described as tripping since his last visit here.  His lumbar spine x-ray on 2021 showed multilevel degenerative change with no acute osseous abnormalities.  He says he has a right leg \"twitch or tremor\" when he lays down to go to sleep at night.  He denies any tremor during the day.  His daughter says he has had that for a long time.  He is taking Pramipexole 0.125mg QHS for restless leg syndrome.  He states \"I have to take at least 3 of those to help with that leg tremor and then I run out of the medicine and can't get it refilled\".  He says he asked his PCP to increase the dose of that medication.    *He was referred to Dr. Sheldon at [facility]s memory disorder clinic at his previous appointment and that is scheduled for 2022.      Following taken from initial consult note:  Que Lagunas is a 76 y.o. male who is here today to establish " "care with Neurology for balance and memory issues.  He is accompanied by his daughter Jennifer today.  He has had balance problems for at least 6 months and was having frequent falls.  He is now using a walker and that has helped quite a bit as he has had no recent falls.  He denies feeling constantly imbalanced but if he does lose his balance he will fall.  He does have poorly controlled diabetes type 2 and is on insulin daily.  He says his back does hurt at times, mostly in the mornings when he gets up.  He denies constipation or blurry vision.  He denies dizziness.  Additional risk factors- HTN, CAD, diabetes, mood disorder, atrial fibrillation, history of TIA around a year ago (taking Pravastatin 40mg daily and Xarelto 20mg daily), RLS, GERD, former smoker.      He scored 22/30 on the MMSE today.  He says he has had a poor memory for a while but worse over the past 6 months.  He is concerned about his short term memory.  He lives with his daughter.  He performs most of his ADLs independently.  His daughter takes care of his finances because he states \"I can't do it\".  He has problems remembering due dates and amounts due on bills.  He does not drive and states \"I was cutting my corners too short so I decided to stop driving\".  He states \"I fear Alzheimer's because it is in the family\".      Pertinent Medical History: MRI brain without contrast on 3/16/2021 showed chronic small vessel ischemic change with no acute intracranial abnormality.  Vitamin B12 and TSH on 3/1/2021 were normal; HgbA1c was elevated at 11.6 (his daughter says when he was living alone he got his medications all messed up and now his medications are all lined out and she is hoping his diabetes is now much better controlled).     Subjective      Review of Systems:   Review of Systems   Constitutional: Negative for chills, fatigue and fever.   HENT: Negative for facial swelling, hearing loss, sore throat, tinnitus and trouble swallowing.  "   Eyes: Negative for blurred vision, double vision, photophobia and visual disturbance.   Respiratory: Negative for cough, chest tightness and shortness of breath.    Cardiovascular: Negative for chest pain, palpitations and leg swelling.   Gastrointestinal: Negative for abdominal pain, nausea and vomiting.   Endocrine: Negative for cold intolerance and heat intolerance.   Musculoskeletal: Positive for gait problem. Negative for neck pain and neck stiffness.   Skin: Negative for color change and rash.   Allergic/Immunologic: Negative for environmental allergies and food allergies.   Neurological: Negative for dizziness, syncope, speech difficulty, weakness, light-headedness, numbness, headache and memory problem.   Psychiatric/Behavioral: Negative for behavioral problems, sleep disturbance and depressed mood. The patient is not nervous/anxious.        I have reviewed and the following portions of the patient's history were updated as appropriate: past family history, past medical history, past social history, past surgical history and problem list.    Medications:     Current Outpatient Medications:   •  amLODIPine (NORVASC) 5 MG tablet, TAKE 1 TABLET BY MOUTH EVERY DAY, Disp: 90 tablet, Rfl: 3  •  cholecalciferol (VITAMIN D3) 1000 units tablet, Take 1,000 Units by mouth Daily., Disp: , Rfl:   •  esomeprazole (nexIUM) 20 MG capsule, Take 20 mg by mouth Every Morning Before Breakfast., Disp: , Rfl:   •  glucose blood (SOLUS V2 TEST) test strip, Use as instructed, Disp: 100 each, Rfl: 12  •  glucose monitor monitoring kit, 1 each As Needed (prn)., Disp: 1 each, Rfl: 0  •  Insulin Glargine (LANTUS SOLOSTAR) 100 UNIT/ML injection pen, Inject 24 Units under the skin into the appropriate area as directed 2 (Two) Times a Day., Disp: 5 pen, Rfl: 11  •  insulin lispro (HumaLOG) 100 UNIT/ML injection, Inject 13 Units under the skin into the appropriate area with meals, Disp: 20 mL, Rfl: 6  •  Insulin Pen Needle (PEN NEEDLES  "3/16\") 31G X 5 MM misc, Use with insulin daily E11.9, Disp: 100 each, Rfl: 5  •  Insulin Syringe-Needle U-100 (Easy Touch Insulin Syringe) 30G X 5/16\" 0.5 ML misc, use as directed with insulin 4 times a day, Disp: 100 each, Rfl: 11  •  Lancets misc, Use to test twice daily. E11.9, Disp: 100 each, Rfl: 12  •  LIFESCAN UNISTIK II LANCETS misc, 1 Units 3 (Three) Times a Day., Disp: 100 each, Rfl: 5  •  memantine (NAMENDA) 10 MG tablet, Take 1 tablet by mouth Every Night., Disp: 90 tablet, Rfl: 3  •  metFORMIN (GLUCOPHAGE) 500 MG tablet, Take 1 with breakfast in am, Disp: 90 tablet, Rfl: 2  •  PARoxetine (PAXIL) 20 MG tablet, TAKE 1 TABLET BY MOUTH EVERY DAY , Disp: 90 tablet, Rfl: 3  •  pravastatin (PRAVACHOL) 40 MG tablet, TAKE 1 TABLET BY MOUTH IN THE EVENING , Disp: 90 tablet, Rfl: 3  •  Xarelto 20 MG tablet, TAKE 1 TABLET BY MOUTH ONE TIME A DAY , Disp: 30 tablet, Rfl: 11  •  [START ON 5/31/2021] donepezil (Aricept) 5 MG tablet, Take 1 tablet by mouth Every Night., Disp: 30 tablet, Rfl: 2  •  pramipexole (Mirapex) 0.5 MG tablet, Take 1 tablet by mouth every night at bedtime., Disp: 90 tablet, Rfl: 0    Allergies:   Allergies   Allergen Reactions   • Lisinopril Anaphylaxis       Objective     Physical Exam:  Vital Signs:   Vitals:    05/17/21 0827   BP: 120/78   BP Location: Left arm  Comment: left   Patient Position: Sitting   Cuff Size: Adult   Pulse: 70   Temp: 97.8 °F (36.6 °C)   SpO2: 95%   Weight: 80.6 kg (177 lb 9.6 oz)   Height: 182.9 cm (72.01\")   PainSc: 0-No pain     Body mass index is 24.08 kg/m².    Physical Exam  Vitals and nursing note reviewed.   Constitutional:       General: He is not in acute distress.     Appearance: Normal appearance. He is well-developed. He is not diaphoretic.   HENT:      Head: Normocephalic and atraumatic.   Eyes:      Conjunctiva/sclera: Conjunctivae normal.      Pupils: Pupils are equal, round, and reactive to light.   Pulmonary:      Effort: Pulmonary effort is normal. No " "respiratory distress.   Musculoskeletal:         General: Normal range of motion.   Skin:     General: Skin is warm and dry.      Findings: No rash.   Neurological:      Mental Status: He is alert and oriented to person, place, and time.      Comments: Able to go from sitting to standing position on first attempt without assistance. Ambulatory with rolling walker. No tremor.    Psychiatric:         Mood and Affect: Mood normal.         Behavior: Behavior normal.         Thought Content: Thought content normal.         Judgment: Judgment normal.      Comments: Flat affect         Neurologic Exam     Mental Status   Oriented to person, place, and time.     Cranial Nerves     CN III, IV, VI   Pupils are equal, round, and reactive to light.       Assessment / Plan      Assessment/Plan:   Diagnoses and all orders for this visit:    1. Loss of balance (Primary)  -     Ambulatory Referral to Physical Therapy Evaluate and treat (Muscle strengthening and gait training for imbalance and falls)  - Encouraged patient to continue to use his walker for ambulation to help prevent falls.   - Offered Lumbar spine MRI but patient refuses and states \"I don't think I am looking to have any kind of surgery\".     2. Memory loss  -     memantine (NAMENDA) 10 MG tablet; Take 1 tablet by mouth Every Night.  Dispense: 90 tablet; Refill: 3  -     donepezil (Aricept) 5 MG tablet; Take 1 tablet by mouth Every Night.  Dispense: 30 tablet; Refill: 2. Indications and possible SEs discussed. He is not to start this for 2 weeks from now.   - Considering the patient's initial MMSE 22/30 and clinical symptoms he most likely has dementia. May consider neuropsychiatric memory testing with Maddie Mcclelland in [place], [place], at follow up appointment, since appointment with  is not until next year.     3. Restless leg syndrome  -     pramipexole (Mirapex) 0.5 MG tablet; Take 1 tablet by mouth every night at bedtime.  Dispense: 90 tablet; Refill: 0    4. " Tremor    5. BMI 24.0-24.9, adult    6. Fall, sequela  -     Ambulatory Referral to Physical Therapy Evaluate and treat (Muscle strengthening and gait training for imbalance and falls)         Follow Up:   Return in about 2 months (around 7/17/2021) for Recheck.    SUKI Smart, FNP-C  [facility]       Please note that portions of this note may have been completed with a voice recognition program. Efforts were made to edit the dictations, but occasionally words are mistranscribed.

## 2021-05-24 ENCOUNTER — TELEPHONE (OUTPATIENT)
Dept: NEUROLOGY | Facility: CLINIC | Age: 76
End: 2021-05-24

## 2021-05-24 NOTE — TELEPHONE ENCOUNTER
Caller: BETY [facility]     Best call back number: [phone]    What form or medical record are you requesting: DEMO AND INSURANCE, AND DR ISAAC NOTE     Who is requesting this form or medical record from you: HOME HEALTH     How would you like to receive the form or medical records (pick-up, mail, fax):   If fax, what is the fax number: [phone]  If mail, what is the address:   If pick-up, provide patient with address and location details    Timeframe paperwork needed: SOON     Additional notes: PLEASE ADVISE.

## 2021-05-24 NOTE — TELEPHONE ENCOUNTER
[facility] CALLED BACK AND STATES UNFORTUNATELY RIGHT NOW THEY CANNOT ACCEPT THE REFERRAL D/T BEING AT CAPACITY WITH THE PAYOR INSURANCE. PLEASE ADVISE.

## 2021-05-25 NOTE — TELEPHONE ENCOUNTER
DANIE RETURNED YOUR CALL . SHE STATED YOU CAN LEAVE DETAILED V.M. IF YOU NEED TO .   PLEASE ADVISE

## 2021-05-25 NOTE — TELEPHONE ENCOUNTER
Attempted to contact patient's daughter. No answer when calling. Left voicemail to return call.

## 2021-05-27 NOTE — TELEPHONE ENCOUNTER
CALLED AND SPOKE WITH PATIENT'S DAUGHTER.      LET HER KNOW THAT [facility] IS UNABLE TO ACCEPT THE PATIENT AT THIS TIME.     I HAVE REACHED OUT TEO AT HOME.

## 2021-05-28 NOTE — TELEPHONE ENCOUNTER
RAJAN [facility]  [phone]    RAJAN WANTED TO LET [facility] KNOW THAT THEY HAD TO TURN DOWN THE REFERRAL BECAUSE THE PT'S INSURANCE IS NOT IN THEIR NETWORK. HE TRIED SENDING IT TO [facility] THROUGH THEIR OUTREACH PROGRAM AND THEY ARE FULL IN THE [place] AREA.

## 2021-06-02 ENCOUNTER — OFFICE VISIT (OUTPATIENT)
Dept: INTERNAL MEDICINE | Facility: CLINIC | Age: 76
End: 2021-06-02

## 2021-06-02 VITALS
TEMPERATURE: 96.8 F | WEIGHT: 181 LBS | HEART RATE: 89 BPM | SYSTOLIC BLOOD PRESSURE: 130 MMHG | OXYGEN SATURATION: 96 % | BODY MASS INDEX: 24.52 KG/M2 | DIASTOLIC BLOOD PRESSURE: 80 MMHG | HEIGHT: 72 IN

## 2021-06-02 DIAGNOSIS — E11.69 TYPE 2 DIABETES MELLITUS WITH OTHER SPECIFIED COMPLICATION, WITH LONG-TERM CURRENT USE OF INSULIN (HCC): Primary | ICD-10-CM

## 2021-06-02 DIAGNOSIS — Z79.4 TYPE 2 DIABETES MELLITUS WITH OTHER SPECIFIED COMPLICATION, WITH LONG-TERM CURRENT USE OF INSULIN (HCC): Primary | ICD-10-CM

## 2021-06-02 DIAGNOSIS — I10 ESSENTIAL HYPERTENSION: ICD-10-CM

## 2021-06-02 LAB — HBA1C MFR BLD: 7.4 %

## 2021-06-02 PROCEDURE — 99214 OFFICE O/P EST MOD 30 MIN: CPT | Performed by: INTERNAL MEDICINE

## 2021-06-02 PROCEDURE — 83036 HEMOGLOBIN GLYCOSYLATED A1C: CPT | Performed by: INTERNAL MEDICINE

## 2021-06-02 NOTE — PROGRESS NOTES
"Subjective   Que Lagunas is a 76 y.o. male.     Chief Complaint   Patient presents with   • Follow-up   • Diabetes     discuss low blood sugar readings       History of Present Illness   Patient here for follow-up.  Patient states morning low sugar from time to time.  2 hours after lunch and at dinner sugar is around 150.  A1c today 7.4.  Patient worried about the morning low sugar even though patient denies any hypoglycemic symptoms.  Patient is taking 40 units of Lantus daily.  Patient also has some memory problem sometime but is adamant about taking insulin religiously.    Current Outpatient Medications:   •  amLODIPine (NORVASC) 5 MG tablet, TAKE 1 TABLET BY MOUTH EVERY DAY, Disp: 90 tablet, Rfl: 3  •  cholecalciferol (VITAMIN D3) 1000 units tablet, Take 1,000 Units by mouth Daily., Disp: , Rfl:   •  donepezil (Aricept) 5 MG tablet, Take 1 tablet by mouth Every Night., Disp: 30 tablet, Rfl: 2  •  esomeprazole (nexIUM) 20 MG capsule, Take 20 mg by mouth Every Morning Before Breakfast., Disp: , Rfl:   •  glucose blood (SOLUS V2 TEST) test strip, Use as instructed, Disp: 100 each, Rfl: 12  •  glucose monitor monitoring kit, 1 each As Needed (prn)., Disp: 1 each, Rfl: 0  •  Insulin Glargine (LANTUS SOLOSTAR) 100 UNIT/ML injection pen, Inject 24 Units under the skin into the appropriate area as directed 2 (Two) Times a Day., Disp: 5 pen, Rfl: 11  •  insulin lispro (HumaLOG) 100 UNIT/ML injection, Inject 13 Units under the skin into the appropriate area with meals, Disp: 20 mL, Rfl: 6  •  Insulin Pen Needle (PEN NEEDLES 3/16\") 31G X 5 MM misc, Use with insulin daily E11.9, Disp: 100 each, Rfl: 5  •  Insulin Syringe-Needle U-100 (Easy Touch Insulin Syringe) 30G X 5/16\" 0.5 ML misc, use as directed with insulin 4 times a day, Disp: 100 each, Rfl: 11  •  Lancets misc, Use to test twice daily. E11.9, Disp: 100 each, Rfl: 12  •  LIFESCAN UNISTIK II LANCETS misc, 1 Units 3 (Three) Times a Day., Disp: 100 each, Rfl: " 5  •  memantine (NAMENDA) 10 MG tablet, Take 1 tablet by mouth Every Night., Disp: 90 tablet, Rfl: 3  •  metFORMIN (GLUCOPHAGE) 500 MG tablet, Take 1 with breakfast in am, Disp: 90 tablet, Rfl: 2  •  PARoxetine (PAXIL) 20 MG tablet, TAKE 1 TABLET BY MOUTH EVERY DAY , Disp: 90 tablet, Rfl: 3  •  pramipexole (Mirapex) 0.5 MG tablet, Take 1 tablet by mouth every night at bedtime., Disp: 90 tablet, Rfl: 0  •  pravastatin (PRAVACHOL) 40 MG tablet, TAKE 1 TABLET BY MOUTH IN THE EVENING , Disp: 90 tablet, Rfl: 3  •  Xarelto 20 MG tablet, TAKE 1 TABLET BY MOUTH ONE TIME A DAY , Disp: 30 tablet, Rfl: 11    The following portions of the patient's history were reviewed and updated as appropriate: allergies, current medications, past family history, past medical history, past social history, past surgical history and problem list.    Review of Systems   Constitutional: Negative.    Respiratory: Negative.    Cardiovascular: Negative.    Gastrointestinal: Negative.    Musculoskeletal: Negative.    Skin: Negative.    Neurological: Negative.    Psychiatric/Behavioral: Negative.        Objective   Physical Exam  Cardiovascular:      Rate and Rhythm: Normal rate and regular rhythm.      Heart sounds: Normal heart sounds.   Pulmonary:      Effort: Pulmonary effort is normal.      Breath sounds: Normal breath sounds.   Abdominal:      General: Bowel sounds are normal.   Musculoskeletal:      Cervical back: Neck supple.   Skin:     General: Skin is warm.   Neurological:      Mental Status: He is alert and oriented to person, place, and time.         All tests have been reviewed.    Assessment/Plan   Diagnoses and all orders for this visit:    Type 2 diabetes mellitus with other specified complication, with long-term current use of insulin (CMS/[facility]) A1c 7.4.  Since patient has low morning sugar without hypoglycemic reaction we will lower Lantus from 40 units daily to 30 units daily.  Also discussed the findings and the counseling with  the daughter.  -     POC Glycosylated Hemoglobin (Hb A1C)    Essential hypertension continue medication      Follow-up in 1 month

## 2021-06-05 NOTE — PROGRESS NOTES
"Subjective   Que Lagunas is a 75 y.o. male.     Chief Complaint   Patient presents with   •  Follow-up of diabetes       History of Present Illness   Patient here for follow-up.  Patient states sugar fluctuates a month ago patient had a sugar in 50s but now sugar again is very high this morning 282.  Patient is on Lantus to 45 units and Humalog 15 units with meals.  Hyperlipidemia stable on medication hypertension stable on medication.  Weight loss no more.    Current Outpatient Medications:   •  amLODIPine (NORVASC) 5 MG tablet, TAKE ONE TABLET BY MOUTH DAILY, Disp: 30 tablet, Rfl: 4  •  cholecalciferol (VITAMIN D3) 1000 units tablet, Take 1,000 Units by mouth Daily., Disp: , Rfl:   •  glucose blood (SOLUS V2 TEST) test strip, Use as instructed, Disp: 100 each, Rfl: 12  •  glucose monitor monitoring kit, 1 each As Needed (prn)., Disp: 1 each, Rfl: 0  •  insulin glargine (Lantus) 100 UNIT/ML injection, Inject 40 Units under the skin into the appropriate area as directed Every Night., Disp: 10 mL, Rfl: 11  •  insulin lispro (HumaLOG) 100 UNIT/ML injection, Inject 13 Units under the skin into the appropriate area with meals, Disp: 20 mL, Rfl: 0  •  Insulin Pen Needle (PEN NEEDLES 3/16\") 31G X 5 MM misc, Use with insulin daily E11.9, Disp: 100 each, Rfl: 5  •  Insulin Syringe-Needle U-100 (Easy Touch Insulin Syringe) 30G X 5/16\" 0.5 ML misc, use as directed with insulin 4 times a day, Disp: 100 each, Rfl: 11  •  Lancets misc, Use to test twice daily. E11.9, Disp: 100 each, Rfl: 12  •  LIFESCAN UNISTIK II LANCETS misc, 1 Units 3 (Three) Times a Day., Disp: 100 each, Rfl: 5  •  metFORMIN (GLUCOPHAGE) 500 MG tablet, Take 1 with breakfast in am, Disp: 90 tablet, Rfl: 2  •  PARoxetine (PAXIL) 20 MG tablet, Take 1 tablet by mouth Daily., Disp: 30 tablet, Rfl: 2  •  pravastatin (PRAVACHOL) 40 MG tablet, TAKE ONE TABLET BY MOUTH EVERY EVENING, Disp: 90 tablet, Rfl: 1  •  XARELTO 20 MG tablet, TAKE 1 TABLET BY MOUTH ONE " TIME A DAY , Disp: 30 tablet, Rfl: 11  •  insulin degludec (Tresiba FlexTouch) 100 UNIT/ML solution pen-injector injection, Inject 40 Units under the skin into the appropriate area as directed Every Night., Disp: 2 pen, Rfl: 0    The following portions of the patient's history were reviewed and updated as appropriate: allergies, current medications, past family history, past medical history, past social history, past surgical history and problem list.    Review of Systems   Constitutional: Negative.    Respiratory: Negative.    Cardiovascular: Negative.    Gastrointestinal: Negative.    Musculoskeletal: Negative.    Skin: Negative.    Neurological: Negative.    Psychiatric/Behavioral: Negative.        Objective   Physical Exam  Neck:      Musculoskeletal: Neck supple.   Cardiovascular:      Rate and Rhythm: Normal rate and regular rhythm.      Heart sounds: Normal heart sounds.   Pulmonary:      Effort: Pulmonary effort is normal.      Breath sounds: Normal breath sounds.   Abdominal:      General: Bowel sounds are normal.   Skin:     General: Skin is warm.   Neurological:      Mental Status: He is alert and oriented to person, place, and time.         All tests have been reviewed.    Assessment/Plan   Diagnoses and all orders for this visit:    Mixed hyperlipidemia continue medication    Essential hypertension continue medication    Type 2 diabetes mellitus with other specified complication, with long-term current use of insulin (CMS/[facility]) increase Lantus to 50 units daily and Humalog 20 units with meals.  Sample of Tresiba and Humalog given today    Weight loss normal    Other orders  -     insulin degludec (Tresiba FlexTouch) 100 UNIT/ML solution pen-injector injection; Inject 40 Units under the skin into the appropriate area as directed Every Night.  -     insulin lispro (HumaLOG) 100 UNIT/ML injection; Inject 13 Units under the skin into the appropriate area with meals        3 weeks follow-up again.  Patient is  due for wellness check.        throat pain x 5 mos, (seen by PMD/gastro, started on pepcid)

## 2021-06-14 ENCOUNTER — TELEPHONE (OUTPATIENT)
Dept: INTERNAL MEDICINE | Facility: CLINIC | Age: 76
End: 2021-06-14

## 2021-06-14 NOTE — TELEPHONE ENCOUNTER
Contacted patient's daughter and she states that morning glucose readings have been within good range since lowering dose of Lantus at last visit but afternoon and evening readings range from 180-310.   Daughter is wondering if Humalog needs to be adjusted from 13 units to a higher dose.   Please advise and I will contact patient and daughter.

## 2021-06-14 NOTE — TELEPHONE ENCOUNTER
Caller: Jennifer Yancey    Relationship to patient: Emergency Contact    Best call back number: [phone]    Patient is needing: DANIE WANTED TO TALK TO  ABOUT PATIENT AND IF HE NEEDED TO TAKE INSULIN DUE TO HIS BLOOD LEVELS BEING HIGH    PLEASE ADVISE ASAP

## 2021-06-16 DIAGNOSIS — R41.3 MEMORY LOSS: ICD-10-CM

## 2021-06-16 RX ORDER — MEMANTINE HYDROCHLORIDE 5 MG/1
TABLET ORAL
Qty: 30 TABLET | Refills: 0 | OUTPATIENT
Start: 2021-06-16

## 2021-06-16 NOTE — TELEPHONE ENCOUNTER
Attempted to contact patient's daughter; left detailed message notifying that afternoon insulin doses could be adjusted to 16 units.

## 2021-07-01 ENCOUNTER — OFFICE VISIT (OUTPATIENT)
Dept: INTERNAL MEDICINE | Facility: CLINIC | Age: 76
End: 2021-07-01

## 2021-07-01 VITALS
WEIGHT: 177 LBS | HEIGHT: 72 IN | TEMPERATURE: 96.8 F | BODY MASS INDEX: 23.98 KG/M2 | HEART RATE: 88 BPM | DIASTOLIC BLOOD PRESSURE: 80 MMHG | SYSTOLIC BLOOD PRESSURE: 132 MMHG | OXYGEN SATURATION: 97 %

## 2021-07-01 DIAGNOSIS — R26.89 LOSS OF BALANCE: ICD-10-CM

## 2021-07-01 DIAGNOSIS — E55.9 VITAMIN D DEFICIENCY: ICD-10-CM

## 2021-07-01 DIAGNOSIS — G25.81 RESTLESS LEG SYNDROME: ICD-10-CM

## 2021-07-01 DIAGNOSIS — I10 ESSENTIAL HYPERTENSION: Primary | ICD-10-CM

## 2021-07-01 DIAGNOSIS — Z79.4 TYPE 2 DIABETES MELLITUS WITH OTHER SPECIFIED COMPLICATION, WITH LONG-TERM CURRENT USE OF INSULIN (HCC): ICD-10-CM

## 2021-07-01 DIAGNOSIS — R41.3 MEMORY LOSS: ICD-10-CM

## 2021-07-01 DIAGNOSIS — E11.69 TYPE 2 DIABETES MELLITUS WITH OTHER SPECIFIED COMPLICATION, WITH LONG-TERM CURRENT USE OF INSULIN (HCC): ICD-10-CM

## 2021-07-01 PROCEDURE — 99214 OFFICE O/P EST MOD 30 MIN: CPT | Performed by: INTERNAL MEDICINE

## 2021-07-01 NOTE — PROGRESS NOTES
"Subjective   Que Lagunas is a 76 y.o. male.     Chief Complaint   Patient presents with   • Follow-up   • Diabetes       History of Present Illness   Patient here for follow-up.  Diabetes improved morning sugar stable with hypoglycemia after decrease Lantus from 40 units to 30 units.  Afternoon sugars are still elevated.  A1c .4. blood pressure stable on medication.  Vitamin D deficiency on supplement is stable.  The restless leg syndrome seeing neurology doing well on medication.  Memory loss on medication stable.  The loss of balance and weakness in legs patient here to do physical therapy.    Current Outpatient Medications:   •  amLODIPine (NORVASC) 5 MG tablet, TAKE 1 TABLET BY MOUTH EVERY DAY, Disp: 90 tablet, Rfl: 3  •  cholecalciferol (VITAMIN D3) 1000 units tablet, Take 1,000 Units by mouth Daily., Disp: , Rfl:   •  donepezil (Aricept) 5 MG tablet, Take 1 tablet by mouth Every Night., Disp: 30 tablet, Rfl: 2  •  esomeprazole (nexIUM) 20 MG capsule, Take 20 mg by mouth Every Morning Before Breakfast., Disp: , Rfl:   •  glucose blood (SOLUS V2 TEST) test strip, Use as instructed, Disp: 100 each, Rfl: 12  •  glucose monitor monitoring kit, 1 each As Needed (prn)., Disp: 1 each, Rfl: 0  •  Insulin Glargine (LANTUS SOLOSTAR) 100 UNIT/ML injection pen, Inject 24 Units under the skin into the appropriate area as directed 2 (Two) Times a Day., Disp: 5 pen, Rfl: 11  •  insulin lispro (HumaLOG) 100 UNIT/ML injection, Inject 13 Units under the skin into the appropriate area with meals, Disp: 20 mL, Rfl: 6  •  Insulin Pen Needle (PEN NEEDLES 3/16\") 31G X 5 MM misc, Use with insulin daily E11.9, Disp: 100 each, Rfl: 5  •  Insulin Syringe-Needle U-100 (Easy Touch Insulin Syringe) 30G X 5/16\" 0.5 ML misc, use as directed with insulin 4 times a day, Disp: 100 each, Rfl: 11  •  Lancets misc, Use to test twice daily. E11.9, Disp: 100 each, Rfl: 12  •  LIFESCAN UNISTIK II LANCETS misc, 1 Units 3 (Three) Times a Day., " Disp: 100 each, Rfl: 5  •  memantine (NAMENDA) 10 MG tablet, Take 1 tablet by mouth Every Night., Disp: 90 tablet, Rfl: 3  •  metFORMIN (GLUCOPHAGE) 500 MG tablet, Take 1 with breakfast in am, Disp: 90 tablet, Rfl: 2  •  PARoxetine (PAXIL) 20 MG tablet, TAKE 1 TABLET BY MOUTH EVERY DAY , Disp: 90 tablet, Rfl: 3  •  pramipexole (Mirapex) 0.5 MG tablet, Take 1 tablet by mouth every night at bedtime., Disp: 90 tablet, Rfl: 0  •  pravastatin (PRAVACHOL) 40 MG tablet, TAKE 1 TABLET BY MOUTH IN THE EVENING , Disp: 90 tablet, Rfl: 3  •  Xarelto 20 MG tablet, TAKE 1 TABLET BY MOUTH ONE TIME A DAY , Disp: 30 tablet, Rfl: 11    The following portions of the patient's history were reviewed and updated as appropriate: allergies, current medications, past family history, past medical history, past social history, past surgical history and problem list.    Review of Systems   Respiratory: Negative.    Cardiovascular: Negative.    Gastrointestinal: Negative.    Musculoskeletal: Negative.    Skin: Negative.    Neurological: Positive for weakness.   Psychiatric/Behavioral: Negative.        Objective   Physical Exam  Cardiovascular:      Rate and Rhythm: Normal rate and regular rhythm.      Heart sounds: Normal heart sounds.   Pulmonary:      Effort: Pulmonary effort is normal.      Breath sounds: Normal breath sounds.   Abdominal:      General: Bowel sounds are normal.   Musculoskeletal:      Cervical back: Neck supple.   Skin:     General: Skin is warm.   Neurological:      Mental Status: He is alert and oriented to person, place, and time.   Psychiatric:         Mood and Affect: Mood normal.         Behavior: Behavior normal.         All tests have been reviewed.    Assessment/Plan   Diagnoses and all orders for this visit:    Essential hypertension continue medication    Vitamin D deficiency continue medication    Type 2 diabetes mellitus with other specified complication, with long-term current use of insulin (CMS/[facility]) continue  medication.  Lantus was decreased from 40 to 30 units morning hypoglycemia resolved    Restless leg syndrome continue medication    Memory loss continue medication    Loss of balance  -     Ambulatory Referral to Physical Therapy Evaluate and treat    4 months follow-up

## 2021-07-26 ENCOUNTER — OFFICE VISIT (OUTPATIENT)
Dept: NEUROLOGY | Facility: CLINIC | Age: 76
End: 2021-07-26

## 2021-07-26 VITALS
DIASTOLIC BLOOD PRESSURE: 80 MMHG | WEIGHT: 182.2 LBS | BODY MASS INDEX: 24.68 KG/M2 | HEART RATE: 78 BPM | OXYGEN SATURATION: 94 % | SYSTOLIC BLOOD PRESSURE: 130 MMHG | HEIGHT: 72 IN | TEMPERATURE: 97.5 F

## 2021-07-26 DIAGNOSIS — F03.90 DEMENTIA WITHOUT BEHAVIORAL DISTURBANCE, UNSPECIFIED DEMENTIA TYPE: Primary | ICD-10-CM

## 2021-07-26 DIAGNOSIS — R41.3 MEMORY LOSS: ICD-10-CM

## 2021-07-26 DIAGNOSIS — G25.81 RESTLESS LEG SYNDROME: ICD-10-CM

## 2021-07-26 DIAGNOSIS — R26.89 LOSS OF BALANCE: ICD-10-CM

## 2021-07-26 DIAGNOSIS — R25.1 TREMOR: ICD-10-CM

## 2021-07-26 PROCEDURE — 99213 OFFICE O/P EST LOW 20 MIN: CPT | Performed by: NURSE PRACTITIONER

## 2021-07-26 RX ORDER — PRAMIPEXOLE DIHYDROCHLORIDE 1 MG/1
1 TABLET ORAL
Qty: 30 TABLET | Refills: 2 | Status: SHIPPED | OUTPATIENT
Start: 2021-07-26 | End: 2021-09-14 | Stop reason: SDUPTHER

## 2021-07-26 RX ORDER — DONEPEZIL HYDROCHLORIDE 10 MG/1
10 TABLET, FILM COATED ORAL NIGHTLY
Qty: 90 TABLET | Refills: 3 | Status: SHIPPED | OUTPATIENT
Start: 2021-07-26 | End: 2021-09-14 | Stop reason: SDUPTHER

## 2021-07-26 NOTE — PROGRESS NOTES
"     Follow Up Office Visit      Patient Name: Que Lagunas  : 1945   MRN: [id number]     Chief Complaint:    Chief Complaint   Patient presents with   • Follow-up     patient in office to follow up on loss of balance.       History of Present Illness: Que Lagunas is a 76 y.o. male who is here today to follow up with poor balance and memory issues.  He was last seen on 2021.  He is accompanied by his daughter, Jennifer, whom he lives with in [place], again today.  His daughter says he starts outpatient PT next week here at [facility].  He is now using a rolling walker when he travels outside of the home.  He has had a couple of falls since his last visit, without major injuries.  He is taking Aricept 5mg and Namenda 10mg QHS-he is tolerating those well and his daughter reports good compliance.  His daughter is helping with his medication organization and administration.  He does not drive.  He and his daughter feel his memory is about the same.  He is now taking Pramipexole 0.5mg QHS and thinks it may be helping-his daughter says he has not been complaining about his legs as much since the medication dose increase.  He does say he still has problems with his legs jumping and moving and this keeps him from getting to sleep at night.  He denies any pain with the leg movements.  He denies any leg symptoms during the day.  His daughter has no new complaints or concerns today.  Additional risk factors- GERD, diabetes, HTN, mood disorder, dyslipidemia, history of atrial fibrillation, history of TIA, CAD.   *His daughter says he has \"signed [facility] for [facility] and all the benefits he can get from that\".   *He is awaiting a memory disorder clinic visit in 2022, for further memory testing. Initial MMSE was 22/30.      Following taken from previous visit note:   Que Lagunas is a 76 y.o. male who is here today to follow up for poor balance and memory issues.  He was last seen on 2021.  He is " "accompanied by his daughter Jennifer again today.  He is taking Memantine 5mg QHS and tolerating that well-he denies any stomach upset.  His appetite is good.  His daughter feels his memory is about the same.  She says he can't always tell her if he has eaten during the day or what he has eaten-she will look in the trash can to see what he has eaten during the day if he is not able to tell her.  He is also suppose to be checking his blood sugar at specific times during the day and some days he does and other days he doesn't.  He denies any bladder or bowel incontinence.  He is still using his rolling walker when he is outside of the home.  He says he had 1 minor fall described as tripping since his last visit here.  His lumbar spine x-ray on 4/19/2021 showed multilevel degenerative change with no acute osseous abnormalities.  He says he has a right leg \"twitch or tremor\" when he lays down to go to sleep at night.  He denies any tremor during the day.  His daughter says he has had that for a long time.  He is taking Pramipexole 0.125mg QHS for restless leg syndrome.  He states \"I have to take at least 3 of those to help with that leg tremor and then I run out of the medicine and can't get it refilled\".  He says he asked his PCP to increase the dose of that medication.    *He was referred to Dr. Sheldon at 's memory disorder clinic at his previous appointment and that is scheduled for February 2022.      Pertinent Diagnostics:   MRI brain without contrast on 3/16/2021 showed chronic small vessel ischemic change with no acute intracranial abnormality.  Vitamin B12 and TSH on 3/1/2021 were normal; HgbA1c was elevated at 11.6 (his daughter says when he was living alone he got his medications all messed up and now his medications are all lined out and she is hoping his diabetes is now much better controlled).      Subjective      Review of Systems:   Review of Systems   Constitutional: Negative for chills, fatigue and " fever.   HENT: Negative for facial swelling, hearing loss, sore throat, tinnitus and trouble swallowing.    Eyes: Negative for blurred vision, double vision, photophobia and visual disturbance.   Respiratory: Negative for cough, chest tightness and shortness of breath.    Cardiovascular: Negative for chest pain, palpitations and leg swelling.   Gastrointestinal: Negative for abdominal pain, nausea and vomiting.   Endocrine: Negative for cold intolerance and heat intolerance.   Musculoskeletal: Positive for gait problem. Negative for neck pain and neck stiffness.   Skin: Negative for color change and rash.   Allergic/Immunologic: Negative for environmental allergies and food allergies.   Neurological: Negative for dizziness, syncope, speech difficulty, weakness, light-headedness, numbness, headache and memory problem.   Psychiatric/Behavioral: Positive for sleep disturbance. Negative for behavioral problems and depressed mood. The patient is not nervous/anxious.        I have reviewed and the following portions of the patient's history were updated as appropriate: past family history, past medical history, past social history, past surgical history and problem list.    Medications:     Current Outpatient Medications:   •  amLODIPine (NORVASC) 5 MG tablet, TAKE 1 TABLET BY MOUTH EVERY DAY, Disp: 90 tablet, Rfl: 3  •  cholecalciferol (VITAMIN D3) 1000 units tablet, Take 1,000 Units by mouth Daily., Disp: , Rfl:   •  donepezil (Aricept) 10 MG tablet, Take 1 tablet by mouth Every Night., Disp: 90 tablet, Rfl: 3  •  esomeprazole (nexIUM) 20 MG capsule, Take 20 mg by mouth Every Morning Before Breakfast., Disp: , Rfl:   •  glucose blood (SOLUS V2 TEST) test strip, Use as instructed, Disp: 100 each, Rfl: 12  •  glucose monitor monitoring kit, 1 each As Needed (prn)., Disp: 1 each, Rfl: 0  •  Insulin Glargine (LANTUS SOLOSTAR) 100 UNIT/ML injection pen, Inject 24 Units under the skin into the appropriate area as directed 2  "(Two) Times a Day., Disp: 5 pen, Rfl: 11  •  insulin lispro (HumaLOG) 100 UNIT/ML injection, Inject 13 Units under the skin into the appropriate area with meals, Disp: 20 mL, Rfl: 6  •  Insulin Pen Needle (PEN NEEDLES 3/16\") 31G X 5 MM misc, Use with insulin daily E11.9, Disp: 100 each, Rfl: 5  •  Insulin Syringe-Needle U-100 (Easy Touch Insulin Syringe) 30G X 5/16\" 0.5 ML misc, use as directed with insulin 4 times a day, Disp: 100 each, Rfl: 11  •  Lancets misc, Use to test twice daily. E11.9, Disp: 100 each, Rfl: 12  •  LIFESCAN UNISTIK II LANCETS misc, 1 Units 3 (Three) Times a Day., Disp: 100 each, Rfl: 5  •  memantine (NAMENDA) 10 MG tablet, Take 1 tablet by mouth Every Night., Disp: 90 tablet, Rfl: 3  •  metFORMIN (GLUCOPHAGE) 500 MG tablet, Take 1 with breakfast in am, Disp: 90 tablet, Rfl: 2  •  PARoxetine (PAXIL) 20 MG tablet, TAKE 1 TABLET BY MOUTH EVERY DAY , Disp: 90 tablet, Rfl: 3  •  pramipexole (Mirapex) 1 MG tablet, Take 1 tablet by mouth every night at bedtime., Disp: 30 tablet, Rfl: 2  •  pravastatin (PRAVACHOL) 40 MG tablet, TAKE 1 TABLET BY MOUTH IN THE EVENING , Disp: 90 tablet, Rfl: 3  •  Xarelto 20 MG tablet, TAKE 1 TABLET BY MOUTH ONE TIME A DAY , Disp: 30 tablet, Rfl: 11    Allergies:   Allergies   Allergen Reactions   • Lisinopril Anaphylaxis       Objective     Physical Exam:  Vital Signs:   Vitals:    07/26/21 0834   BP: 130/80   BP Location: Left arm   Patient Position: Sitting   Cuff Size: Adult   Pulse: 78   Temp: 97.5 °F (36.4 °C)   SpO2: 94%   Weight: 82.6 kg (182 lb 3.2 oz)   Height: 182.9 cm (72.01\")   PainSc: 0-No pain     Body mass index is 24.7 kg/m².    Physical Exam  Vitals and nursing note reviewed.   Constitutional:       General: He is not in acute distress.     Appearance: Normal appearance. He is well-developed. He is not diaphoretic.   HENT:      Head: Normocephalic and atraumatic.   Eyes:      Conjunctiva/sclera: Conjunctivae normal.      Pupils: Pupils are equal, " round, and reactive to light.   Pulmonary:      Effort: Pulmonary effort is normal.   Skin:     General: Skin is dry.      Findings: No rash.   Neurological:      Mental Status: He is alert. Mental status is at baseline.   Psychiatric:         Mood and Affect: Mood normal.         Behavior: Behavior normal.         Thought Content: Thought content normal.         Neurologic Exam     Cranial Nerves     CN III, IV, VI   Pupils are equal, round, and reactive to light.    Motor Exam Ambulatory with the assistance of a rolling walker. Able to go from sitting to standing position on first attempt without assistance.         Assessment / Plan      Assessment/Plan:   Diagnoses and all orders for this visit:    1. Dementia without behavioral disturbance, unspecified dementia type (CMS/HCC) (Primary)    2. Memory loss  -     donepezil (Aricept) 10 MG tablet; Take 1 tablet by mouth Every Night.  Dispense: 90 tablet; Refill: 3    3. Loss of balance    4. Restless leg syndrome  -     pramipexole (Mirapex) 1 MG tablet; Take 1 tablet by mouth every night at bedtime.  Dispense: 30 tablet; Refill: 2    5. Tremor    6. BMI 24.0-24.9, adult    *Continue Nameda, increase dose of Aricept. Increase dose of Pramipexole.   *Fall precautions in place. Safety precautions discussed.     Follow Up:   Return in about 3 months (around 10/26/2021) for Recheck.    SUKI Smart, FNP-C  [facility]       Please note that portions of this note may have been completed with a voice recognition program. Efforts were made to edit the dictations, but occasionally words are mistranscribed.

## 2021-07-28 ENCOUNTER — TELEPHONE (OUTPATIENT)
Dept: INTERNAL MEDICINE | Facility: CLINIC | Age: 76
End: 2021-07-28

## 2021-07-28 NOTE — TELEPHONE ENCOUNTER
PATIENT DAUGHTER (JUANITA) CALLED TO INQUIRE ABOUT THE MEDICATION THAT THE PATIENT WAS PROVIDED (ABOUT 2 YEARS AGO) THAT WAS TO AIDE HIM WITH BLOOD PRESSURE THAT THE PATIENT HAD AN ADVERSE REACTION TO IN RESULT HAD CLOSED HIS THROAT. JUANITA IS TRYING TO MAKE SURE THAT THE MEDICATION IS ADDED TO HIS ALLERGIES LIST TO AVOID FUTURE MEDICATION REACTIONS THAT OTHER PROVIDERS MAY PRESCRIBE FOR HIM.    PATIENT DAUGHTER WILL LIKE A CALL BACK : [phone]    TO DISCUSS THIS MATTER.     PATIENT HAS BEEN ADVISED TO ALLOW 48 HOURS FOR THE CLINICAL TEAM TO FOLLOW UP ON THIS REQUEST,  IF SYMPTOMS WORSENS TO SEEK OUT EMERGENT CARE. PATIENT  FULLY UNDERSTANDS.

## 2021-07-28 NOTE — TELEPHONE ENCOUNTER
Attempted to contact daughter (on release); left detailed message per release that patient has Lisinopril on active allergies with the reaction being anaphylaxis so future providers will be alerted when prescribing medication in the future.

## 2021-08-04 ENCOUNTER — TREATMENT (OUTPATIENT)
Dept: PHYSICAL THERAPY | Facility: CLINIC | Age: 76
End: 2021-08-04

## 2021-08-04 DIAGNOSIS — R26.89 LOSS OF BALANCE: Primary | ICD-10-CM

## 2021-08-04 PROCEDURE — 97530 THERAPEUTIC ACTIVITIES: CPT | Performed by: PHYSICAL THERAPIST

## 2021-08-04 PROCEDURE — 97161 PT EVAL LOW COMPLEX 20 MIN: CPT | Performed by: PHYSICAL THERAPIST

## 2021-08-04 NOTE — PROGRESS NOTES
Physical Therapy Initial Evaluation and Plan of Care      Patient: Que Lagunas   : 1945  Diagnosis/ICD-10 Code:  No primary diagnosis found.  Referring practitioner: Evans Blackburn MD    Subjective Evaluation    History of Present Illness  Date of onset: 2021  Mechanism of injury: Pt reports having balance issues for the last 4-5 months and has moved in with his daughter. Pt reports that he has a fall 1-2 times per month. Pt reports that he has taught himself how to fall to avoid hurting himself but wants to improve his balance. Pt reports that he often trips and has a knee problem in the L knee which sometimes gives way.       Patient Occupation: retired Pain  Current pain ratin  At best pain ratin  At worst pain ratin  Aggravating factors: ambulation and stairs    Social Support  Lives with: adult children    Diagnostic Tests  No diagnostic tests performed    Patient Goals  Patient goals for therapy: increased motion, improved balance and increased strength             Objective          Neurological Testing     Reflexes   Left   Patellar (L4): normal (2+)  Achilles (S1): normal (2+)    Right   Patellar (L4): normal (2+)  Achilles (S1): normal (2+)    Strength/Myotome Testing     Left Hip   Planes of Motion   Flexion: 4  Abduction: 3+    Right Hip   Planes of Motion   Flexion: 4  Abduction: 3+    Left Knee   Flexion: 4+  Extension: 4+    Right Knee   Flexion: 4+  Extension: 4+    Left Ankle/Foot   Dorsiflexion: 4+    Right Ankle/Foot   Dorsiflexion: 4+    Ambulation     Observational Gait     Additional Observational Gait Details  Pt with shuffling gait pattern with decreased heel strike and toe off. Pt with flexion at the hips with forward trunk lean.       Functional Assessment     Comments  TU.43 seconds with unsteadiness noted    Pt with increased sway standing with feet together and was unable to perform tandem stance or SLS without assistance.     Pt with good strength and  was able to perform stairs with step through pattern without railing but had some noted difficulty knowing where his L foot was and had poor control on the L side.           Assessment & Plan     Assessment  Impairments: abnormal gait, abnormal or restricted ROM, activity intolerance, impaired balance, impaired physical strength and lacks appropriate home exercise program  Assessment details: Pt is a 76 year old male with dementia that falls about 1-2 times per month. Pt with no neuro signs or symptoms noted. Pt with decreased balance with decreased base of support and SLS. Pt with weakness in the hips noted with exam. Pt would benefit from functional training to help challenge his balance and work on hip, ankle, and step strategies to help keep his balance. Pt to be seen by PT to help the above deficits.   Prognosis: good  Prognosis details: Short Term Goals (2 weeks)  1. Pt will demonstrate independence with HEP  2. Pt will increase hip abduction strength to 4-/5   3. Pt will be able to stand with feet together without increased sway    Long Term Goals (6 weeks)  1. Pt will have no falls while being seen by PT at home or in the clinic.   2. Pt will have increased hip abd strength to 4/5 to improve stability in WB activities  3. Pt will be able to stand on one leg for 10 seconds with minimal assistance from PT.   Functional Limitations: walking  Plan  Therapy options: will be seen for skilled physical therapy services  Planned therapy interventions: abdominal trunk stabilization, balance/weight-bearing training, flexibility, functional ROM exercises, home exercise program, gait training, joint mobilization, manual therapy, motor coordination training, neuromuscular re-education, soft tissue mobilization, strengthening, stretching and therapeutic activities  Frequency: 2x week  Duration in weeks: 6  Treatment plan discussed with: patient        Manual Therapy:         mins  06657;  Therapeutic Exercise:         mins   77124;     Neuromuscular Lisa:        mins  03967;    Therapeutic Activity:     14     mins  07772;     Gait Training:           mins  38105;     Ultrasound:          mins  92186;    Electrical Stimulation:         mins  51822 ( );  Dry Needling          mins self-pay    Timed Treatment:  14    mins   Total Treatment:     40   mins    PT SIGNATURE: Sarkis Moreno, NORMA   DATE TREATMENT INITIATED: 8/4/2021    Initial Certification  Certification Period: 11/2/2021  I certify that the therapy services are furnished while this patient is under my care.  The services outlined above are required by this patient, and will be reviewed every 90 days.     PHYSICIAN: Evans Blackburn MD      DATE:     Please sign and return via fax to [phone].. Thank you, [facility].

## 2021-08-12 ENCOUNTER — TELEPHONE (OUTPATIENT)
Dept: PHYSICAL THERAPY | Facility: CLINIC | Age: 76
End: 2021-08-12

## 2021-08-19 ENCOUNTER — TREATMENT (OUTPATIENT)
Dept: PHYSICAL THERAPY | Facility: CLINIC | Age: 76
End: 2021-08-19

## 2021-08-19 DIAGNOSIS — R26.89 LOSS OF BALANCE: Primary | ICD-10-CM

## 2021-08-19 PROCEDURE — 97530 THERAPEUTIC ACTIVITIES: CPT | Performed by: PHYSICAL THERAPIST

## 2021-08-19 PROCEDURE — 97112 NEUROMUSCULAR REEDUCATION: CPT | Performed by: PHYSICAL THERAPIST

## 2021-08-19 PROCEDURE — 97110 THERAPEUTIC EXERCISES: CPT | Performed by: PHYSICAL THERAPIST

## 2021-08-19 NOTE — PROGRESS NOTES
Physical Therapy Daily Progress Note      Visit #: 2    Que Lagunas reports 0/10 pain today at rest.  Pt reports that he has been doing well and has not had any falls. Pt reports that the exercises has been going well. Pt reports that he has been using his walker a lot more and feels very safe with it.         Objective Pt present to PT today with no distress at rest.     Pt with several LOB requiring max assist from PT to correct.       See Exercise, Manual, and Modality Logs for complete treatment.     Assessment/Plan  Pt continues to be off balance and have safety issues due to weakness and instability in the LEs. Pt is doing well with exercises at home and in the clinic. Pt to continue with PT to help improve safety, balance, and activity tolerance.       Progress per Plan of Care      Visit Diagnosis:    ICD-10-CM ICD-9-CM   1. Loss of balance  R26.89 781.99            Manual Therapy:         mins  84458;  Therapeutic Exercise:    10     mins  97602;     Neuromuscular Lisa:    18    mins  75239;    Therapeutic Activity:     13     mins  24960;     Gait Training:           mins  79146;     Ultrasound:          mins  47664;    Electrical Stimulation:         mins  08282 ( );  Dry Needling          mins self-pay  Iontophoresis          mins 10565      Timed Treatment:   41   mins   Total Treatment:     41   mins    Sarkis Moreno, PT  Physical Therapist

## 2021-08-24 ENCOUNTER — TREATMENT (OUTPATIENT)
Dept: PHYSICAL THERAPY | Facility: CLINIC | Age: 76
End: 2021-08-24

## 2021-08-24 DIAGNOSIS — R26.89 LOSS OF BALANCE: Primary | ICD-10-CM

## 2021-08-24 PROCEDURE — 97110 THERAPEUTIC EXERCISES: CPT | Performed by: PHYSICAL THERAPIST

## 2021-08-24 PROCEDURE — 97112 NEUROMUSCULAR REEDUCATION: CPT | Performed by: PHYSICAL THERAPIST

## 2021-08-24 NOTE — PROGRESS NOTES
Physical Therapy Daily Progress Note      Visit #: 3    Que Lagunas reports 0/10 pain today at rest.  Pt reports that he has not fallen since last session. Pt reports that he was a little sore after last session but did not have any adverse effect from exercises.         Objective Pt present to PT today with no distress at rest.     Pt fatigued quickly following sit to stands today which were performed in the first 10 mins of the session. Pt required mod assist for the rest of the balance activities today due to fatigue in the legs.       See Exercise, Manual, and Modality Logs for complete treatment.     Assessment/Plan  Pt was very tired today although was able to complete about 40 mins of activities. Pt still has poor stability on one leg and will continue with PT to help challenge and improve balance and strength for improved safety and endurance.        Progress per Plan of Care      Visit Diagnosis:    ICD-10-CM ICD-9-CM   1. Loss of balance  R26.89 781.99            Manual Therapy:         mins  61324;  Therapeutic Exercise:    12     mins  42015;     Neuromuscular Lisa:    26    mins  92916;    Therapeutic Activity:          mins  46615;     Gait Training:           mins  75116;     Ultrasound:          mins  26316;    Electrical Stimulation:         mins  78960 ( );  Dry Needling          mins self-pay  Iontophoresis          mins 90471      Timed Treatment:   38   mins   Total Treatment:     41   mins    Sarkis Moreno, PT  Physical Therapist

## 2021-08-31 ENCOUNTER — TREATMENT (OUTPATIENT)
Dept: PHYSICAL THERAPY | Facility: CLINIC | Age: 76
End: 2021-08-31

## 2021-08-31 DIAGNOSIS — R26.89 LOSS OF BALANCE: Primary | ICD-10-CM

## 2021-08-31 PROCEDURE — 97112 NEUROMUSCULAR REEDUCATION: CPT | Performed by: PHYSICAL THERAPIST

## 2021-08-31 PROCEDURE — 97530 THERAPEUTIC ACTIVITIES: CPT | Performed by: PHYSICAL THERAPIST

## 2021-09-01 ENCOUNTER — OFFICE VISIT (OUTPATIENT)
Dept: CARDIOLOGY | Facility: CLINIC | Age: 76
End: 2021-09-01

## 2021-09-01 VITALS
SYSTOLIC BLOOD PRESSURE: 140 MMHG | OXYGEN SATURATION: 98 % | DIASTOLIC BLOOD PRESSURE: 90 MMHG | BODY MASS INDEX: 24.57 KG/M2 | HEART RATE: 80 BPM | HEIGHT: 72 IN | WEIGHT: 181.4 LBS

## 2021-09-01 DIAGNOSIS — I25.10 CORONARY ARTERY DISEASE INVOLVING NATIVE CORONARY ARTERY OF NATIVE HEART WITHOUT ANGINA PECTORIS: Primary | ICD-10-CM

## 2021-09-01 DIAGNOSIS — I10 ESSENTIAL HYPERTENSION: ICD-10-CM

## 2021-09-01 DIAGNOSIS — I48.0 PAROXYSMAL ATRIAL FIBRILLATION (HCC): ICD-10-CM

## 2021-09-01 DIAGNOSIS — E11.65 TYPE 2 DIABETES MELLITUS WITH HYPERGLYCEMIA, WITH LONG-TERM CURRENT USE OF INSULIN (HCC): ICD-10-CM

## 2021-09-01 DIAGNOSIS — E78.2 MIXED HYPERLIPIDEMIA: ICD-10-CM

## 2021-09-01 DIAGNOSIS — Z79.4 TYPE 2 DIABETES MELLITUS WITH HYPERGLYCEMIA, WITH LONG-TERM CURRENT USE OF INSULIN (HCC): ICD-10-CM

## 2021-09-01 PROCEDURE — 99214 OFFICE O/P EST MOD 30 MIN: CPT | Performed by: INTERNAL MEDICINE

## 2021-09-01 PROCEDURE — 93000 ELECTROCARDIOGRAM COMPLETE: CPT | Performed by: INTERNAL MEDICINE

## 2021-09-01 NOTE — PROGRESS NOTES
[facility] Cardiology  Office Progress Note  Que Lagunas  1945  [address]  [place] [address]       Visit Date: 09/1/21    PCP: Evans Blackburn MD  [address]  [place] [address]    IDENTIFICATION: A 76 y.o. male former [employer] employee,  ,retired  from [place], former fishing enthusiast     PROBLEM LIST:   1. CAD  2. Atrial Fibrillation  3. TIA:   a. April 2016, evaluation at [facility] with paroxysmal atrial fibrillation and negative CT of the head and neck.   4. Abnormal EKG:  a. Bifascicular block and no prior ischemia evaluation.   b. 4/16 echo EF 60%  5. Dyslipidemia  a. 2/20 126/68/52/60  b. 3/21 134/39/57/67  6. Diabetes mellitus, onset at age 49 and is requiring insulin x12 years.  a. A1c 11/19 9.0  b. 3/21 11.6  c. 6/21 A1c: 7.4   7.  Hypertension, presumed essential.   8. 12/13/18 AAA screen WNL  9. Prostatism.   10. Remote tonsillectomy and eye surgery.  11. Nicotine addiction, cessation 2011 with a 56 pack year history previously.  12. Loss of balance and memory  13. Dementia  14. Tiny Left lower lobe Lung nodules and moderate emphysematous changes.      CC:   Chief Complaint   Patient presents with   • Coronary artery disease involving native coronary artery of        Allergies  Allergies   Allergen Reactions   • Lisinopril Anaphylaxis       Current Medications    Current Outpatient Medications:   •  amLODIPine (NORVASC) 5 MG tablet, TAKE 1 TABLET BY MOUTH EVERY DAY, Disp: 90 tablet, Rfl: 3  •  cholecalciferol (VITAMIN D3) 1000 units tablet, Take 1,000 Units by mouth Daily., Disp: , Rfl:   •  donepezil (Aricept) 10 MG tablet, Take 1 tablet by mouth Every Night., Disp: 90 tablet, Rfl: 3  •  esomeprazole (nexIUM) 20 MG capsule, Take 20 mg by mouth Every Morning Before Breakfast., Disp: , Rfl:   •  glucose blood (SOLUS V2 TEST) test strip, Use as instructed, Disp: 100 each, Rfl: 12  •  glucose monitor monitoring kit, 1 each As Needed  "(prn)., Disp: 1 each, Rfl: 0  •  Insulin Glargine (LANTUS SOLOSTAR) 100 UNIT/ML injection pen, Inject 24 Units under the skin into the appropriate area as directed 2 (Two) Times a Day. (Patient taking differently: Inject 30 Units under the skin into the appropriate area as directed 2 (Two) Times a Day.), Disp: 5 pen, Rfl: 11  •  insulin lispro (HumaLOG) 100 UNIT/ML injection, Inject 13 Units under the skin into the appropriate area with meals (Patient taking differently: Inject 16  Units under the skin into the appropriate area with meals), Disp: 20 mL, Rfl: 6  •  Insulin Pen Needle (PEN NEEDLES 3/16\") 31G X 5 MM misc, Use with insulin daily E11.9, Disp: 100 each, Rfl: 5  •  Insulin Syringe-Needle U-100 (Easy Touch Insulin Syringe) 30G X 5/16\" 0.5 ML misc, use as directed with insulin 4 times a day, Disp: 100 each, Rfl: 11  •  Lancets misc, Use to test twice daily. E11.9, Disp: 100 each, Rfl: 12  •  LIFESCAN UNISTIK II LANCETS misc, 1 Units 3 (Three) Times a Day., Disp: 100 each, Rfl: 5  •  memantine (NAMENDA) 10 MG tablet, Take 1 tablet by mouth Every Night., Disp: 90 tablet, Rfl: 3  •  metFORMIN (GLUCOPHAGE) 500 MG tablet, Take 1 with breakfast in am, Disp: 90 tablet, Rfl: 2  •  PARoxetine (PAXIL) 20 MG tablet, TAKE 1 TABLET BY MOUTH EVERY DAY , Disp: 90 tablet, Rfl: 3  •  pramipexole (Mirapex) 1 MG tablet, Take 1 tablet by mouth every night at bedtime., Disp: 30 tablet, Rfl: 2  •  pravastatin (PRAVACHOL) 40 MG tablet, TAKE 1 TABLET BY MOUTH IN THE EVENING , Disp: 90 tablet, Rfl: 3  •  Xarelto 20 MG tablet, TAKE 1 TABLET BY MOUTH ONE TIME A DAY , Disp: 30 tablet, Rfl: 11      History of Present Illness   Que Lagunas is a 76 y.o. year old male here for follow up.  No new issues.  He is accompanied by his daughter.  His memory continues to decline he states.  He is walking with a walker now          OBJECTIVE:  Vitals:    09/01/21 1001   BP: 140/90   BP Location: Right arm   Patient Position: Sitting   Pulse: " "80   SpO2: 98%   Weight: 82.3 kg (181 lb 6.4 oz)   Height: 182.9 cm (72\")     Body mass index is 24.6 kg/m².    Constitutional:       Appearance: Healthy appearance. Not in distress.   Neck:      Vascular: No JVR. JVD normal.   Pulmonary:      Effort: Pulmonary effort is normal.      Breath sounds: Normal breath sounds. No wheezing. No rhonchi. No rales.   Chest:      Chest wall: Not tender to palpatation.   Cardiovascular:      PMI at left midclavicular line. Normal rate. Regular rhythm. Normal S1. Normal S2.      Murmurs: There is no murmur.      No gallop. No click. No rub.   Pulses:     Intact distal pulses.   Edema:     Peripheral edema absent.   Abdominal:      General: Bowel sounds are normal.      Palpations: Abdomen is soft.      Tenderness: There is no abdominal tenderness.   Musculoskeletal: Normal range of motion.         General: No tenderness. Skin:     General: Skin is warm and dry.   Neurological:      General: No focal deficit present.      Mental Status: Alert and oriented to person, place and time.         Diagnostic Data:    ECG 12 Lead    Date/Time: 9/1/2021 10:19 AM  Performed by: Charles Topete MD  Authorized by: Charles Topete MD   Previous ECG: no previous ECG available  Rhythm: sinus rhythm  BPM: 80  Conduction: right bundle branch block    Clinical impression: abnormal EKG              ASSESSMENT:   Diagnosis Plan   1. Coronary artery disease involving native coronary artery of native heart without angina pectoris     2. Paroxysmal atrial fibrillation (CMS/HCC)     3. Essential hypertension     4. Mixed hyperlipidemia     5. Type 2 diabetes mellitus with hyperglycemia, with long-term current use of insulin (CMS/HCC)         PLAN:  CAD no recent ischemic evaluation remotely preserved LV function.  Given his asymptomatic status and high probability of multivessel CAD continued medical    Paroxysmal A. fib maintaining and anticoagulated with Xarelto    Hypertension controlled on current " amlodipine with candidacy for ACE/ARB    Dyslipidemia on statin therapy    Diabetes insulin requiring with labile A1c over the last 1 year improved per most recent trend          Charles Topete MD, FACC

## 2021-09-02 ENCOUNTER — TREATMENT (OUTPATIENT)
Dept: PHYSICAL THERAPY | Facility: CLINIC | Age: 76
End: 2021-09-02

## 2021-09-02 DIAGNOSIS — R26.89 LOSS OF BALANCE: Primary | ICD-10-CM

## 2021-09-02 PROCEDURE — 97112 NEUROMUSCULAR REEDUCATION: CPT | Performed by: PHYSICAL THERAPIST

## 2021-09-02 PROCEDURE — 97110 THERAPEUTIC EXERCISES: CPT | Performed by: PHYSICAL THERAPIST

## 2021-09-02 NOTE — PROGRESS NOTES
Physical Therapy Daily Progress Note      Visit #: 5    Que Lagunas reports 0/10 pain today at rest.  Pt's daughter said his sugar was very low when she got home from work but he has eaten and feels better now. Pt brought his rollator in today because he said he felt more comfortable.         Objective Pt present to PT today with no distress at rest.     Pt with some forward leaning corrected with verbal cues during cones.     Pt did all new activities today to mix his treatment up. Pt had said he felt like he had just been here doing the same things.       See Exercise, Manual, and Modality Logs for complete treatment.     Assessment/Plan  Pt did well today but fatigued quickly again today probably due to his low blood sugar earlier today. Pt to continue with PT in 2 weeks and will discuss POC at that time with him and his daughter.       Progress per Plan of Care      Visit Diagnosis:    ICD-10-CM ICD-9-CM   1. Loss of balance  R26.89 781.99            Manual Therapy:         mins  93582;  Therapeutic Exercise:    12     mins  30083;     Neuromuscular Lisa:    18    mins  32670;    Therapeutic Activity:          mins  31166;     Gait Training:           mins  08713;     Ultrasound:          mins  38691;    Electrical Stimulation:         mins  30884 ( );  Dry Needling          mins self-pay  Iontophoresis          mins 51291      Timed Treatment:   30   mins   Total Treatment:     35   mins    Sarkis Moreno, PT  Physical Therapist

## 2021-09-14 DIAGNOSIS — G25.81 RESTLESS LEG SYNDROME: ICD-10-CM

## 2021-09-14 DIAGNOSIS — R41.3 MEMORY LOSS: ICD-10-CM

## 2021-09-14 RX ORDER — DONEPEZIL HYDROCHLORIDE 10 MG/1
10 TABLET, FILM COATED ORAL NIGHTLY
Qty: 90 TABLET | Refills: 3 | Status: SHIPPED | OUTPATIENT
Start: 2021-09-14 | End: 2021-10-25 | Stop reason: SDUPTHER

## 2021-09-14 RX ORDER — PRAMIPEXOLE DIHYDROCHLORIDE 1 MG/1
1 TABLET ORAL
Qty: 90 TABLET | Refills: 1 | Status: SHIPPED | OUTPATIENT
Start: 2021-09-14 | End: 2021-10-25 | Stop reason: SDUPTHER

## 2021-09-14 NOTE — TELEPHONE ENCOUNTER
Looking at the last refill it looks like he should have refills at the pharmacy right?     He would only need the pramipexole?

## 2021-09-14 NOTE — TELEPHONE ENCOUNTER
Caller: Jennifer Yancey    Relationship: Emergency Contact    Best call back number: [phone]  Medication needed:   Requested Prescriptions     Pending Prescriptions Disp Refills   • donepezil (Aricept) 10 MG tablet 90 tablet 3     Sig: Take 1 tablet by mouth Every Night.   • pramipexole (Mirapex) 1 MG tablet 30 tablet 2     Sig: Take 1 tablet by mouth every night at bedtime.       When do you need the refill by: ASAP    What additional details did the patient provide when requesting the medication: PT COMPLETELY OUT AS OF TODAY    Does the patient have less than a 3 day supply:  [x] Yes  [] No    What is the patient's preferred pharmacy:      [id number]

## 2021-09-17 ENCOUNTER — TELEPHONE (OUTPATIENT)
Dept: INTERNAL MEDICINE | Facility: CLINIC | Age: 76
End: 2021-09-17

## 2021-09-17 NOTE — TELEPHONE ENCOUNTER
Caller: Jennifer Yancey    Relationship: Emergency Contact    Best call back number: [phone]    What is the best time to reach you: ANYTIME AFTER 4:30    Who are you requesting to speak with (clinical staff, provider,  specific staff member): CLINICAL STAFF        What was the call regarding: PATIENTS DAUGHTER IS CALLING SHE STATES THAT THE PATIENT TOLD HER THAT HE IS SUPPOSED TO TAKE AN ANTI-DEPRESSENT OR ANXIETY MEDICATION THE PATIENT THINKS THAT IS WAS PAXIL. PLEASE CALL THE CALLER TO LET HER KNOW IF HE SHOULD BE TAKING THAT AND IF SO SHE WILL NEED THE MEDICATION CALLED IN TO THE [facility] IN [place].    Do you require a callback: YES

## 2021-09-24 RX ORDER — PAROXETINE HYDROCHLORIDE 20 MG/1
20 TABLET, FILM COATED ORAL DAILY
Qty: 90 TABLET | Refills: 3 | Status: SHIPPED | OUTPATIENT
Start: 2021-09-24 | End: 2022-12-15 | Stop reason: SDUPTHER

## 2021-09-24 NOTE — TELEPHONE ENCOUNTER
Daughter contacted office and requested medication be sent to different pharmacy. Pharmacy updated and medication sent.    Rx Refill Note  Requested Prescriptions     Pending Prescriptions Disp Refills   • PARoxetine (PAXIL) 20 MG tablet 90 tablet 3     Sig: Take 1 tablet by mouth Daily.      Last office visit with prescribing clinician: 7/1/2021      Next office visit with prescribing clinician: 11/1/2021            Estella Wang MA  09/24/21, 08:05 EDT

## 2021-09-24 NOTE — TELEPHONE ENCOUNTER
Attempted to contact patient's daughter (on release); left detailed message per release notifying that patient is supposed to taking medication and a new prescription was sent to requested pharmacy

## 2021-10-25 ENCOUNTER — OFFICE VISIT (OUTPATIENT)
Dept: NEUROLOGY | Facility: CLINIC | Age: 76
End: 2021-10-25

## 2021-10-25 VITALS
HEIGHT: 72 IN | WEIGHT: 192.4 LBS | OXYGEN SATURATION: 99 % | TEMPERATURE: 96.9 F | DIASTOLIC BLOOD PRESSURE: 92 MMHG | BODY MASS INDEX: 26.06 KG/M2 | SYSTOLIC BLOOD PRESSURE: 148 MMHG | HEART RATE: 74 BPM
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
## 2021-10-25 DIAGNOSIS — G25.81 RESTLESS LEG SYNDROME: ICD-10-CM

## 2021-10-25 DIAGNOSIS — F03.90 DEMENTIA WITHOUT BEHAVIORAL DISTURBANCE, UNSPECIFIED DEMENTIA TYPE: Primary | ICD-10-CM

## 2021-10-25 DIAGNOSIS — R26.89 IMBALANCE: ICD-10-CM

## 2021-10-25 DIAGNOSIS — R41.3 MEMORY LOSS: ICD-10-CM

## 2021-10-25 DIAGNOSIS — R25.1 TREMOR: ICD-10-CM

## 2021-10-25 PROCEDURE — 99213 OFFICE O/P EST LOW 20 MIN: CPT | Performed by: NURSE PRACTITIONER

## 2021-10-25 RX ORDER — PRAMIPEXOLE DIHYDROCHLORIDE 1 MG/1
1 TABLET ORAL
Qty: 90 TABLET | Refills: 3 | Status: SHIPPED | OUTPATIENT
Start: 2021-10-25

## 2021-10-25 RX ORDER — DONEPEZIL HYDROCHLORIDE 10 MG/1
10 TABLET, FILM COATED ORAL NIGHTLY
Qty: 90 TABLET | Refills: 3 | Status: SHIPPED | OUTPATIENT
Start: 2021-10-25 | End: 2023-01-31

## 2021-10-25 RX ORDER — MEMANTINE HYDROCHLORIDE 10 MG/1
10 TABLET ORAL NIGHTLY
Qty: 90 TABLET | Refills: 3 | Status: SHIPPED | OUTPATIENT
Start: 2021-10-25 | End: 2022-06-27

## 2021-10-25 NOTE — PROGRESS NOTES
Follow Up Office Visit      Patient Name: Que Lagunas  : 1945   MRN: [id number]     Chief Complaint:    Chief Complaint   Patient presents with   • Follow-up     Pateint in office for follow up on dementia       History of Present Illness: Que Lagunas is a 76 y.o. male who is here today to follow up and was last seen on 2021.  He is accompanied by his daughter again today.  He is taking Aricept 10mg daily, Namenda 10mg daily, Pramipexole 1mg QHS and reports good compliance and toleration.  He says he is doing very well.  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  He did participate in PT since his last visit.  His daughter says he has short term memory deficits-he forgets to do things she tells him to do.  However, they have gotten into a good routine-he lives with his daughter.  He takes multiple naps per day but seems to be sleeping fairly well.  He has a very good appetite.  He denies depression.    *Awaiting neuropsychiatric memory testing in 2022.      Following taken from previous visit note:  Que Lagunas is a 76 y.o. male who is here today to follow up with poor balance and memory issues.  He was last seen on 2021.  He is accompanied by his daughter, Jennifer, whom he lives with in [place], again today.  His daughter says he starts outpatient PT next week here at [facility].  He is now using a rolling walker when he travels outside of the home.  He has had a couple of falls since his last visit, without major injuries.  He is taking Aricept 5mg and Namenda 10mg QHS-he is tolerating those well and his daughter reports good compliance.  His daughter is helping with his medication organization and administration.  He does not drive.  He and his daughter feel his memory is about the same.  He is now taking Pramipexole 0.5mg QHS and thinks it may be  "helping-his daughter says he has not been complaining about his legs as much since the medication dose increase.  He does say he still has problems with his legs jumping and moving and this keeps him from getting to sleep at night.  He denies any pain with the leg movements.  He denies any leg symptoms during the day.  His daughter has no new complaints or concerns today.  Additional risk factors- GERD, diabetes, HTN, mood disorder, dyslipidemia, history of atrial fibrillation, history of TIA, CAD.   *His daughter says he has \"signed up for [facility] and all the benefits he can get from that\".   *He is awaiting a memory disorder clinic visit in February 2022, for further memory testing. Initial MMSE was 22/30.      Subjective      Review of Systems:   Review of Systems   Constitutional: Negative for chills, fatigue and fever.   HENT: Negative for facial swelling, hearing loss, sore throat, tinnitus and trouble swallowing.    Eyes: Negative for blurred vision, double vision, photophobia and visual disturbance.   Respiratory: Negative for cough, chest tightness and shortness of breath.    Cardiovascular: Negative for chest pain, palpitations and leg swelling.   Gastrointestinal: Negative for abdominal pain, nausea and vomiting.   Endocrine: Negative for cold intolerance and heat intolerance.   Musculoskeletal: Negative for gait problem, neck pain and neck stiffness.   Skin: Negative for color change and rash.   Allergic/Immunologic: Negative for environmental allergies and food allergies.   Neurological: Positive for memory problem. Negative for dizziness, syncope, speech difficulty, weakness, light-headedness, numbness and headache.   Psychiatric/Behavioral: Negative for behavioral problems, sleep disturbance and depressed mood. The patient is not nervous/anxious.        I have reviewed and the following portions of the patient's history were updated as appropriate: past family history, past medical history, past social " "history, past surgical history and problem list.    Medications:     Current Outpatient Medications:   •  amLODIPine (NORVASC) 5 MG tablet, TAKE 1 TABLET BY MOUTH EVERY DAY, Disp: 90 tablet, Rfl: 3  •  cholecalciferol (VITAMIN D3) 1000 units tablet, Take 1,000 Units by mouth Daily., Disp: , Rfl:   •  donepezil (Aricept) 10 MG tablet, Take 1 tablet by mouth Every Night., Disp: 90 tablet, Rfl: 3  •  esomeprazole (nexIUM) 20 MG capsule, Take 20 mg by mouth Every Morning Before Breakfast., Disp: , Rfl:   •  glucose blood (SOLUS V2 TEST) test strip, Use as instructed, Disp: 100 each, Rfl: 12  •  glucose monitor monitoring kit, 1 each As Needed (prn)., Disp: 1 each, Rfl: 0  •  Insulin Glargine (LANTUS SOLOSTAR) 100 UNIT/ML injection pen, Inject 24 Units under the skin into the appropriate area as directed 2 (Two) Times a Day. (Patient taking differently: Inject 30 Units under the skin into the appropriate area as directed 2 (Two) Times a Day.), Disp: 5 pen, Rfl: 11  •  insulin lispro (HumaLOG) 100 UNIT/ML injection, Inject 13 Units under the skin into the appropriate area with meals (Patient taking differently: Inject 16  Units under the skin into the appropriate area with meals), Disp: 20 mL, Rfl: 6  •  Insulin Pen Needle (PEN NEEDLES 3/16\") 31G X 5 MM misc, Use with insulin daily E11.9, Disp: 100 each, Rfl: 5  •  Insulin Syringe-Needle U-100 (Easy Touch Insulin Syringe) 30G X 5/16\" 0.5 ML misc, use as directed with insulin 4 times a day, Disp: 100 each, Rfl: 11  •  Lancets misc, Use to test twice daily. E11.9, Disp: 100 each, Rfl: 12  •  LIFESCAN UNISTIK II LANCETS misc, 1 Units 3 (Three) Times a Day., Disp: 100 each, Rfl: 5  •  memantine (NAMENDA) 10 MG tablet, Take 1 tablet by mouth Every Night., Disp: 90 tablet, Rfl: 3  •  metFORMIN (GLUCOPHAGE) 500 MG tablet, Take 1 with breakfast in am, Disp: 90 tablet, Rfl: 2  •  PARoxetine (PAXIL) 20 MG tablet, Take 1 tablet by mouth Daily., Disp: 90 tablet, Rfl: 3  •  " "pramipexole (Mirapex) 1 MG tablet, Take 1 tablet by mouth every night at bedtime., Disp: 90 tablet, Rfl: 3  •  pravastatin (PRAVACHOL) 40 MG tablet, TAKE 1 TABLET BY MOUTH IN THE EVENING , Disp: 90 tablet, Rfl: 3  •  Xarelto 20 MG tablet, TAKE 1 TABLET BY MOUTH ONE TIME A DAY , Disp: 30 tablet, Rfl: 11    Allergies:   Allergies   Allergen Reactions   • Lisinopril Anaphylaxis       Objective     Physical Exam:  Vital Signs:   Vitals:    10/25/21 0840   BP: 148/92   BP Location: Left arm   Patient Position: Sitting   Cuff Size: Adult   Pulse: 74   Temp: 96.9 °F (36.1 °C)   TempSrc: Temporal   SpO2: 99%   Weight: 87.3 kg (192 lb 6.4 oz)   Height: 182.9 cm (72.01\")   PainSc: 0-No pain     Body mass index is 26.09 kg/m².    Physical Exam  Vitals and nursing note reviewed.   Constitutional:       General: He is not in acute distress.     Appearance: Normal appearance. He is well-developed. He is not diaphoretic.   HENT:      Head: Normocephalic and atraumatic.   Eyes:      Conjunctiva/sclera: Conjunctivae normal.      Pupils: Pupils are equal, round, and reactive to light.   Pulmonary:      Effort: Pulmonary effort is normal. No respiratory distress.   Skin:     General: Skin is warm and dry.      Findings: No rash.   Neurological:      Mental Status: He is alert. Mental status is at baseline.   Psychiatric:         Mood and Affect: Mood normal.         Behavior: Behavior normal.         Thought Content: Thought content normal.      Comments: Decreased facial expressions         Neurologic Exam     Cranial Nerves     CN III, IV, VI   Pupils are equal, round, and reactive to light.    Gait, Coordination, and Reflexes     Gait  Gait: shufflingAble to go from sitting to standing position on first attempt without assistance; wide-based, shuffling gait; unsteady; slight decrease in bilateral arm swing; takes 2-3 steps to complete a turn. Very slight axial and perioral tremor at times. Low velocity, low frequency resting tremor " to left hand at times.         Assessment / Plan      Assessment/Plan:   Diagnoses and all orders for this visit:    1. Dementia without behavioral disturbance, unspecified dementia type (HCC) (Primary)    2. Restless leg syndrome  -     pramipexole (Mirapex) 1 MG tablet; Take 1 tablet by mouth every night at bedtime.  Dispense: 90 tablet; Refill: 3    3. Imbalance    4. Fall, sequela    5. Memory loss  -     memantine (NAMENDA) 10 MG tablet; Take 1 tablet by mouth Every Night.  Dispense: 90 tablet; Refill: 3  -     donepezil (Aricept) 10 MG tablet; Take 1 tablet by mouth Every Night.  Dispense: 90 tablet; Refill: 3    6. Tremor    7. BMI 26.0-26.9,adult    *He appears somewhat Parkinsonian upon inspection and examination. Increased dose of Pramipexole seems to have helped some. He seems to be doing pretty well at this time so will continue current medications.   *Safety and fall precautions discussed and in place-using a rolling walker for ambulation.     Follow Up:   Return in about 6 months (around 4/25/2022) for Recheck.    SUKI Smart, FNP-C  [facility]       Please note that portions of this note may have been completed with a voice recognition program. Efforts were made to edit the dictations, but occasionally words are mistranscribed.

## 2021-11-02 DIAGNOSIS — Z79.4 TYPE 2 DIABETES MELLITUS WITH OTHER SPECIFIED COMPLICATION, WITH LONG-TERM CURRENT USE OF INSULIN (HCC): Primary | ICD-10-CM

## 2021-11-02 DIAGNOSIS — E11.69 TYPE 2 DIABETES MELLITUS WITH OTHER SPECIFIED COMPLICATION, WITH LONG-TERM CURRENT USE OF INSULIN (HCC): Primary | ICD-10-CM

## 2021-11-02 NOTE — TELEPHONE ENCOUNTER
Rx Refill Note  Requested Prescriptions     Pending Prescriptions Disp Refills   • metFORMIN (GLUCOPHAGE) 500 MG tablet [Pharmacy Med Name: metFORMIN HCl 500 MG Oral Tablet] 90 tablet 0     Sig: TAKE 1 TABLET BY MOUTH IN THE MORNING WITH  BREAKFAST      Last office visit with prescribing clinician: 7/1/2021      Next office visit with prescribing clinician: 11/3/2021            JASON VILLASENOR MA  11/02/21, 10:54 EDT

## 2021-11-03 ENCOUNTER — OFFICE VISIT (OUTPATIENT)
Dept: INTERNAL MEDICINE | Facility: CLINIC | Age: 76
End: 2021-11-03

## 2021-11-03 VITALS
SYSTOLIC BLOOD PRESSURE: 132 MMHG | OXYGEN SATURATION: 97 % | BODY MASS INDEX: 25.87 KG/M2 | DIASTOLIC BLOOD PRESSURE: 80 MMHG | WEIGHT: 191 LBS | HEART RATE: 67 BPM | HEIGHT: 72 IN | TEMPERATURE: 97.3 F

## 2021-11-03 DIAGNOSIS — I10 PRIMARY HYPERTENSION: ICD-10-CM

## 2021-11-03 DIAGNOSIS — E11.69 TYPE 2 DIABETES MELLITUS WITH OTHER SPECIFIED COMPLICATION, WITH LONG-TERM CURRENT USE OF INSULIN (HCC): Primary | ICD-10-CM

## 2021-11-03 DIAGNOSIS — Z79.4 TYPE 2 DIABETES MELLITUS WITH OTHER SPECIFIED COMPLICATION, WITH LONG-TERM CURRENT USE OF INSULIN (HCC): Primary | ICD-10-CM

## 2021-11-03 DIAGNOSIS — E78.2 MIXED HYPERLIPIDEMIA: ICD-10-CM

## 2021-11-03 LAB
EXPIRATION DATE: ABNORMAL
HBA1C MFR BLD: 6.8 %
Lab: ABNORMAL

## 2021-11-03 PROCEDURE — 83036 HEMOGLOBIN GLYCOSYLATED A1C: CPT | Performed by: INTERNAL MEDICINE

## 2021-11-03 PROCEDURE — 99214 OFFICE O/P EST MOD 30 MIN: CPT | Performed by: INTERNAL MEDICINE

## 2021-11-03 NOTE — PROGRESS NOTES
"Subjective   Que Lagunas is a 76 y.o. male.     Chief Complaint   Patient presents with   • Follow-up   • Diabetes   • Hypertension   • Hyperlipidemia       History of Present Illness   Patient here for follow-up.  Diabetes improved on medications.  A1c today 6.8.  Hypertension stable on medication.  Hyperlipidemia stable on medication.  Dementia stable on medication.    Current Outpatient Medications:   •  amLODIPine (NORVASC) 5 MG tablet, TAKE 1 TABLET BY MOUTH EVERY DAY, Disp: 90 tablet, Rfl: 3  •  cholecalciferol (VITAMIN D3) 1000 units tablet, Take 1,000 Units by mouth Daily., Disp: , Rfl:   •  donepezil (Aricept) 10 MG tablet, Take 1 tablet by mouth Every Night., Disp: 90 tablet, Rfl: 3  •  esomeprazole (nexIUM) 20 MG capsule, Take 20 mg by mouth Every Morning Before Breakfast., Disp: , Rfl:   •  glucose blood (SOLUS V2 TEST) test strip, Use as instructed, Disp: 100 each, Rfl: 12  •  glucose monitor monitoring kit, 1 each As Needed (prn)., Disp: 1 each, Rfl: 0  •  Insulin Glargine (LANTUS SOLOSTAR) 100 UNIT/ML injection pen, Inject 24 Units under the skin into the appropriate area as directed 2 (Two) Times a Day. (Patient taking differently: Inject 30 Units under the skin into the appropriate area as directed 2 (Two) Times a Day.), Disp: 5 pen, Rfl: 11  •  insulin lispro (HumaLOG) 100 UNIT/ML injection, Inject 13 Units under the skin into the appropriate area with meals (Patient taking differently: Inject 16  Units under the skin into the appropriate area with meals), Disp: 20 mL, Rfl: 6  •  Insulin Pen Needle (PEN NEEDLES 3/16\") 31G X 5 MM misc, Use with insulin daily E11.9, Disp: 100 each, Rfl: 5  •  Insulin Syringe-Needle U-100 (Easy Touch Insulin Syringe) 30G X 5/16\" 0.5 ML misc, use as directed with insulin 4 times a day, Disp: 100 each, Rfl: 11  •  Lancets misc, Use to test twice daily. E11.9, Disp: 100 each, Rfl: 12  •  LIFESCAN UNISTIK II LANCETS misc, 1 Units 3 (Three) Times a Day., Disp: 100 " each, Rfl: 5  •  memantine (NAMENDA) 10 MG tablet, Take 1 tablet by mouth Every Night., Disp: 90 tablet, Rfl: 3  •  metFORMIN (GLUCOPHAGE) 500 MG tablet, TAKE 1 TABLET BY MOUTH IN THE MORNING WITH  BREAKFAST, Disp: 90 tablet, Rfl: 3  •  PARoxetine (PAXIL) 20 MG tablet, Take 1 tablet by mouth Daily., Disp: 90 tablet, Rfl: 3  •  pramipexole (Mirapex) 1 MG tablet, Take 1 tablet by mouth every night at bedtime., Disp: 90 tablet, Rfl: 3  •  pravastatin (PRAVACHOL) 40 MG tablet, TAKE 1 TABLET BY MOUTH IN THE EVENING , Disp: 90 tablet, Rfl: 3  •  Xarelto 20 MG tablet, TAKE 1 TABLET BY MOUTH ONE TIME A DAY , Disp: 30 tablet, Rfl: 11    The following portions of the patient's history were reviewed and updated as appropriate: allergies, current medications, past family history, past medical history, past social history, past surgical history and problem list.    Review of Systems   Constitutional: Negative.    Respiratory: Negative.    Cardiovascular: Negative.    Gastrointestinal: Negative.    Musculoskeletal: Negative.    Skin: Negative.    Neurological: Negative.    Psychiatric/Behavioral: Negative.        Objective   Physical Exam  Cardiovascular:      Rate and Rhythm: Normal rate and regular rhythm.      Heart sounds: Normal heart sounds.   Pulmonary:      Effort: Pulmonary effort is normal.      Breath sounds: Normal breath sounds.   Abdominal:      General: Bowel sounds are normal.   Musculoskeletal:      Cervical back: Neck supple.   Skin:     General: Skin is warm.   Neurological:      Mental Status: He is alert and oriented to person, place, and time.         All tests have been reviewed.    Assessment/Plan   Diagnoses and all orders for this visit:    Type 2 diabetes mellitus with other specified complication, with long-term current use of insulin (HCC) continue medications  -     POC Glycosylated Hemoglobin (Hb A1C)    Mixed hyperlipidemia continue medication check lab  -     CK  -     Comprehensive Metabolic  Panel  -     Lipid Panel    Primary hypertension continue medication                   Memory loss continue medication     Loss of balance improved after PT     3  months wellness

## 2021-11-24 ENCOUNTER — FLU SHOT (OUTPATIENT)
Dept: INTERNAL MEDICINE | Facility: CLINIC | Age: 76
End: 2021-11-24

## 2021-11-24 ENCOUNTER — IMMUNIZATION (OUTPATIENT)
Dept: INTERNAL MEDICINE | Facility: CLINIC | Age: 76
End: 2021-11-24

## 2021-11-24 ENCOUNTER — TELEPHONE (OUTPATIENT)
Dept: INTERNAL MEDICINE | Facility: CLINIC | Age: 76
End: 2021-11-24

## 2021-11-24 DIAGNOSIS — Z23 NEED FOR COVID-19 VACCINE: Primary | ICD-10-CM

## 2021-11-24 DIAGNOSIS — Z23 NEED FOR IMMUNIZATION AGAINST INFLUENZA: Primary | ICD-10-CM

## 2021-11-24 PROCEDURE — 0003A COVID-19 (PFIZER): CPT | Performed by: INTERNAL MEDICINE

## 2021-11-24 PROCEDURE — 90662 IIV NO PRSV INCREASED AG IM: CPT | Performed by: INTERNAL MEDICINE

## 2021-11-24 PROCEDURE — 91300 COVID-19 (PFIZER): CPT | Performed by: INTERNAL MEDICINE

## 2021-11-24 PROCEDURE — G0008 ADMIN INFLUENZA VIRUS VAC: HCPCS | Performed by: INTERNAL MEDICINE

## 2021-11-24 NOTE — TELEPHONE ENCOUNTER
JUANITA SAID THAT THE PT IS NOT EATING ENOUGH TO TAKE THE HUMALOG AND THAT WHEN THEY WERE TOLD TO STILL GIVE IT TO HIM IN THE MORNINGS HE IS BOTTOMING OUT TO 25 AND THEY HAD TO CALL A AMBULANCE AND THE PT WAS UNCONSECIOUS. JUANITA STATED THAT SINCE TAKING HIM OFF OF THE HUMALOG THAT HIS NUMBERS WERE STAYING GOOD. IF YOU COULD GIVE THEM A CALL TO MAKE SURE THIS IS OKAY AND IF LET THEM KNOW IF YOU WOULD LIKE THEM TO CONTINUE KEEPING HIM OFF OF IT.

## 2021-11-24 NOTE — TELEPHONE ENCOUNTER
Contacted patient's daughter (on release) and notified that PCP agreed that patient could hold insulin if glucose numbers were stable; daughter expressed understanding and stated she would follow up as needed before scheduled appointment.

## 2022-01-18 RX ORDER — AMLODIPINE BESYLATE 5 MG/1
TABLET ORAL
Qty: 60 TABLET | Refills: 0 | Status: SHIPPED | OUTPATIENT
Start: 2022-01-18 | End: 2022-03-14

## 2022-03-02 ENCOUNTER — HOSPITAL ENCOUNTER (OUTPATIENT)
Dept: ULTRASOUND IMAGING | Facility: HOSPITAL | Age: 77
Discharge: HOME | End: 2022-03-02
Admitting: INTERNAL MEDICINE

## 2022-03-02 ENCOUNTER — OFFICE VISIT (OUTPATIENT)
Dept: INTERNAL MEDICINE | Facility: CLINIC | Age: 77
End: 2022-03-02

## 2022-03-02 VITALS
DIASTOLIC BLOOD PRESSURE: 80 MMHG | WEIGHT: 208 LBS | HEIGHT: 72 IN | BODY MASS INDEX: 28.17 KG/M2 | SYSTOLIC BLOOD PRESSURE: 124 MMHG | OXYGEN SATURATION: 96 % | HEART RATE: 70 BPM | TEMPERATURE: 97.3 F

## 2022-03-02 DIAGNOSIS — R60.0 BILATERAL LEG EDEMA: ICD-10-CM

## 2022-03-02 DIAGNOSIS — R26.89 LOSS OF BALANCE: ICD-10-CM

## 2022-03-02 DIAGNOSIS — R41.3 MEMORY LOSS: ICD-10-CM

## 2022-03-02 DIAGNOSIS — G89.29 CHRONIC PAIN OF LEFT KNEE: ICD-10-CM

## 2022-03-02 DIAGNOSIS — I10 PRIMARY HYPERTENSION: Primary | ICD-10-CM

## 2022-03-02 DIAGNOSIS — I48.91 ATRIAL FIBRILLATION, UNSPECIFIED TYPE: ICD-10-CM

## 2022-03-02 DIAGNOSIS — G45.9 TIA (TRANSIENT ISCHEMIC ATTACK): ICD-10-CM

## 2022-03-02 DIAGNOSIS — E11.69 TYPE 2 DIABETES MELLITUS WITH OTHER SPECIFIED COMPLICATION, WITH LONG-TERM CURRENT USE OF INSULIN: ICD-10-CM

## 2022-03-02 DIAGNOSIS — E55.9 VITAMIN D DEFICIENCY: ICD-10-CM

## 2022-03-02 DIAGNOSIS — Z79.4 TYPE 2 DIABETES MELLITUS WITH OTHER SPECIFIED COMPLICATION, WITH LONG-TERM CURRENT USE OF INSULIN: ICD-10-CM

## 2022-03-02 DIAGNOSIS — I25.10 CORONARY ARTERY DISEASE INVOLVING NATIVE CORONARY ARTERY OF NATIVE HEART WITHOUT ANGINA PECTORIS: ICD-10-CM

## 2022-03-02 DIAGNOSIS — F41.1 GENERALIZED ANXIETY DISORDER: ICD-10-CM

## 2022-03-02 DIAGNOSIS — M25.562 CHRONIC PAIN OF LEFT KNEE: ICD-10-CM

## 2022-03-02 DIAGNOSIS — E78.2 MIXED HYPERLIPIDEMIA: ICD-10-CM

## 2022-03-02 DIAGNOSIS — N40.0 ENLARGED PROSTATE: ICD-10-CM

## 2022-03-02 DIAGNOSIS — G25.81 RESTLESS LEG SYNDROME: ICD-10-CM

## 2022-03-02 PROCEDURE — 1170F FXNL STATUS ASSESSED: CPT | Performed by: INTERNAL MEDICINE

## 2022-03-02 PROCEDURE — 99397 PER PM REEVAL EST PAT 65+ YR: CPT | Performed by: INTERNAL MEDICINE

## 2022-03-02 PROCEDURE — 99214 OFFICE O/P EST MOD 30 MIN: CPT | Performed by: INTERNAL MEDICINE

## 2022-03-02 PROCEDURE — 1159F MED LIST DOCD IN RCRD: CPT | Performed by: INTERNAL MEDICINE

## 2022-03-02 PROCEDURE — 93970 EXTREMITY STUDY: CPT

## 2022-03-02 PROCEDURE — G0439 PPPS, SUBSEQ VISIT: HCPCS | Performed by: INTERNAL MEDICINE

## 2022-03-02 RX ORDER — METOPROLOL TARTRATE 100 MG/1
75 TABLET ORAL 2 TIMES DAILY
COMMUNITY
Start: 2022-01-24

## 2022-03-02 NOTE — PROGRESS NOTES
The ABCs of the Annual Wellness Visit  Subsequent Medicare Wellness Visit    Chief Complaint   Patient presents with   • Medicare Wellness-subsequent   • Leg Swelling     week; bilateral lower extremities      Subjective    History of Present Illness:  Que Lagunas is a 76 y.o. male who presents for a Subsequent Medicare Wellness Visit.  Patient here for annual wellness check also has medical problems to be discussed. Patient recently gained a lot of weight almost 30 pound from November. Patient complains bilateral lower extremity edema all the way to the thigh right side worse. Patient denies any short of breath PND nocturnal dyspnea. Patient does have a history of a coronary artery disease atrial fibrillation on medication controlled. Diabetes on medication improved after medical adjustment. Restless leg syndrome stable medication. Patient does have a loss of balance. Also complains of memory loss patient is taking medications.  The following portions of the patient's history were reviewed and   updated as appropriate: allergies, current medications, past family history, past medical history, past social history, past surgical history and problem list.    Compared to one year ago, the patient feels his physical   health is the same.    Compared to one year ago, the patient feels his mental   health is the same.    Recent Hospitalizations:  He was not admitted to the hospital during the last year.       Current Medical Providers:  Patient Care Team:  Evans Blackburn MD as PCP - General (Internal Medicine)  Alayna Lynch MD as Consulting Physician (General Surgery)    Outpatient Medications Prior to Visit   Medication Sig Dispense Refill   • amLODIPine (NORVASC) 5 MG tablet Take 1 tablet by mouth once daily 60 tablet 0   • cholecalciferol (VITAMIN D3) 1000 units tablet Take 1,000 Units by mouth Daily.     • donepezil (Aricept) 10 MG tablet Take 1 tablet by mouth Every Night. 90 tablet 3   • esomeprazole  "(nexIUM) 20 MG capsule Take 20 mg by mouth Every Morning Before Breakfast.     • glucose blood (SOLUS V2 TEST) test strip Use as instructed 100 each 12   • glucose monitor monitoring kit 1 each As Needed (prn). 1 each 0   • Insulin Glargine (LANTUS SOLOSTAR) 100 UNIT/ML injection pen Inject 24 Units under the skin into the appropriate area as directed 2 (Two) Times a Day. (Patient taking differently: Inject 30 Units under the skin into the appropriate area as directed 2 (Two) Times a Day.) 5 pen 11   • insulin lispro (HumaLOG) 100 UNIT/ML injection Inject 13 Units under the skin into the appropriate area with meals (Patient taking differently: Inject 16   Units under the skin into the appropriate area with meals) 20 mL 6   • Insulin Pen Needle (PEN NEEDLES 3/16\") 31G X 5 MM misc Use with insulin daily E11.9 100 each 5   • Insulin Syringe-Needle U-100 (Easy Touch Insulin Syringe) 30G X 5/16\" 0.5 ML misc use as directed with insulin 4 times a day 100 each 11   • Lancets misc Use to test twice daily. E11.9 100 each 12   • Iglu.comCAN UNISTIK II LANCETS misc 1 Units 3 (Three) Times a Day. 100 each 5   • memantine (NAMENDA) 10 MG tablet Take 1 tablet by mouth Every Night. 90 tablet 3   • metFORMIN (GLUCOPHAGE) 500 MG tablet TAKE 1 TABLET BY MOUTH IN THE MORNING WITH  BREAKFAST 90 tablet 3   • PARoxetine (PAXIL) 20 MG tablet Take 1 tablet by mouth Daily. 90 tablet 3   • pramipexole (Mirapex) 1 MG tablet Take 1 tablet by mouth every night at bedtime. 90 tablet 3   • pravastatin (PRAVACHOL) 40 MG tablet TAKE 1 TABLET BY MOUTH IN THE EVENING  90 tablet 3   • Xarelto 20 MG tablet TAKE 1 TABLET BY MOUTH ONE TIME A DAY  30 tablet 11   • metoprolol tartrate (LOPRESSOR) 100 MG tablet Take 50 mg by mouth 2 (Two) Times a Day.       No facility-administered medications prior to visit.       No opioid medication identified on active medication list. I have reviewed chart for other potential  high risk medication/s and harmful drug " "interactions in the elderly.          Aspirin is not on active medication list.  Aspirin use is not indicated based on review of current medical condition/s. Risk of harm outweighs potential benefits.  .    Patient Active Problem List   Diagnosis   • Diabetes mellitus (HCC)   • Enlarged prostate   • Generalized anxiety disorder   • Hyperlipidemia   • Hypertension   • Onychomycosis   • TIA (transient ischemic attack)   • CAD (coronary artery disease)   • Atrial fibrillation (HCC)   • Vitamin D deficiency   • Colon cancer screening   • Chronic pain of left knee   • Sebaceous cyst   • Tear of meniscus of left knee   • Restless leg syndrome   • Personal history of colonic polyps   • Loss of balance   • Memory loss   • Bilateral leg edema     Advance Care Planning  Advance Directive is not on file.  ACP discussion was held with the patient during this visit. Patient has an advance directive (not in EMR), copy requested.    Review of Systems   HENT: Negative.    Eyes: Negative.    Respiratory: Negative.  Negative for shortness of breath.    Cardiovascular: Positive for leg swelling. Negative for chest pain and palpitations.   Gastrointestinal: Negative.    Genitourinary: Negative.    Musculoskeletal: Negative.    Skin: Negative.    Neurological: Positive for weakness.        Loss of balance     Psychiatric/Behavioral: Negative.         Objective    Vitals:    03/02/22 1602   BP: 124/80   Pulse: 70   Temp: 97.3 °F (36.3 °C)   SpO2: 96%   Weight: 94.3 kg (208 lb)   Height: 182.9 cm (72.01\")     BMI Readings from Last 1 Encounters:   03/02/22 28.20 kg/m²   BMI is above normal parameters. Recommendations include: exercise counseling    Does the patient have evidence of cognitive impairment? No    Physical Exam  Vitals and nursing note reviewed.   Constitutional:       Appearance: Normal appearance. He is well-developed. He is obese.   HENT:      Head: Normocephalic and atraumatic.      Right Ear: Tympanic membrane, ear canal " and external ear normal.      Left Ear: Tympanic membrane, ear canal and external ear normal.      Nose: Nose normal.      Mouth/Throat:      Mouth: Mucous membranes are moist.      Pharynx: Oropharynx is clear.   Eyes:      Conjunctiva/sclera: Conjunctivae normal.      Pupils: Pupils are equal, round, and reactive to light.   Neck:      Thyroid: No thyromegaly.   Cardiovascular:      Rate and Rhythm: Normal rate and regular rhythm.      Heart sounds: Normal heart sounds.   Pulmonary:      Effort: Pulmonary effort is normal.      Breath sounds: Normal breath sounds.   Abdominal:      General: Bowel sounds are normal.      Palpations: Abdomen is soft.   Musculoskeletal:         General: Normal range of motion.      Cervical back: Normal range of motion and neck supple.      Right lower leg: Edema present.      Left lower leg: Edema present.   Skin:     General: Skin is warm and dry.   Neurological:      Mental Status: He is alert and oriented to person, place, and time.      Deep Tendon Reflexes: Reflexes are normal and symmetric.   Psychiatric:         Behavior: Behavior normal.         Thought Content: Thought content normal.         Judgment: Judgment normal.                 HEALTH RISK ASSESSMENT    Smoking Status:  Social History     Tobacco Use   Smoking Status Former Smoker   • Types: Cigarettes   • Quit date:    • Years since quittin.1   Smokeless Tobacco Never Used   Tobacco Comment    STOPPED 5 YEARS AGO     Alcohol Consumption:  Social History     Substance and Sexual Activity   Alcohol Use No     Fall Risk Screen:    STEADI Fall Risk Assessment was completed, and patient is at LOW risk for falls.Assessment completed on:3/2/2022    Depression Screening:  PHQ-2/PHQ-9 Depression Screening 3/2/2022   Little interest or pleasure in doing things 0   Feeling down, depressed, or hopeless 0   Total Score 0       Health Habits and Functional and Cognitive Screening:  Functional & Cognitive Status 3/2/2022    Do you have difficulty preparing food and eating? -   Do you have difficulty bathing yourself, getting dressed or grooming yourself? No   Do you have difficulty using the toilet? No   Do you have difficulty moving around from place to place? Yes   Do you have trouble with steps or getting out of a bed or a chair? No   Current Diet Well Balanced Diet   Dental Exam Unknown        Dental Exam Comment wears dentures   Eye Exam Up to date        Eye Exam Comment 2021   Exercise (times per week) 0 times per week   Current Exercises Include No Regular Exercise   Current Exercise Activities Include -   Do you need help using the phone?  No   Are you deaf or do you have serious difficulty hearing?  No   Do you need help with transportation? No   Do you need help shopping? No   Do you need help preparing meals?  Yes   Do you need help with housework?  No   Do you need help with laundry? No   Do you need help taking your medications? Yes   Do you need help managing money? Yes   Do you ever drive or ride in a car without wearing a seat belt? No   Have you felt unusual stress, anger or loneliness in the last month? No   Who do you live with? Child   If you need help, do you have trouble finding someone available to you? No   Have you been bothered in the last four weeks by sexual problems? No   Do you have difficulty concentrating, remembering or making decisions? Yes       Age-appropriate Screening Schedule:  Refer to the list below for future screening recommendations based on patient's age, sex and/or medical conditions. Orders for these recommended tests are listed in the plan section. The patient has been provided with a written plan.    Health Maintenance   Topic Date Due   • TDAP/TD VACCINES (1 - Tdap) Never done   • ZOSTER VACCINE (1 of 2) Never done   • DIABETIC EYE EXAM  10/25/2020   • LIPID PANEL  03/01/2022   • URINE MICROALBUMIN  03/01/2022   • HEMOGLOBIN A1C  05/03/2022   • INFLUENZA VACCINE  Completed               Assessment/Plan   CMS Preventative Services Quick Reference  Risk Factors Identified During Encounter  Inactivity/Sedentary  The above risks/problems have been discussed with the patient.  Follow up actions/plans if indicated are seen below in the Assessment/Plan Section.  Pertinent information has been shared with the patient in the After Visit Summary.    Diagnoses and all orders for this visit:    1. Primary hypertension (Primary) continue medication  -     CBC & Differential    2. Mixed hyperlipidemia continue medication  -     Comprehensive Metabolic Panel  -     Lipid Panel    3. Coronary artery disease involving native coronary artery of native heart without angina pectoris  -     Adult Transthoracic Echo Complete W/ Cont if Necessary Per Protocol; Future    4. Atrial fibrillation, unspecified type (HCC) continue medication  -     TSH  -     Adult Transthoracic Echo Complete W/ Cont if Necessary Per Protocol; Future    5. Vitamin D deficiency continue supplement  -     Vitamin D 25 Hydroxy    6. Type 2 diabetes mellitus with other specified complication, with long-term current use of insulin (HCC) check lab  -     Hemoglobin A1c  -     Vitamin B12  -     MicroAlbumin, Urine, Random - Urine, Clean Catch  -     Urinalysis With Microscopic If Indicated (No Culture) - Urine, Clean Catch    7. Enlarged prostate watch    8. Generalized anxiety disorder stable now    9. Chronic pain of left knee stable    10. TIA (transient ischemic attack)    11. Restless leg syndrome continue medication    12. Memory loss continue medication follow-up with neurology [place]    13. Loss of balance    14. Bilateral leg edema family reports patient is taking water pill doesn't know the name of it and the family is going to let us know.  -     US Venous Doppler Lower Extremity Bilateral (duplex)        Follow Up:   Return in about 1 month (around 4/2/2022) for Recheck.     An After Visit Summary and PPPS were made available to  the patient.              ---- historical data    TIA continue xarelto, Echo and carotid duplex and CT head only small vessel ischemia    A.fib continue med , follow up with cardio  BPH,s/p laser surgery doing better, hematuria s/p scope,  follow up with dr mclain,   Diabetes  on humalog  tresiba   vitD low continue  vitD3 1000u daily  Onychomycosis improved after lamisil  colon:divertic polyp and hemorroid done 4/2015, patient refuses to repeat, prefer to do cologuard  patient declined 12/29/20  Vaccinations up-to-date, patient declined shingrix, pneumovax done ,   Balancing low, PT patient refuses  AAA screen  negative

## 2022-03-03 ENCOUNTER — TELEPHONE (OUTPATIENT)
Dept: INTERNAL MEDICINE | Facility: CLINIC | Age: 77
End: 2022-03-03

## 2022-03-03 NOTE — TELEPHONE ENCOUNTER
Caller: Jennifer Yancey    Relationship: Emergency Contact    Best Call Back Number:    [phone]     Caller requesting test results:     DAUGHTER    What test was performed:     ULTRASOUND  ECHOCARDIOGRAM    When was the test performed:     3/2/22    Where was the test performed:     [facility]    Additional notes:     CALLER/DAUGHTER REQUESTED WHEN TEST RESULTS HAVE RETURNED AND DR RYDER HAD A CHANCE TO REVIEW TO CONTACT HER WITH THE RESULTS AND EXPLANATION    CALLER ALSO REQUESTED CALL BACK TO DISCUSS MEDICATION, metoprolol tartrate (LOPRESSOR) 100 MG tablet    CALLER ASKED IF THIS MEDICATION COULD BE THE CAUSE OF PATIENT'S WATER RETENTION    DR RYDER

## 2022-03-04 NOTE — TELEPHONE ENCOUNTER
Contacted patient's daughter (on release) and explained that provider had not had a chance to review results and annotate and he is out of the office on Fridays. I assured daughter that she would be contacted with results when provider had reviewed. Daughter expressed understanding.     Daughter also reported that patient [facility] but she is unsure of exact dose. Patient is scheduled today [facility]/04/[facility] and will discuss medication and fluid retention.

## 2022-03-07 RX ORDER — INSULIN GLARGINE 100 [IU]/ML
INJECTION, SOLUTION SUBCUTANEOUS
Qty: 10 ML | Refills: 3 | Status: SHIPPED | OUTPATIENT
Start: 2022-03-07 | End: 2022-04-13 | Stop reason: SDUPTHER

## 2022-03-07 NOTE — TELEPHONE ENCOUNTER
Rx Refill Note  Requested Prescriptions     Pending Prescriptions Disp Refills   • Lantus 100 UNIT/ML injection [Pharmacy Med Name: Lantus 100 UNIT/ML Subcutaneous Solution] 10 mL 3     Sig: INJECT 40 UNITS SUBCUTANEOUSLY NIGHTLY      Last office visit with prescribing clinician: 3/2/2022      Next office visit with prescribing clinician: 4/13/2022            Amanda Espino LPN  03/07/22, 11:38 EST

## 2022-03-09 NOTE — TELEPHONE ENCOUNTER
PATIENTS DAUGHTER DANIE CALLED BACK I RELAYED THE HUB TO READ MESSAGE THE CALLER UNDERSTOOD AND HAD NO QUESTIONS SHE STATES THAT THE PATIENT WILL BE DOING LABS THIS WEEKS

## 2022-03-09 NOTE — TELEPHONE ENCOUNTER
Attempted to contact patient's daughter (on release); unable to leave detailed message per release so I asked daughter to return clinic's call.    HUB may deliver message per Dr. Blackburn:    We are waiting for patient to have labs completed to compare against imaging results.

## 2022-03-14 ENCOUNTER — TELEPHONE (OUTPATIENT)
Dept: INTERNAL MEDICINE | Facility: CLINIC | Age: 77
End: 2022-03-14

## 2022-03-14 RX ORDER — AMLODIPINE BESYLATE 5 MG/1
TABLET ORAL
Qty: 90 TABLET | Refills: 3 | Status: SHIPPED | OUTPATIENT
Start: 2022-03-14 | End: 2022-12-15 | Stop reason: SDUPTHER

## 2022-03-14 NOTE — TELEPHONE ENCOUNTER
Wanted Dr Blackburn to be aware of the following changes made to the pts med [facility] by the [facility]  Stated the [facility] had [facility] 20 mg of lasix to be administered every other day   Stated meds are in mail so he wont be able to start right away but then he will have labs drawn and will sign a release to have them sent here

## 2022-03-15 LAB
25(OH)D3+25(OH)D2 SERPL-MCNC: 25.1 NG/ML (ref 30–100)
ALBUMIN SERPL-MCNC: 4.1 G/DL (ref 3.5–5.2)
ALBUMIN/GLOB SERPL: 1.8 G/DL
ALP SERPL-CCNC: 116 U/L (ref 39–117)
ALT SERPL-CCNC: 19 U/L (ref 1–41)
APPEARANCE UR: CLEAR
AST SERPL-CCNC: 16 U/L (ref 1–40)
BASOPHILS # BLD AUTO: 0.06 10*3/MM3 (ref 0–0.2)
BASOPHILS NFR BLD AUTO: 0.7 % (ref 0–1.5)
BILIRUB SERPL-MCNC: 0.6 MG/DL (ref 0–1.2)
BILIRUB UR QL STRIP: NEGATIVE
BUN SERPL-MCNC: 16 MG/DL (ref 8–23)
BUN/CREAT SERPL: 18 (ref 7–25)
CALCIUM SERPL-MCNC: 9.5 MG/DL (ref 8.6–10.5)
CHLORIDE SERPL-SCNC: 99 MMOL/L (ref 98–107)
CHOLEST SERPL-MCNC: 133 MG/DL (ref 0–200)
CO2 SERPL-SCNC: 29.5 MMOL/L (ref 22–29)
COLOR UR: YELLOW
CREAT SERPL-MCNC: 0.89 MG/DL (ref 0.76–1.27)
EGFR GENE MUT ANL BLD/T: 88.3 ML/MIN/1.73
EOSINOPHIL # BLD AUTO: 0.17 10*3/MM3 (ref 0–0.4)
EOSINOPHIL NFR BLD AUTO: 2.1 % (ref 0.3–6.2)
ERYTHROCYTE [DISTWIDTH] IN BLOOD BY AUTOMATED COUNT: 13.2 % (ref 12.3–15.4)
GLOBULIN SER CALC-MCNC: 2.3 GM/DL
GLUCOSE SERPL-MCNC: 169 MG/DL (ref 65–99)
GLUCOSE UR QL STRIP: NEGATIVE
HBA1C MFR BLD: 8.3 % (ref 4.8–5.6)
HCT VFR BLD AUTO: 43.1 % (ref 37.5–51)
HDLC SERPL-MCNC: 48 MG/DL (ref 40–60)
HGB BLD-MCNC: 13.7 G/DL (ref 13–17.7)
HGB UR QL STRIP: NEGATIVE
IMM GRANULOCYTES # BLD AUTO: 0.04 10*3/MM3 (ref 0–0.05)
IMM GRANULOCYTES NFR BLD AUTO: 0.5 % (ref 0–0.5)
KETONES UR QL STRIP: NEGATIVE
LDLC SERPL CALC-MCNC: 75 MG/DL (ref 0–100)
LEUKOCYTE ESTERASE UR QL STRIP: NEGATIVE
LYMPHOCYTES # BLD AUTO: 3.01 10*3/MM3 (ref 0.7–3.1)
LYMPHOCYTES NFR BLD AUTO: 36.4 % (ref 19.6–45.3)
MCH RBC QN AUTO: 27.7 PG (ref 26.6–33)
MCHC RBC AUTO-ENTMCNC: 31.8 G/DL (ref 31.5–35.7)
MCV RBC AUTO: 87.2 FL (ref 79–97)
MICROALBUMIN UR-MCNC: 6.8 UG/ML
MONOCYTES # BLD AUTO: 0.88 10*3/MM3 (ref 0.1–0.9)
MONOCYTES NFR BLD AUTO: 10.6 % (ref 5–12)
NEUTROPHILS # BLD AUTO: 4.12 10*3/MM3 (ref 1.7–7)
NEUTROPHILS NFR BLD AUTO: 49.7 % (ref 42.7–76)
NITRITE UR QL STRIP: NEGATIVE
NRBC BLD AUTO-RTO: 0 /100 WBC (ref 0–0.2)
PH UR STRIP: 7 [PH] (ref 5–8)
PLATELET # BLD AUTO: 321 10*3/MM3 (ref 140–450)
POTASSIUM SERPL-SCNC: 4.5 MMOL/L (ref 3.5–5.2)
PROT SERPL-MCNC: 6.4 G/DL (ref 6–8.5)
PROT UR QL STRIP: NEGATIVE
RBC # BLD AUTO: 4.94 10*6/MM3 (ref 4.14–5.8)
SODIUM SERPL-SCNC: 139 MMOL/L (ref 136–145)
SP GR UR STRIP: 1.01 (ref 1–1.03)
TRIGL SERPL-MCNC: 42 MG/DL (ref 0–150)
TSH SERPL DL<=0.005 MIU/L-ACNC: 5.28 UIU/ML (ref 0.27–4.2)
UROBILINOGEN UR STRIP-MCNC: NORMAL MG/DL
VIT B12 SERPL-MCNC: 629 PG/ML (ref 211–946)
VLDLC SERPL CALC-MCNC: 10 MG/DL (ref 5–40)
WBC # BLD AUTO: 8.28 10*3/MM3 (ref 3.4–10.8)

## 2022-03-18 ENCOUNTER — HOSPITAL ENCOUNTER (OUTPATIENT)
Dept: CARDIOLOGY | Facility: HOSPITAL | Age: 77
Discharge: HOME | End: 2022-03-18
Admitting: INTERNAL MEDICINE

## 2022-03-18 VITALS — BODY MASS INDEX: 28.16 KG/M2 | HEIGHT: 72 IN | WEIGHT: 207.89 LBS

## 2022-03-18 DIAGNOSIS — G25.81 RESTLESS LEG SYNDROME: ICD-10-CM

## 2022-03-18 DIAGNOSIS — I48.91 ATRIAL FIBRILLATION, UNSPECIFIED TYPE: ICD-10-CM

## 2022-03-18 DIAGNOSIS — I25.10 CORONARY ARTERY DISEASE INVOLVING NATIVE CORONARY ARTERY OF NATIVE HEART WITHOUT ANGINA PECTORIS: ICD-10-CM

## 2022-03-18 LAB
BH CV ECHO MEAS - AO MAX PG (FULL): 3 MMHG
BH CV ECHO MEAS - AO MAX PG: 9 MMHG
BH CV ECHO MEAS - AO MEAN PG (FULL): 1 MMHG
BH CV ECHO MEAS - AO MEAN PG: 4 MMHG
BH CV ECHO MEAS - AO ROOT AREA (BSA CORRECTED): 1.4
BH CV ECHO MEAS - AO ROOT AREA: 7.3 CM^2
BH CV ECHO MEAS - AO ROOT DIAM: 3.1 CM
BH CV ECHO MEAS - AO V2 MAX: 146 CM/SEC
BH CV ECHO MEAS - AO V2 MEAN: 96.7 CM/SEC
BH CV ECHO MEAS - AO V2 VTI: 35.3 CM
BH CV ECHO MEAS - ASC AORTA: 3.5 CM
BH CV ECHO MEAS - AVA(I,A): 2.7 CM^2
BH CV ECHO MEAS - AVA(I,D): 2.7 CM^2
BH CV ECHO MEAS - AVA(V,A): 2.6 CM^2
BH CV ECHO MEAS - AVA(V,D): 2.6 CM^2
BH CV ECHO MEAS - BSA(HAYCOCK): 2.2 M^2
BH CV ECHO MEAS - BSA: 2.2 M^2
BH CV ECHO MEAS - BZI_BMI: 28.2 KILOGRAMS/M^2
BH CV ECHO MEAS - BZI_METRIC_HEIGHT: 182.9 CM
BH CV ECHO MEAS - BZI_METRIC_WEIGHT: 94.3 KG
BH CV ECHO MEAS - DESC AORTA: 2.4 CM
BH CV ECHO MEAS - EDV(CUBED): 111.3 ML
BH CV ECHO MEAS - EDV(MOD-SP2): 123 ML
BH CV ECHO MEAS - EDV(MOD-SP4): 131 ML
BH CV ECHO MEAS - EDV(TEICH): 108 ML
BH CV ECHO MEAS - EF(CUBED): 81.1 %
BH CV ECHO MEAS - EF(MOD-BP): 66.3 %
BH CV ECHO MEAS - EF(MOD-SP2): 63 %
BH CV ECHO MEAS - EF(MOD-SP4): 69.9 %
BH CV ECHO MEAS - EF(TEICH): 73.6 %
BH CV ECHO MEAS - ESV(CUBED): 21 ML
BH CV ECHO MEAS - ESV(MOD-SP2): 45.5 ML
BH CV ECHO MEAS - ESV(MOD-SP4): 39.4 ML
BH CV ECHO MEAS - ESV(TEICH): 28.5 ML
BH CV ECHO MEAS - FS: 42.6 %
BH CV ECHO MEAS - IVS/LVPW: 0.95
BH CV ECHO MEAS - IVSD: 1.1 CM
BH CV ECHO MEAS - LA DIMENSION: 3.8 CM
BH CV ECHO MEAS - LA/AO: 1.2
BH CV ECHO MEAS - LAD MAJOR: 5.3 CM
BH CV ECHO MEAS - LAT PEAK E' VEL: 8.4 CM/SEC
BH CV ECHO MEAS - LATERAL E/E' RATIO: 18.1
BH CV ECHO MEAS - LV DIASTOLIC VOL/BSA (35-75): 60.5 ML/M^2
BH CV ECHO MEAS - LV MASS(C)D: 207.1 GRAMS
BH CV ECHO MEAS - LV MASS(C)DI: 95.6 GRAMS/M^2
BH CV ECHO MEAS - LV MAX PG: 6 MMHG
BH CV ECHO MEAS - LV MEAN PG: 3 MMHG
BH CV ECHO MEAS - LV SYSTOLIC VOL/BSA (12-30): 18.2 ML/M^2
BH CV ECHO MEAS - LV V1 MAX: 122 CM/SEC
BH CV ECHO MEAS - LV V1 MEAN: 85.8 CM/SEC
BH CV ECHO MEAS - LV V1 VTI: 30.8 CM
BH CV ECHO MEAS - LVIDD: 4.8 CM
BH CV ECHO MEAS - LVIDS: 2.8 CM
BH CV ECHO MEAS - LVLD AP2: 8.4 CM
BH CV ECHO MEAS - LVLD AP4: 8.6 CM
BH CV ECHO MEAS - LVLS AP2: 6.6 CM
BH CV ECHO MEAS - LVLS AP4: 6.3 CM
BH CV ECHO MEAS - LVOT AREA (M): 3.1 CM^2
BH CV ECHO MEAS - LVOT AREA: 3.1 CM^2
BH CV ECHO MEAS - LVOT DIAM: 2 CM
BH CV ECHO MEAS - LVPWD: 1.2 CM
BH CV ECHO MEAS - MED PEAK E' VEL: 6 CM/SEC
BH CV ECHO MEAS - MEDIAL E/E' RATIO: 25.4
BH CV ECHO MEAS - MV A MAX VEL: 93.3 CM/SEC
BH CV ECHO MEAS - MV DEC SLOPE: 480 CM/SEC^2
BH CV ECHO MEAS - MV DEC TIME: 0.24 SEC
BH CV ECHO MEAS - MV E MAX VEL: 152 CM/SEC
BH CV ECHO MEAS - MV E/A: 1.6
BH CV ECHO MEAS - MV MAX PG: 8.2 MMHG
BH CV ECHO MEAS - MV MEAN PG: 3 MMHG
BH CV ECHO MEAS - MV P1/2T MAX VEL: 148 CM/SEC
BH CV ECHO MEAS - MV P1/2T: 90.3 MSEC
BH CV ECHO MEAS - MV V2 MAX: 143 CM/SEC
BH CV ECHO MEAS - MV V2 MEAN: 70.7 CM/SEC
BH CV ECHO MEAS - MV V2 VTI: 55.9 CM
BH CV ECHO MEAS - MVA P1/2T LCG: 1.5 CM^2
BH CV ECHO MEAS - MVA(P1/2T): 2.4 CM^2
BH CV ECHO MEAS - MVA(VTI): 1.7 CM^2
BH CV ECHO MEAS - PA ACC TIME: 0.12 SEC
BH CV ECHO MEAS - PA MAX PG (FULL): 1.8 MMHG
BH CV ECHO MEAS - PA MAX PG: 4.2 MMHG
BH CV ECHO MEAS - PA MEAN PG (FULL): 1 MMHG
BH CV ECHO MEAS - PA MEAN PG: 2 MMHG
BH CV ECHO MEAS - PA PR(ACCEL): 25 MMHG
BH CV ECHO MEAS - PA V2 MAX: 102 CM/SEC
BH CV ECHO MEAS - PA V2 MEAN: 64.5 CM/SEC
BH CV ECHO MEAS - PA V2 VTI: 22.2 CM
BH CV ECHO MEAS - PULM A REVS DUR: 0.14 SEC
BH CV ECHO MEAS - PULM A REVS VEL: 32.8 CM/SEC
BH CV ECHO MEAS - PULM DIAS VEL: 68.1 CM/SEC
BH CV ECHO MEAS - PULM S/D: 0.99
BH CV ECHO MEAS - PULM SYS VEL: 67.1 CM/SEC
BH CV ECHO MEAS - RAP SYSTOLE: 3 MMHG
BH CV ECHO MEAS - RV MAX PG: 2.3 MMHG
BH CV ECHO MEAS - RV MEAN PG: 1 MMHG
BH CV ECHO MEAS - RV V1 MAX: 76.1 CM/SEC
BH CV ECHO MEAS - RV V1 MEAN: 54.2 CM/SEC
BH CV ECHO MEAS - RV V1 VTI: 19.2 CM
BH CV ECHO MEAS - SI(AO): 119.1 ML/M^2
BH CV ECHO MEAS - SI(CUBED): 41.7 ML/M^2
BH CV ECHO MEAS - SI(LVOT): 43.8 ML/M^2
BH CV ECHO MEAS - SI(MOD-SP2): 35.8 ML/M^2
BH CV ECHO MEAS - SI(MOD-SP4): 42.3 ML/M^2
BH CV ECHO MEAS - SI(TEICH): 36.7 ML/M^2
BH CV ECHO MEAS - SV(AO): 257.9 ML
BH CV ECHO MEAS - SV(CUBED): 90.3 ML
BH CV ECHO MEAS - SV(LVOT): 94.8 ML
BH CV ECHO MEAS - SV(MOD-SP2): 77.5 ML
BH CV ECHO MEAS - SV(MOD-SP4): 91.6 ML
BH CV ECHO MEAS - SV(TEICH): 79.5 ML
BH CV ECHO MEAS - TAPSE (>1.6): 1.8 CM
BH CV ECHO MEASUREMENTS AVERAGE E/E' RATIO: 21.11
BH CV XLRA - RV BASE: 3.4 CM
BH CV XLRA - RV LENGTH: 6.8 CM
BH CV XLRA - RV MID: 2.8 CM
BH CV XLRA - TDI S': 11.7 CM/SEC
LEFT ATRIUM VOLUME INDEX: 31.5 ML/M^2
LEFT ATRIUM VOLUME: 68.3 ML
LV EF 2D ECHO EST: 66 %

## 2022-03-18 PROCEDURE — 93306 TTE W/DOPPLER COMPLETE: CPT

## 2022-03-18 PROCEDURE — 93306 TTE W/DOPPLER COMPLETE: CPT | Performed by: INTERNAL MEDICINE

## 2022-04-11 RX ORDER — RIVAROXABAN 20 MG/1
TABLET, FILM COATED ORAL
Qty: 30 TABLET | Refills: 11 | Status: SHIPPED | OUTPATIENT
Start: 2022-04-11

## 2022-04-11 NOTE — TELEPHONE ENCOUNTER
Next appt 6/6/2022  Lab Results   Component Value Date    GLUCOSE 169 (H) 03/14/2022    BUN 16 03/14/2022    CREATININE 0.89 03/14/2022    EGFRIFNONA 95 03/01/2021    EGFRIFAFRI 116 03/01/2021    BCR 18.0 03/14/2022    K 4.5 03/14/2022    CO2 29.5 (H) 03/14/2022    CALCIUM 9.5 03/14/2022    PROTENTOTREF 6.4 03/14/2022    ALBUMIN 4.10 03/14/2022    LABIL2 1.8 03/14/2022    AST 16 03/14/2022    ALT 19 03/14/2022

## 2022-04-13 ENCOUNTER — OFFICE VISIT (OUTPATIENT)
Dept: INTERNAL MEDICINE | Facility: CLINIC | Age: 77
End: 2022-04-13

## 2022-04-13 VITALS
DIASTOLIC BLOOD PRESSURE: 80 MMHG | WEIGHT: 201 LBS | HEIGHT: 72 IN | TEMPERATURE: 97.1 F | OXYGEN SATURATION: 95 % | SYSTOLIC BLOOD PRESSURE: 124 MMHG | BODY MASS INDEX: 27.22 KG/M2 | HEART RATE: 71 BPM

## 2022-04-13 DIAGNOSIS — Z79.4 TYPE 2 DIABETES MELLITUS WITH OTHER SPECIFIED COMPLICATION, WITH LONG-TERM CURRENT USE OF INSULIN: ICD-10-CM

## 2022-04-13 DIAGNOSIS — E55.9 VITAMIN D DEFICIENCY: ICD-10-CM

## 2022-04-13 DIAGNOSIS — F41.1 GENERALIZED ANXIETY DISORDER: ICD-10-CM

## 2022-04-13 DIAGNOSIS — R41.3 MEMORY LOSS: ICD-10-CM

## 2022-04-13 DIAGNOSIS — I25.10 CORONARY ARTERY DISEASE INVOLVING NATIVE CORONARY ARTERY OF NATIVE HEART WITHOUT ANGINA PECTORIS: ICD-10-CM

## 2022-04-13 DIAGNOSIS — I10 PRIMARY HYPERTENSION: Primary | ICD-10-CM

## 2022-04-13 DIAGNOSIS — R26.89 LOSS OF BALANCE: ICD-10-CM

## 2022-04-13 DIAGNOSIS — R79.89 TSH ELEVATION: ICD-10-CM

## 2022-04-13 DIAGNOSIS — I48.91 ATRIAL FIBRILLATION, UNSPECIFIED TYPE: ICD-10-CM

## 2022-04-13 DIAGNOSIS — E78.2 MIXED HYPERLIPIDEMIA: ICD-10-CM

## 2022-04-13 DIAGNOSIS — R93.89 ABNORMAL FINDINGS ON DIAGNOSTIC IMAGING OF OTHER SPECIFIED BODY STRUCTURES: ICD-10-CM

## 2022-04-13 DIAGNOSIS — R60.0 BILATERAL LEG EDEMA: ICD-10-CM

## 2022-04-13 DIAGNOSIS — E11.69 TYPE 2 DIABETES MELLITUS WITH OTHER SPECIFIED COMPLICATION, WITH LONG-TERM CURRENT USE OF INSULIN: ICD-10-CM

## 2022-04-13 PROCEDURE — 99214 OFFICE O/P EST MOD 30 MIN: CPT | Performed by: INTERNAL MEDICINE

## 2022-04-13 RX ORDER — PRAVASTATIN SODIUM 40 MG
40 TABLET ORAL EVERY EVENING
Qty: 90 TABLET | Refills: 3 | Status: SHIPPED | OUTPATIENT
Start: 2022-04-13

## 2022-04-13 NOTE — PROGRESS NOTES
Subjective   Max Boyd Lagunas is a 77 y.o. male.     Chief Complaint   Patient presents with   • Follow-up     Recent lab results     • Diabetes   • Hypertension   • Hyperlipidemia       History of Present Illness   Mr. Lagunas is a 77-year-old male who presents today for follow-up. Patient is not a good historian due to memory loss. Patient's daughter is with him and is a good historian. Patient has a past medical history of type II diabetes mellitus with long-term use of insulin. Patient's daughter states that his blood sugar has been up and down and the patient's daughter does not know if patient is taking his insulin correctly especially at 12 PM. Patient is currently on Lantus 30 units in the morning and insulin lispro 16 units at 12 PM. Patient checks is blood sugar in the morning. Patients average blood sugar in the morning is around 120. Patient was educated on compliance to insulin regimen. Patient's daughter states that she is going to hire a caregiver for compliance of blood sugar control. Patient last hemoglobin A1c was 8.30. Patient needs and another hemoglobin A1c ordered.    Patient has a past medical history of hypertension and is currently on amlodipine. Patient daughter states that his blood pressure on average runs around 130/80. Patient has a past medical history of hyperlipidemia and his daughter does not know if he patient is taking medication. Upon chart review lipid panel was normal. Patient is supposed to be on pravastatin 40 mg and needs to be refilled. Patient has a past medical history of coronary artery disease and atrial fibrillation. Patient is currently following with cardiology. Patient had a transthoracic echo done on 3/2 that showed left ventricle wall thickness and normal heart function. Patient is currently on metoprolol and Xarelto for atrial fibrillation.    Upon chart review patient still has vitamin D deficiency and needs to increase his over-the-counter vitamin D supplements  to 3000 units. Patient is daughter states that his memory loss is for same as last visit. Patient still following with neurology for memory loss and is on medication. Patient has a past medical history of generalized anxiety disorder but is stable on current medication without side effects.    Last visit patient had bilateral leg edema that has since improved. US Venous Doppler Lower Extremity Bilateral (duplex) was done on 3/2 and was negative. [facility] Patient elevates legs and uses compression stockings for bilateral leg edema. Patient is using a walker for his loss of balance. Patient's daughter states that he has recently had a nontraumatic fall and is learning to keep his walker close by. Upon chart review, patient has elevated TSH levels and will need to continue to monitor going forward.      Current Outpatient Medications:   •  amLODIPine (NORVASC) 5 MG tablet, Take 1 tablet by mouth once daily, Disp: 90 tablet, Rfl: 3  •  cholecalciferol (VITAMIN D3) 1000 units tablet, Take 1,000 Units by mouth Daily., Disp: , Rfl:   •  donepezil (Aricept) 10 MG tablet, Take 1 tablet by mouth Every Night., Disp: 90 tablet, Rfl: 3  •  esomeprazole (nexIUM) 20 MG capsule, Take 20 mg by mouth Every Morning Before Breakfast., Disp: , Rfl:   •  glucose blood (SOLUS V2 TEST) test strip, Use as instructed, Disp: 100 each, Rfl: 12  •  glucose monitor monitoring kit, 1 each As Needed (prn)., Disp: 1 each, Rfl: 0  •  Insulin Glargine (LANTUS SOLOSTAR) 100 UNIT/ML injection pen, Inject 24 Units under the skin into the appropriate area as directed 2 (Two) Times a Day. (Patient taking differently: Inject 30 Units under the skin into the appropriate area as directed 2 (Two) Times a Day.), Disp: 5 pen, Rfl: 11  •  insulin lispro (HumaLOG) 100 UNIT/ML injection, Inject 13 Units under the skin into the appropriate area with meals (Patient taking differently: Inject 16  Units under the skin into the  "appropriate area with meals), Disp: 20 mL, Rfl: 6  •  Insulin Pen Needle (PEN NEEDLES 3/16\") 31G X 5 MM misc, Use with insulin daily E11.9, Disp: 100 each, Rfl: 5  •  Insulin Syringe-Needle U-100 (Easy Touch Insulin Syringe) 30G X 5/16\" 0.5 ML misc, use as directed with insulin 4 times a day, Disp: 100 each, Rfl: 11  •  Lancets misc, Use to test twice daily. E11.9, Disp: 100 each, Rfl: 12  •  LIFESCAN UNISTIK II LANCETS misc, 1 Units 3 (Three) Times a Day., Disp: 100 each, Rfl: 5  •  memantine (NAMENDA) 10 MG tablet, Take 1 tablet by mouth Every Night., Disp: 90 tablet, Rfl: 3  •  metFORMIN (GLUCOPHAGE) 500 MG tablet, TAKE 1 TABLET BY MOUTH IN THE MORNING WITH  BREAKFAST, Disp: 90 tablet, Rfl: 3  •  metoprolol tartrate (LOPRESSOR) 100 MG tablet, Take 50 mg by mouth 2 (Two) Times a Day., Disp: , Rfl:   •  PARoxetine (PAXIL) 20 MG tablet, Take 1 tablet by mouth Daily., Disp: 90 tablet, Rfl: 3  •  pramipexole (Mirapex) 1 MG tablet, Take 1 tablet by mouth every night at bedtime., Disp: 90 tablet, Rfl: 3  •  pravastatin (PRAVACHOL) 40 MG tablet, TAKE 1 TABLET BY MOUTH IN THE EVENING , Disp: 90 tablet, Rfl: 3  •  Xarelto 20 MG tablet, Take 1 tablet by mouth once daily, Disp: 30 tablet, Rfl: 11  •  Lantus 100 UNIT/ML injection, INJECT 40 UNITS SUBCUTANEOUSLY NIGHTLY, Disp: 10 mL, Rfl: 3    The following portions of the patient's history were reviewed and updated as appropriate: allergies, current medications, past family history, past medical history, past social history, past surgical history and problem list.    Review of Systems   Constitutional: Negative.  Negative for activity change, appetite change, chills, fever and unexpected weight change.   HENT: Negative.  Negative for congestion, rhinorrhea and sneezing.    Eyes: Negative.    Respiratory: Negative.  Negative for apnea, cough, choking, chest tightness, shortness of breath, wheezing and stridor.    Cardiovascular: Positive for palpitations (History of a-fib) and " leg swelling (bilateral leg edema improved). Negative for chest pain.   Gastrointestinal: Negative.  Negative for abdominal distention, abdominal pain, constipation, diarrhea, nausea and vomiting.   Endocrine: Negative.    Genitourinary: Negative.  Negative for flank pain, frequency and urgency.   Musculoskeletal: Positive for gait problem (trouble with balance ). Negative for arthralgias, back pain, joint swelling and myalgias.   Skin: Negative.  Negative for color change and rash.   Allergic/Immunologic: Negative.    Neurological: Negative for dizziness, tremors, seizures, syncope, speech difficulty, light-headedness, numbness and headaches.        Memory loss   Hematological: Negative.    Psychiatric/Behavioral: Positive for confusion. Negative for agitation, dysphoric mood, hallucinations and suicidal ideas. The patient is nervous/anxious.         Memory loss   All other systems reviewed and are negative.      Objective   Physical Exam  HENT:      Head: Normocephalic and atraumatic.   Cardiovascular:      Rate and Rhythm: Normal rate and regular rhythm.      Pulses: Normal pulses.      Heart sounds: Normal heart sounds. No murmur heard.    No gallop.   Pulmonary:      Effort: Pulmonary effort is normal. No respiratory distress.      Breath sounds: Normal breath sounds. No wheezing, rhonchi or rales.   Chest:      Chest wall: No tenderness.   Abdominal:      General: Abdomen is flat. Bowel sounds are normal. There is no distension.      Palpations: Abdomen is soft.      Tenderness: There is no abdominal tenderness.   Musculoskeletal:         General: No swelling, tenderness or signs of injury. Normal range of motion.      Cervical back: Normal range of motion and neck supple.      Right lower leg: Edema (1+) present.      Left lower leg: Edema (1+) present.   Skin:     General: Skin is warm.      Capillary Refill: Capillary refill takes less than 2 seconds.   Neurological:      General: No focal deficit present.       Mental Status: He is alert and oriented to person, place, and time. Mental status is at baseline.      Cranial Nerves: No cranial nerve deficit.      Motor: No weakness.      Gait: Gait abnormal (Loss of balance has to use walker ).   Psychiatric:      Comments: Memory loss and not good judgment          All tests have been reviewed.    Assessment/Plan   Diagnoses and all orders for this visit:    Primary hypertension, order CMP, cont med  BP in office 124/80  Mixed hyperlipidemia, restart pravastatin 40 mg, order lipid panel    Type 2 diabetes mellitus with other specified complication, with long-term current use of insulin ([facility]), 30 units  Lantus in AM. Insulin lispro 16 units. Check blood sugar in AM. Compliance. Order Hemoglobin A1c.     Coronary artery disease involving native coronary artery of native heart without angina pectoris,  follow with cardiology  -Adult Transthoracic Echo Complete W/ Cont left ventricular wall thickness and heart function normal on 3/2    Atrial fibrillation, unspecified type ([facility]), follow with cardiology, cont med  -Adult Transthoracic Echo Complete W/ Cont left ventricular wall thickness and heart function normal on 3/2    Vitamin D deficiency, increase vitamin D supplement OTC, labs increased dose now    Memory loss, following with neurology, cont med.    Generalized anxiety disorder, stable, cont medication    Bilateral leg edema, improved, on Lasix 20 mg [facility]. Recommend elevate legs and use compression stockings.   - US Venous Doppler Lower Extremity Bilateral (duplex) done on 3/2 was negative     Loss of balance, using walker has improved with walker.     Elevated TSH, order TSH and will cont to monitor.

## 2022-04-25 ENCOUNTER — OFFICE VISIT (OUTPATIENT)
Dept: NEUROLOGY | Facility: CLINIC | Age: 77
End: 2022-04-25

## 2022-04-25 VITALS
SYSTOLIC BLOOD PRESSURE: 140 MMHG | OXYGEN SATURATION: 98 % | WEIGHT: 195.6 LBS | DIASTOLIC BLOOD PRESSURE: 82 MMHG | HEIGHT: 72 IN | BODY MASS INDEX: 26.49 KG/M2 | HEART RATE: 77 BPM | TEMPERATURE: 96.4 F

## 2022-04-25 DIAGNOSIS — R26.89 IMBALANCE: ICD-10-CM

## 2022-04-25 DIAGNOSIS — G25.81 RESTLESS LEG SYNDROME: ICD-10-CM

## 2022-04-25 DIAGNOSIS — F01.50 VASCULAR DEMENTIA WITHOUT BEHAVIORAL DISTURBANCE: Primary | ICD-10-CM

## 2022-04-25 PROCEDURE — 99214 OFFICE O/P EST MOD 30 MIN: CPT | Performed by: NURSE PRACTITIONER

## 2022-04-25 NOTE — PROGRESS NOTES
"     Follow Up Office Visit      Patient Name: Que Lagunas  : 1945   MRN: [id number]     Chief Complaint:    Chief Complaint   Patient presents with   • Follow-up     Patient in office to follow up on Dementia.          History of Present Illness: Que Lagunas is a 77 y.o. male who is here today to follow up with dementia and was last seen on 10/25/2021.  He is accompanied by his son, Que, today.  He is taking Pramipexole 1mg QHS, Aricept 10mg daily and Namenda 10mg daily- reports good toleration and compliance.  He was diagnosed with vascular dementia by Dr. Sheldon, memory disorder specialist at , in 2022.  He has a 2022 follow up with Dr. Sheldon.  According to a note, provided by his daughter who is not present today, he seems to be doing fairly well.  He has a lady coming into the home 3 days per week to interact with him and keep him engaged/socially active.  She states, \"He doesn't like to think for himself\".  His daughter is administering his medications and trying to ensure he eats regularly.  He often forgets if he has eaten and confuses mornings and evenings.  He has lost 13 pounds since his previous visit.  He is using depends because he has urgency incontinence.  He has had no falls since his last visit and is using a rolling walker for ambulation.  He says he is feeling really well and thinks he is doing pretty good right now.  Additional risk factors- BMI 26, GERD, diabetes, RLS, CAD, atrial fibrillation, HTN, dyslipidemia, mood disorder.   *Consult note (Dr. Sheldon at ) reviewed at time of visit.     Following taken from previous visit note:  Que Lagunas is a 76 y.o. male who is here today to follow up and was last seen on 2021.  He is accompanied by his daughter again today.  He is taking Aricept 10mg daily, Namenda 10mg daily, Pramipexole 1mg QHS and reports good compliance and toleration.  He says he is doing very well.  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  He did participate in PT since his last visit.  His daughter says he has short term memory deficits-he forgets to do things she tells him to do.  However, they have gotten into a good routine-he lives with his daughter.  He takes multiple naps per day but seems to be sleeping fairly well.  He has a very good appetite.  He denies depression.    *Awaiting neuropsychiatric memory testing in February 2022.     Subjective      Review of Systems:   Review of Systems   Constitutional: Negative for chills, fatigue and fever.   HENT: Negative for facial swelling, hearing loss, sore throat, tinnitus and trouble swallowing.    Eyes: Negative for blurred vision, double vision, photophobia and visual disturbance.   Respiratory: Negative for cough, chest tightness and shortness of breath.    Cardiovascular: Negative for chest pain, palpitations and leg swelling.   Gastrointestinal: Negative for abdominal pain, nausea and vomiting.   Endocrine: Negative for cold intolerance and heat intolerance.   Genitourinary: Positive for urgency and urinary incontinence.   Musculoskeletal: Positive for gait problem. Negative for neck pain and neck stiffness.   Skin: Negative for color change and rash.   Allergic/Immunologic: Negative for environmental allergies and food allergies.   Neurological: Positive for memory problem. Negative for dizziness, syncope, speech difficulty, weakness, light-headedness, numbness and headache.   Psychiatric/Behavioral: Negative for behavioral problems, sleep disturbance and depressed mood. The patient is not nervous/anxious.        I have reviewed and the following portions of the patient's history were updated as appropriate: past family history, past medical history, past social history, past surgical history and problem list.    Medications:     Current Outpatient Medications:   •   "amLODIPine (NORVASC) 5 MG tablet, Take 1 tablet by mouth once daily, Disp: 90 tablet, Rfl: 3  •  cholecalciferol (VITAMIN D3) 1000 units tablet, Take 1,000 Units by mouth Daily., Disp: , Rfl:   •  donepezil (Aricept) 10 MG tablet, Take 1 tablet by mouth Every Night., Disp: 90 tablet, Rfl: 3  •  esomeprazole (nexIUM) 20 MG capsule, Take 20 mg by mouth Every Morning Before Breakfast., Disp: , Rfl:   •  glucose blood (SOLUS V2 TEST) test strip, Use as instructed, Disp: 100 each, Rfl: 12  •  glucose monitor monitoring kit, 1 each As Needed (prn)., Disp: 1 each, Rfl: 0  •  Insulin Glargine (LANTUS SOLOSTAR) 100 UNIT/ML injection pen, Inject 24 Units under the skin into the appropriate area as directed 2 (Two) Times a Day. (Patient taking differently: Inject 30 Units under the skin into the appropriate area as directed 2 (Two) Times a Day.), Disp: 5 pen, Rfl: 11  •  insulin lispro (HumaLOG) 100 UNIT/ML injection, Inject 13 Units under the skin into the appropriate area with meals (Patient taking differently: Inject 16  Units under the skin into the appropriate area with meals), Disp: 20 mL, Rfl: 6  •  Insulin Pen Needle (PEN NEEDLES 3/16\") 31G X 5 MM misc, Use with insulin daily E11.9, Disp: 100 each, Rfl: 5  •  Insulin Syringe-Needle U-100 (Easy Touch Insulin Syringe) 30G X 5/16\" 0.5 ML misc, use as directed with insulin 4 times a day, Disp: 100 each, Rfl: 11  •  Lancets misc, Use to test twice daily. E11.9, Disp: 100 each, Rfl: 12  •  LIFESCAN UNISTIK II LANCETS misc, 1 Units 3 (Three) Times a Day., Disp: 100 each, Rfl: 5  •  memantine (NAMENDA) 10 MG tablet, Take 1 tablet by mouth Every Night., Disp: 90 tablet, Rfl: 3  •  metFORMIN (GLUCOPHAGE) 500 MG tablet, TAKE 1 TABLET BY MOUTH IN THE MORNING WITH  BREAKFAST, Disp: 90 tablet, Rfl: 3  •  metoprolol tartrate (LOPRESSOR) 100 MG tablet, Take 50 mg by mouth 2 (Two) Times a Day., Disp: , Rfl:   •  PARoxetine (PAXIL) 20 MG tablet, Take 1 tablet by mouth Daily., Disp: 90 " "tablet, Rfl: 3  •  pramipexole (Mirapex) 1 MG tablet, Take 1 tablet by mouth every night at bedtime., Disp: 90 tablet, Rfl: 3  •  pravastatin (PRAVACHOL) 40 MG tablet, Take 1 tablet by mouth Every Evening., Disp: 90 tablet, Rfl: 3  •  Xarelto 20 MG tablet, Take 1 tablet by mouth once daily, Disp: 30 tablet, Rfl: 11    Allergies:   Allergies   Allergen Reactions   • Lisinopril Anaphylaxis       Objective     Physical Exam:  Vital Signs:   Vitals:    04/25/22 0857   BP: 140/82   BP Location: Right arm   Patient Position: Sitting   Cuff Size: Adult   Pulse: 77   Temp: 96.4 °F (35.8 °C)   SpO2: 98%   Weight: 88.7 kg (195 lb 9.6 oz)   Height: 182.9 cm (72.01\")   PainSc: 0-No pain     Body mass index is 26.52 kg/m².    Physical Exam  Vitals and nursing note reviewed.   Constitutional:       General: He is not in acute distress.     Appearance: Normal appearance. He is well-developed. He is not diaphoretic.      Comments: Ambulatory with rolling walker.    HENT:      Head: Normocephalic and atraumatic.   Eyes:      Extraocular Movements: Extraocular movements intact.      Conjunctiva/sclera: Conjunctivae normal.      Pupils: Pupils are equal, round, and reactive to light.   Pulmonary:      Effort: Pulmonary effort is normal. No respiratory distress.   Musculoskeletal:         General: Normal range of motion.   Skin:     General: Skin is warm and dry.      Findings: No rash.   Neurological:      Mental Status: He is alert. Mental status is at baseline.   Psychiatric:         Mood and Affect: Mood normal.         Behavior: Behavior normal.         Thought Content: Thought content normal.         Judgment: Judgment normal.         Neurologic Exam     Cranial Nerves     CN III, IV, VI   Pupils are equal, round, and reactive to light.       Assessment / Plan      Assessment/Plan:   Diagnoses and all orders for this visit:    1. Vascular dementia without behavioral disturbance (HCC) (Primary)    2. Restless leg syndrome    3. " Imbalance    4. BMI 26.0-26.9,adult    *Safety precautions discussed. Encouraged patient to continue to use his rolling walker for ambulation to help prevent falls.   *Continue medications and keep follow up with Dr. Sheldon, as scheduled.     Follow Up:   Return in about 6 months (around 10/25/2022) for Recheck.    SUKI Smart, FNP-C  [facility]       Please note that portions of this note may have been completed with a voice recognition program. Efforts were made to edit the dictations, but occasionally words are mistranscribed.

## 2022-05-18 DIAGNOSIS — E11.69 TYPE 2 DIABETES MELLITUS WITH OTHER SPECIFIED COMPLICATION, WITH LONG-TERM CURRENT USE OF INSULIN: ICD-10-CM

## 2022-05-18 DIAGNOSIS — Z79.4 TYPE 2 DIABETES MELLITUS WITH OTHER SPECIFIED COMPLICATION, WITH LONG-TERM CURRENT USE OF INSULIN: ICD-10-CM

## 2022-05-18 RX ORDER — INSULIN LISPRO 100 [IU]/ML
INJECTION, SOLUTION INTRAVENOUS; SUBCUTANEOUS
Qty: 20 ML | Refills: 3 | Status: SHIPPED | OUTPATIENT
Start: 2022-05-18 | End: 2022-12-21

## 2022-05-18 NOTE — TELEPHONE ENCOUNTER
Rx Refill Note  Requested Prescriptions     Pending Prescriptions Disp Refills   • Insulin Lispro (humaLOG) 100 UNIT/ML injection [Pharmacy Med Name: Insulin Lispro 100 UNIT/ML Subcutaneous Solution] 20 mL 3     Sig: INJECT 13 UNITS SUBCUTANEOUSLY TO THE APPROPRIATE AREA WITH MEALS      Last office visit with prescribing clinician: 4/13/2022      Next office visit with prescribing clinician: 7/13/2022            Estella Wang MA  05/18/22, 12:00 EDT

## 2022-06-05 NOTE — PROGRESS NOTES
[facility] Cardiology  Office Progress Note  Que Lagunas  1945  [address]  [place] [address]       Visit Date: 06/06/22    PCP: Evans Blackburn MD  [address] [facility] [address]  [place] [address]    IDENTIFICATION: A 77 y.o. male former [employer] employee,  ,retired  from [place], former fishing enthusiast     PROBLEM LIST:   1. CAD/dyspnea  1. 3/20 two 2D echo: LVEF = 66%.  LV wall thickness-mild concentric hypertrophy.  LV diastolic dysfunction (grade 2) pseudonormalization.  2. Atrial Fibrillation  3. TIA:   a. April 2016, evaluation at [facility] with paroxysmal atrial fibrillation and negative CT of the head and neck.   4. Abnormal EKG:  a. Bifascicular block and no prior ischemia evaluation.   b. 4/16 echo EF 60%  5. Dyslipidemia  a. 3/21 134/39/57/67  b. 3/22 133/42/48/75  6. Diabetes mellitus, onset at age 49 and is requiring insulin x12 years.  a. A1c 11/19 9.0  b. 11/21 A1c 6.8  c. 3/22 A1c 8.3  7.  Hypertension, presumed essential.   8. 12/13/18 AAA screen WNL  9. Prostatism.   10. Remote tonsillectomy and eye surgery.  11. Nicotine addiction, cessation 2011 with a 56 pack year history previously.  12. Loss of balance and memory  13. Dementia-vascular [facility]  14. Restless leg syndrome  15. Tiny Left lower lobe Lung nodules and moderate emphysematous changes.       CC:   Chief Complaint   Patient presents with   • Coronary artery disease involving native coronary artery of       Allergies  Allergies   Allergen Reactions   • Lisinopril Anaphylaxis       Current Medications    Current Outpatient Medications:   •  amLODIPine (NORVASC) 5 MG tablet, Take 1 tablet by mouth once daily, Disp: 90 tablet, Rfl: 3  •  cholecalciferol (VITAMIN D3) 1000 units tablet, Take 1,000 Units by mouth Daily., Disp: , Rfl:   •  donepezil (Aricept) 10 MG tablet, Take 1 tablet by mouth Every Night., Disp: 90 tablet, Rfl: 3  •  esomeprazole (nexIUM) 20 MG capsule, Take 20 mg  "by mouth Every Morning Before Breakfast., Disp: , Rfl:   •  glucose blood (SOLUS V2 TEST) test strip, Use as instructed, Disp: 100 each, Rfl: 12  •  glucose monitor monitoring kit, 1 each As Needed (prn)., Disp: 1 each, Rfl: 0  •  Insulin Glargine (LANTUS SOLOSTAR) 100 UNIT/ML injection pen, Inject 24 Units under the skin into the appropriate area as directed 2 (Two) Times a Day. (Patient taking differently: Inject 30 Units under the skin into the appropriate area as directed 2 (Two) Times a Day.), Disp: 5 pen, Rfl: 11  •  Insulin Lispro (humaLOG) 100 UNIT/ML injection, INJECT 13 UNITS SUBCUTANEOUSLY TO THE APPROPRIATE AREA WITH MEALS, Disp: 20 mL, Rfl: 3  •  Insulin Pen Needle (PEN NEEDLES 3/16\") 31G X 5 MM misc, Use with insulin daily E11.9, Disp: 100 each, Rfl: 5  •  Insulin Syringe-Needle U-100 (Easy Touch Insulin Syringe) 30G X 5/16\" 0.5 ML misc, use as directed with insulin 4 times a day, Disp: 100 each, Rfl: 11  •  Lancets misc, Use to test twice daily. E11.9, Disp: 100 each, Rfl: 12  •  LIFESCAN UNISTIK II LANCETS misc, 1 Units 3 (Three) Times a Day., Disp: 100 each, Rfl: 5  •  memantine (NAMENDA) 10 MG tablet, Take 1 tablet by mouth Every Night., Disp: 90 tablet, Rfl: 3  •  metFORMIN (GLUCOPHAGE) 500 MG tablet, TAKE 1 TABLET BY MOUTH IN THE MORNING WITH  BREAKFAST, Disp: 90 tablet, Rfl: 3  •  metoprolol tartrate (LOPRESSOR) 100 MG tablet, Take 50 mg by mouth 2 (Two) Times a Day., Disp: , Rfl:   •  PARoxetine (PAXIL) 20 MG tablet, Take 1 tablet by mouth Daily., Disp: 90 tablet, Rfl: 3  •  pramipexole (Mirapex) 1 MG tablet, Take 1 tablet by mouth every night at bedtime., Disp: 90 tablet, Rfl: 3  •  pravastatin (PRAVACHOL) 40 MG tablet, Take 1 tablet by mouth Every Evening., Disp: 90 tablet, Rfl: 3  •  Xarelto 20 MG tablet, Take 1 tablet by mouth once daily, Disp: 30 tablet, Rfl: 11      History of Present Illness   Que Lagunas is a 77 y.o. year old male here for follow up.    Pt denies any new chest " "pain, dyspnea, dyspnea on exertion, orthopnea, PND, palpitations, lower extremity edema, or claudication.  He is walking with a rolling walker.  His son accompanies him today.  He is now living with his daughter.  He seems in good spirits without complaints        OBJECTIVE:  Vitals:    06/06/22 0943   BP: 138/68   BP Location: Left arm   Patient Position: Sitting   Pulse: 70   SpO2: 95%   Weight: 91.6 kg (202 lb)   Height: 180.3 cm (71\")     Body mass index is 28.17 kg/m².    Constitutional:       Appearance: Healthy appearance. Not in distress.   Neck:      Vascular: No JVR. JVD normal.   Pulmonary:      Effort: Pulmonary effort is normal.      Breath sounds: No wheezing. No rhonchi. No rales.      Comments: Prolonged expiratory phase  Chest:      Chest wall: Not tender to palpatation.   Cardiovascular:      PMI at left midclavicular line. Normal rate. Regular rhythm. Normal S1. Normal S2.      Murmurs: There is a systolic murmur.      No gallop. No click. No rub.   Pulses:     Intact distal pulses.   Edema:     Peripheral edema absent.   Abdominal:      General: Bowel sounds are normal.      Palpations: Abdomen is soft.      Tenderness: There is no abdominal tenderness.   Musculoskeletal: Normal range of motion.         General: No tenderness. Skin:     General: Skin is warm and dry.   Neurological:      General: No focal deficit present.      Mental Status: Alert and oriented to person, place and time.         Diagnostic Data:  Procedures      ASSESSMENT:   Diagnosis Plan   1. Coronary artery disease involving native coronary artery of native heart without angina pectoris     2. Primary hypertension     3. Mixed hyperlipidemia     4. Type 2 diabetes mellitus with hyperglycemia, with long-term current use of insulin (HCC)     5. Paroxysmal atrial fibrillation (HCC)         PLAN:  CAD with no obstructive symptoms continued risk factor modification medical management    Hypertension controlled current amlodipine, " metoprolol    Mixed dyslipidemia controlled on statin therapy    Diabetes A1c 8.3 sliding scale insulin    Paroxysmal A. fib anticoagulated continue rate control    Vascular dementia followed by [facility]          Charles Topete MD, FACC

## 2022-06-06 ENCOUNTER — OFFICE VISIT (OUTPATIENT)
Dept: CARDIOLOGY | Facility: CLINIC | Age: 77
End: 2022-06-06

## 2022-06-06 VITALS
WEIGHT: 202 LBS | SYSTOLIC BLOOD PRESSURE: 138 MMHG | HEART RATE: 70 BPM | DIASTOLIC BLOOD PRESSURE: 68 MMHG | BODY MASS INDEX: 28.28 KG/M2 | OXYGEN SATURATION: 95 % | HEIGHT: 71 IN

## 2022-06-06 DIAGNOSIS — I10 PRIMARY HYPERTENSION: ICD-10-CM

## 2022-06-06 DIAGNOSIS — E78.2 MIXED HYPERLIPIDEMIA: ICD-10-CM

## 2022-06-06 DIAGNOSIS — Z79.4 TYPE 2 DIABETES MELLITUS WITH HYPERGLYCEMIA, WITH LONG-TERM CURRENT USE OF INSULIN: ICD-10-CM

## 2022-06-06 DIAGNOSIS — I25.10 CORONARY ARTERY DISEASE INVOLVING NATIVE CORONARY ARTERY OF NATIVE HEART WITHOUT ANGINA PECTORIS: Primary | ICD-10-CM

## 2022-06-06 DIAGNOSIS — E11.65 TYPE 2 DIABETES MELLITUS WITH HYPERGLYCEMIA, WITH LONG-TERM CURRENT USE OF INSULIN: ICD-10-CM

## 2022-06-06 DIAGNOSIS — I48.0 PAROXYSMAL ATRIAL FIBRILLATION: ICD-10-CM

## 2022-06-06 PROCEDURE — 99214 OFFICE O/P EST MOD 30 MIN: CPT | Performed by: INTERNAL MEDICINE

## 2022-06-27 DIAGNOSIS — R41.3 MEMORY LOSS: ICD-10-CM

## 2022-06-27 RX ORDER — MEMANTINE HYDROCHLORIDE 10 MG/1
TABLET ORAL
Qty: 90 TABLET | Refills: 1 | Status: SHIPPED | OUTPATIENT
Start: 2022-06-27

## 2022-07-13 ENCOUNTER — OFFICE VISIT (OUTPATIENT)
Dept: INTERNAL MEDICINE | Facility: CLINIC | Age: 77
End: 2022-07-13

## 2022-07-13 VITALS
TEMPERATURE: 97.3 F | OXYGEN SATURATION: 94 % | HEART RATE: 82 BPM | BODY MASS INDEX: 28.84 KG/M2 | DIASTOLIC BLOOD PRESSURE: 84 MMHG | SYSTOLIC BLOOD PRESSURE: 130 MMHG | HEIGHT: 71 IN | WEIGHT: 206 LBS

## 2022-07-13 DIAGNOSIS — I10 PRIMARY HYPERTENSION: Primary | ICD-10-CM

## 2022-07-13 DIAGNOSIS — E55.9 VITAMIN D DEFICIENCY: ICD-10-CM

## 2022-07-13 DIAGNOSIS — E78.2 MIXED HYPERLIPIDEMIA: ICD-10-CM

## 2022-07-13 DIAGNOSIS — Z79.4 TYPE 2 DIABETES MELLITUS WITH OTHER SPECIFIED COMPLICATION, WITH LONG-TERM CURRENT USE OF INSULIN: ICD-10-CM

## 2022-07-13 DIAGNOSIS — R60.0 BILATERAL LEG EDEMA: ICD-10-CM

## 2022-07-13 DIAGNOSIS — R41.3 MEMORY LOSS: ICD-10-CM

## 2022-07-13 DIAGNOSIS — E11.69 TYPE 2 DIABETES MELLITUS WITH OTHER SPECIFIED COMPLICATION, WITH LONG-TERM CURRENT USE OF INSULIN: ICD-10-CM

## 2022-07-13 PROCEDURE — 99214 OFFICE O/P EST MOD 30 MIN: CPT | Performed by: INTERNAL MEDICINE

## 2022-07-13 NOTE — PROGRESS NOTES
"Subjective   Que Lagunas is a 77 y.o. male.     Chief Complaint   Patient presents with   • Follow-up   • Diabetes   • Hypertension   • Hyperlipidemia       History of Present Illness   Patient here for follow-up. Blood pressure stable medication. Diabetes out-of-control patient yet to do blood tests. Leg edema controlled on medication. Vitamin D deficiency patient hereto follow-up. Memory loss patient is on medication so far so stable. TSH elevation need the blood tests    Current Outpatient Medications:   •  amLODIPine (NORVASC) 5 MG tablet, Take 1 tablet by mouth once daily, Disp: 90 tablet, Rfl: 3  •  cholecalciferol (VITAMIN D3) 1000 units tablet, Take 1,000 Units by mouth Daily., Disp: , Rfl:   •  donepezil (Aricept) 10 MG tablet, Take 1 tablet by mouth Every Night., Disp: 90 tablet, Rfl: 3  •  esomeprazole (nexIUM) 20 MG capsule, Take 20 mg by mouth Every Morning Before Breakfast., Disp: , Rfl:   •  glucose blood (SOLUS V2 TEST) test strip, Use as instructed, Disp: 100 each, Rfl: 12  •  glucose monitor monitoring kit, 1 each As Needed (prn)., Disp: 1 each, Rfl: 0  •  Insulin Glargine (LANTUS SOLOSTAR) 100 UNIT/ML injection pen, Inject 24 Units under the skin into the appropriate area as directed 2 (Two) Times a Day. (Patient taking differently: Inject 30 Units under the skin into the appropriate area as directed 2 (Two) Times a Day.), Disp: 5 pen, Rfl: 11  •  Insulin Lispro (humaLOG) 100 UNIT/ML injection, INJECT 13 UNITS SUBCUTANEOUSLY TO THE APPROPRIATE AREA WITH MEALS, Disp: 20 mL, Rfl: 3  •  Insulin Pen Needle (PEN NEEDLES 3/16\") 31G X 5 MM misc, Use with insulin daily E11.9, Disp: 100 each, Rfl: 5  •  Insulin Syringe-Needle U-100 (Easy Touch Insulin Syringe) 30G X 5/16\" 0.5 ML misc, use as directed with insulin 4 times a day, Disp: 100 each, Rfl: 11  •  Lancets misc, Use to test twice daily. E11.9, Disp: 100 each, Rfl: 12  •  LIFESCAN UNISTIK II LANCETS misc, 1 Units 3 (Three) Times a Day., Disp: " 100 each, Rfl: 5  •  memantine (NAMENDA) 10 MG tablet, TAKE 1 TABLET BY MOUTH ONCE DAILY AT NIGHT, Disp: 90 tablet, Rfl: 1  •  metFORMIN (GLUCOPHAGE) 500 MG tablet, TAKE 1 TABLET BY MOUTH IN THE MORNING WITH  BREAKFAST, Disp: 90 tablet, Rfl: 3  •  metoprolol tartrate (LOPRESSOR) 100 MG tablet, Take 50 mg by mouth 2 (Two) Times a Day., Disp: , Rfl:   •  PARoxetine (PAXIL) 20 MG tablet, Take 1 tablet by mouth Daily., Disp: 90 tablet, Rfl: 3  •  pramipexole (Mirapex) 1 MG tablet, Take 1 tablet by mouth every night at bedtime., Disp: 90 tablet, Rfl: 3  •  pravastatin (PRAVACHOL) 40 MG tablet, Take 1 tablet by mouth Every Evening., Disp: 90 tablet, Rfl: 3  •  Xarelto 20 MG tablet, Take 1 tablet by mouth once daily, Disp: 30 tablet, Rfl: 11    The following portions of the patient's history were reviewed and updated as appropriate: allergies, current medications, past family history, past medical history, past social history, past surgical history and problem list.    Review of Systems   Constitutional: Negative.    Respiratory: Negative.    Cardiovascular: Negative.    Gastrointestinal: Negative.    Musculoskeletal: Negative.    Skin: Negative.    Neurological: Negative.    Psychiatric/Behavioral: Negative.        Objective   Physical Exam  Cardiovascular:      Rate and Rhythm: Normal rate and regular rhythm.      Heart sounds: Normal heart sounds.   Pulmonary:      Effort: Pulmonary effort is normal.      Breath sounds: Normal breath sounds.   Abdominal:      General: Bowel sounds are normal.   Musculoskeletal:      Cervical back: Neck supple.   Skin:     General: Skin is warm.   Neurological:      Mental Status: He is alert and oriented to person, place, and time.         All tests have been reviewed.    Assessment & Plan   Diagnoses and all orders for this visit:    Primary hypertension amlodipine 5 mg daily stable cont med    Mixed hyperlipidemia pravastatin 40 mg daily improved cont med    Type 2 diabetes mellitus  with other specified metformin 500 mg twice a day sugar still elevated need the blood tests continue medication    Vitamin D deficiency check lab    Memory loss Aricept 10 mg daily stable continue medication    Bilateral leg edema stable on Lasix continue medication

## 2022-07-29 RX ORDER — INSULIN GLARGINE 100 [IU]/ML
INJECTION, SOLUTION SUBCUTANEOUS
Qty: 10 ML | Refills: 0 | OUTPATIENT
Start: 2022-07-29

## 2022-08-02 NOTE — TELEPHONE ENCOUNTER
Rx Refill Note  Requested Prescriptions     Pending Prescriptions Disp Refills   • Insulin Glargine (LANTUS SOLOSTAR) 100 UNIT/ML injection pen 3 mL 5     Sig: Inject 30 Units under the skin into the appropriate area as directed 2 (Two) Times a Day.      Last office visit with prescribing clinician: 7/13/2022      Next office visit with prescribing clinician: 10/18/2022            Estella Wang MA  08/02/22, 09:39 EDT

## 2022-08-02 NOTE — TELEPHONE ENCOUNTER
Caller: Que Lagunas    Relationship: Self    Best call back number:  [phone]    Requested Prescriptions:   Requested Prescriptions     Pending Prescriptions Disp Refills   • Insulin Glargine (LANTUS SOLOSTAR) 100 UNIT/ML injection pen 5 pen 11     Sig: Inject 24 Units under the skin into the appropriate area as directed 2 (Two) Times a Day.        Pharmacy where request should be sent: [facility] [address] [place] [address] [phone] [facility] [phone] FX     Additional details provided by patient: PATIENT IS OUT OF THIS MEDICATION    Does the patient have less than a 3 day supply:  [x] Yes  [] No    Dick Mei Rep   08/02/22 09:14 EDT

## 2022-08-03 RX ORDER — LANCETS
1 EACH MISCELLANEOUS 3 TIMES DAILY
Qty: 100 EACH | Refills: 5 | Status: SHIPPED | OUTPATIENT
Start: 2022-08-03

## 2022-08-03 NOTE — TELEPHONE ENCOUNTER
Caller: BcJennifer    Relationship: Emergency Contact    Best call back number: [phone] (M)    Requested Prescriptions:   Requested Prescriptions     Pending Prescriptions Disp Refills   • LifeScan Unistik II Lancets misc 100 each 5     Si Units 3 (Three) Times a Day.        Pharmacy where request should be sent: [facility] [address] [place] [address] [phone] [facility] [phone]      Additional details provided by patient: PATIENT DAUGHTER CALLED AND STATED THE PATIENT USES 30 UNITS ONE TIME A DAY AND THIS NEEDS TO BE IN THE VIALS AND NOT A PEN- PATIENT HAS BEEN OUT OF MEDICATION SINCE Monday     Does the patient have less than a 3 day supply:  [x] Yes  [] No    Dick Peters   22 13:06 EDT

## 2022-08-03 NOTE — TELEPHONE ENCOUNTER
Rx Refill Note  Requested Prescriptions     Pending Prescriptions Disp Refills   • LifeScan Unistik II Lancets misc 100 each 5     Si Units 3 (Three) Times a Day.      Last office visit with prescribing clinician: 2022      Next office visit with prescribing clinician: 10/18/2022            Estella Wang MA  22, 13:47 EDT

## 2022-08-05 NOTE — TELEPHONE ENCOUNTER
"Caller: Jennifer Yancey    Relationship: Emergency Contact    Best call back number: [phone]    What medications are you currently taking:   Current Outpatient Medications on File Prior to Visit   Medication Sig Dispense Refill   • amLODIPine (NORVASC) 5 MG tablet Take 1 tablet by mouth once daily 90 tablet 3   • cholecalciferol (VITAMIN D3) 1000 units tablet Take 1,000 Units by mouth Daily.     • donepezil (Aricept) 10 MG tablet Take 1 tablet by mouth Every Night. 90 tablet 3   • esomeprazole (nexIUM) 20 MG capsule Take 20 mg by mouth Every Morning Before Breakfast.     • glucose blood (SOLUS V2 TEST) test strip Use as instructed 100 each 12   • glucose monitor monitoring kit 1 each As Needed (prn). 1 each 0   • Insulin Lispro (humaLOG) 100 UNIT/ML injection INJECT 13 UNITS SUBCUTANEOUSLY TO THE APPROPRIATE AREA WITH MEALS 20 mL 3   • Insulin Pen Needle (PEN NEEDLES 3/16\") 31G X 5 MM misc Use with insulin daily E11.9 100 each 5   • Insulin Syringe-Needle U-100 (Easy Touch Insulin Syringe) 30G X 5/16\" 0.5 ML misc use as directed with insulin 4 times a day 100 each 11   • Lancets misc Use to test twice daily. E11.9 100 each 12   • memantine (NAMENDA) 10 MG tablet TAKE 1 TABLET BY MOUTH ONCE DAILY AT NIGHT 90 tablet 1   • metFORMIN (GLUCOPHAGE) 500 MG tablet TAKE 1 TABLET BY MOUTH IN THE MORNING WITH  BREAKFAST 90 tablet 3   • metoprolol tartrate (LOPRESSOR) 100 MG tablet Take 50 mg by mouth 2 (Two) Times a Day.     • PARoxetine (PAXIL) 20 MG tablet Take 1 tablet by mouth Daily. 90 tablet 3   • pramipexole (Mirapex) 1 MG tablet Take 1 tablet by mouth every night at bedtime. 90 tablet 3   • pravastatin (PRAVACHOL) 40 MG tablet Take 1 tablet by mouth Every Evening. 90 tablet 3   • Xarelto 20 MG tablet Take 1 tablet by mouth once daily 30 tablet 11     No current facility-administered medications on file prior to visit.          Which medication are you concerned about: CLAUDIA PATRICIO    Who prescribed you this " medication: DR RYDER    What are your concerns: DAUGHTER CALLED STATING PATIENT DOES NOT USE THE LANTUS PENS HE USES THE LANTUS VIALS. PLEASE CALL [facility] AND CANCEL THE PENS AND ORDER THE VIALS. PATIENT HAS BEEN WITHOUT MEDICATION FOR 1 WEEK. PATIENT ALSO NEEDS NEEDLES. PLEASE ADVISE AND CALL DANIE YUSUF

## 2022-08-10 RX ORDER — INSULIN GLARGINE 100 [IU]/ML
INJECTION, SOLUTION SUBCUTANEOUS
Qty: 10 ML | Refills: 3 | Status: SHIPPED | OUTPATIENT
Start: 2022-08-10 | End: 2022-12-21

## 2022-08-10 NOTE — TELEPHONE ENCOUNTER
Incoming Refill Request      Medication requested (name and dose):LANTUS VIALS NOT PENS    Pharmacy where request should be sent: [facility] [id number]    Additional details provided by patient:PATIENT IS COMPLETELY OUT OF LANTUS 1 WEEK AND A HALF    Best call back number:[phone]    Does the patient have less than a 3 day supply:  [x] Yes  [] No    Dick NELSON Rep  08/10/22, 09:53 EDT

## 2022-10-26 ENCOUNTER — TELEPHONE (OUTPATIENT)
Dept: NEUROLOGY | Facility: CLINIC | Age: 77
End: 2022-10-26

## 2022-10-26 NOTE — TELEPHONE ENCOUNTER
Caller: Jennifer Yancey    Relationship: Emergency Contact; DAUGHTER    Best call back number: [phone]    What was the call regarding: THIS ENCOUNTER HAS BEEN CREATED, PER PROTOCOL, TO MAKE THE OFFICE AWARE THAT PT'S DAUGHTER CALLED TO PT'S F/U APPT W/ SUKI CARRERO FOR HIS MEMORY CARE. PT'S DAUGHTER STATES PT IS IN THE PROCESS OF MOVING INTO ASSISTED LIVING AND SHE WILL CALL BACK NEXT WEEK ONCE PT IS SETTLED TO RESCHEDULE THE APPT.    Do you require a callback: NO    DOCUMENTING PER PROTOCOL AS MEMORY RELATED F/U APPT WAS CANCELLED WITH OPT TO NOT RESCHEDULE AT THE TIME OF CALL.

## 2022-11-19 ENCOUNTER — HOSPITAL ENCOUNTER (EMERGENCY)
Facility: HOSPITAL | Age: 77
Discharge: HOME | End: 2022-11-19
Attending: FAMILY MEDICINE | Admitting: FAMILY MEDICINE

## 2022-11-19 ENCOUNTER — APPOINTMENT (OUTPATIENT)
Dept: CT IMAGING | Facility: HOSPITAL | Age: 77
End: 2022-11-19

## 2022-11-19 ENCOUNTER — APPOINTMENT (OUTPATIENT)
Dept: GENERAL RADIOLOGY | Facility: HOSPITAL | Age: 77
End: 2022-11-19

## 2022-11-19 VITALS
DIASTOLIC BLOOD PRESSURE: 76 MMHG | HEART RATE: 64 BPM | HEIGHT: 72 IN | BODY MASS INDEX: 27.09 KG/M2 | OXYGEN SATURATION: 99 % | WEIGHT: 200 LBS | SYSTOLIC BLOOD PRESSURE: 132 MMHG | TEMPERATURE: 97.1 F | RESPIRATION RATE: 16 BRPM

## 2022-11-19 DIAGNOSIS — E11.649 HYPOGLYCEMIC EPISODE IN PATIENT WITH DIABETES MELLITUS: Primary | ICD-10-CM

## 2022-11-19 LAB
ALBUMIN SERPL-MCNC: 3.8 G/DL (ref 3.5–5.2)
ALBUMIN/GLOB SERPL: 1.5 G/DL
ALP SERPL-CCNC: 94 U/L (ref 39–117)
ALT SERPL W P-5'-P-CCNC: 14 U/L (ref 1–41)
ANION GAP SERPL CALCULATED.3IONS-SCNC: 9.6 MMOL/L (ref 5–15)
AST SERPL-CCNC: 18 U/L (ref 1–40)
BASOPHILS # BLD AUTO: 0.05 10*3/MM3 (ref 0–0.2)
BASOPHILS NFR BLD AUTO: 0.5 % (ref 0–1.5)
BILIRUB SERPL-MCNC: 0.5 MG/DL (ref 0–1.2)
BILIRUB UR QL STRIP: NEGATIVE
BUN SERPL-MCNC: 21 MG/DL (ref 8–23)
BUN/CREAT SERPL: 25.9 (ref 7–25)
CALCIUM SPEC-SCNC: 8.9 MG/DL (ref 8.6–10.5)
CHLORIDE SERPL-SCNC: 97 MMOL/L (ref 98–107)
CLARITY UR: CLEAR
CO2 SERPL-SCNC: 28.4 MMOL/L (ref 22–29)
COLOR UR: YELLOW
CREAT SERPL-MCNC: 0.81 MG/DL (ref 0.76–1.27)
DEPRECATED RDW RBC AUTO: 43.9 FL (ref 37–54)
EGFRCR SERPLBLD CKD-EPI 2021: 90.2 ML/MIN/1.73
EOSINOPHIL # BLD AUTO: 0.07 10*3/MM3 (ref 0–0.4)
EOSINOPHIL NFR BLD AUTO: 0.6 % (ref 0.3–6.2)
ERYTHROCYTE [DISTWIDTH] IN BLOOD BY AUTOMATED COUNT: 13.4 % (ref 12.3–15.4)
GLOBULIN UR ELPH-MCNC: 2.6 GM/DL
GLUCOSE BLDC GLUCOMTR-MCNC: 116 MG/DL (ref 70–130)
GLUCOSE BLDC GLUCOMTR-MCNC: 215 MG/DL (ref 70–130)
GLUCOSE SERPL-MCNC: 180 MG/DL (ref 65–99)
GLUCOSE UR STRIP-MCNC: ABNORMAL MG/DL
HCT VFR BLD AUTO: 41.4 % (ref 37.5–51)
HGB BLD-MCNC: 13.6 G/DL (ref 13–17.7)
HGB UR QL STRIP.AUTO: NEGATIVE
IMM GRANULOCYTES # BLD AUTO: 0.06 10*3/MM3 (ref 0–0.05)
IMM GRANULOCYTES NFR BLD AUTO: 0.5 % (ref 0–0.5)
KETONES UR QL STRIP: NEGATIVE
LEUKOCYTE ESTERASE UR QL STRIP.AUTO: NEGATIVE
LIPASE SERPL-CCNC: 13 U/L (ref 13–60)
LYMPHOCYTES # BLD AUTO: 1.79 10*3/MM3 (ref 0.7–3.1)
LYMPHOCYTES NFR BLD AUTO: 16.2 % (ref 19.6–45.3)
MCH RBC QN AUTO: 29.6 PG (ref 26.6–33)
MCHC RBC AUTO-ENTMCNC: 32.9 G/DL (ref 31.5–35.7)
MCV RBC AUTO: 90.2 FL (ref 79–97)
MONOCYTES # BLD AUTO: 0.7 10*3/MM3 (ref 0.1–0.9)
MONOCYTES NFR BLD AUTO: 6.3 % (ref 5–12)
NEUTROPHILS NFR BLD AUTO: 75.9 % (ref 42.7–76)
NEUTROPHILS NFR BLD AUTO: 8.4 10*3/MM3 (ref 1.7–7)
NITRITE UR QL STRIP: NEGATIVE
NRBC BLD AUTO-RTO: 0 /100 WBC (ref 0–0.2)
PH UR STRIP.AUTO: 6.5 [PH] (ref 5–8)
PLATELET # BLD AUTO: 280 10*3/MM3 (ref 140–450)
PMV BLD AUTO: 10.5 FL (ref 6–12)
POTASSIUM SERPL-SCNC: 4 MMOL/L (ref 3.5–5.2)
PROT SERPL-MCNC: 6.4 G/DL (ref 6–8.5)
PROT UR QL STRIP: NEGATIVE
RBC # BLD AUTO: 4.59 10*6/MM3 (ref 4.14–5.8)
SODIUM SERPL-SCNC: 135 MMOL/L (ref 136–145)
SP GR UR STRIP: 1.02 (ref 1–1.03)
TROPONIN T SERPL-MCNC: <0.01 NG/ML (ref 0–0.03)
UROBILINOGEN UR QL STRIP: ABNORMAL
WBC NRBC COR # BLD: 11.07 10*3/MM3 (ref 3.4–10.8)

## 2022-11-19 PROCEDURE — 93005 ELECTROCARDIOGRAM TRACING: CPT | Performed by: PHYSICIAN ASSISTANT

## 2022-11-19 PROCEDURE — 84484 ASSAY OF TROPONIN QUANT: CPT | Performed by: PHYSICIAN ASSISTANT

## 2022-11-19 PROCEDURE — 82962 GLUCOSE BLOOD TEST: CPT

## 2022-11-19 PROCEDURE — 71045 X-RAY EXAM CHEST 1 VIEW: CPT

## 2022-11-19 PROCEDURE — 85025 COMPLETE CBC W/AUTO DIFF WBC: CPT | Performed by: PHYSICIAN ASSISTANT

## 2022-11-19 PROCEDURE — 99284 EMERGENCY DEPT VISIT MOD MDM: CPT

## 2022-11-19 PROCEDURE — 80053 COMPREHEN METABOLIC PANEL: CPT | Performed by: PHYSICIAN ASSISTANT

## 2022-11-19 PROCEDURE — 81003 URINALYSIS AUTO W/O SCOPE: CPT | Performed by: PHYSICIAN ASSISTANT

## 2022-11-19 PROCEDURE — 36415 COLL VENOUS BLD VENIPUNCTURE: CPT

## 2022-11-19 PROCEDURE — 70450 CT HEAD/BRAIN W/O DYE: CPT

## 2022-11-19 PROCEDURE — 83690 ASSAY OF LIPASE: CPT | Performed by: PHYSICIAN ASSISTANT

## 2022-11-19 NOTE — ED PROVIDER NOTES
Subjective  History of Present Illness:    Chief Complaint: Syncopal episode  History of Present Illness: 77-year-old male comes in for evaluation of above complaint.  Lives in assisted living community.  Woke this morning as he normally does not take his Lantus.  Unsure the amount but he states his daughter presets the plan and he just injected what is prepared for him.  He remembers going and eating breakfast and back to his apartment and sunshine remembers an ambulance was there.  He was found lying on his back.  Does not recall injuring his head but is on Xarelto.  Denies any pain or injury.  Has no complaints of recent illness.  Feels fine at this time.  Reportedly EMS noted his blood glucose to be in the 60s, was given some oral glucose, recheck showed still persistent hypoglycemia he was given some D10 intravenously.  Onset: This morning prior to arrival  Duration: Single episode now resolved  Exacerbating / Alleviating factors: None  Associated symptoms: None      Nurses Notes reviewed and agree, including vitals, allergies, social history and prior medical history.     Review of Systems   Constitutional: Negative.    HENT: Negative.    Eyes: Negative.    Respiratory: Negative.    Cardiovascular: Negative.    Gastrointestinal: Negative.    Genitourinary: Negative.    Musculoskeletal: Negative.    Skin: Negative.    Allergic/Immunologic: Negative.    Neurological: Positive for syncope.   Psychiatric/Behavioral: Negative.    All other systems reviewed and are negative.      Past Medical History:   Diagnosis Date   • Abnormal EKG     Bifascicular block and no prior ischemia evaluation.    • Anemia    • Anxiety    • Arthritis    • Atrial fibrillation (HCC)     CHADS VASc=3.  Continue Xarelto 20.  vas continue Zaroxolyn 20 has a relative 20 Knobloch can add Lantus 40   • CAD (coronary artery disease)    • Colon polyp 04/09/2015   • Contact dermatitis    • Diabetes (HCC)    • Dyslipidemia    • Hypertension    •  "Palpitations    • Prostatism    • Stroke (HCC)    • Tattoo        Allergies:    Lisinopril      Past Surgical History:   Procedure Laterality Date   • COLONOSCOPY  2015   • EYE SURGERY     • PROSTATE SURGERY     • TONSILLECTOMY           Social History     Socioeconomic History   • Marital status:    Tobacco Use   • Smoking status: Former     Types: Cigarettes     Quit date:      Years since quittin.8   • Smokeless tobacco: Never   • Tobacco comments:     STOPPED 5 YEARS AGO   Vaping Use   • Vaping Use: Never used   Substance and Sexual Activity   • Alcohol use: No   • Drug use: No   • Sexual activity: Defer         Family History   Problem Relation Age of Onset   • Diabetes Other    • Cancer Other         NO PROSTATE CANCER HISTORY   • Hypertension Other    • Cancer Other    • Diabetes Other    • Liver disease Mother    • Liver disease Father    • Colon cancer Neg Hx    • Stomach cancer Neg Hx        Objective  Physical Exam:  /82 (BP Location: Right arm, Patient Position: Sitting)   Pulse 107   Temp 97.1 °F (36.2 °C)   Resp 16   Ht 182.9 cm (72\")   Wt 90.7 kg (200 lb)   SpO2 100%   BMI 27.12 kg/m²      Physical Exam  Vitals and nursing note reviewed.   Constitutional:       General: He is not in acute distress.     Appearance: Normal appearance. He is normal weight. He is not ill-appearing, toxic-appearing or diaphoretic.   HENT:      Head: Normocephalic and atraumatic.      Nose: Nose normal.   Eyes:      Extraocular Movements: Extraocular movements intact.   Cardiovascular:      Rate and Rhythm: Normal rate and regular rhythm.   Pulmonary:      Effort: Pulmonary effort is normal.   Abdominal:      Palpations: Abdomen is soft.      Tenderness: There is no abdominal tenderness.   Musculoskeletal:         General: Normal range of motion.      Cervical back: Normal range of motion and neck supple. No tenderness.   Skin:     General: Skin is warm and dry.   Neurological:      " General: No focal deficit present.      Mental Status: He is alert. Mental status is at baseline.   Psychiatric:         Mood and Affect: Mood normal.         Behavior: Behavior normal.           Procedures    ED Course:    ED Course as of 11/19/22 1613   Sat Nov 19, 2022   1554 EKG interpretation time is 1500 A. fib with RVR at 106 bpm QRS duration is 136 QT is 384 QTC is 446 no acute ST elevation or depression there is a left anterior fascicular and a right bundle branch block.  This abnormal EKG [MH]      ED Course User Index  [MH] Louise Ross DO       Lab Results (last 24 hours)     Procedure Component Value Units Date/Time    POC Glucose Once [[id number]]  (Normal) Collected: 11/19/22 1455    Specimen: Blood Updated: 11/19/22 1456     Glucose 116 mg/dL      Comment: Serial Number: [id number]:  [id number]       CBC & Differential [[id number]]  (Abnormal) Collected: 11/19/22 1459    Specimen: Blood Updated: 11/19/22 1505    Narrative:      The following orders were created for panel order CBC & Differential.  Procedure                               Abnormality         Status                     ---------                               -----------         ------                     CBC Auto Differential[[id number]]        Abnormal            Final result                 Please view results for these tests on the individual orders.    Comprehensive Metabolic Panel [[id number]]  (Abnormal) Collected: 11/19/22 1459    Specimen: Blood Updated: 11/19/22 1526     Glucose 180 mg/dL      Comment: Glucose >180, Hemoglobin A1C recommended.        BUN 21 mg/dL      Creatinine 0.81 mg/dL      Sodium 135 mmol/L      Potassium 4.0 mmol/L      Chloride 97 mmol/L      CO2 28.4 mmol/L      Calcium 8.9 mg/dL      Total Protein 6.4 g/dL      Albumin 3.80 g/dL      ALT (SGPT) 14 U/L      AST (SGOT) 18 U/L      Alkaline Phosphatase 94 U/L      Total Bilirubin 0.5 mg/dL      Globulin 2.6 gm/dL      A/G Ratio 1.5 g/dL       BUN/Creatinine Ratio 25.9     Anion Gap 9.6 mmol/L      eGFR 90.2 mL/min/1.73      Comment: National Kidney Foundation and American Society of Nephrology (ASN) Task Force recommended calculation based on the Chronic Kidney Disease Epidemiology Collaboration (CKD-EPI) equation refit without adjustment for race.       Narrative:      GFR Normal >60  Chronic Kidney Disease <60  Kidney Failure <15    The GFR formula is only valid for adults with stable renal function between ages 18 and 70.    Lipase [[id number]]  (Normal) Collected: 11/19/22 1459    Specimen: Blood Updated: 11/19/22 1526     Lipase 13 U/L     Troponin [[id number]]  (Normal) Collected: 11/19/22 1459    Specimen: Blood Updated: 11/19/22 1526     Troponin T <0.010 ng/mL     Narrative:      Troponin T Reference Range:  <= 0.03 ng/mL-   Negative for AMI  >0.03 ng/mL-     Abnormal for myocardial necrosis.  Clinicians would have to utilize clinical acumen, EKG, Troponin and serial changes to determine if it is an Acute Myocardial Infarction or myocardial injury due to an underlying chronic condition.       Results may be falsely decreased if patient taking Biotin.      CBC Auto Differential [[id number]]  (Abnormal) Collected: 11/19/22 1459    Specimen: Blood Updated: 11/19/22 1505     WBC 11.07 10*3/mm3      RBC 4.59 10*6/mm3      Hemoglobin 13.6 g/dL      Hematocrit 41.4 %      MCV 90.2 fL      MCH 29.6 pg      MCHC 32.9 g/dL      RDW 13.4 %      RDW-SD 43.9 fl      MPV 10.5 fL      Platelets 280 10*3/mm3      Neutrophil % 75.9 %      Lymphocyte % 16.2 %      Monocyte % 6.3 %      Eosinophil % 0.6 %      Basophil % 0.5 %      Immature Grans % 0.5 %      Neutrophils, Absolute 8.40 10*3/mm3      Lymphocytes, Absolute 1.79 10*3/mm3      Monocytes, Absolute 0.70 10*3/mm3      Eosinophils, Absolute 0.07 10*3/mm3      Basophils, Absolute 0.05 10*3/mm3      Immature Grans, Absolute 0.06 10*3/mm3      nRBC 0.0 /100 WBC     Urinalysis With Microscopic If Indicated (No  Culture) - Urine, Clean Catch [[id number]]  (Abnormal) Collected: 11/19/22 1556    Specimen: Urine, Clean Catch Updated: 11/19/22 1602     Color, UA Yellow     Appearance, UA Clear     pH, UA 6.5     Specific Gravity, UA 1.019     Glucose,  mg/dL (Trace)     Ketones, UA Negative     Bilirubin, UA Negative     Blood, UA Negative     Protein, UA Negative     Leuk Esterase, UA Negative     Nitrite, UA Negative     Urobilinogen, UA 1.0 E.U./dL    Narrative:      Urine microscopic not indicated.    POC Glucose Once [[id number]]  (Abnormal) Collected: 11/19/22 1558    Specimen: Blood Updated: 11/19/22 1600     Glucose 215 mg/dL      Comment: Serial Number: [id number]:  [id number]              CT Head Without Contrast    Result Date: 11/19/2022  HEAD CT     11/19/2022 1:59 PM  HISTORY: Head injury, anticoagulation.  COMPARISON: Brain MRI March 2021  TECHNIQUE: Multiple axial CT images were performed from the foramen magnum to the vertex. This study was performed with techniques to keep radiation doses as low as reasonably achievable, (ALARA). Individualized dose reduction techniques using automated exposure control or adjustment of mA and/or kV according to the patient size were employed.  FINDINGS: No evidence of acute infarction, hemorrhage, or mass. No cerebral edema or contusion. Remote lacunar infarction left thalamus, unchanged. There is diffuse atrophy with disproportionate ventricular enlargement. This is unchanged but raises possibility of underlying communicating type hydrocephalus. No old cortical infarct. The skull is intact. The partially imaged paranasal sinuses and mastoid air cells are clear. Minimal gas within small bowel veins from the skull base and left periorbital regions, likely iatrogenic.        Impression: No acute intracranial process.    This report was signed and finalized on 11/19/2022 2:12 PM by Dr Patrice Morse DO.    XR Chest 1 View    Result Date: 11/19/2022  PORTABLE CHEST     11/19/2022 2:07 PM  HISTORY: Syncope  COMPARISON: March 2020.  FINDINGS: Normal heart size. Diffuse bronchitis. No pneumonia or edema. Old fracture of the left fifth rib. Atherosclerotic aorta.      Impression: No acute cardiopulmonary process .   This report was signed and finalized on 11/19/2022 2:35 PM by Dr Patrice Morse DO.         MDM    Patient feeling well, blood glucose stable.  Daughter here who manages meds.  She expresses her belief that patient had his long and short acting insulin this morning 20 breakfast but he is on a diabetic diet cellular got thomas and oatmeal.  Will inform nursing home to not have him on a diabetic diet if he is on his current regimen or modify insulin regimen also recommended more frequent fingersticks.  Patient and daughter comfortable with plan is wishing for discharge home  Final diagnoses:   Hypoglycemic episode in patient with diabetes mellitus (HCC)        Antonio Husain PA-C  11/19/22 2106

## 2022-12-15 ENCOUNTER — OFFICE VISIT (OUTPATIENT)
Dept: INTERNAL MEDICINE | Facility: CLINIC | Age: 77
End: 2022-12-15

## 2022-12-15 VITALS
OXYGEN SATURATION: 96 % | WEIGHT: 212 LBS | DIASTOLIC BLOOD PRESSURE: 82 MMHG | HEIGHT: 72 IN | SYSTOLIC BLOOD PRESSURE: 130 MMHG | BODY MASS INDEX: 28.71 KG/M2 | TEMPERATURE: 96.9 F | HEART RATE: 62 BPM

## 2022-12-15 DIAGNOSIS — R60.0 BILATERAL LEG EDEMA: ICD-10-CM

## 2022-12-15 DIAGNOSIS — I10 PRIMARY HYPERTENSION: Primary | ICD-10-CM

## 2022-12-15 DIAGNOSIS — E08.00 DIABETES MELLITUS DUE TO UNDERLYING CONDITION WITH HYPEROSMOLARITY WITHOUT COMA, WITHOUT LONG-TERM CURRENT USE OF INSULIN: ICD-10-CM

## 2022-12-15 DIAGNOSIS — F41.1 GENERALIZED ANXIETY DISORDER: ICD-10-CM

## 2022-12-15 DIAGNOSIS — E78.2 MIXED HYPERLIPIDEMIA: ICD-10-CM

## 2022-12-15 PROCEDURE — 99214 OFFICE O/P EST MOD 30 MIN: CPT | Performed by: INTERNAL MEDICINE

## 2022-12-15 RX ORDER — PAROXETINE HYDROCHLORIDE 20 MG/1
20 TABLET, FILM COATED ORAL DAILY
Qty: 90 TABLET | Refills: 3 | Status: SHIPPED | OUTPATIENT
Start: 2022-12-15

## 2022-12-15 RX ORDER — AMLODIPINE BESYLATE 5 MG/1
5 TABLET ORAL DAILY
Qty: 90 TABLET | Refills: 3 | Status: SHIPPED | OUTPATIENT
Start: 2022-12-15 | End: 2022-12-21

## 2022-12-15 NOTE — PROGRESS NOTES
"Subjective   Que Lagunas is a 77 y.o. male.     Chief Complaint   Patient presents with   • Follow-up   • Diabetes     Scattered readings since recent transition to assisted living       History of Present Illness   Patient complains sugar elevated recently while in assisted living.  Patient is eating a lot more than before.  Patient gained 10 pound.  Patient just started assisted living recently.  Was on diabetic diet sugar was on the low side but now changed to  regular diet sugar is too high.  Blood pressure stable on medication needs medicine refill hyperlipidemia stable on medication.  Bilateral lower extremity edema still patient is using compression hose.  Anxiety stable on medication.    Current Outpatient Medications:   •  amLODIPine (NORVASC) 5 MG tablet, Take 1 tablet by mouth Daily., Disp: 90 tablet, Rfl: 3  •  cholecalciferol (VITAMIN D3) 1000 units tablet, Take 1,000 Units by mouth Daily., Disp: , Rfl:   •  donepezil (Aricept) 10 MG tablet, Take 1 tablet by mouth Every Night., Disp: 90 tablet, Rfl: 3  •  esomeprazole (nexIUM) 20 MG capsule, Take 20 mg by mouth Every Morning Before Breakfast., Disp: , Rfl:   •  glucose blood (SOLUS V2 TEST) test strip, Use as instructed, Disp: 100 each, Rfl: 12  •  glucose monitor monitoring kit, 1 each As Needed (prn)., Disp: 1 each, Rfl: 0  •  Insulin Lispro (humaLOG) 100 UNIT/ML injection, INJECT 13 UNITS SUBCUTANEOUSLY TO THE APPROPRIATE AREA WITH MEALS, Disp: 20 mL, Rfl: 3  •  Insulin Pen Needle (PEN NEEDLES 3/16\") 31G X 5 MM misc, Use with insulin daily E11.9, Disp: 100 each, Rfl: 5  •  Insulin Syringe-Needle U-100 (Easy Touch Insulin Syringe) 30G X 5/16\" 0.5 ML misc, use as directed with insulin 4 times a day, Disp: 100 each, Rfl: 11  •  Lancets misc, Use to test twice daily. E11.9, Disp: 100 each, Rfl: 12  •  Lantus 100 UNIT/ML injection, INJECT 40 UNITS SUBCUTANEOUSLY NIGHTLY, Disp: 10 mL, Rfl: 3  •  LifeScan Unistik II Lancets misc, 1 Units 3 (Three) " Times a Day., Disp: 100 each, Rfl: 5  •  memantine (NAMENDA) 10 MG tablet, TAKE 1 TABLET BY MOUTH ONCE DAILY AT NIGHT, Disp: 90 tablet, Rfl: 1  •  metFORMIN (GLUCOPHAGE) 500 MG tablet, TAKE 1 TABLET BY MOUTH IN THE MORNING WITH  BREAKFAST, Disp: 90 tablet, Rfl: 3  •  metoprolol tartrate (LOPRESSOR) 100 MG tablet, Take 50 mg by mouth 2 (Two) Times a Day., Disp: , Rfl:   •  PARoxetine (PAXIL) 20 MG tablet, Take 1 tablet by mouth Daily., Disp: 90 tablet, Rfl: 3  •  pramipexole (Mirapex) 1 MG tablet, Take 1 tablet by mouth every night at bedtime., Disp: 90 tablet, Rfl: 3  •  pravastatin (PRAVACHOL) 40 MG tablet, Take 1 tablet by mouth Every Evening., Disp: 90 tablet, Rfl: 3  •  Xarelto 20 MG tablet, Take 1 tablet by mouth once daily, Disp: 30 tablet, Rfl: 11    The following portions of the patient's history were reviewed and updated as appropriate: allergies, current medications, past family history, past medical history, past social history, past surgical history and problem list.    Review of Systems   Constitutional: Negative.    Respiratory: Negative.    Cardiovascular: Positive for leg swelling.   Gastrointestinal: Negative.    Musculoskeletal: Negative.    Skin: Negative.    Neurological: Negative.    Psychiatric/Behavioral: Negative.        Objective   Physical Exam  Cardiovascular:      Rate and Rhythm: Normal rate and regular rhythm.      Heart sounds: Normal heart sounds.   Pulmonary:      Effort: Pulmonary effort is normal.      Breath sounds: Normal breath sounds.   Abdominal:      General: Bowel sounds are normal.   Musculoskeletal:      Cervical back: Neck supple.      Right lower leg: Edema present.      Left lower leg: Edema present.   Skin:     General: Skin is warm.   Neurological:      Mental Status: He is alert and oriented to person, place, and time.         All tests have been reviewed.    Assessment & Plan   Diagnoses and all orders for this visit:    Primary hypertension stable on Norvasc 5 mg  daily continue medication  -     amLODIPine (NORVASC) 5 MG tablet; Take 1 tablet by mouth Daily.    Mixed hyperlipidemia stable on pravastatin 40 mg daily continue medication check lab  -     CK  -     Comprehensive Metabolic Panel  -     Lipid Panel    Diabetes mellitus due to underlying condition with hyperosmolarity without coma, without long-term current use of insulin ([facility]) encouraged Family detailed assisted living to change patient's diet to diabetic diet.  Let me know if sugar still elevated or not  -     Hemoglobin A1c    Generalized anxiety disorder stable Paxil 20 mg daily continue medication  -     PARoxetine (PAXIL) 20 MG tablet; Take 1 tablet by mouth Daily.    Leg edema continue compression hose and also need low-salt diet      2 months follow-up after blood tests

## 2022-12-21 DIAGNOSIS — Z79.4 TYPE 2 DIABETES MELLITUS WITH OTHER SPECIFIED COMPLICATION, WITH LONG-TERM CURRENT USE OF INSULIN: ICD-10-CM

## 2022-12-21 DIAGNOSIS — I10 PRIMARY HYPERTENSION: ICD-10-CM

## 2022-12-21 DIAGNOSIS — E11.69 TYPE 2 DIABETES MELLITUS WITH OTHER SPECIFIED COMPLICATION, WITH LONG-TERM CURRENT USE OF INSULIN: ICD-10-CM

## 2022-12-21 RX ORDER — INSULIN LISPRO 100 [IU]/ML
INJECTION, SOLUTION INTRAVENOUS; SUBCUTANEOUS
Qty: 40 ML | Refills: 0 | Status: SHIPPED | OUTPATIENT
Start: 2022-12-21 | End: 2023-03-01 | Stop reason: SDUPTHER

## 2022-12-21 RX ORDER — AMLODIPINE BESYLATE 5 MG/1
TABLET ORAL
Qty: 90 TABLET | Refills: 3 | Status: SHIPPED | OUTPATIENT
Start: 2022-12-21 | End: 2023-02-24 | Stop reason: ALTCHOICE

## 2022-12-21 RX ORDER — INSULIN GLARGINE 100 [IU]/ML
INJECTION, SOLUTION SUBCUTANEOUS
Qty: 10 ML | Refills: 0 | Status: SHIPPED | OUTPATIENT
Start: 2022-12-21 | End: 2023-01-31

## 2022-12-21 NOTE — TELEPHONE ENCOUNTER
Rx Refill Note  Requested Prescriptions     Pending Prescriptions Disp Refills   • amLODIPine (NORVASC) 5 MG tablet [Pharmacy Med Name: amLODIPine Besylate 5 MG Oral Tablet] 90 tablet 0     Sig: Take 1 tablet by mouth once daily   • Insulin Lispro (humaLOG) 100 UNIT/ML injection [Pharmacy Med Name: Insulin Lispro 100 UNIT/ML Subcutaneous Solution] 40 mL 0     Sig: INJECT 13 UNITS SUBCUTANEOUSLY WITH MEALS   • Lantus 100 UNIT/ML injection [Pharmacy Med Name: Lantus 100 UNIT/ML Subcutaneous Solution] 10 mL 0     Sig: INJECT 40 UNITS SUBCUTANEOUSLY NIGHTLY      Last office visit with prescribing clinician: 12/15/2022     Next office visit with prescribing clinician: 2/15/2023                         Would you like a call back once the refill request has been completed: [] Yes [] No    If the office needs to give you a call back, can they leave a voicemail: [] Yes [] No    Estella Wang MA  12/21/22, 14:07 EST

## 2023-01-26 ENCOUNTER — HOME HEALTH ADMISSION (OUTPATIENT)
Dept: HOME HEALTH SERVICES | Facility: HOME HEALTHCARE | Age: 78
End: 2023-01-26
Payer: COMMERCIAL

## 2023-01-26 ENCOUNTER — OFFICE VISIT (OUTPATIENT)
Dept: INTERNAL MEDICINE | Facility: CLINIC | Age: 78
End: 2023-01-26
Payer: MEDICARE

## 2023-01-26 VITALS
HEART RATE: 78 BPM | OXYGEN SATURATION: 99 % | WEIGHT: 196 LBS | TEMPERATURE: 98.5 F | SYSTOLIC BLOOD PRESSURE: 130 MMHG | DIASTOLIC BLOOD PRESSURE: 78 MMHG | BODY MASS INDEX: 26.55 KG/M2 | HEIGHT: 72 IN

## 2023-01-26 DIAGNOSIS — E11.65 TYPE 2 DIABETES MELLITUS WITH HYPERGLYCEMIA, WITHOUT LONG-TERM CURRENT USE OF INSULIN: ICD-10-CM

## 2023-01-26 DIAGNOSIS — R63.4 WEIGHT LOSS: ICD-10-CM

## 2023-01-26 DIAGNOSIS — R93.89 ABNORMAL FINDINGS ON DIAGNOSTIC IMAGING OF OTHER SPECIFIED BODY STRUCTURES: ICD-10-CM

## 2023-01-26 DIAGNOSIS — E78.2 MIXED HYPERLIPIDEMIA: ICD-10-CM

## 2023-01-26 DIAGNOSIS — R79.89 TSH ELEVATION: ICD-10-CM

## 2023-01-26 DIAGNOSIS — R41.3 MEMORY LOSS: ICD-10-CM

## 2023-01-26 DIAGNOSIS — I10 PRIMARY HYPERTENSION: ICD-10-CM

## 2023-01-26 DIAGNOSIS — L89.002 PRESSURE INJURY OF ELBOW, STAGE 2, UNSPECIFIED LATERALITY: Primary | ICD-10-CM

## 2023-01-26 PROCEDURE — 99214 OFFICE O/P EST MOD 30 MIN: CPT | Performed by: INTERNAL MEDICINE

## 2023-01-26 NOTE — PROGRESS NOTES
"Subjective   Que Lagunas is a 77 y.o. male.     Chief Complaint   Patient presents with   • bed sores   • glucose     Running high and low       History of Present Illness   Patient here for follow-up.  Family reports patient has lost weight sugar fluctuates and also developed sacral wound.  Dementia is getting worse.  Decubitus ulcerUsually 2 PM sugar may bottom out patient is on 30 units of LantusIn the 16 units Humalog with meals    Current Outpatient Medications:   •  amLODIPine (NORVASC) 5 MG tablet, Take 1 tablet by mouth once daily, Disp: 90 tablet, Rfl: 3  •  cholecalciferol (VITAMIN D3) 1000 units tablet, Take 1,000 Units by mouth Daily., Disp: , Rfl:   •  donepezil (Aricept) 10 MG tablet, Take 1 tablet by mouth Every Night., Disp: 90 tablet, Rfl: 3  •  esomeprazole (nexIUM) 20 MG capsule, Take 20 mg by mouth Every Morning Before Breakfast., Disp: , Rfl:   •  glucose blood (SOLUS V2 TEST) test strip, Use as instructed, Disp: 100 each, Rfl: 12  •  glucose monitor monitoring kit, 1 each As Needed (prn)., Disp: 1 each, Rfl: 0  •  Insulin Lispro (humaLOG) 100 UNIT/ML injection, INJECT 13 UNITS SUBCUTANEOUSLY WITH MEALS, Disp: 40 mL, Rfl: 0  •  Insulin Pen Needle (PEN NEEDLES 3/16\") 31G X 5 MM misc, Use with insulin daily E11.9, Disp: 100 each, Rfl: 5  •  Insulin Syringe-Needle U-100 (Easy Touch Insulin Syringe) 30G X 5/16\" 0.5 ML misc, use as directed with insulin 4 times a day, Disp: 100 each, Rfl: 11  •  Lancets misc, Use to test twice daily. E11.9, Disp: 100 each, Rfl: 12  •  Lantus 100 UNIT/ML injection, INJECT 40 UNITS SUBCUTANEOUSLY NIGHTLY, Disp: 10 mL, Rfl: 0  •  LifeScan Unistik II Lancets misc, 1 Units 3 (Three) Times a Day., Disp: 100 each, Rfl: 5  •  memantine (NAMENDA) 10 MG tablet, TAKE 1 TABLET BY MOUTH ONCE DAILY AT NIGHT, Disp: 90 tablet, Rfl: 1  •  metFORMIN (GLUCOPHAGE) 500 MG tablet, TAKE 1 TABLET BY MOUTH IN THE MORNING WITH  BREAKFAST, Disp: 90 tablet, Rfl: 3  •  metoprolol tartrate " (LOPRESSOR) 100 MG tablet, Take 50 mg by mouth 2 (Two) Times a Day., Disp: , Rfl:   •  PARoxetine (PAXIL) 20 MG tablet, Take 1 tablet by mouth Daily., Disp: 90 tablet, Rfl: 3  •  pramipexole (Mirapex) 1 MG tablet, Take 1 tablet by mouth every night at bedtime., Disp: 90 tablet, Rfl: 3  •  pravastatin (PRAVACHOL) 40 MG tablet, Take 1 tablet by mouth Every Evening., Disp: 90 tablet, Rfl: 3  •  Xarelto 20 MG tablet, Take 1 tablet by mouth once daily, Disp: 30 tablet, Rfl: 11    The following portions of the patient's history were reviewed and updated as appropriate: allergies, current medications, past family history, past medical history, past social history, past surgical history and problem list.    Review of Systems   Constitutional: Positive for unexpected weight change.   Respiratory: Negative.    Cardiovascular: Negative.    Gastrointestinal: Negative.    Musculoskeletal: Negative.    Skin: Positive for wound.   Neurological: Negative.    Psychiatric/Behavioral: Negative.        Objective   Physical Exam  Cardiovascular:      Rate and Rhythm: Normal rate and regular rhythm.      Heart sounds: Normal heart sounds.   Pulmonary:      Effort: Pulmonary effort is normal.      Breath sounds: Normal breath sounds.   Abdominal:      General: Bowel sounds are normal.   Musculoskeletal:      Cervical back: Neck supple.   Skin:     General: Skin is warm.      Comments: Sacral distal decubitus ulcer   Neurological:      Mental Status: He is alert and oriented to person, place, and time.         All tests have been reviewed.    Assessment & Plan   Diagnoses and all orders for this visit:    Pressure injury of elbow, stage 2, unspecified laterality (HCC)  -     Ambulatory Referral to Home Health    Primary hypertension stable on amlodipine 5 mg daily continue medication  -     CBC & Differential    Mixed hyperlipidemia continue medication  -     Comprehensive Metabolic Panel  -     Lipid Panel  -     CK    Weight loss check  lab    Type 2 diabetes mellitus with hyperglycemia, without long-term current use of insulin ([facility]) check lab  -     Hemoglobin A1c    Memory loss continue medication  -     C-reactive Protein  -     Sedimentation rate, automated    TSH elevation check lab  -     TSH    Abnormal findings on diagnostic imaging of other specified body structures  -     TSH              Primary hypertension stable on Norvasc 5 mg daily continue medication  -     amLODIPine (NORVASC) 5 MG tablet; Take 1 tablet by mouth Daily.     Mixed hyperlipidemia stable on pravastatin 40 mg daily continue medication do lab    Weight loss not on purpose check lab     Diabetes mellitus due to underlying condition with hyperosmolarity without coma, without long-term current use of insulin ([facility]) encouraged Family detailed assisted living to change patient's diet to diabetic diet.  Let me know if sugar still elevated or not  lantus 30u daily, humalog 16 tid 2 -3 pm low sugar. Change lunch numalog to 10u      Generalized anxiety disorder stable Paxil 20 mg daily continue medication  -     PARoxetine (PAXIL) 20 MG tablet; Take 1 tablet by mouth Daily.     Leg edema continue compression hose and also need low-salt diet

## 2023-01-27 LAB
ALBUMIN SERPL-MCNC: 4.1 G/DL (ref 3.5–5.2)
ALBUMIN/GLOB SERPL: 1.9 G/DL
ALP SERPL-CCNC: 95 U/L (ref 39–117)
ALT SERPL-CCNC: 23 U/L (ref 1–41)
AST SERPL-CCNC: 21 U/L (ref 1–40)
BASOPHILS # BLD AUTO: 0.07 10*3/MM3 (ref 0–0.2)
BASOPHILS NFR BLD AUTO: 0.6 % (ref 0–1.5)
BILIRUB SERPL-MCNC: 0.4 MG/DL (ref 0–1.2)
BUN SERPL-MCNC: 23 MG/DL (ref 8–23)
BUN/CREAT SERPL: 28.8 (ref 7–25)
CALCIUM SERPL-MCNC: 9.7 MG/DL (ref 8.6–10.5)
CHLORIDE SERPL-SCNC: 98 MMOL/L (ref 98–107)
CHOLEST SERPL-MCNC: 104 MG/DL (ref 0–200)
CK SERPL-CCNC: 129 U/L (ref 20–200)
CO2 SERPL-SCNC: 32.9 MMOL/L (ref 22–29)
CREAT SERPL-MCNC: 0.8 MG/DL (ref 0.76–1.27)
CRP SERPL-MCNC: <0.3 MG/DL (ref 0–0.5)
EGFRCR SERPLBLD CKD-EPI 2021: 90.6 ML/MIN/1.73
EOSINOPHIL # BLD AUTO: 0.24 10*3/MM3 (ref 0–0.4)
EOSINOPHIL NFR BLD AUTO: 1.9 % (ref 0.3–6.2)
ERYTHROCYTE [DISTWIDTH] IN BLOOD BY AUTOMATED COUNT: 13 % (ref 12.3–15.4)
ERYTHROCYTE [SEDIMENTATION RATE] IN BLOOD BY WESTERGREN METHOD: <1 MM/HR (ref 0–20)
GLOBULIN SER CALC-MCNC: 2.2 GM/DL
GLUCOSE SERPL-MCNC: 83 MG/DL (ref 65–99)
HBA1C MFR BLD: 6.7 % (ref 4.8–5.6)
HCT VFR BLD AUTO: 44.9 % (ref 37.5–51)
HDLC SERPL-MCNC: 44 MG/DL (ref 40–60)
HGB BLD-MCNC: 15 G/DL (ref 13–17.7)
IMM GRANULOCYTES # BLD AUTO: 0.05 10*3/MM3 (ref 0–0.05)
IMM GRANULOCYTES NFR BLD AUTO: 0.4 % (ref 0–0.5)
LDLC SERPL CALC-MCNC: 37 MG/DL (ref 0–100)
LYMPHOCYTES # BLD AUTO: 4.32 10*3/MM3 (ref 0.7–3.1)
LYMPHOCYTES NFR BLD AUTO: 34.6 % (ref 19.6–45.3)
MCH RBC QN AUTO: 29.8 PG (ref 26.6–33)
MCHC RBC AUTO-ENTMCNC: 33.4 G/DL (ref 31.5–35.7)
MCV RBC AUTO: 89.3 FL (ref 79–97)
MONOCYTES # BLD AUTO: 1.17 10*3/MM3 (ref 0.1–0.9)
MONOCYTES NFR BLD AUTO: 9.4 % (ref 5–12)
NEUTROPHILS # BLD AUTO: 6.62 10*3/MM3 (ref 1.7–7)
NEUTROPHILS NFR BLD AUTO: 53.1 % (ref 42.7–76)
NRBC BLD AUTO-RTO: 0 /100 WBC (ref 0–0.2)
PLATELET # BLD AUTO: 342 10*3/MM3 (ref 140–450)
POTASSIUM SERPL-SCNC: 4.4 MMOL/L (ref 3.5–5.2)
PROT SERPL-MCNC: 6.3 G/DL (ref 6–8.5)
RBC # BLD AUTO: 5.03 10*6/MM3 (ref 4.14–5.8)
SODIUM SERPL-SCNC: 139 MMOL/L (ref 136–145)
TRIGL SERPL-MCNC: 128 MG/DL (ref 0–150)
TSH SERPL DL<=0.005 MIU/L-ACNC: 3.25 UIU/ML (ref 0.27–4.2)
VLDLC SERPL CALC-MCNC: 23 MG/DL (ref 5–40)
WBC # BLD AUTO: 12.47 10*3/MM3 (ref 3.4–10.8)

## 2023-01-28 ENCOUNTER — HOME CARE VISIT (OUTPATIENT)
Dept: HOME HEALTH SERVICES | Facility: HOME HEALTHCARE | Age: 78
End: 2023-01-28
Payer: COMMERCIAL

## 2023-01-28 PROCEDURE — G0299 HHS/HOSPICE OF RN EA 15 MIN: HCPCS

## 2023-01-28 NOTE — Clinical Note
SOC Note: 1/28/23    Home Health ordered for: disciplines Sn, Add on for PT, OT, ST, HHA    Reason for Hosp/Primary Dx/Co-morbidities: Pressure ulcer stage II sacral area, DM, Dementia,     Focus of Care: wound care, Diabetic teaching, medication management, safety    Current Functional status/mobility/DME:  Walker, shower chair, grab bars    HB status/Living Arrangements: Dementia, weakness, unsteady gait, Lives with daughter in basement of her home    Skin Integrity/wound status: 1.5x1.0 sacral wound, cleanse with mild soap and water, apply barrier cream 2xday prn. Red rash to abdominal folds, and scrotal area    Code Status: full code    Fall Risk: high fall risk    POC confirmed with Dr Evans Blackburn    Plan for next visit: next week, wound assessment, diabetic teaching. Can you please get wound picture at next visit, I could not pull him up on my phone.

## 2023-01-29 VITALS
BODY MASS INDEX: 27.44 KG/M2 | TEMPERATURE: 95.4 F | WEIGHT: 196 LBS | OXYGEN SATURATION: 100 % | DIASTOLIC BLOOD PRESSURE: 80 MMHG | SYSTOLIC BLOOD PRESSURE: 124 MMHG | HEIGHT: 71 IN | HEART RATE: 74 BPM | RESPIRATION RATE: 18 BRPM

## 2023-01-29 NOTE — HOME HEALTH
SOC Note: 1/28/23    Home Health ordered for: disciplines Sn, Add on for PT, OT, ST, HHA    Reason for Hosp/Primary Dx/Co-morbidities: Pressure ulcer stage II sacral area, DM, Dementia,     Focus of Care: wound care, Diabetic teaching, medication management, safety    Current Functional status/mobility/DME:  Walker, shower chair, grab bars    HB status/Living Arrangements: Dementia, weakness, unsteady gait, Lives with daughter in basement of her home    Skin Integrity/wound status: 1.5x1.0 sacral wound, cleanse with mild soap and water, apply barrier cream 2xday prn. Red rash to abdominal folds, and scrotal area    Code Status: full code    Fall Risk: high fall risk    POC confirmed with Dr Evans Blackburn    Plan for next visit: next week, wound assessment, diabetic teaching

## 2023-01-30 RX ORDER — NYSTATIN 100000 [USP'U]/G
POWDER TOPICAL 2 TIMES DAILY
Qty: 60 G | Refills: 1 | Status: SHIPPED | OUTPATIENT
Start: 2023-01-30

## 2023-01-31 DIAGNOSIS — R41.3 MEMORY LOSS: ICD-10-CM

## 2023-01-31 RX ORDER — DONEPEZIL HYDROCHLORIDE 10 MG/1
TABLET, FILM COATED ORAL
Qty: 90 TABLET | Refills: 1 | Status: SHIPPED | OUTPATIENT
Start: 2023-01-31

## 2023-01-31 RX ORDER — INSULIN GLARGINE 100 [IU]/ML
INJECTION, SOLUTION SUBCUTANEOUS
Qty: 10 ML | Refills: 0 | Status: SHIPPED | OUTPATIENT
Start: 2023-01-31 | End: 2023-03-01 | Stop reason: SDUPTHER

## 2023-01-31 NOTE — TELEPHONE ENCOUNTER
Rx Refill Note  Requested Prescriptions     Pending Prescriptions Disp Refills   • Lantus 100 UNIT/ML injection [Pharmacy Med Name: Lantus 100 UNIT/ML Subcutaneous Solution] 10 mL 0     Sig: INJECT 40 UNITS SUBCUTANEOUSLY NIGHTLY      Last office visit with prescribing clinician: 1/26/2023     Next office visit with prescribing clinician: 2/15/2023                         Would you like a call back once the refill request has been completed: [] Yes [] No    If the office needs to give you a call back, can they leave a voicemail: [] Yes [] No    Estella Wang MA  01/31/23, 16:51 EST

## 2023-02-01 ENCOUNTER — HOME CARE VISIT (OUTPATIENT)
Dept: HOME HEALTH SERVICES | Facility: HOME HEALTHCARE | Age: 78
End: 2023-02-01
Payer: COMMERCIAL

## 2023-02-01 VITALS
HEART RATE: 115 BPM | RESPIRATION RATE: 16 BRPM | OXYGEN SATURATION: 96 % | SYSTOLIC BLOOD PRESSURE: 120 MMHG | DIASTOLIC BLOOD PRESSURE: 62 MMHG

## 2023-02-01 VITALS
RESPIRATION RATE: 16 BRPM | SYSTOLIC BLOOD PRESSURE: 120 MMHG | HEART RATE: 115 BPM | OXYGEN SATURATION: 96 % | DIASTOLIC BLOOD PRESSURE: 62 MMHG | TEMPERATURE: 98.6 F

## 2023-02-01 PROCEDURE — G0153 HHCP-SVS OF S/L PATH,EA 15MN: HCPCS

## 2023-02-01 PROCEDURE — G0151 HHCP-SERV OF PT,EA 15 MIN: HCPCS

## 2023-02-01 NOTE — HOME HEALTH
ST evaluation complete on this date.  Hx per daughter; pt. moved here about 2 years ago after his wife .  He was living with her for awhile and she placed him in [facility] for increased socialization. Pt. was requiring increased assistance with ADLs and IADL and needed higher level of care.  Daughter moved pt. back into her home.  Pt. does have dx of dementia.  Pt. with noted mild/moderate cognitive communication deficits as evidenced by his score on the MOCA.  Pt. demonstrates deficits in the areas of short term memory recall, word retrieval, orientation.  Deficits most significantly impact his ability to recall medication administration times, his ability to manage diabetes and his ablity to perform BADLs.  These deficits increase burden of care upon caregivers and increase his risk of falls in the home.  See interventions tab for goals and POC.

## 2023-02-02 ENCOUNTER — HOSPITAL ENCOUNTER (EMERGENCY)
Facility: HOSPITAL | Age: 78
Discharge: LEFT BEFORE DISCHARGE | End: 2023-02-02
Payer: MEDICARE

## 2023-02-02 ENCOUNTER — HOME CARE VISIT (OUTPATIENT)
Dept: HOME HEALTH SERVICES | Facility: HOME HEALTHCARE | Age: 78
End: 2023-02-02
Payer: COMMERCIAL

## 2023-02-02 VITALS
DIASTOLIC BLOOD PRESSURE: 75 MMHG | SYSTOLIC BLOOD PRESSURE: 96 MMHG | RESPIRATION RATE: 16 BRPM | HEART RATE: 103 BPM | OXYGEN SATURATION: 97 % | TEMPERATURE: 97.9 F

## 2023-02-02 VITALS
OXYGEN SATURATION: 100 % | TEMPERATURE: 97.8 F | SYSTOLIC BLOOD PRESSURE: 114 MMHG | RESPIRATION RATE: 16 BRPM | BODY MASS INDEX: 27.44 KG/M2 | HEIGHT: 71 IN | DIASTOLIC BLOOD PRESSURE: 87 MMHG | HEART RATE: 110 BPM | WEIGHT: 196 LBS

## 2023-02-02 VITALS — DIASTOLIC BLOOD PRESSURE: 70 MMHG | HEART RATE: 99 BPM | OXYGEN SATURATION: 97 % | SYSTOLIC BLOOD PRESSURE: 113 MMHG

## 2023-02-02 PROCEDURE — G0152 HHCP-SERV OF OT,EA 15 MIN: HCPCS

## 2023-02-02 PROCEDURE — G0156 HHCP-SVS OF AIDE,EA 15 MIN: HCPCS

## 2023-02-02 PROCEDURE — G0495 RN CARE TRAIN/EDU IN HH: HCPCS

## 2023-02-02 PROCEDURE — 99211 OFF/OP EST MAY X REQ PHY/QHP: CPT

## 2023-02-02 NOTE — HOME HEALTH
76 y/o M with recent hospitalization for syncopial episode. Pt. seen today for OT evaluation and presents with  decreased strength/endurance limiting safety and function with ADLs. Pt. will benefit from continued OT services to increase strength, endurance, safety, and overall independence with ADLs at home.    PMH listed below:  Anemia   Anxiety   Arthritis   Atrial fibrillation (HCC)   CHADS VASc=3. Continue Xarelto 20. vas continue Zaroxolyn 20 has a relative 20 Kn[place]och can add Lantus 40   CAD (coronary artery disease)   Contact dermatitis   Diabetes (HCC)   Dyslipidemia   Hypertension   Palpitations   Prostatism   Stroke (HCC)

## 2023-02-02 NOTE — HOME HEALTH
Mr. Lagunas is a 77 year old male with significant medical history of dementia, sacral pressure wounds and recent weight loss. Pt had lived at his daughter's home for approx. 2 years due to progressive dementia then moved to an [facility] for a few months prior to recently moving back to his daughter's. Pt is able to ambulate short distances with rollator. Bed/bath on ground floor but does navigate 14 steps to go to main floor if needed. Daughter reports 1 fall due to tripping on carpet.   Pt did get SOA during evaluation and demonstrates impaired balance indicating high fall risk. Pt would benefit from skilled PT to address impairments.

## 2023-02-04 NOTE — HOME HEALTH
Patient scheduled for a SNV. On arrival to patient's home, he was sitting up at the kitchen table. He was drowsy and kept falling alseep. He would awake to verbal command. He was diaphoretic, cool, clammy. Pulse was 130, weak and thready. BP was 80 over palp. Patient was sent to ER. Offered/recommended EMS transport. Daughter declined. Stated she would transport via private vehicle. Daughter instructed to go straight to ED. Provider notified via Case Communication. No other assessment was completed due to patient's condition.

## 2023-02-06 ENCOUNTER — HOME CARE VISIT (OUTPATIENT)
Dept: HOME HEALTH SERVICES | Facility: HOME HEALTHCARE | Age: 78
End: 2023-02-06
Payer: COMMERCIAL

## 2023-02-06 VITALS
HEART RATE: 62 BPM | TEMPERATURE: 97.4 F | OXYGEN SATURATION: 95 % | DIASTOLIC BLOOD PRESSURE: 86 MMHG | SYSTOLIC BLOOD PRESSURE: 127 MMHG | RESPIRATION RATE: 16 BRPM

## 2023-02-06 PROCEDURE — G0300 HHS/HOSPICE OF LPN EA 15 MIN: HCPCS

## 2023-02-06 NOTE — HOME HEALTH
Routine Visit Note: LPN    Skill/education Complete assessment. Education on diabetic foot care     Patient/caregiver verbalised understanding of instructions    Plan for next visit:Continue with plan of care as set.    Other pertinent info: n/a

## 2023-02-09 ENCOUNTER — HOME CARE VISIT (OUTPATIENT)
Dept: HOME HEALTH SERVICES | Facility: HOME HEALTHCARE | Age: 78
End: 2023-02-09
Payer: COMMERCIAL

## 2023-02-09 VITALS
DIASTOLIC BLOOD PRESSURE: 88 MMHG | HEART RATE: 60 BPM | TEMPERATURE: 97.9 F | OXYGEN SATURATION: 96 % | RESPIRATION RATE: 16 BRPM | SYSTOLIC BLOOD PRESSURE: 150 MMHG

## 2023-02-09 VITALS
DIASTOLIC BLOOD PRESSURE: 88 MMHG | SYSTOLIC BLOOD PRESSURE: 150 MMHG | OXYGEN SATURATION: 96 % | HEART RATE: 49 BPM | RESPIRATION RATE: 16 BRPM

## 2023-02-09 PROCEDURE — G0153 HHCP-SVS OF S/L PATH,EA 15MN: HCPCS

## 2023-02-09 PROCEDURE — G0156 HHCP-SVS OF AIDE,EA 15 MIN: HCPCS

## 2023-02-10 ENCOUNTER — HOME CARE VISIT (OUTPATIENT)
Dept: HOME HEALTH SERVICES | Facility: HOME HEALTHCARE | Age: 78
End: 2023-02-10
Payer: COMMERCIAL

## 2023-02-10 VITALS
SYSTOLIC BLOOD PRESSURE: 135 MMHG | HEART RATE: 51 BPM | OXYGEN SATURATION: 97 % | TEMPERATURE: 98.7 F | DIASTOLIC BLOOD PRESSURE: 85 MMHG | RESPIRATION RATE: 15 BRPM

## 2023-02-10 VITALS — OXYGEN SATURATION: 96 % | HEART RATE: 60 BPM | DIASTOLIC BLOOD PRESSURE: 70 MMHG | SYSTOLIC BLOOD PRESSURE: 115 MMHG

## 2023-02-10 PROCEDURE — G0152 HHCP-SERV OF OT,EA 15 MIN: HCPCS

## 2023-02-10 PROCEDURE — G0157 HHC PT ASSISTANT EA 15: HCPCS

## 2023-02-10 NOTE — HOME HEALTH
"Routine Visit Note: Greeted at door by caregiver, patient first seen sitting in recliner in basement with no visable signs of distress. Patient reports feeling \"pretty good\" today, and states that he has been completing exercises on his own.      Skill/education provided: Instructed gait training, therapeutic exercise, and balance training today. Patient educated on HEP exercises, and encourged to complete multiple times daily.     Patient/caregiver response: Patient tolerated all treatment well today, with no visable signs of distress, pain, or excessive fatigue. Patient declined to progress with exercise and gait training today.     Plan for next visit: Patient would benefit from continued skilled physical therapy to address deficits in BLE strength, balance, and overall functional mobility. Progress with gait training and balance exercises next visit. Progress to standing exercises."

## 2023-02-10 NOTE — HOME HEALTH
Therapy interventions focused on education and implementation of external aides to improve pt's independence with daily living tasks.  A calendar was implemented to improve pt's recall of temporal orientation tasks and a daily checlist was implemented to improve his ability to recall daily care routines.  Pt. educated on both visual aides and he verbalizes understanding, however, due to memory loss, further education may be necessary.  Next session will focus on daily routine checklist, education and training and problem solving tasks to increase independence with daily living tasks.

## 2023-02-10 NOTE — HOME HEALTH
Pt. tolerated OT session fair. Pt participated in 36 mins of ADLs and theraputic exercises: Pt. required max cues for dymamic standing balalnce today, however, demonstrated good participation with OOB ADLs tasks today & UB HEP. Continue OT per POC to increase independence and safety with ADLs within the home.

## 2023-02-13 ENCOUNTER — HOME CARE VISIT (OUTPATIENT)
Dept: HOME HEALTH SERVICES | Facility: HOME HEALTHCARE | Age: 78
End: 2023-02-13
Payer: COMMERCIAL

## 2023-02-13 NOTE — CASE COMMUNICATION
Patient missed a HHAIDE visit from [facility] on 2-13-23.     Reason: HHA visits denied.       For your records only.   As per home health protocol, MD must be notified of missed/cancelled visits; therefore the prescribed frequency was not met.

## 2023-02-14 ENCOUNTER — HOME CARE VISIT (OUTPATIENT)
Dept: HOME HEALTH SERVICES | Facility: HOME HEALTHCARE | Age: 78
End: 2023-02-14
Payer: COMMERCIAL

## 2023-02-14 PROCEDURE — G0300 HHS/HOSPICE OF LPN EA 15 MIN: HCPCS

## 2023-02-15 ENCOUNTER — OFFICE VISIT (OUTPATIENT)
Dept: INTERNAL MEDICINE | Facility: CLINIC | Age: 78
End: 2023-02-15
Payer: MEDICARE

## 2023-02-15 VITALS
SYSTOLIC BLOOD PRESSURE: 110 MMHG | DIASTOLIC BLOOD PRESSURE: 54 MMHG | HEART RATE: 55 BPM | TEMPERATURE: 97 F | OXYGEN SATURATION: 96 % | RESPIRATION RATE: 16 BRPM

## 2023-02-15 VITALS
OXYGEN SATURATION: 98 % | DIASTOLIC BLOOD PRESSURE: 68 MMHG | WEIGHT: 187 LBS | BODY MASS INDEX: 26.18 KG/M2 | SYSTOLIC BLOOD PRESSURE: 100 MMHG | TEMPERATURE: 97.5 F | HEIGHT: 71 IN | HEART RATE: 58 BPM

## 2023-02-15 DIAGNOSIS — R63.4 WEIGHT LOSS: ICD-10-CM

## 2023-02-15 DIAGNOSIS — R79.89 TSH ELEVATION: ICD-10-CM

## 2023-02-15 DIAGNOSIS — I48.0 PAROXYSMAL ATRIAL FIBRILLATION: ICD-10-CM

## 2023-02-15 DIAGNOSIS — R41.3 MEMORY LOSS: ICD-10-CM

## 2023-02-15 DIAGNOSIS — E78.2 MIXED HYPERLIPIDEMIA: ICD-10-CM

## 2023-02-15 DIAGNOSIS — E11.65 TYPE 2 DIABETES MELLITUS WITH HYPERGLYCEMIA, WITHOUT LONG-TERM CURRENT USE OF INSULIN: ICD-10-CM

## 2023-02-15 DIAGNOSIS — I10 PRIMARY HYPERTENSION: ICD-10-CM

## 2023-02-15 DIAGNOSIS — D72.823 LEUKEMOID REACTION: Primary | ICD-10-CM

## 2023-02-15 LAB
EXPIRATION DATE: NORMAL
GLUCOSE BLDC GLUCOMTR-MCNC: 120 MG/DL (ref 70–130)
HBA1C MFR BLD: 6.4 %
Lab: NORMAL

## 2023-02-15 PROCEDURE — 93000 ELECTROCARDIOGRAM COMPLETE: CPT | Performed by: INTERNAL MEDICINE

## 2023-02-15 PROCEDURE — 82962 GLUCOSE BLOOD TEST: CPT | Performed by: INTERNAL MEDICINE

## 2023-02-15 PROCEDURE — 83036 HEMOGLOBIN GLYCOSYLATED A1C: CPT | Performed by: INTERNAL MEDICINE

## 2023-02-15 PROCEDURE — 3044F HG A1C LEVEL LT 7.0%: CPT | Performed by: INTERNAL MEDICINE

## 2023-02-15 PROCEDURE — 99214 OFFICE O/P EST MOD 30 MIN: CPT | Performed by: INTERNAL MEDICINE

## 2023-02-15 NOTE — PROGRESS NOTES
"Subjective   Que Lagunas is a 77 y.o. male.     Chief Complaint   Patient presents with   • Follow-up   • Diabetes   • Fatigue              History of Present Illness   Patient here for follow-up.  Family complaints patient has fatigue low blood pressure and fast heart rate.  Went to emergency room nothing has been done.  Patient has some diaphoresis.  No short of breath no chest pain.  Patient lost 9 pound again stating no appetite.  Recent blood test diabetes cholesterol controlled no dehydration.  White blood cell mildly elevated.  A1c today 6.4.  Denies any cough.  Home health thought patient might have foot infection.    Current Outpatient Medications:   •  amLODIPine (NORVASC) 5 MG tablet, Take 1 tablet by mouth once daily, Disp: 90 tablet, Rfl: 3  •  cholecalciferol (VITAMIN D3) 1000 units tablet, Take 1,000 Units by mouth Daily. Indications: Vitamin D Deficiency, Disp: , Rfl:   •  donepezil (ARICEPT) 10 MG tablet, TAKE 1 TABLET BY MOUTH ONCE DAILY AT NIGHT, Disp: 90 tablet, Rfl: 1  •  esomeprazole (nexIUM) 20 MG capsule, Take 20 mg by mouth Every Morning Before Breakfast. Indications: Heartburn, Disp: , Rfl:   •  ferrous sulfate 325 (65 FE) MG tablet, Take 325 mg by mouth Daily With Breakfast., Disp: , Rfl:   •  furosemide (LASIX) 20 MG tablet, Take 20 mg by mouth Daily., Disp: , Rfl:   •  glucose blood (SOLUS V2 TEST) test strip, Use as instructed, Disp: 100 each, Rfl: 12  •  glucose monitor monitoring kit, 1 each As Needed (prn)., Disp: 1 each, Rfl: 0  •  Insulin Lispro (humaLOG) 100 UNIT/ML injection, INJECT 13 UNITS SUBCUTANEOUSLY WITH MEALS (Patient taking differently: Inject 10 Units under the skin into the appropriate area as directed 3 (Three) Times a Day Before Meals. INJECT 10 UNITS AT BREAKFAST AND LUNCH INJECT 16 UNITS AT DINNER  Indications: Type 2 Diabetes), Disp: 40 mL, Rfl: 0  •  Insulin Pen Needle (PEN NEEDLES 3/16\") 31G X 5 MM misc, Use with insulin daily E11.9, Disp: 100 each, Rfl: " "5  •  Insulin Syringe-Needle U-100 (Easy Touch Insulin Syringe) 30G X 5/16\" 0.5 ML misc, use as directed with insulin 4 times a day, Disp: 100 each, Rfl: 11  •  Lancets misc, Use to test twice daily. E11.9, Disp: 100 each, Rfl: 12  •  Lantus 100 UNIT/ML injection, INJECT 40 UNITS SUBCUTANEOUSLY NIGHTLY, Disp: 10 mL, Rfl: 0  •  LifeScan Unistik II Lancets misc, 1 Units 3 (Three) Times a Day., Disp: 100 each, Rfl: 5  •  memantine (NAMENDA) 10 MG tablet, TAKE 1 TABLET BY MOUTH ONCE DAILY AT NIGHT, Disp: 90 tablet, Rfl: 1  •  metFORMIN (GLUCOPHAGE) 500 MG tablet, TAKE 1 TABLET BY MOUTH IN THE MORNING WITH  BREAKFAST, Disp: 90 tablet, Rfl: 3  •  metoprolol tartrate (LOPRESSOR) 100 MG tablet, Take 50 mg by mouth 2 (Two) Times a Day. Indications: High Blood Pressure Disorder, Disp: , Rfl:   •  nystatin (MYCOSTATIN) 113135 UNIT/GM powder, Apply  topically to the appropriate area as directed 2 (Two) Times a Day., Disp: 60 g, Rfl: 1  •  PARoxetine (PAXIL) 20 MG tablet, Take 1 tablet by mouth Daily., Disp: 90 tablet, Rfl: 3  •  pramipexole (Mirapex) 1 MG tablet, Take 1 tablet by mouth every night at bedtime., Disp: 90 tablet, Rfl: 3  •  pravastatin (PRAVACHOL) 40 MG tablet, Take 1 tablet by mouth Every Evening., Disp: 90 tablet, Rfl: 3  •  Vitamins A & D (magic barrier cream), Apply 1 application topically to the appropriate area as directed As Needed. when incontinent, Disp: , Rfl:   •  Xarelto 20 MG tablet, Take 1 tablet by mouth once daily, Disp: 30 tablet, Rfl: 11    The following portions of the patient's history were reviewed and updated as appropriate: allergies, current medications, past family history, past medical history, past social history, past surgical history and problem list.    Review of Systems   Constitutional: Positive for diaphoresis, fatigue and unexpected weight change. Negative for chills and fever.   HENT: Negative for congestion.    Respiratory: Negative.  Negative for cough and shortness of breath.  "   Cardiovascular: Negative.  Negative for chest pain and palpitations.   Gastrointestinal: Negative.    Musculoskeletal: Negative.    Skin: Negative.    Neurological: Negative.    Psychiatric/Behavioral: Negative.        Objective   Physical Exam  Cardiovascular:      Rate and Rhythm: Tachycardia present. Rhythm irregular.      Heart sounds: Normal heart sounds.   Pulmonary:      Effort: Pulmonary effort is normal.      Breath sounds: Normal breath sounds.   Abdominal:      General: Bowel sounds are normal.   Musculoskeletal:      Cervical back: Neck supple.   Skin:     General: Skin is warm.      Comments: No apparent infection in both feet   Neurological:      Mental Status: He is alert and oriented to person, place, and time.         All tests have been reviewed.    Assessment & Plan   Diagnoses and all orders for this visit:    Leukemoid reaction  -     CBC & Differential    Type 2 diabetes mellitus with hyperglycemia, without long-term current use of insulin ([facility]) A1c 6.4 patient is not eating well weight loss 9 pound over 2 weeks we will cut down Lantus from 30 units to 20 units.  -     POCT Glucose  -     POC Glycosylated Hemoglobin (Hb A1C)    Primary hypertension blood pressure low systolic blood pressure today 100.  Hold amlodipine for now    Mixed hyperlipidemia stable on medication    Paroxysmal atrial fibrillation (HCC) EKG showed atrial fibrillation with .  Increase metoprolol from 50 mg to 75 mg twice a day    Weight loss 25 pound weight loss over 2 months patient is not eating well.  Encouraged patient to increase diet if not we may need to start the Megace    Memory loss continue medication    TSH elevation resolved after medication      2 weeks follow-up      ECG 12 Lead    Date/Time: 2/15/2023 5:25 PM  Performed by: Evans Blackburn MD  Authorized by: Evans Blackburn MD   Comparison: not compared with previous ECG   Rhythm: atrial fibrillation  Rate: tachycardic    Clinical impression:  abnormal EKG

## 2023-02-16 ENCOUNTER — HOME CARE VISIT (OUTPATIENT)
Dept: HOME HEALTH SERVICES | Facility: HOME HEALTHCARE | Age: 78
End: 2023-02-16
Payer: COMMERCIAL

## 2023-02-16 VITALS
RESPIRATION RATE: 16 BRPM | SYSTOLIC BLOOD PRESSURE: 116 MMHG | DIASTOLIC BLOOD PRESSURE: 80 MMHG | OXYGEN SATURATION: 96 % | HEART RATE: 62 BPM | TEMPERATURE: 98.6 F

## 2023-02-16 VITALS — DIASTOLIC BLOOD PRESSURE: 81 MMHG | OXYGEN SATURATION: 96 % | SYSTOLIC BLOOD PRESSURE: 146 MMHG | HEART RATE: 72 BPM

## 2023-02-16 PROCEDURE — G0152 HHCP-SERV OF OT,EA 15 MIN: HCPCS

## 2023-02-16 PROCEDURE — G0153 HHCP-SVS OF S/L PATH,EA 15MN: HCPCS

## 2023-02-16 NOTE — HOME HEALTH
Pt. tolerated OT session well. Pt. was sleeping upon OT arrival and required cues/min A for all OOB ADLs today. Pt. with good participation/command following for all ADLs. Continue OT per POC.

## 2023-02-16 NOTE — CASE COMMUNICATION
Patient missed a HHAIDE visit from [facility] on 2-16-23.     Reason: HHA visits denied.       For your records only.   As per home health protocol, MD must be notified of missed/cancelled visits; therefore the prescribed frequency was not met.

## 2023-02-17 ENCOUNTER — HOME CARE VISIT (OUTPATIENT)
Dept: HOME HEALTH SERVICES | Facility: HOME HEALTHCARE | Age: 78
End: 2023-02-17
Payer: COMMERCIAL

## 2023-02-17 VITALS
HEART RATE: 64 BPM | TEMPERATURE: 97.6 F | DIASTOLIC BLOOD PRESSURE: 76 MMHG | OXYGEN SATURATION: 94 % | RESPIRATION RATE: 15 BRPM | SYSTOLIC BLOOD PRESSURE: 135 MMHG

## 2023-02-17 PROCEDURE — G0157 HHC PT ASSISTANT EA 15: HCPCS

## 2023-02-17 NOTE — HOME HEALTH
"Routine Visit Note: Greeted by patient caregiver at door, patient first seen lying in bed with no visable signs of distress. Patient reports \"feeling okay\" and states that he has been \"moving around pretty good\".     Skill/education provided: Instructed therapeutic exercise, gait training, and balance exercises today. Educated patient on HEP and encouraged to complete multiple times daily.     Patient/caregiver response: Patient tolerated all treatment well today, with no visable signs of distress or reports of pain.     Plan for next visit: Patient would benefit from continued skilled physical therapy to address deficits in BLE strength, balance, and overall functional mobility. Progress with gait training and standing exercises as tolerated."

## 2023-02-17 NOTE — HOME HEALTH
Therapy interventions focused on improving pt's overall cognitive communication status in order to improve pt's ability to perform daily living activities with increased independence using external aides.  SLP implemented a daily checklist during last week's visit in order to assist pt. in checking blood sugars, taking medications and changing his brief.  Pt was able to utilize on 1/7 days checklist had been set up for last.  Pt. now has a caregiver in the home at least 2 days a week.  SLP educated caregiver on providing verbal prompts to pt. to look at check list for daily rountines and assist him in checking items off as he does them.  Beatrice, (caregiver) verbalized understanding.  SLP also implemented a log for BP and glucose and pt. is not able to recall what those numbers are when he does check them.  Pt. requires assist to answer temporal orientation questions using his atomic clock.  Pt. unable to recall events from the past week or earlier in the am on this visit, however, he is not interested in memory book to assist in his recall.  Next session to focus on continued education and training with external aides to increase pt's independence with using his remote for TV.

## 2023-02-20 ENCOUNTER — HOME CARE VISIT (OUTPATIENT)
Dept: HOME HEALTH SERVICES | Facility: HOME HEALTHCARE | Age: 78
End: 2023-02-20
Payer: COMMERCIAL

## 2023-02-20 PROCEDURE — G0300 HHS/HOSPICE OF LPN EA 15 MIN: HCPCS

## 2023-02-21 NOTE — PROGRESS NOTES
[facility] Cardiology  Office Progress Note  Que Lagunas  1945  [address] [place] [address]       Visit Date: 02/24/23    PCP: Evans Blackburn MD  [address]  [place] [address]    IDENTIFICATION: A 77 y.o. male former [employer] employee,  ,retired  from [place], former fishing enthusiast     PROBLEM LIST:   1. CAD/dyspnea  1. 3/20 two 2D echo: LVEF = 66%.  LV wall thickness-mild concentric hypertrophy.  LV diastolic dysfunction (grade 2) pseudonormalization.  2. Echo 3/22: EF 66%, mild conc LVH, LV diastolic fx  With grade II w/high LAP psudonormalization  2. Atrial Fibrillation  3. TIA:   a. April 2016, evaluation at [facility] with paroxysmal atrial fibrillation and negative CT of the head and neck.   4. Abnormal EKG:  a. Bifascicular block and no prior ischemia evaluation.   b. 4/16 echo EF 60%  5. 2/2023 Unintended weight loss - 25 lbs over 2 months, decreased PO intake  6. Dyslipidemia  b. 3/21 134/39/57/67  c. 3/22 133/42/48/75  d. 1/23 104/128/44/37  7. Diabetes mellitus, onset at age 49 and is requiring insulin x12 years.  a. A1c 11/19 9.0  b. 11/21 A1c 6.8  c. 3/22 A1c 8.3  d. 11/22 [facility] ED visit due to hypoglycemia  e. 1/23 A1c 6.7  8.  Hypertension, presumed essential.   9. 2/23 [facility] ED syncopal episode due to hypoglycemia  10. 12/13/18 AAA screen WNL  11. Prostatism.   12. Remote tonsillectomy and eye surgery.  13. Nicotine addiction, cessation 2011 with a 56 pack year history previously.  14. Loss of balance and memory  15. Dementia-vascular [facility]  16. Restless leg syndrome  17. Tiny Left lower lobe Lung nodules and moderate emphysematous changes        CC:   Chief Complaint   Patient presents with   • Coronary artery disease involving native coronary artery of       Allergies  Allergies   Allergen Reactions   • Lisinopril Anaphylaxis       Current Medications    Current Outpatient Medications:   •  cholecalciferol (VITAMIN D3)  "1000 units tablet, Take 1,000 Units by mouth Daily. Indications: Vitamin D Deficiency, Disp: , Rfl:   •  donepezil (ARICEPT) 10 MG tablet, TAKE 1 TABLET BY MOUTH ONCE DAILY AT NIGHT, Disp: 90 tablet, Rfl: 1  •  esomeprazole (nexIUM) 20 MG capsule, Take 20 mg by mouth Every Morning Before Breakfast. Indications: Heartburn, Disp: , Rfl:   •  ferrous sulfate 325 (65 FE) MG tablet, Take 325 mg by mouth Daily With Breakfast., Disp: , Rfl:   •  furosemide (LASIX) 20 MG tablet, Take 20 mg by mouth Daily., Disp: , Rfl:   •  glucose blood (SOLUS V2 TEST) test strip, Use as instructed, Disp: 100 each, Rfl: 12  •  glucose monitor monitoring kit, 1 each As Needed (prn)., Disp: 1 each, Rfl: 0  •  Insulin Lispro (humaLOG) 100 UNIT/ML injection, INJECT 13 UNITS SUBCUTANEOUSLY WITH MEALS (Patient taking differently: Inject 36 Units under the skin into the appropriate area as directed 3 (Three) Times a Day Before Meals. INJECT 10 UNITS AT BREAKFAST AND LUNCH INJECT 16 UNITS AT DINNER  Indications: Type 2 Diabetes), Disp: 40 mL, Rfl: 0  •  Insulin Pen Needle (PEN NEEDLES 3/16\") 31G X 5 MM misc, Use with insulin daily E11.9, Disp: 100 each, Rfl: 5  •  Insulin Syringe-Needle U-100 (Easy Touch Insulin Syringe) 30G X 5/16\" 0.5 ML misc, use as directed with insulin 4 times a day, Disp: 100 each, Rfl: 11  •  Lancets misc, Use to test twice daily. E11.9, Disp: 100 each, Rfl: 12  •  Lantus 100 UNIT/ML injection, INJECT 40 UNITS SUBCUTANEOUSLY NIGHTLY (Patient taking differently: 20 Units. 20 units daily  Indications: Type 2 Diabetes), Disp: 10 mL, Rfl: 0  •  LifeScan Unistik II Lancets misc, 1 Units 3 (Three) Times a Day., Disp: 100 each, Rfl: 5  •  memantine (NAMENDA) 10 MG tablet, TAKE 1 TABLET BY MOUTH ONCE DAILY AT NIGHT, Disp: 90 tablet, Rfl: 1  •  metFORMIN (GLUCOPHAGE) 500 MG tablet, TAKE 1 TABLET BY MOUTH IN THE MORNING WITH  BREAKFAST, Disp: 90 tablet, Rfl: 3  •  metoprolol tartrate (LOPRESSOR) 100 MG tablet, Take 75 mg by mouth 2 " "(Two) Times a Day. 75mg daily  Indications: High Blood Pressure Disorder, Disp: , Rfl:   •  nystatin (MYCOSTATIN) 976562 UNIT/GM powder, Apply  topically to the appropriate area as directed 2 (Two) Times a Day., Disp: 60 g, Rfl: 1  •  PARoxetine (PAXIL) 20 MG tablet, Take 1 tablet by mouth Daily., Disp: 90 tablet, Rfl: 3  •  pramipexole (Mirapex) 1 MG tablet, Take 1 tablet by mouth every night at bedtime., Disp: 90 tablet, Rfl: 3  •  pravastatin (PRAVACHOL) 40 MG tablet, Take 1 tablet by mouth Every Evening., Disp: 90 tablet, Rfl: 3  •  Xarelto 20 MG tablet, Take 1 tablet by mouth once daily, Disp: 30 tablet, Rfl: 11      History of Present Illness   Que Lagunas is a 77 y.o. year old male here for follow up on CAD, paroxysmal afib, htn, and hyperlipidemia. He had an ED visit in 11/22 due to a hypoglycemic episode involving syncope. EKG showed afib with rates 106. Patient experienced atrial fibrillation with rates 110 at rest while at a PCP's office 2/15 and metoprolol dosage was increased to 75 mg bid.  There were some concerns with hypotension so amlodipine was discontinued.  This since patient has had these medication changes, he and his daughter state that he has been doing relatively well and his appetite has improved.  He was at an assisted living center previously but has since moved in with his daughter.  He does report that he has been more sweaty recently but    denies any chest pain, dyspnea, dyspnea on exertion, orthopnea, PND, palpitations, lower extremity edema, or claudication.      OBJECTIVE:  Vitals:    02/24/23 0959   BP: 100/68   BP Location: Left arm   Patient Position: Sitting   Pulse: 63   SpO2: 97%   Weight: 86.2 kg (190 lb)   Height: 180.3 cm (71\")     Body mass index is 26.5 kg/m².    Constitutional:       Appearance: Not in distress.      Comments: sweaty   Pulmonary:      Effort: Pulmonary effort is normal.      Breath sounds: Normal breath sounds. No wheezing. No rhonchi. No rales. "   Chest:      Chest wall: Not tender to palpatation.   Cardiovascular:      PMI at left midclavicular line. Normal rate. Regular rhythm. Normal S1. Normal S2.      Murmurs: There is no murmur.      No gallop. No click. No rub.   Pulses:     Intact distal pulses.   Edema:     Peripheral edema absent.   Skin:     General: Skin is warm and dry.   Neurological:      General: No focal deficit present.      Mental Status: Alert and oriented to person, place and time.         Diagnostic Data:  Procedures      ASSESSMENT:   Diagnosis Plan   1. Mixed hyperlipidemia        2. Primary hypertension        3. Paroxysmal atrial fibrillation (HCC)        4. Type 2 diabetes mellitus with microalbuminuria, with long-term current use of insulin ([facility])        5. Coronary artery disease involving native coronary artery of native heart without angina pectoris            PLAN:  Hypertension- The patient has been having hypotension recently. Agree with discontinuation of amlodipine.  BP in office today 100 systolic.  Patient's daughter will keep a 2-week blood pressure log and call our office if numerous SBP <100.    Paroxysmal atrial fibrillation- Patient on Xarelto.  His metoprolol dosage was increased to 75 mg twice daily due to elevated heart rate at PCPs office recently at 106 bpm.  Overall, patient has been tolerating medication changes well according to him and his daughter. Patient with no recent heart failure symptoms.    CAD- Patient with no obstructive symptoms. Continue risk factor management.    Hyperlipidemia- Lipid panel in January at goal. Continue statin therapy.    Type 2 diabetes- The patient has had no recurrence of hypoglycemic episode in November. Continue medical management with oral meds and insulin. A1c 6.7 this January.    Scribed by Cesar Byrd PA-C for Charles Topete MD, [facility]

## 2023-02-22 VITALS
DIASTOLIC BLOOD PRESSURE: 79 MMHG | OXYGEN SATURATION: 96 % | TEMPERATURE: 96.7 F | RESPIRATION RATE: 16 BRPM | SYSTOLIC BLOOD PRESSURE: 117 MMHG | HEART RATE: 79 BPM

## 2023-02-22 NOTE — HOME HEALTH
Routine Visit Note: LPN    Skill/education provided:       Patient/caregiver response:     Plan for next visit:      Other pertinent info:

## 2023-02-22 NOTE — HOME HEALTH
Routine Visit Note: LPN    Skill/education provided: Assessment,  wound care, diadetic education    Patient/caregiver verbalized understanding    Plan for next visit:  Diabetic medication management and wound assessment.    Other pertinent info:

## 2023-02-23 ENCOUNTER — HOME CARE VISIT (OUTPATIENT)
Dept: HOME HEALTH SERVICES | Facility: HOME HEALTHCARE | Age: 78
End: 2023-02-23
Payer: COMMERCIAL

## 2023-02-23 VITALS — OXYGEN SATURATION: 94 % | HEART RATE: 76 BPM | SYSTOLIC BLOOD PRESSURE: 136 MMHG | DIASTOLIC BLOOD PRESSURE: 99 MMHG

## 2023-02-23 PROCEDURE — G0152 HHCP-SERV OF OT,EA 15 MIN: HCPCS

## 2023-02-23 NOTE — CASE COMMUNICATION
Patient missed a HHAIDE visit from [facility] on 2-23-23.     Reason: HHA visits denied.       For your records only.   As per home health protocol, MD must be notified of missed/cancelled visits; therefore the prescribed frequency was not met.

## 2023-02-23 NOTE — HOME HEALTH
Pt. tolerated OT session well. Pt participated in 32 mins of ADLs/IADLs and theraputic exercises: Pt. demonstrated increased safety with using his rollator for OOB ADL tasks today with understanding of energy conservation techniques and importance of HEP. Continue OT per POC to increase independence and safety with ADLs within the home.

## 2023-02-24 ENCOUNTER — OFFICE VISIT (OUTPATIENT)
Dept: CARDIOLOGY | Facility: CLINIC | Age: 78
End: 2023-02-24
Payer: MEDICARE

## 2023-02-24 ENCOUNTER — HOME CARE VISIT (OUTPATIENT)
Dept: HOME HEALTH SERVICES | Facility: HOME HEALTHCARE | Age: 78
End: 2023-02-24
Payer: COMMERCIAL

## 2023-02-24 VITALS
HEART RATE: 77 BPM | SYSTOLIC BLOOD PRESSURE: 118 MMHG | DIASTOLIC BLOOD PRESSURE: 82 MMHG | OXYGEN SATURATION: 96 % | TEMPERATURE: 98.5 F | RESPIRATION RATE: 15 BRPM

## 2023-02-24 VITALS
WEIGHT: 190 LBS | HEIGHT: 71 IN | BODY MASS INDEX: 26.6 KG/M2 | SYSTOLIC BLOOD PRESSURE: 100 MMHG | DIASTOLIC BLOOD PRESSURE: 68 MMHG | HEART RATE: 63 BPM | OXYGEN SATURATION: 97 %

## 2023-02-24 DIAGNOSIS — I25.10 CORONARY ARTERY DISEASE INVOLVING NATIVE CORONARY ARTERY OF NATIVE HEART WITHOUT ANGINA PECTORIS: ICD-10-CM

## 2023-02-24 DIAGNOSIS — I48.0 PAROXYSMAL ATRIAL FIBRILLATION: ICD-10-CM

## 2023-02-24 DIAGNOSIS — E78.2 MIXED HYPERLIPIDEMIA: Primary | ICD-10-CM

## 2023-02-24 DIAGNOSIS — Z79.4 TYPE 2 DIABETES MELLITUS WITH MICROALBUMINURIA, WITH LONG-TERM CURRENT USE OF INSULIN: ICD-10-CM

## 2023-02-24 DIAGNOSIS — E11.29 TYPE 2 DIABETES MELLITUS WITH MICROALBUMINURIA, WITH LONG-TERM CURRENT USE OF INSULIN: ICD-10-CM

## 2023-02-24 DIAGNOSIS — I10 PRIMARY HYPERTENSION: ICD-10-CM

## 2023-02-24 DIAGNOSIS — R80.9 TYPE 2 DIABETES MELLITUS WITH MICROALBUMINURIA, WITH LONG-TERM CURRENT USE OF INSULIN: ICD-10-CM

## 2023-02-24 PROCEDURE — G0157 HHC PT ASSISTANT EA 15: HCPCS

## 2023-02-24 PROCEDURE — 99214 OFFICE O/P EST MOD 30 MIN: CPT | Performed by: INTERNAL MEDICINE

## 2023-02-25 NOTE — HOME HEALTH
"Routine Visit Note: Let into home by patient, who was first seen ambulating with no AD or signs of distress. Patient reports that he had a visit with cardiologist today. Patient/caregiver report the MD states \"everything looks great\" and reports overall improvement since SOC.     Skill/education provided: Instructed balance exercises, gait training, and therapeutic exercises today. Educated patient/caregiver on HEP, provided with new HEP exercises.     Patient/caregiver response: Patient tolerated all treatment well, with no visable signs of distress, excessive fatigue, or pain. Patient displays overall improvement in functional mobiltiy / balance.     Plan for next visit: Re-assessment with PT. Patient does not expresss desire to continue with PT / but may potentially benefit from further PT. Discuss plans with PT next visit."

## 2023-02-27 ENCOUNTER — HOME CARE VISIT (OUTPATIENT)
Dept: HOME HEALTH SERVICES | Facility: HOME HEALTHCARE | Age: 78
End: 2023-02-27
Payer: COMMERCIAL

## 2023-02-27 PROCEDURE — G0299 HHS/HOSPICE OF RN EA 15 MIN: HCPCS

## 2023-02-28 ENCOUNTER — HOME CARE VISIT (OUTPATIENT)
Dept: HOME HEALTH SERVICES | Facility: HOME HEALTHCARE | Age: 78
End: 2023-02-28
Payer: COMMERCIAL

## 2023-02-28 VITALS
DIASTOLIC BLOOD PRESSURE: 92 MMHG | RESPIRATION RATE: 16 BRPM | SYSTOLIC BLOOD PRESSURE: 125 MMHG | HEART RATE: 63 BPM | OXYGEN SATURATION: 94 %

## 2023-02-28 VITALS
DIASTOLIC BLOOD PRESSURE: 62 MMHG | OXYGEN SATURATION: 96 % | TEMPERATURE: 97.9 F | HEART RATE: 68 BPM | RESPIRATION RATE: 16 BRPM | SYSTOLIC BLOOD PRESSURE: 119 MMHG

## 2023-02-28 PROCEDURE — G0151 HHCP-SERV OF PT,EA 15 MIN: HCPCS

## 2023-03-01 ENCOUNTER — HOME CARE VISIT (OUTPATIENT)
Dept: HOME HEALTH SERVICES | Facility: HOME HEALTHCARE | Age: 78
End: 2023-03-01
Payer: COMMERCIAL

## 2023-03-01 ENCOUNTER — OFFICE VISIT (OUTPATIENT)
Dept: INTERNAL MEDICINE | Facility: CLINIC | Age: 78
End: 2023-03-01
Payer: MEDICARE

## 2023-03-01 VITALS
HEART RATE: 72 BPM | HEIGHT: 71 IN | SYSTOLIC BLOOD PRESSURE: 142 MMHG | BODY MASS INDEX: 26.74 KG/M2 | TEMPERATURE: 95.9 F | OXYGEN SATURATION: 94 % | DIASTOLIC BLOOD PRESSURE: 90 MMHG | WEIGHT: 191 LBS

## 2023-03-01 DIAGNOSIS — E11.69 TYPE 2 DIABETES MELLITUS WITH OTHER SPECIFIED COMPLICATION, WITH LONG-TERM CURRENT USE OF INSULIN: ICD-10-CM

## 2023-03-01 DIAGNOSIS — Z79.4 TYPE 2 DIABETES MELLITUS WITH OTHER SPECIFIED COMPLICATION, WITH LONG-TERM CURRENT USE OF INSULIN: ICD-10-CM

## 2023-03-01 PROCEDURE — 99214 OFFICE O/P EST MOD 30 MIN: CPT | Performed by: INTERNAL MEDICINE

## 2023-03-01 RX ORDER — INSULIN GLARGINE 100 [IU]/ML
15 INJECTION, SOLUTION SUBCUTANEOUS DAILY
Qty: 10 ML | Refills: 0
Start: 2023-03-01 | End: 2023-03-24 | Stop reason: SDUPTHER

## 2023-03-01 RX ORDER — INSULIN LISPRO 100 [IU]/ML
INJECTION, SOLUTION INTRAVENOUS; SUBCUTANEOUS
Qty: 40 ML | Refills: 0 | Status: SHIPPED | OUTPATIENT
Start: 2023-03-01 | End: 2023-04-06

## 2023-03-01 NOTE — CASE COMMUNICATION
Patient missed a HHAIDE visit from [facility] on 3-1-23.     Reason: HHA visits denied.       For your records only.   As per home health protocol, MD must be notified of missed/cancelled visits; therefore the prescribed frequency was not met.

## 2023-03-01 NOTE — PROGRESS NOTES
"Subjective   Que Lagunas is a 77 y.o. male.     Chief Complaint   Patient presents with   • Follow-up   • Diabetes       History of Present Illness   Patient here for follow-up.  A1c 6.4 sometime low sugar at home per family.  Blood pressure slightly elevated.  Thyroid function normal.  Hyperlipidemia stable on medication.  Atrial fibrillation RVR stable now after raising metoprolol to 75 mg twice a day.  Weight loss normal.    Current Outpatient Medications:   •  cholecalciferol (VITAMIN D3) 1000 units tablet, Take 1 tablet by mouth Daily. Indications: Vitamin D Deficiency, Disp: , Rfl:   •  donepezil (ARICEPT) 10 MG tablet, TAKE 1 TABLET BY MOUTH ONCE DAILY AT NIGHT, Disp: 90 tablet, Rfl: 1  •  esomeprazole (nexIUM) 20 MG capsule, Take 1 capsule by mouth Every Morning Before Breakfast. Indications: Heartburn, Disp: , Rfl:   •  ferrous sulfate 325 (65 FE) MG tablet, Take 1 tablet by mouth Daily With Breakfast., Disp: , Rfl:   •  furosemide (LASIX) 20 MG tablet, Take 1 tablet by mouth Daily., Disp: , Rfl:   •  glucose blood (SOLUS V2 TEST) test strip, Use as instructed, Disp: 100 each, Rfl: 12  •  glucose monitor monitoring kit, 1 each As Needed (prn)., Disp: 1 each, Rfl: 0  •  Insulin Lispro (humaLOG) 100 UNIT/ML injection, INJECT 13 UNITS SUBCUTANEOUSLY WITH MEALS (Patient taking differently: Inject 36 Units under the skin into the appropriate area as directed 3 (Three) Times a Day Before Meals. INJECT 10 UNITS AT BREAKFAST AND LUNCH INJECT 16 UNITS AT DINNER  Indications: Type 2 Diabetes), Disp: 40 mL, Rfl: 0  •  Insulin Pen Needle (PEN NEEDLES 3/16\") 31G X 5 MM misc, Use with insulin daily E11.9, Disp: 100 each, Rfl: 5  •  Insulin Syringe-Needle U-100 (Easy Touch Insulin Syringe) 30G X 5/16\" 0.5 ML misc, use as directed with insulin 4 times a day, Disp: 100 each, Rfl: 11  •  Lancets misc, Use to test twice daily. E11.9, Disp: 100 each, Rfl: 12  •  Lantus 100 UNIT/ML injection, INJECT 40 UNITS SUBCUTANEOUSLY " NIGHTLY (Patient taking differently: 20 Units. 20 units daily  Indications: Type 2 Diabetes), Disp: 10 mL, Rfl: 0  •  LifeScan Unistik II Lancets misc, 1 Units 3 (Three) Times a Day., Disp: 100 each, Rfl: 5  •  memantine (NAMENDA) 10 MG tablet, TAKE 1 TABLET BY MOUTH ONCE DAILY AT NIGHT, Disp: 90 tablet, Rfl: 1  •  metFORMIN (GLUCOPHAGE) 500 MG tablet, TAKE 1 TABLET BY MOUTH IN THE MORNING WITH  BREAKFAST, Disp: 90 tablet, Rfl: 3  •  metoprolol tartrate (LOPRESSOR) 100 MG tablet, Take 75 mg by mouth 2 (Two) Times a Day. 75mg daily  Indications: High Blood Pressure Disorder, Disp: , Rfl:   •  nystatin (MYCOSTATIN) 399331 UNIT/GM powder, Apply  topically to the appropriate area as directed 2 (Two) Times a Day., Disp: 60 g, Rfl: 1  •  PARoxetine (PAXIL) 20 MG tablet, Take 1 tablet by mouth Daily., Disp: 90 tablet, Rfl: 3  •  pramipexole (Mirapex) 1 MG tablet, Take 1 tablet by mouth every night at bedtime., Disp: 90 tablet, Rfl: 3  •  pravastatin (PRAVACHOL) 40 MG tablet, Take 1 tablet by mouth Every Evening., Disp: 90 tablet, Rfl: 3  •  Xarelto 20 MG tablet, Take 1 tablet by mouth once daily, Disp: 30 tablet, Rfl: 11    The following portions of the patient's history were reviewed and updated as appropriate: allergies, current medications, past family history, past medical history, past social history, past surgical history and problem list.    Review of Systems   Constitutional: Negative.    Respiratory: Negative.    Cardiovascular: Negative.    Gastrointestinal: Negative.    Musculoskeletal: Negative.    Skin: Negative.    Neurological: Negative.    Psychiatric/Behavioral: Negative.        Objective   Physical Exam  Cardiovascular:      Rate and Rhythm: Normal rate and regular rhythm.      Heart sounds: Normal heart sounds.   Pulmonary:      Effort: Pulmonary effort is normal.      Breath sounds: Normal breath sounds.   Abdominal:      General: Bowel sounds are normal.   Musculoskeletal:      Cervical back: Neck  supple.   Skin:     General: Skin is warm.   Neurological:      Mental Status: He is alert and oriented to person, place, and time.         All tests have been reviewed.    Assessment & Plan   There are no diagnoses linked to this encounter.    Leukemoid reaction patient here to do blood tests  -     CBC & Differential     Type 2 diabetes mellitus with hyperglycemia, without long-term current use of insulin ([facility]) A1c 6.4 patient is not eating well   Cut down Lantus to 15 units.  Also cut down NovoLog to 10 units with meals  -     POCT Glucose  -     POC Glycosylated Hemoglobin (Hb A1C)     Primary hypertension elevated-today family reports home blood pressure normal.  We will continue to watch.     Mixed hyperlipidemia stable on medication     Paroxysmal atrial fibrillation (HCC) EKG showed atrial fibrillation with .  Increase metoprolol from 50 mg to 75 mg twice a day rate controlled     Weight loss 25 pound no more     Memory loss continue medication     TSH elevation resolved after medication        4 weeks follow-up Medicare wellness

## 2023-03-02 ENCOUNTER — HOME CARE VISIT (OUTPATIENT)
Dept: HOME HEALTH SERVICES | Facility: HOME HEALTHCARE | Age: 78
End: 2023-03-02
Payer: COMMERCIAL

## 2023-03-02 VITALS
DIASTOLIC BLOOD PRESSURE: 88 MMHG | SYSTOLIC BLOOD PRESSURE: 140 MMHG | RESPIRATION RATE: 16 BRPM | HEART RATE: 66 BPM | OXYGEN SATURATION: 91 % | TEMPERATURE: 98.5 F

## 2023-03-02 PROCEDURE — G0153 HHCP-SVS OF S/L PATH,EA 15MN: HCPCS

## 2023-03-03 ENCOUNTER — HOME CARE VISIT (OUTPATIENT)
Dept: HOME HEALTH SERVICES | Facility: HOME HEALTHCARE | Age: 78
End: 2023-03-03
Payer: COMMERCIAL

## 2023-03-03 VITALS — SYSTOLIC BLOOD PRESSURE: 116 MMHG | HEART RATE: 60 BPM | OXYGEN SATURATION: 95 % | DIASTOLIC BLOOD PRESSURE: 76 MMHG

## 2023-03-03 PROCEDURE — G0152 HHCP-SERV OF OT,EA 15 MIN: HCPCS

## 2023-03-03 NOTE — HOME HEALTH
Pt. was sleeping upon OT arrival, however, agreeable to OT session. Pt. denied current pain and pain within 24 hours. OT discipline specific D/C completed today secondsary to all OT goals met and pt. has met max potential with ADLs/IADLs and will continue to require 24/7 supervision/cues for safety with ADLs/IADLs. Pt. lives with family and has a caregiver during the day.

## 2023-03-06 ENCOUNTER — HOME CARE VISIT (OUTPATIENT)
Dept: HOME HEALTH SERVICES | Facility: HOME HEALTHCARE | Age: 78
End: 2023-03-06
Payer: COMMERCIAL

## 2023-03-06 PROCEDURE — G0300 HHS/HOSPICE OF LPN EA 15 MIN: HCPCS

## 2023-03-13 ENCOUNTER — HOME CARE VISIT (OUTPATIENT)
Dept: HOME HEALTH SERVICES | Facility: HOME HEALTHCARE | Age: 78
End: 2023-03-13
Payer: COMMERCIAL

## 2023-03-13 VITALS
HEART RATE: 64 BPM | OXYGEN SATURATION: 93 % | TEMPERATURE: 44.8 F | RESPIRATION RATE: 16 BRPM | SYSTOLIC BLOOD PRESSURE: 109 MMHG | DIASTOLIC BLOOD PRESSURE: 64 MMHG

## 2023-03-13 PROCEDURE — G0300 HHS/HOSPICE OF LPN EA 15 MIN: HCPCS

## 2023-03-13 NOTE — HOME HEALTH
Routine Visit Note: LPN    Skill/education provided: Assessment, Diabetic foot care, Education on skin integrity, moisture related skin injury due to incontinence.    Patient/caregiver verbalized understanding of instructions    Plan for next visit: Education on medication management,     Other pertinent info: n/a

## 2023-03-16 VITALS
HEART RATE: 58 BPM | DIASTOLIC BLOOD PRESSURE: 79 MMHG | RESPIRATION RATE: 16 BRPM | TEMPERATURE: 97.2 F | OXYGEN SATURATION: 95 % | SYSTOLIC BLOOD PRESSURE: 129 MMHG

## 2023-03-20 ENCOUNTER — HOME CARE VISIT (OUTPATIENT)
Dept: HOME HEALTH SERVICES | Facility: HOME HEALTHCARE | Age: 78
End: 2023-03-20
Payer: COMMERCIAL

## 2023-03-20 VITALS
HEART RATE: 79 BPM | RESPIRATION RATE: 12 BRPM | DIASTOLIC BLOOD PRESSURE: 58 MMHG | OXYGEN SATURATION: 96 % | SYSTOLIC BLOOD PRESSURE: 109 MMHG | TEMPERATURE: 97.5 F

## 2023-03-20 PROCEDURE — G0495 RN CARE TRAIN/EDU IN HH: HCPCS

## 2023-03-20 NOTE — HOME HEALTH
THE FOLLOWING INSTRUCTIONS WERE REVIEWED UPON DISCHARGE:    Keep your appointment with your providers as scheduled.    Call your doctor if you develop a new or worsening symptom, especially if you develop s/s of hyperglycemia/hypoglycemia, chest pain, SOB, dizziness, syncopy/near-syncopy.    Continue to take the medications prescribed by your doctor. A medication list is attached. Be sure to keep them informed of all medications you take including over the counter herbal, vitamins/minerals and the ones you take only when needed.    Follow the prescribed diet as ordered by your doctor.     Continue with home program as instructed by therapist (handouts given).    Instructions given to Patient.    Instructions provided per Visit.    Patient receives assistance with ADL's from family.

## 2023-03-24 DIAGNOSIS — F41.1 GENERALIZED ANXIETY DISORDER: ICD-10-CM

## 2023-03-24 NOTE — TELEPHONE ENCOUNTER
Caller: Jennifer Yancey    Relationship: Emergency Contact    Best call back number: [phone]    Requested Prescriptions:   Requested Prescriptions     Pending Prescriptions Disp Refills   • insulin glargine (Lantus) 100 UNIT/ML injection 10 mL 0     Sig: Inject 15 Units under the skin into the appropriate area as directed Daily. Indications: Type 2 Diabetes   • PARoxetine (PAXIL) 20 MG tablet 90 tablet 3     Sig: Take 1 tablet by mouth Daily. Indications: Major Depressive Disorder        Pharmacy where request should be sent: [address] [phone] [facility] [phone]      Last office visit with prescribing clinician: 3/1/2023   Last telemedicine visit with prescribing clinician: 4/13/2023   Next office visit with prescribing clinician: 4/13/2023     Additional details provided by patient: PLEASE REFILL 90 DAY AS ABLE WITH REFILLS    Does the patient have less than a 3 day supply:  [x] Yes  [] No    Would you like a call back once the refill request has been completed: [] Yes [] No    If the office needs to give you a call back, can they leave a voicemail: [] Yes [] No    Dick Dunbar Rep   03/24/23 14:03 EDT

## 2023-03-27 RX ORDER — PAROXETINE HYDROCHLORIDE 20 MG/1
20 TABLET, FILM COATED ORAL DAILY
Qty: 90 TABLET | Refills: 3 | OUTPATIENT
Start: 2023-03-27

## 2023-03-27 RX ORDER — INSULIN GLARGINE 100 [IU]/ML
15 INJECTION, SOLUTION SUBCUTANEOUS DAILY
Qty: 10 ML | Refills: 0
Start: 2023-03-27 | End: 2023-03-30 | Stop reason: SDUPTHER

## 2023-03-27 NOTE — TELEPHONE ENCOUNTER
Rx Refill Note  Requested Prescriptions     Pending Prescriptions Disp Refills   • insulin glargine (Lantus) 100 UNIT/ML injection 10 mL 0     Sig: Inject 15 Units under the skin into the appropriate area as directed Daily. Indications: Type 2 Diabetes   • PARoxetine (PAXIL) 20 MG tablet 90 tablet 3     Sig: Take 1 tablet by mouth Daily. Indications: Major Depressive Disorder      Last office visit with prescribing clinician: 3/1/2023  Next office visit with prescribing clinician: 4/13/2023     Paxil not due double checked with aline Ryan MA  03/27/23, 10:05 EDT

## 2023-03-30 RX ORDER — INSULIN GLARGINE 100 [IU]/ML
15 INJECTION, SOLUTION SUBCUTANEOUS DAILY
Qty: 10 ML | Refills: 0
Start: 2023-03-30 | End: 2023-04-04 | Stop reason: SDUPTHER

## 2023-03-30 RX ORDER — INSULIN GLARGINE 100 [IU]/ML
INJECTION, SOLUTION SUBCUTANEOUS
Qty: 10 ML | Refills: 0 | OUTPATIENT
Start: 2023-03-30

## 2023-03-30 NOTE — TELEPHONE ENCOUNTER
Rx Refill Note  Requested Prescriptions     Pending Prescriptions Disp Refills   • insulin glargine (Lantus) 100 UNIT/ML injection 10 mL 0     Sig: Inject 15 Units under the skin into the appropriate area as directed Daily. Indications: Type 2 Diabetes      Last office visit with prescribing clinician: 3/1/2023     Next office visit with prescribing clinician: 4/13/2023                         Would you like a call back once the refill request has been completed: [] Yes [] No    If the office needs to give you a call back, can they leave a voicemail: [] Yes [] No    Estella Wang MA  03/30/23, 10:32 EDT

## 2023-03-30 NOTE — TELEPHONE ENCOUNTER
Caller: Jennifer Yancey    Relationship: Emergency Contact    Best call back number: [phone]    Requested Prescriptions:   Requested Prescriptions     Pending Prescriptions Disp Refills   • insulin glargine (Lantus) 100 UNIT/ML injection 10 mL 0     Sig: Inject 15 Units under the skin into the appropriate area as directed Daily. Indications: Type 2 Diabetes        Pharmacy where request should be sent: [address] [phone] [facility] [phone]      Last office visit with prescribing clinician: 3/1/2023   Last telemedicine visit with prescribing clinician: 4/13/2023   Next office visit with prescribing clinician: 4/13/2023     Additional details provided by patient: BEEN OUT 1.5 WEEKS    Does the patient have less than a 3 day supply:  [x] Yes  [] No    Would you like a call back once the refill request has been completed: [] Yes [x] No    If the office needs to give you a call back, can they leave a voicemail: [] Yes [x] No    Dick Cleary Rep   03/30/23 09:43 EDT

## 2023-03-30 NOTE — TELEPHONE ENCOUNTER
Rx Refill Note  Requested Prescriptions     Pending Prescriptions Disp Refills   • Lantus 100 UNIT/ML injection [Pharmacy Med Name: Lantus 100 UNIT/ML Subcutaneous Solution] 10 mL 0     Sig: INJECT 40 UNITS SUBCUTANEOUSLY NIGHTLY      Will deny for DUPLICATE   Was signed 3 days ago    Melita Ryan MA  03/30/23, 09:11 EDT

## 2023-04-04 RX ORDER — INSULIN GLARGINE 100 [IU]/ML
15 INJECTION, SOLUTION SUBCUTANEOUS DAILY
Qty: 10 ML | Refills: 0
Start: 2023-04-04 | End: 2023-04-07 | Stop reason: SDUPTHER

## 2023-04-04 NOTE — TELEPHONE ENCOUNTER
Caller: Jennifer Yancey    Relationship: Emergency Contact    Best call back number: [phone]     Requested Prescriptions:   Requested Prescriptions     Pending Prescriptions Disp Refills   • insulin glargine (Lantus) 100 UNIT/ML injection 10 mL 0     Sig: Inject 15 Units under the skin into the appropriate area as directed Daily. Indications: Type 2 Diabetes      VIALS NOT PENS  Pharmacy where request should be sent: [address] [phone] [facility] [phone]      Last office visit with prescribing clinician: 3/1/2023   Last telemedicine visit with prescribing clinician: 4/13/2023   Next office visit with prescribing clinician: 4/13/2023     Additional details provided by patient: PHARMACY HAS NOT RECEIVED THE REFILL AND PATIENT IS OUT    REQUESTING VIALS AND NOT PEN  Does the patient have less than a 3 day supply:  [x] Yes  [] No    Would you like a call back once the refill request has been completed: [] Yes [x] No    If the office needs to give you a call back, can they leave a voicemail: [] Yes [x] No    Dick Carter Rep   04/04/23 09:18 EDT

## 2023-04-06 DIAGNOSIS — E11.69 TYPE 2 DIABETES MELLITUS WITH OTHER SPECIFIED COMPLICATION, WITH LONG-TERM CURRENT USE OF INSULIN: ICD-10-CM

## 2023-04-06 DIAGNOSIS — Z79.4 TYPE 2 DIABETES MELLITUS WITH OTHER SPECIFIED COMPLICATION, WITH LONG-TERM CURRENT USE OF INSULIN: ICD-10-CM

## 2023-04-06 RX ORDER — INSULIN LISPRO 100 [IU]/ML
INJECTION, SOLUTION INTRAVENOUS; SUBCUTANEOUS
Qty: 40 ML | Refills: 0 | Status: SHIPPED | OUTPATIENT
Start: 2023-04-06

## 2023-04-06 NOTE — TELEPHONE ENCOUNTER
Rx Refill Note  Requested Prescriptions     Pending Prescriptions Disp Refills   • Insulin Lispro (humaLOG) 100 UNIT/ML injection [Pharmacy Med Name: Insulin Lispro 100 UNIT/ML Subcutaneous Solution] 40 mL 0     Sig: INJECT 10 UNITS SUBCUTANEOUSLY WITH MEALS      Last office visit with prescribing clinician: 3/1/2023   Next office visit with prescribing clinician: 4/13/2023       Melita Ryan MA  04/06/23, 11:20 EDT

## 2023-04-07 RX ORDER — INSULIN GLARGINE 100 [IU]/ML
15 INJECTION, SOLUTION SUBCUTANEOUS DAILY
Qty: 10 ML | Refills: 0
Start: 2023-04-07 | End: 2023-04-10

## 2023-04-10 RX ORDER — INSULIN GLARGINE 100 [IU]/ML
INJECTION, SOLUTION SUBCUTANEOUS
Qty: 10 ML | Refills: 0 | Status: SHIPPED | OUTPATIENT
Start: 2023-04-10 | End: 2023-04-19 | Stop reason: SDUPTHER

## 2023-04-10 NOTE — TELEPHONE ENCOUNTER
Rx Refill Note  Requested Prescriptions     Pending Prescriptions Disp Refills   • Lantus 100 UNIT/ML injection [Pharmacy Med Name: Lantus 100 UNIT/ML Subcutaneous Solution] 10 mL 0     Sig: INJECT 40 UNITS SUBCUTANEOUSLY NIGHTLY      Last office visit with prescribing clinician: 3/1/2023   Last telemedicine visit with prescribing clinician: Visit date not found   Next office visit with prescribing clinician: Visit date not found                         Would you like a call back once the refill request has been completed: [] Yes [] No    If the office needs to give you a call back, can they leave a voicemail: [] Yes [] No    Amanda Espino LPN  04/10/23, 09:57 EDT

## 2023-04-19 ENCOUNTER — OFFICE VISIT (OUTPATIENT)
Dept: INTERNAL MEDICINE | Facility: CLINIC | Age: 78
End: 2023-04-19
Payer: MEDICARE

## 2023-04-19 VITALS
OXYGEN SATURATION: 96 % | HEART RATE: 76 BPM | BODY MASS INDEX: 26.04 KG/M2 | TEMPERATURE: 96.5 F | HEIGHT: 71 IN | SYSTOLIC BLOOD PRESSURE: 124 MMHG | DIASTOLIC BLOOD PRESSURE: 80 MMHG | WEIGHT: 186 LBS

## 2023-04-19 DIAGNOSIS — R41.3 MEMORY LOSS: ICD-10-CM

## 2023-04-19 DIAGNOSIS — R93.89 ABNORMAL FINDINGS ON DIAGNOSTIC IMAGING OF OTHER SPECIFIED BODY STRUCTURES: ICD-10-CM

## 2023-04-19 DIAGNOSIS — I25.10 CORONARY ARTERY DISEASE INVOLVING NATIVE CORONARY ARTERY OF NATIVE HEART WITHOUT ANGINA PECTORIS: ICD-10-CM

## 2023-04-19 DIAGNOSIS — G25.81 RESTLESS LEG SYNDROME: ICD-10-CM

## 2023-04-19 DIAGNOSIS — E78.2 MIXED HYPERLIPIDEMIA: ICD-10-CM

## 2023-04-19 DIAGNOSIS — G45.9 TIA (TRANSIENT ISCHEMIC ATTACK): ICD-10-CM

## 2023-04-19 DIAGNOSIS — R26.89 LOSS OF BALANCE: ICD-10-CM

## 2023-04-19 DIAGNOSIS — I10 PRIMARY HYPERTENSION: ICD-10-CM

## 2023-04-19 DIAGNOSIS — F41.1 GENERALIZED ANXIETY DISORDER: ICD-10-CM

## 2023-04-19 DIAGNOSIS — E55.9 VITAMIN D DEFICIENCY: ICD-10-CM

## 2023-04-19 DIAGNOSIS — I48.0 PAROXYSMAL ATRIAL FIBRILLATION: Primary | ICD-10-CM

## 2023-04-19 DIAGNOSIS — E11.65 TYPE 2 DIABETES MELLITUS WITH HYPERGLYCEMIA, WITHOUT LONG-TERM CURRENT USE OF INSULIN: ICD-10-CM

## 2023-04-19 DIAGNOSIS — R60.0 BILATERAL LEG EDEMA: ICD-10-CM

## 2023-04-19 DIAGNOSIS — N40.0 ENLARGED PROSTATE: ICD-10-CM

## 2023-04-19 PROBLEM — G89.29 CHRONIC PAIN OF LEFT KNEE: Status: RESOLVED | Noted: 2018-11-01 | Resolved: 2023-04-19

## 2023-04-19 PROBLEM — R79.89 TSH ELEVATION: Status: RESOLVED | Noted: 2022-04-13 | Resolved: 2023-04-19

## 2023-04-19 PROBLEM — S83.207A TEAR OF MENISCUS OF LEFT KNEE: Status: RESOLVED | Noted: 2020-01-28 | Resolved: 2023-04-19

## 2023-04-19 PROBLEM — M25.562 CHRONIC PAIN OF LEFT KNEE: Status: RESOLVED | Noted: 2018-11-01 | Resolved: 2023-04-19

## 2023-04-19 PROBLEM — L72.3 SEBACEOUS CYST: Status: RESOLVED | Noted: 2018-11-01 | Resolved: 2023-04-19

## 2023-04-19 PROBLEM — R63.4 WEIGHT LOSS: Status: RESOLVED | Noted: 2020-10-08 | Resolved: 2023-04-19

## 2023-04-19 RX ORDER — INSULIN GLARGINE 100 [IU]/ML
10 INJECTION, SOLUTION SUBCUTANEOUS NIGHTLY
Qty: 10 ML | Refills: 2 | Status: SHIPPED | OUTPATIENT
Start: 2023-04-19

## 2023-04-19 RX ORDER — FUROSEMIDE 20 MG/1
20 TABLET ORAL EVERY OTHER DAY
Qty: 45 TABLET | Refills: 1 | Status: SHIPPED | OUTPATIENT
Start: 2023-04-19

## 2023-04-19 NOTE — PROGRESS NOTES
"The ABCs of the Annual Wellness Visit  Subsequent Medicare Wellness Visit    Subjective      Que Lagunas is a 78 y.o. male who presents for a Subsequent Medicare Wellness Visit.    The following portions of the patient's history were reviewed and   updated as appropriate: allergies, current medications, past family history, past medical history, past social history, past surgical history and problem list.    Compared to one year ago, the patient feels his physical   health is the same.    Compared to one year ago, the patient feels his mental   health is the same.    Recent Hospitalizations:  He was not admitted to the hospital during the last year.       Current Medical Providers:  Patient Care Team:  Evans Blcakburn MD as PCP - General (Internal Medicine)  Alayna Lynch MD as Consulting Physician (General Surgery)  Charles Topete MD as Consulting Physician (Cardiology)    Outpatient Medications Prior to Visit   Medication Sig Dispense Refill   • cholecalciferol (VITAMIN D3) 1000 units tablet Take 1 tablet by mouth Daily. Indications: Vitamin D Deficiency     • donepezil (ARICEPT) 10 MG tablet TAKE 1 TABLET BY MOUTH ONCE DAILY AT NIGHT 90 tablet 1   • esomeprazole (nexIUM) 20 MG capsule Take 1 capsule by mouth Every Morning Before Breakfast. Indications: Heartburn     • glucose blood (SOLUS V2 TEST) test strip Use as instructed 100 each 12   • glucose monitor monitoring kit 1 each As Needed (prn). 1 each 0   • Insulin Lispro (humaLOG) 100 UNIT/ML injection INJECT 10 UNITS SUBCUTANEOUSLY WITH MEALS 40 mL 0   • Insulin Pen Needle (PEN NEEDLES 3/16\") 31G X 5 MM misc Use with insulin daily E11.9 100 each 5   • Insulin Syringe-Needle U-100 (Easy Touch Insulin Syringe) 30G X 5/16\" 0.5 ML misc use as directed with insulin 4 times a day 100 each 11   • Lancets misc Use to test twice daily. E11.9 100 each 12   • LifeScan Unistik II Lancets misc 1 Units 3 (Three) Times a Day. 100 each 5   • memantine (NAMENDA) 10 " MG tablet TAKE 1 TABLET BY MOUTH ONCE DAILY AT NIGHT 90 tablet 1   • metFORMIN (GLUCOPHAGE) 500 MG tablet TAKE 1 TABLET BY MOUTH IN THE MORNING WITH  BREAKFAST 90 tablet 3   • metoprolol tartrate (LOPRESSOR) 100 MG tablet Take 75 mg by mouth 2 (Two) Times a Day. 75mg daily  Indications: High Blood Pressure Disorder     • nystatin (MYCOSTATIN) 185000 UNIT/GM powder Apply  topically to the appropriate area as directed 2 (Two) Times a Day. 60 g 1   • PARoxetine (PAXIL) 20 MG tablet Take 1 tablet by mouth Daily. 90 tablet 3   • pramipexole (Mirapex) 1 MG tablet Take 1 tablet by mouth every night at bedtime. 90 tablet 3   • pravastatin (PRAVACHOL) 40 MG tablet Take 1 tablet by mouth Every Evening. 90 tablet 3   • Xarelto 20 MG tablet Take 1 tablet by mouth once daily 30 tablet 11   • ferrous sulfate 325 (65 FE) MG tablet Take 1 tablet by mouth Daily With Breakfast.     • furosemide (LASIX) 20 MG tablet Take 1 tablet by mouth Daily.     • Lantus 100 UNIT/ML injection INJECT 40 UNITS SUBCUTANEOUSLY NIGHTLY 10 mL 0     No facility-administered medications prior to visit.       No opioid medication identified on active medication list. I have reviewed chart for other potential  high risk medication/s and harmful drug interactions in the elderly.          Aspirin is not on active medication list.  Aspirin use is not indicated based on review of current medical condition/s. Risk of harm outweighs potential benefits.  .    Patient Active Problem List   Diagnosis   • Type 2 diabetes mellitus with hyperglycemia   • Enlarged prostate   • Generalized anxiety disorder   • Hyperlipidemia   • Hypertension   • TIA (transient ischemic attack)   • CAD (coronary artery disease)   • Paroxysmal atrial fibrillation   • Vitamin D deficiency   • Colon cancer screening   • Restless leg syndrome   • Personal history of colonic polyps   • Loss of balance   • Memory loss     Advance Care Planning   Advance Care Planning     Advance Directive is on  "file.  ACP discussion was held with the patient during this visit. Patient has an advance directive in EMR which is still valid.      Objective    Vitals:    23 1649   BP: 124/80   Pulse: 76   Temp: 96.5 °F (35.8 °C)   SpO2: 96%   Weight: 84.4 kg (186 lb)   Height: 180.6 cm (71.1\")     Estimated body mass index is 25.87 kg/m² as calculated from the following:    Height as of this encounter: 180.6 cm (71.1\").    Weight as of this encounter: 84.4 kg (186 lb).    BMI is >= 25 and <30. (Overweight) The following options were offered after discussion;: exercise counseling/recommendations and nutrition counseling/recommendations      Does the patient have evidence of cognitive impairment?   No    Lab Results   Component Value Date    CHLPL 104 2023    TRIG 128 2023    HDL 44 2023    LDL 37 2023    VLDL 23 2023    HGBA1C 6.4 02/15/2023          HEALTH RISK ASSESSMENT    Smoking Status:  Social History     Tobacco Use   Smoking Status Former   • Types: Cigarettes   • Quit date:    • Years since quittin.3   Smokeless Tobacco Never   Tobacco Comments    STOPPED 5 YEARS AGO     Alcohol Consumption:  Social History     Substance and Sexual Activity   Alcohol Use No     Fall Risk Screen:    GLENNA Fall Risk Assessment was completed, and patient is at MODERATE risk for falls. Assessment completed on:2023    Depression Screenin/19/2023     4:50 PM   PHQ-2/PHQ-9 Depression Screening   Little Interest or Pleasure in Doing Things 0-->not at all   Feeling Down, Depressed or Hopeless 0-->not at all   PHQ-9: Brief Depression Severity Measure Score 0       Health Habits and Functional and Cognitive Screenin/19/2023     4:50 PM   Functional & Cognitive Status   Do you have difficulty preparing food and eating? No   Do you have difficulty bathing yourself, getting dressed or grooming yourself? No   Do you have difficulty using the toilet? No   Do you have difficulty moving " around from place to place? Yes   Do you have trouble with steps or getting out of a bed or a chair? Yes   Current Diet Well Balanced Diet   Dental Exam Up to date   Eye Exam Up to date   Exercise (times per week) 0 times per week   Current Exercises Include No Regular Exercise   Do you need help using the phone?  No   Are you deaf or do you have serious difficulty hearing?  No   Do you need help with transportation? Yes   Do you need help shopping? No   Do you need help preparing meals?  No   Do you need help with housework?  No   Do you need help with laundry? No   Do you need help taking your medications? Yes   Do you need help managing money? Yes   Do you ever drive or ride in a car without wearing a seat belt? No   Have you felt unusual stress, anger or loneliness in the last month? No   Who do you live with? Child   If you need help, do you have trouble finding someone available to you? No   Have you been bothered in the last four weeks by sexual problems? No   Do you have difficulty concentrating, remembering or making decisions? Yes       Age-appropriate Screening Schedule:  Refer to the list below for future screening recommendations based on patient's age, sex and/or medical conditions. Orders for these recommended tests are listed in the plan section. The patient has been provided with a written plan.    Health Maintenance   Topic Date Due   • DIABETIC EYE EXAM  10/25/2020   • URINE MICROALBUMIN  03/14/2023   • COVID-19 Vaccine (4 - Booster for Pfizer series) 08/09/2023 (Originally 1/19/2022)   • TDAP/TD VACCINES (1 - Tdap) 07/08/2025 (Originally 3/18/1964)   • ZOSTER VACCINE (1 of 2) 07/07/2026 (Originally 3/18/1994)   • INFLUENZA VACCINE  08/01/2023   • HEMOGLOBIN A1C  08/15/2023   • LIPID PANEL  01/26/2024   • ANNUAL WELLNESS VISIT  04/19/2024   • COLORECTAL CANCER SCREENING  04/07/2025   • HEPATITIS C SCREENING  Completed   • Pneumococcal Vaccine 65+  Completed                  CMS Preventative  Services Quick Reference  Risk Factors Identified During Encounter:    None Identified    The above risks/problems have been discussed with the patient.  Pertinent information has been shared with the patient in the After Visit Summary.    Diagnoses and all orders for this visit:    1. Paroxysmal atrial fibrillation (Primary) continue medication    2. Primary hypertension cont med  -     CBC & Differential    3. Mixed hyperlipidemia cont med  -     Comprehensive Metabolic Panel  -     Lipid Panel    4. Coronary artery disease involving native coronary artery of native heart without angina pectoris  -     TSH    5. Vitamin D deficiency do lab  -     Vitamin D,25-Hydroxy    6. Type 2 diabetes mellitus with hyperglycemia, without long-term current use of insulin decrease lantus to 10u daily  -     Hemoglobin A1c  -     MicroAlbumin, Urine, Random - Urine, Clean Catch  -     insulin glargine (Lantus) 100 UNIT/ML injection; Inject 10 Units under the skin into the appropriate area as directed Every Night.  Dispense: 10 mL; Refill: 2    7. Enlarged prostate    8. Generalized anxiety disorder cont med    9. TIA (transient ischemic attack) cont med    10. Memory loss cont med    11. Restless leg syndrome cont med    12. Loss of balance cont walker    13. Abnormal findings on diagnostic imaging of other specified body structures  -     TSH    14. Bilateral leg edema change to qod po   -     furosemide (LASIX) 20 MG tablet; Take 1 tablet by mouth Every Other Day.  Dispense: 45 tablet; Refill: 1        Follow Up:   Next Medicare Wellness visit to be scheduled in 1 year.      An After Visit Summary and PPPS were made available to the patient.       ---- historical data    TIA continue xarelto, Echo and carotid duplex and CT head only small vessel ischemia   BPH,s/p laser surgery doing better, hematuria s/p scope,  follow up with dr mclain,   Onychomycosis improved after lamisil  colon:divertic polyp and hemorroid done 4/2015,  patient refuses to repeat, prefer to do cologuard  patient declined 12/29/20, 4/19/23 declined again   Vaccinations up-to-date, patient declined shingrix, pneumovax done ,   Balancing low, PT patient refuses  AAA screen  negative

## 2023-06-05 DIAGNOSIS — E78.2 MIXED HYPERLIPIDEMIA: ICD-10-CM

## 2023-06-05 RX ORDER — PRAVASTATIN SODIUM 40 MG
TABLET ORAL
Qty: 90 TABLET | Refills: 1 | Status: SHIPPED | OUTPATIENT
Start: 2023-06-05

## 2023-06-19 ENCOUNTER — OFFICE VISIT (OUTPATIENT)
Dept: INTERNAL MEDICINE | Facility: CLINIC | Age: 78
End: 2023-06-19
Payer: MEDICARE

## 2023-06-19 VITALS
HEART RATE: 50 BPM | HEIGHT: 71 IN | WEIGHT: 175.4 LBS | BODY MASS INDEX: 24.56 KG/M2 | OXYGEN SATURATION: 100 % | SYSTOLIC BLOOD PRESSURE: 120 MMHG | TEMPERATURE: 97.4 F | DIASTOLIC BLOOD PRESSURE: 70 MMHG

## 2023-06-19 DIAGNOSIS — E11.65 TYPE 2 DIABETES MELLITUS WITH HYPERGLYCEMIA, WITHOUT LONG-TERM CURRENT USE OF INSULIN: ICD-10-CM

## 2023-06-19 DIAGNOSIS — I48.0 PAROXYSMAL ATRIAL FIBRILLATION: ICD-10-CM

## 2023-06-19 DIAGNOSIS — E78.2 MIXED HYPERLIPIDEMIA: ICD-10-CM

## 2023-06-19 DIAGNOSIS — I10 HYPERTENSION, UNSPECIFIED TYPE: Primary | ICD-10-CM

## 2023-06-19 DIAGNOSIS — R63.4 WEIGHT LOSS: ICD-10-CM

## 2023-06-19 DIAGNOSIS — F41.1 GENERALIZED ANXIETY DISORDER: ICD-10-CM

## 2023-06-19 PROCEDURE — 1160F RVW MEDS BY RX/DR IN RCRD: CPT | Performed by: INTERNAL MEDICINE

## 2023-06-19 PROCEDURE — 3078F DIAST BP <80 MM HG: CPT | Performed by: INTERNAL MEDICINE

## 2023-06-19 PROCEDURE — 99214 OFFICE O/P EST MOD 30 MIN: CPT | Performed by: INTERNAL MEDICINE

## 2023-06-19 PROCEDURE — 1159F MED LIST DOCD IN RCRD: CPT | Performed by: INTERNAL MEDICINE

## 2023-06-19 PROCEDURE — 3074F SYST BP LT 130 MM HG: CPT | Performed by: INTERNAL MEDICINE

## 2023-06-19 NOTE — PROGRESS NOTES
"Subjective   Que Lagunas is a 78 y.o. male.     Chief Complaint   Patient presents with    Paroxysmal atrial fibrillation    Hyperlipidemia    Hypertension       Hyperlipidemia    Hypertension     Patient here for follow-up.  Atrial fibrillation rate controlled but heart rate is 50.  Patient is taking metoprolol.  Patient lost 15 pounds over 4 months.  Family states trying to cut down food to lower the weight.  Diabetes is stable on medication hypertension stable on medication hyperlipidemia stable on medication leg edema resolved.    Current Outpatient Medications:     cholecalciferol (VITAMIN D3) 1000 units tablet, Take 1 tablet by mouth Daily. Indications: Vitamin D Deficiency, Disp: , Rfl:     donepezil (ARICEPT) 10 MG tablet, TAKE 1 TABLET BY MOUTH ONCE DAILY AT NIGHT, Disp: 90 tablet, Rfl: 1    esomeprazole (nexIUM) 20 MG capsule, Take 1 capsule by mouth Every Morning Before Breakfast. Indications: Heartburn, Disp: , Rfl:     furosemide (LASIX) 20 MG tablet, Take 1 tablet by mouth Every Other Day., Disp: 45 tablet, Rfl: 1    glucose blood (SOLUS V2 TEST) test strip, Use as instructed, Disp: 100 each, Rfl: 12    glucose monitor monitoring kit, 1 each As Needed (prn)., Disp: 1 each, Rfl: 0    insulin glargine (Lantus) 100 UNIT/ML injection, Inject 10 Units under the skin into the appropriate area as directed Every Night., Disp: 10 mL, Rfl: 2    Insulin Lispro (humaLOG) 100 UNIT/ML injection, INJECT 10 UNITS SUBCUTANEOUSLY WITH MEALS, Disp: 40 mL, Rfl: 0    Insulin Pen Needle (PEN NEEDLES 3/16\") 31G X 5 MM misc, Use with insulin daily E11.9, Disp: 100 each, Rfl: 5    Insulin Syringe-Needle U-100 (Easy Touch Insulin Syringe) 30G X 5/16\" 0.5 ML misc, use as directed with insulin 4 times a day, Disp: 100 each, Rfl: 11    Lancets misc, Use to test twice daily. E11.9, Disp: 100 each, Rfl: 12    LifeScan Unist[place] II Lancets misc, 1 Units 3 (Three) Times a Day., Disp: 100 each, Rfl: 5    memantine (NAMENDA) 10 MG " tablet, TAKE 1 TABLET BY MOUTH ONCE DAILY AT NIGHT, Disp: 90 tablet, Rfl: 1    metFORMIN (GLUCOPHAGE) 500 MG tablet, TAKE 1 TABLET BY MOUTH IN THE MORNING WITH  BREAKFAST, Disp: 90 tablet, Rfl: 3    metoprolol tartrate (LOPRESSOR) 100 MG tablet, Take 75 mg by mouth 2 (Two) Times a Day. 75mg daily  Indications: High Blood Pressure Disorder, Disp: , Rfl:     nystatin (MYCOSTATIN) 940232 UNIT/GM powder, Apply  topically to the appropriate area as directed 2 (Two) Times a Day., Disp: 60 g, Rfl: 1    PARoxetine (PAXIL) 20 MG tablet, Take 1 tablet by mouth Daily., Disp: 90 tablet, Rfl: 3    pramipexole (Mirapex) 1 MG tablet, Take 1 tablet by mouth every night at bedtime., Disp: 90 tablet, Rfl: 3    pravastatin (PRAVACHOL) 40 MG tablet, TAKE 1 TABLET BY MOUTH ONCE DAILY IN THE EVENING, Disp: 90 tablet, Rfl: 1    Xarelto 20 MG tablet, Take 1 tablet by mouth once daily, Disp: 30 tablet, Rfl: 11    The following portions of the patient's history were reviewed and updated as appropriate: allergies, current medications, past family history, past medical history, past social history, past surgical history, and problem list.    Review of Systems   Constitutional: Negative.    Respiratory: Negative.     Cardiovascular: Negative.    Gastrointestinal: Negative.    Musculoskeletal: Negative.    Skin: Negative.    Neurological: Negative.    Psychiatric/Behavioral: Negative.       Objective   Physical Exam  Cardiovascular:      Rate and Rhythm: Normal rate and regular rhythm. Bradycardia present. Rhythm irregular.      Heart sounds: Normal heart sounds.   Pulmonary:      Effort: Pulmonary effort is normal.      Breath sounds: Normal breath sounds.   Abdominal:      General: Bowel sounds are normal.   Musculoskeletal:      Cervical back: Neck supple.   Skin:     General: Skin is warm.   Neurological:      Mental Status: He is alert and oriented to person, place, and time.       All tests have been reviewed.    Assessment & Plan    Diagnoses and all orders for this visit:    Hypertension, stable on medication continue    Mixed hyperlipidemia stable on pravastatin 40 mg daily continue medication    Type 2 diabetes mellitus with stable on metformin continue medication    Paroxysmal atrial fibrillation heart rate 50 needs to lower the metoprolol dose patient is going to let us know the exact dose is taking.    Generalized anxiety disorder stable    Weight loss check lab encourage patient to have good diet  -     C-reactive Protein  -     Sedimentation Rate       2. Primary hypertension cont med  -     CBC & Differential     3. Mixed hyperlipidemia cont med  -     Comprehensive Metabolic Panel  -     Lipid Panel     4. Coronary artery disease involving native coronary artery of native heart without angina pectoris  -     TSH     5. Vitamin D deficiency do lab  -     Vitamin D,25-Hydroxy     6. Type 2 diabetes mellitus with hyperglycemia, without long-term current use of insulin decrease lantus to 10u daily  -     Hemoglobin A1c  -     MicroAlbumin, Urine, Random - Urine, Clean Catch  -     insulin glargine (Lantus) 100 UNIT/ML injection; Inject 10 Units under the skin into the appropriate area as directed Every Night.  Dispense: 10 mL; Refill: 2     14. Bilateral leg edema change to qod po   -     furosemide (LASIX) 20 MG tablet; Take 1 tablet by mouth Every Other Day.  Dispense: 45 tablet; Refill: 1      weight loss 15 Ib over 4 months watch diet      4 weeks follow-up after blood tests and also follow-up with weight and heart rate

## 2023-07-08 LAB
25(OH)D3+25(OH)D2 SERPL-MCNC: 38 NG/ML (ref 30–100)
ALBUMIN SERPL-MCNC: 4.3 G/DL (ref 3.5–5.2)
ALBUMIN/GLOB SERPL: 2.2 G/DL
ALP SERPL-CCNC: 113 U/L (ref 39–117)
ALT SERPL-CCNC: 15 U/L (ref 1–41)
AST SERPL-CCNC: 15 U/L (ref 1–40)
BASOPHILS # BLD AUTO: 0.06 10*3/MM3 (ref 0–0.2)
BASOPHILS NFR BLD AUTO: 0.6 % (ref 0–1.5)
BILIRUB SERPL-MCNC: 0.8 MG/DL (ref 0–1.2)
BUN SERPL-MCNC: 15 MG/DL (ref 8–23)
BUN/CREAT SERPL: 18.3 (ref 7–25)
CALCIUM SERPL-MCNC: 9.8 MG/DL (ref 8.6–10.5)
CHLORIDE SERPL-SCNC: 98 MMOL/L (ref 98–107)
CHOLEST SERPL-MCNC: 131 MG/DL (ref 0–200)
CO2 SERPL-SCNC: 29.7 MMOL/L (ref 22–29)
CREAT SERPL-MCNC: 0.82 MG/DL (ref 0.76–1.27)
CRP SERPL-MCNC: <0.3 MG/DL (ref 0–0.5)
EGFRCR SERPLBLD CKD-EPI 2021: 89.4 ML/MIN/1.73
EOSINOPHIL # BLD AUTO: 0.2 10*3/MM3 (ref 0–0.4)
EOSINOPHIL NFR BLD AUTO: 2.1 % (ref 0.3–6.2)
ERYTHROCYTE [DISTWIDTH] IN BLOOD BY AUTOMATED COUNT: 13 % (ref 12.3–15.4)
ERYTHROCYTE [SEDIMENTATION RATE] IN BLOOD BY WESTERGREN METHOD: 3 MM/HR (ref 0–20)
GLOBULIN SER CALC-MCNC: 2 GM/DL
GLUCOSE SERPL-MCNC: 189 MG/DL (ref 65–99)
HBA1C MFR BLD: 7.8 % (ref 4.8–5.6)
HCT VFR BLD AUTO: 45.9 % (ref 37.5–51)
HDLC SERPL-MCNC: 50 MG/DL (ref 40–60)
HGB BLD-MCNC: 15.5 G/DL (ref 13–17.7)
IMM GRANULOCYTES # BLD AUTO: 0.04 10*3/MM3 (ref 0–0.05)
IMM GRANULOCYTES NFR BLD AUTO: 0.4 % (ref 0–0.5)
LDLC SERPL CALC-MCNC: 66 MG/DL (ref 0–100)
LYMPHOCYTES # BLD AUTO: 3.25 10*3/MM3 (ref 0.7–3.1)
LYMPHOCYTES NFR BLD AUTO: 34.1 % (ref 19.6–45.3)
MCH RBC QN AUTO: 30.2 PG (ref 26.6–33)
MCHC RBC AUTO-ENTMCNC: 33.8 G/DL (ref 31.5–35.7)
MCV RBC AUTO: 89.3 FL (ref 79–97)
MICROALBUMIN UR-MCNC: 10.6 UG/ML
MONOCYTES # BLD AUTO: 0.9 10*3/MM3 (ref 0.1–0.9)
MONOCYTES NFR BLD AUTO: 9.5 % (ref 5–12)
NEUTROPHILS # BLD AUTO: 5.07 10*3/MM3 (ref 1.7–7)
NEUTROPHILS NFR BLD AUTO: 53.3 % (ref 42.7–76)
NRBC BLD AUTO-RTO: 0 /100 WBC (ref 0–0.2)
PLATELET # BLD AUTO: 302 10*3/MM3 (ref 140–450)
POTASSIUM SERPL-SCNC: 5 MMOL/L (ref 3.5–5.2)
PROT SERPL-MCNC: 6.3 G/DL (ref 6–8.5)
RBC # BLD AUTO: 5.14 10*6/MM3 (ref 4.14–5.8)
SODIUM SERPL-SCNC: 139 MMOL/L (ref 136–145)
TRIGL SERPL-MCNC: 72 MG/DL (ref 0–150)
TSH SERPL DL<=0.005 MIU/L-ACNC: 1.89 UIU/ML (ref 0.27–4.2)
VLDLC SERPL CALC-MCNC: 15 MG/DL (ref 5–40)
WBC # BLD AUTO: 9.52 10*3/MM3 (ref 3.4–10.8)

## 2023-07-27 ENCOUNTER — PRIOR AUTHORIZATION (OUTPATIENT)
Dept: INTERNAL MEDICINE | Facility: CLINIC | Age: 78
End: 2023-07-27
Payer: MEDICARE

## 2023-07-27 NOTE — TELEPHONE ENCOUNTER
PA has been approved     Attempted to call patient, left vague message with daughter's phone. Per verbal release.

## 2023-11-09 ENCOUNTER — TELEPHONE (OUTPATIENT)
Dept: INTERNAL MEDICINE | Facility: CLINIC | Age: 78
End: 2023-11-09
Payer: MEDICARE

## 2023-11-09 DIAGNOSIS — F01.50 VASCULAR DEMENTIA WITHOUT BEHAVIORAL DISTURBANCE: ICD-10-CM

## 2023-11-09 RX ORDER — MEMANTINE HYDROCHLORIDE 10 MG/1
10 TABLET ORAL NIGHTLY
Qty: 90 TABLET | Refills: 0 | Status: SHIPPED | OUTPATIENT
Start: 2023-11-09

## 2023-11-09 NOTE — TELEPHONE ENCOUNTER
Caller: ShekharjhonathanJennifer    Relationship: Emergency Contact    Best call back number: [phone]    Requested Prescriptions:   Requested Prescriptions      No prescriptions requested or ordered in this encounter      memantine (NAMENDA) 10 MG tablet     Pharmacy where request should be sent:      [facility] [address] [place] [address] [phone] [facility] [phone] [facility]      Last office visit with prescribing clinician: 7/19/2023   Last telemedicine visit with prescribing clinician: Visit date not found   Next office visit with prescribing clinician: Visit date not found     Does the patient have less than a 3 day supply:  [x] Yes  [] No    Would you like a call back once the refill request has been completed: [] Yes [x] No    If the office needs to give you a call back, can they leave a voicemail: [] Yes [x] No    Racheal Montero, PCT   11/09/23 15:45 EST

## 2023-11-09 NOTE — TELEPHONE ENCOUNTER
Called patient's emergency contact with no answer, left a voicemail informing medication had already been sent to the pharmacy today by his Neurologist.

## 2023-11-25 DIAGNOSIS — E78.2 MIXED HYPERLIPIDEMIA: ICD-10-CM

## 2023-11-27 RX ORDER — PRAVASTATIN SODIUM 40 MG
TABLET ORAL
Qty: 90 TABLET | Refills: 0 | Status: SHIPPED | OUTPATIENT
Start: 2023-11-27

## 2024-01-08 DIAGNOSIS — E78.2 MIXED HYPERLIPIDEMIA: ICD-10-CM

## 2024-01-08 DIAGNOSIS — F01.50 VASCULAR DEMENTIA WITHOUT BEHAVIORAL DISTURBANCE: ICD-10-CM

## 2024-01-08 DIAGNOSIS — F41.1 GENERALIZED ANXIETY DISORDER: ICD-10-CM

## 2024-01-08 RX ORDER — DONEPEZIL HYDROCHLORIDE 10 MG/1
10 TABLET, FILM COATED ORAL NIGHTLY
Qty: 90 TABLET | Refills: 0 | Status: SHIPPED | OUTPATIENT
Start: 2024-01-08

## 2024-01-09 RX ORDER — PAROXETINE HYDROCHLORIDE 20 MG/1
20 TABLET, FILM COATED ORAL DAILY
Qty: 90 TABLET | Refills: 1 | Status: SHIPPED | OUTPATIENT
Start: 2024-01-09

## 2024-01-09 RX ORDER — PRAVASTATIN SODIUM 40 MG
TABLET ORAL
Qty: 90 TABLET | Refills: 1 | Status: SHIPPED | OUTPATIENT
Start: 2024-01-09

## 2024-01-30 ENCOUNTER — OFFICE VISIT (OUTPATIENT)
Dept: INTERNAL MEDICINE | Facility: CLINIC | Age: 79
End: 2024-01-30
Payer: MEDICARE

## 2024-01-30 VITALS
BODY MASS INDEX: 23.1 KG/M2 | HEIGHT: 71 IN | HEART RATE: 117 BPM | RESPIRATION RATE: 16 BRPM | SYSTOLIC BLOOD PRESSURE: 140 MMHG | DIASTOLIC BLOOD PRESSURE: 101 MMHG | WEIGHT: 165 LBS

## 2024-01-30 DIAGNOSIS — Z12.11 COLON CANCER SCREENING: ICD-10-CM

## 2024-01-30 DIAGNOSIS — E78.2 MIXED HYPERLIPIDEMIA: ICD-10-CM

## 2024-01-30 DIAGNOSIS — R41.3 MEMORY LOSS: ICD-10-CM

## 2024-01-30 DIAGNOSIS — D36.9 TUBULAR ADENOMA: ICD-10-CM

## 2024-01-30 DIAGNOSIS — I48.0 PAROXYSMAL ATRIAL FIBRILLATION: ICD-10-CM

## 2024-01-30 DIAGNOSIS — F41.1 GENERALIZED ANXIETY DISORDER: ICD-10-CM

## 2024-01-30 DIAGNOSIS — I10 PRIMARY HYPERTENSION: Primary | ICD-10-CM

## 2024-01-30 DIAGNOSIS — E11.65 TYPE 2 DIABETES MELLITUS WITH HYPERGLYCEMIA, WITHOUT LONG-TERM CURRENT USE OF INSULIN: ICD-10-CM

## 2024-01-30 LAB
EXPIRATION DATE: ABNORMAL
HBA1C MFR BLD: 6.5 % (ref 4.5–5.7)
Lab: ABNORMAL

## 2024-01-30 PROCEDURE — 99214 OFFICE O/P EST MOD 30 MIN: CPT | Performed by: STUDENT IN AN ORGANIZED HEALTH CARE EDUCATION/TRAINING PROGRAM

## 2024-01-30 PROCEDURE — 3044F HG A1C LEVEL LT 7.0%: CPT | Performed by: STUDENT IN AN ORGANIZED HEALTH CARE EDUCATION/TRAINING PROGRAM

## 2024-01-30 PROCEDURE — 3080F DIAST BP >= 90 MM HG: CPT | Performed by: STUDENT IN AN ORGANIZED HEALTH CARE EDUCATION/TRAINING PROGRAM

## 2024-01-30 PROCEDURE — 83036 HEMOGLOBIN GLYCOSYLATED A1C: CPT | Performed by: STUDENT IN AN ORGANIZED HEALTH CARE EDUCATION/TRAINING PROGRAM

## 2024-01-30 PROCEDURE — 3077F SYST BP >= 140 MM HG: CPT | Performed by: STUDENT IN AN ORGANIZED HEALTH CARE EDUCATION/TRAINING PROGRAM

## 2024-01-30 NOTE — ASSESSMENT & PLAN NOTE
Stable on current medication and dosage. Will continue current management. Refill medication as necessary.  Pravastatin

## 2024-01-30 NOTE — ASSESSMENT & PLAN NOTE
Stable on current medication and dosage. Will continue current management. Refill medication as necessary.  Xarelto, metoprolol

## 2024-01-30 NOTE — ASSESSMENT & PLAN NOTE
Stable on current medication and dosage. Will continue current management. Refill medication as necessary.  Metoprolol, Lasix

## 2024-01-30 NOTE — ASSESSMENT & PLAN NOTE
Stable on current medication and dosage. Will continue current management. Refill medication as necessary.  Paroxetine

## 2024-01-30 NOTE — PROGRESS NOTES
Office Note     Name: Que Lagunas    : 1945     MRN: [id number]     Chief Complaint  Establish Care (Offboarding Dr. Blackburn/)    Subjective     History of Present Illness:  Que Lagunas is a 78 y.o. male who presents today for chronic conditions.    Hypertension Lasix 20 mg every other day, metoprolol, usually <140/90  Paroxysmal atrial fibrillation on Xarelto, metoprolol  Hyperlipidemia on pravastatin  Diabetes type 2: Lantus 10 units in AM, Humalog 10 units in morning and noon, metformin 500 once a day  Vitamin D deficiency on vitamin D  Memory loss on donepezil and memantine, follows with neurology  GERD on esomeprazole  Iron deficiency anemia on ferrous sulfate  Anxiety and depression on paroxetine for years now, stable symptoms    Previous smoker 100py history, quit 23 years ago    Family History:   Family History   Problem Relation Age of Onset    Diabetes Other     Cancer Other         NO PROSTATE CANCER HISTORY    Hypertension Other     Cancer Other     Diabetes Other     Liver disease Mother     Liver disease Father     Colon cancer Neg Hx     Stomach cancer Neg Hx        Social History:   Social History     Socioeconomic History    Marital status:    Tobacco Use    Smoking status: Former     Packs/day: 2.00     Years: 50.00     Additional pack years: 0.00     Total pack years: 100.00     Types: Cigarettes     Quit date:      Years since quittin.0    Smokeless tobacco: Never    Tobacco comments:     STOPPED 5 YEARS AGO   Vaping Use    Vaping Use: Never used   Substance and Sexual Activity    Alcohol use: No    Drug use: No    Sexual activity: Defer       Health Maintenance   Topic Date Due    INFLUENZA VACCINE  2023    COVID-19 Vaccine ( season) 2025 (Originally 2023)    TDAP/TD VACCINES (1 - Tdap) 2025 (Originally 3/18/1964)    ZOSTER VACCINE (1 of 2) 2026 (Originally 3/18/1994)    DIABETIC EYE EXAM  2024    ANNUAL WELLNESS  "VISIT  04/19/2024    LIPID PANEL  07/07/2024    URINE MICROALBUMIN  07/07/2024    HEMOGLOBIN A1C  07/30/2024    COLORECTAL CANCER SCREENING  04/07/2025    HEPATITIS C SCREENING  Completed    Pneumococcal Vaccine 65+  Completed       Objective     Vital Signs  BP (!) 140/101   Pulse 117   Resp 16   Ht 180.6 cm (71.1\")   Wt 74.8 kg (165 lb)   BMI 22.95 kg/m²   Estimated body mass index is 22.95 kg/m² as calculated from the following:    Height as of this encounter: 180.6 cm (71.1\").    Weight as of this encounter: 74.8 kg (165 lb).  BMI is within normal parameters. No other follow-up for BMI required.    Physical Exam  Vitals and nursing note reviewed.   Constitutional:       Appearance: Normal appearance.   HENT:      Head: Normocephalic and atraumatic.   Cardiovascular:      Rate and Rhythm: Normal rate and regular rhythm.      Pulses: Normal pulses.      Heart sounds: Normal heart sounds.   Pulmonary:      Effort: Pulmonary effort is normal. No respiratory distress.      Breath sounds: Normal breath sounds. No wheezing, rhonchi or rales.   Abdominal:      General: Abdomen is flat. Bowel sounds are normal.      Palpations: Abdomen is soft.      Tenderness: There is no abdominal tenderness. There is no guarding.   Musculoskeletal:      Cervical back: Neck supple.   Skin:     General: Skin is warm.      Capillary Refill: Capillary refill takes less than 2 seconds.   Neurological:      General: No focal deficit present.      Mental Status: He is alert. Mental status is at baseline.   Psychiatric:         Mood and Affect: Mood normal.         Behavior: Behavior normal.          Procedures     Assessment and Plan     Diagnoses and all orders for this visit:    1. Primary hypertension (Primary)  Assessment & Plan:  Stable on current medication and dosage. Will continue current management. Refill medication as necessary.  Metoprolol, Lasix    Orders:  -     CBC & Differential  -     Comprehensive Metabolic Panel  -    "  TSH Rfx On Abnormal To Free T4    2. Paroxysmal atrial fibrillation  Assessment & Plan:  Stable on current medication and dosage. Will continue current management. Refill medication as necessary.  Xarelto, metoprolol    Orders:  -     TSH Rfx On Abnormal To Free T4    3. Type 2 diabetes mellitus with hyperglycemia, without long-term current use of insulin  Assessment & Plan:  A1c today 6.5%  Lantus 10units in AM, Humalog 10 in Am and noon, metformin 500 once a day  Stable on current medication and dosage. Will continue current management. Refill medication as necessary.  Goal A1c is less than 8%    Orders:  -     Cancel: Hemoglobin A1c  -     POC Glycosylated Hemoglobin (Hb A1C)    4. Generalized anxiety disorder  Assessment & Plan:  Stable on current medication and dosage. Will continue current management. Refill medication as necessary.  Paroxetine      5. Memory loss  Assessment & Plan:  Follows with neurology, stable  Donepezil and memantine      6. Mixed hyperlipidemia  Assessment & Plan:  Stable on current medication and dosage. Will continue current management. Refill medication as necessary.  Pravastatin    Orders:  -     Lipid Panel    7. Colon cancer screening  -     Cancel: Ambulatory Referral For Screening Colonoscopy  -     Ambulatory Referral For Screening Colonoscopy    8. Tubular adenoma  -     Ambulatory Referral For Screening Colonoscopy         Counseling was given to patient for the following topics: instructions for management, risks and benefits of treatment options, and importance of treatment compliance.    Follow Up  Return in about 3 months (around 4/22/2024) for Medicare Wellness.    MD ANUJA Cuellar [facility]  [address] [place] [id number] [phone]

## 2024-01-30 NOTE — ASSESSMENT & PLAN NOTE
A1c today 6.5%  Lantus 10units in AM, Humalog 10 in Am and noon, metformin 500 once a day  Stable on current medication and dosage. Will continue current management. Refill medication as necessary.  Goal A1c is less than 8%

## 2024-03-15 DIAGNOSIS — F01.50 VASCULAR DEMENTIA WITHOUT BEHAVIORAL DISTURBANCE: ICD-10-CM

## 2024-03-15 NOTE — TELEPHONE ENCOUNTER
Caller: JUANITA    Relationship: DAUGHTER    Best call back number: [phone]     Requested Prescriptions:   Requested Prescriptions     Pending Prescriptions Disp Refills    donepezil (ARICEPT) 10 MG tablet 90 tablet 0     Sig: Take 1 tablet by mouth Every Night.    memantine (NAMENDA) 10 MG tablet 90 tablet 0     Sig: Take 1 tablet by mouth Every Night.        Pharmacy where request should be sent:  [id number]    Last office visit with prescribing clinician: 7/13/2023   Last telemedicine visit with prescribing clinician: Visit date not found   Next office visit with prescribing clinician: 6/5/2024     Additional details provided by patient: PT IS OUT OF BOTH MEDICATIONS    Does the patient have less than a 3 day supply:  [x] Yes  [] No        Dick Zimmerman Rep   03/15/24 09:32 EDT

## 2024-03-18 RX ORDER — DONEPEZIL HYDROCHLORIDE 10 MG/1
10 TABLET, FILM COATED ORAL NIGHTLY
Qty: 90 TABLET | Refills: 0 | Status: SHIPPED | OUTPATIENT
Start: 2024-03-18

## 2024-03-18 RX ORDER — MEMANTINE HYDROCHLORIDE 10 MG/1
10 TABLET ORAL NIGHTLY
Qty: 90 TABLET | Refills: 0 | Status: SHIPPED | OUTPATIENT
Start: 2024-03-18

## 2024-04-04 DIAGNOSIS — E11.65 TYPE 2 DIABETES MELLITUS WITH HYPERGLYCEMIA, WITHOUT LONG-TERM CURRENT USE OF INSULIN: ICD-10-CM

## 2024-04-04 DIAGNOSIS — E11.69 TYPE 2 DIABETES MELLITUS WITH OTHER SPECIFIED COMPLICATION, WITH LONG-TERM CURRENT USE OF INSULIN: ICD-10-CM

## 2024-04-04 DIAGNOSIS — Z79.4 TYPE 2 DIABETES MELLITUS WITH OTHER SPECIFIED COMPLICATION, WITH LONG-TERM CURRENT USE OF INSULIN: ICD-10-CM

## 2024-04-04 RX ORDER — INSULIN GLARGINE 100 [IU]/ML
10 INJECTION, SOLUTION SUBCUTANEOUS NIGHTLY
Qty: 10 ML | Refills: 2 | Status: SHIPPED | OUTPATIENT
Start: 2024-04-04

## 2024-04-04 RX ORDER — INSULIN LISPRO 100 [IU]/ML
INJECTION, SOLUTION INTRAVENOUS; SUBCUTANEOUS
Qty: 40 ML | Refills: 0 | Status: SHIPPED | OUTPATIENT
Start: 2024-04-04

## 2024-05-31 ENCOUNTER — HOSPITAL ENCOUNTER (INPATIENT)
Facility: HOSPITAL | Age: 79
LOS: 1 days | Discharge: HOME HEALTH | DRG: 690 | End: 2024-06-03
Attending: EMERGENCY MEDICINE | Admitting: FAMILY MEDICINE
Payer: OTHER GOVERNMENT

## 2024-05-31 ENCOUNTER — APPOINTMENT (OUTPATIENT)
Dept: CT IMAGING | Facility: HOSPITAL | Age: 79
DRG: 690 | End: 2024-05-31
Payer: OTHER GOVERNMENT

## 2024-05-31 ENCOUNTER — APPOINTMENT (OUTPATIENT)
Dept: GENERAL RADIOLOGY | Facility: HOSPITAL | Age: 79
DRG: 690 | End: 2024-05-31
Payer: OTHER GOVERNMENT

## 2024-05-31 DIAGNOSIS — Z78.9 IMPAIRED MOBILITY AND ADLS: Chronic | ICD-10-CM

## 2024-05-31 DIAGNOSIS — N39.0 COMPLICATED UTI (URINARY TRACT INFECTION): Primary | ICD-10-CM

## 2024-05-31 DIAGNOSIS — Z79.4 TYPE 2 DIABETES MELLITUS WITH OTHER SPECIFIED COMPLICATION, WITH LONG-TERM CURRENT USE OF INSULIN: ICD-10-CM

## 2024-05-31 DIAGNOSIS — N30.00 ACUTE CYSTITIS WITHOUT HEMATURIA: ICD-10-CM

## 2024-05-31 DIAGNOSIS — Z74.09 IMPAIRED MOBILITY AND ADLS: Chronic | ICD-10-CM

## 2024-05-31 DIAGNOSIS — R29.6 MULTIPLE FALLS: ICD-10-CM

## 2024-05-31 DIAGNOSIS — E11.69 TYPE 2 DIABETES MELLITUS WITH OTHER SPECIFIED COMPLICATION, WITH LONG-TERM CURRENT USE OF INSULIN: ICD-10-CM

## 2024-05-31 DIAGNOSIS — F03.90 DEMENTIA, UNSPECIFIED DEMENTIA SEVERITY, UNSPECIFIED DEMENTIA TYPE, UNSPECIFIED WHETHER BEHAVIORAL, PSYCHOTIC, OR MOOD DISTURBANCE OR ANXIETY: Chronic | ICD-10-CM

## 2024-05-31 LAB
ALBUMIN SERPL-MCNC: 3.6 G/DL (ref 3.5–5.2)
ALBUMIN/GLOB SERPL: 1.2 G/DL
ALP SERPL-CCNC: 83 U/L (ref 39–117)
ALT SERPL W P-5'-P-CCNC: 11 U/L (ref 1–41)
ANION GAP SERPL CALCULATED.3IONS-SCNC: 15.2 MMOL/L (ref 5–15)
AST SERPL-CCNC: 15 U/L (ref 1–40)
BACTERIA UR QL AUTO: ABNORMAL /HPF
BASOPHILS # BLD AUTO: 0.07 10*3/MM3 (ref 0–0.2)
BASOPHILS NFR BLD AUTO: 0.5 % (ref 0–1.5)
BILIRUB SERPL-MCNC: 1.1 MG/DL (ref 0–1.2)
BILIRUB UR QL STRIP: NEGATIVE
BUN SERPL-MCNC: 17 MG/DL (ref 8–23)
BUN/CREAT SERPL: 23.3 (ref 7–25)
CALCIUM SPEC-SCNC: 9.2 MG/DL (ref 8.6–10.5)
CHLORIDE SERPL-SCNC: 94 MMOL/L (ref 98–107)
CLARITY UR: ABNORMAL
CO2 SERPL-SCNC: 25.8 MMOL/L (ref 22–29)
COLOR UR: YELLOW
CREAT SERPL-MCNC: 0.73 MG/DL (ref 0.76–1.27)
D-LACTATE SERPL-SCNC: 2.2 MMOL/L (ref 0.5–2)
DEPRECATED RDW RBC AUTO: 43.9 FL (ref 37–54)
EGFRCR SERPLBLD CKD-EPI 2021: 92 ML/MIN/1.73
EOSINOPHIL # BLD AUTO: 0.05 10*3/MM3 (ref 0–0.4)
EOSINOPHIL NFR BLD AUTO: 0.4 % (ref 0.3–6.2)
ERYTHROCYTE [DISTWIDTH] IN BLOOD BY AUTOMATED COUNT: 13.5 % (ref 12.3–15.4)
GEN 5 2HR TROPONIN T REFLEX: 16 NG/L
GLOBULIN UR ELPH-MCNC: 3.1 GM/DL
GLUCOSE SERPL-MCNC: 179 MG/DL (ref 65–99)
GLUCOSE UR STRIP-MCNC: ABNORMAL MG/DL
HCT VFR BLD AUTO: 44.4 % (ref 37.5–51)
HGB BLD-MCNC: 14.8 G/DL (ref 13–17.7)
HGB UR QL STRIP.AUTO: ABNORMAL
HOLD SPECIMEN: NORMAL
HYALINE CASTS UR QL AUTO: ABNORMAL /LPF
IMM GRANULOCYTES # BLD AUTO: 0.09 10*3/MM3 (ref 0–0.05)
IMM GRANULOCYTES NFR BLD AUTO: 0.6 % (ref 0–0.5)
INR PPP: 1.33 (ref 0.9–1.1)
KETONES UR QL STRIP: NEGATIVE
LEUKOCYTE ESTERASE UR QL STRIP.AUTO: ABNORMAL
LYMPHOCYTES # BLD AUTO: 2.44 10*3/MM3 (ref 0.7–3.1)
LYMPHOCYTES NFR BLD AUTO: 17.2 % (ref 19.6–45.3)
MAGNESIUM SERPL-MCNC: 1.6 MG/DL (ref 1.6–2.4)
MCH RBC QN AUTO: 29.6 PG (ref 26.6–33)
MCHC RBC AUTO-ENTMCNC: 33.3 G/DL (ref 31.5–35.7)
MCV RBC AUTO: 88.8 FL (ref 79–97)
MONOCYTES # BLD AUTO: 1.56 10*3/MM3 (ref 0.1–0.9)
MONOCYTES NFR BLD AUTO: 11 % (ref 5–12)
NEUTROPHILS NFR BLD AUTO: 70.3 % (ref 42.7–76)
NEUTROPHILS NFR BLD AUTO: 9.94 10*3/MM3 (ref 1.7–7)
NITRITE UR QL STRIP: NEGATIVE
NRBC BLD AUTO-RTO: 0 /100 WBC (ref 0–0.2)
PH UR STRIP.AUTO: 5.5 [PH] (ref 5–8)
PHOSPHATE SERPL-MCNC: 2.6 MG/DL (ref 2.5–4.5)
PLATELET # BLD AUTO: 310 10*3/MM3 (ref 140–450)
PMV BLD AUTO: 10.7 FL (ref 6–12)
POTASSIUM SERPL-SCNC: 3.7 MMOL/L (ref 3.5–5.2)
PROT SERPL-MCNC: 6.7 G/DL (ref 6–8.5)
PROT UR QL STRIP: ABNORMAL
PROTHROMBIN TIME: 17.1 SECONDS (ref 12.3–15.1)
RBC # BLD AUTO: 5 10*6/MM3 (ref 4.14–5.8)
RBC # UR STRIP: ABNORMAL /HPF
REF LAB TEST METHOD: ABNORMAL
SODIUM SERPL-SCNC: 135 MMOL/L (ref 136–145)
SP GR UR STRIP: 1.01 (ref 1–1.03)
SQUAMOUS #/AREA URNS HPF: ABNORMAL /HPF
T4 FREE SERPL-MCNC: 1.25 NG/DL (ref 0.92–1.68)
TROPONIN T DELTA: -1 NG/L
TROPONIN T SERPL HS-MCNC: 17 NG/L
TSH SERPL DL<=0.05 MIU/L-ACNC: 1.73 UIU/ML (ref 0.27–4.2)
UROBILINOGEN UR QL STRIP: ABNORMAL
WBC # UR STRIP: ABNORMAL /HPF
WBC NRBC COR # BLD AUTO: 14.15 10*3/MM3 (ref 3.4–10.8)
WHOLE BLOOD HOLD COAG: NORMAL
WHOLE BLOOD HOLD SPECIMEN: NORMAL

## 2024-05-31 PROCEDURE — 71045 X-RAY EXAM CHEST 1 VIEW: CPT

## 2024-05-31 PROCEDURE — 84100 ASSAY OF PHOSPHORUS: CPT | Performed by: EMERGENCY MEDICINE

## 2024-05-31 PROCEDURE — 84443 ASSAY THYROID STIM HORMONE: CPT | Performed by: EMERGENCY MEDICINE

## 2024-05-31 PROCEDURE — 83605 ASSAY OF LACTIC ACID: CPT | Performed by: EMERGENCY MEDICINE

## 2024-05-31 PROCEDURE — 72125 CT NECK SPINE W/O DYE: CPT

## 2024-05-31 PROCEDURE — 70450 CT HEAD/BRAIN W/O DYE: CPT

## 2024-05-31 PROCEDURE — 25010000002 CEFTRIAXONE PER 250 MG: Performed by: EMERGENCY MEDICINE

## 2024-05-31 PROCEDURE — 85025 COMPLETE CBC W/AUTO DIFF WBC: CPT | Performed by: EMERGENCY MEDICINE

## 2024-05-31 PROCEDURE — 71275 CT ANGIOGRAPHY CHEST: CPT

## 2024-05-31 PROCEDURE — 81001 URINALYSIS AUTO W/SCOPE: CPT | Performed by: EMERGENCY MEDICINE

## 2024-05-31 PROCEDURE — 25810000003 SODIUM CHLORIDE 0.9 % SOLUTION: Performed by: EMERGENCY MEDICINE

## 2024-05-31 PROCEDURE — 25510000001 IOPAMIDOL 61 % SOLUTION: Performed by: EMERGENCY MEDICINE

## 2024-05-31 PROCEDURE — G0378 HOSPITAL OBSERVATION PER HR: HCPCS

## 2024-05-31 PROCEDURE — 99285 EMERGENCY DEPT VISIT HI MDM: CPT

## 2024-05-31 PROCEDURE — 87154 CUL TYP ID BLD PTHGN 6+ TRGT: CPT | Performed by: EMERGENCY MEDICINE

## 2024-05-31 PROCEDURE — 87077 CULTURE AEROBIC IDENTIFY: CPT | Performed by: FAMILY MEDICINE

## 2024-05-31 PROCEDURE — 87186 SC STD MICRODIL/AGAR DIL: CPT | Performed by: FAMILY MEDICINE

## 2024-05-31 PROCEDURE — 80053 COMPREHEN METABOLIC PANEL: CPT | Performed by: EMERGENCY MEDICINE

## 2024-05-31 PROCEDURE — 99223 1ST HOSP IP/OBS HIGH 75: CPT | Performed by: FAMILY MEDICINE

## 2024-05-31 PROCEDURE — 36415 COLL VENOUS BLD VENIPUNCTURE: CPT

## 2024-05-31 PROCEDURE — 83735 ASSAY OF MAGNESIUM: CPT | Performed by: EMERGENCY MEDICINE

## 2024-05-31 PROCEDURE — 85610 PROTHROMBIN TIME: CPT | Performed by: EMERGENCY MEDICINE

## 2024-05-31 PROCEDURE — 87077 CULTURE AEROBIC IDENTIFY: CPT | Performed by: EMERGENCY MEDICINE

## 2024-05-31 PROCEDURE — 84484 ASSAY OF TROPONIN QUANT: CPT | Performed by: EMERGENCY MEDICINE

## 2024-05-31 PROCEDURE — 84439 ASSAY OF FREE THYROXINE: CPT | Performed by: EMERGENCY MEDICINE

## 2024-05-31 PROCEDURE — 87040 BLOOD CULTURE FOR BACTERIA: CPT | Performed by: EMERGENCY MEDICINE

## 2024-05-31 PROCEDURE — 87186 SC STD MICRODIL/AGAR DIL: CPT | Performed by: EMERGENCY MEDICINE

## 2024-05-31 PROCEDURE — 93005 ELECTROCARDIOGRAM TRACING: CPT | Performed by: EMERGENCY MEDICINE

## 2024-05-31 PROCEDURE — 87086 URINE CULTURE/COLONY COUNT: CPT | Performed by: FAMILY MEDICINE

## 2024-05-31 PROCEDURE — 84145 PROCALCITONIN (PCT): CPT | Performed by: FAMILY MEDICINE

## 2024-05-31 RX ORDER — SODIUM CHLORIDE 0.9 % (FLUSH) 0.9 %
10 SYRINGE (ML) INJECTION EVERY 12 HOURS SCHEDULED
Status: DISCONTINUED | OUTPATIENT
Start: 2024-06-01 | End: 2024-06-03 | Stop reason: HOSPADM

## 2024-05-31 RX ORDER — ALUMINA, MAGNESIA, AND SIMETHICONE 2400; 2400; 240 MG/30ML; MG/30ML; MG/30ML
15 SUSPENSION ORAL EVERY 6 HOURS PRN
Status: DISCONTINUED | OUTPATIENT
Start: 2024-05-31 | End: 2024-06-03 | Stop reason: HOSPADM

## 2024-05-31 RX ORDER — BISACODYL 10 MG
10 SUPPOSITORY, RECTAL RECTAL DAILY PRN
Status: DISCONTINUED | OUTPATIENT
Start: 2024-05-31 | End: 2024-06-03 | Stop reason: HOSPADM

## 2024-05-31 RX ORDER — SODIUM CHLORIDE 0.9 % (FLUSH) 0.9 %
10 SYRINGE (ML) INJECTION AS NEEDED
Status: DISCONTINUED | OUTPATIENT
Start: 2024-05-31 | End: 2024-06-03 | Stop reason: HOSPADM

## 2024-05-31 RX ORDER — INSULIN LISPRO 100 [IU]/ML
2-9 INJECTION, SOLUTION INTRAVENOUS; SUBCUTANEOUS
Status: DISCONTINUED | OUTPATIENT
Start: 2024-06-01 | End: 2024-06-03 | Stop reason: HOSPADM

## 2024-05-31 RX ORDER — ONDANSETRON 4 MG/1
4 TABLET, ORALLY DISINTEGRATING ORAL EVERY 6 HOURS PRN
Status: DISCONTINUED | OUTPATIENT
Start: 2024-05-31 | End: 2024-06-03 | Stop reason: HOSPADM

## 2024-05-31 RX ORDER — NITROGLYCERIN 0.4 MG/1
0.4 TABLET SUBLINGUAL
Status: DISCONTINUED | OUTPATIENT
Start: 2024-05-31 | End: 2024-06-03 | Stop reason: HOSPADM

## 2024-05-31 RX ORDER — BISACODYL 5 MG/1
5 TABLET, DELAYED RELEASE ORAL DAILY PRN
Status: DISCONTINUED | OUTPATIENT
Start: 2024-05-31 | End: 2024-06-03 | Stop reason: HOSPADM

## 2024-05-31 RX ORDER — PRAVASTATIN SODIUM 20 MG
40 TABLET ORAL DAILY
Status: DISCONTINUED | OUTPATIENT
Start: 2024-06-01 | End: 2024-06-03 | Stop reason: HOSPADM

## 2024-05-31 RX ORDER — ONDANSETRON 2 MG/ML
4 INJECTION INTRAMUSCULAR; INTRAVENOUS EVERY 6 HOURS PRN
Status: DISCONTINUED | OUTPATIENT
Start: 2024-05-31 | End: 2024-06-03 | Stop reason: HOSPADM

## 2024-05-31 RX ORDER — SODIUM CHLORIDE AND POTASSIUM CHLORIDE 150; 900 MG/100ML; MG/100ML
100 INJECTION, SOLUTION INTRAVENOUS CONTINUOUS
Status: DISCONTINUED | OUTPATIENT
Start: 2024-06-01 | End: 2024-06-03 | Stop reason: HOSPADM

## 2024-05-31 RX ORDER — AMOXICILLIN 250 MG
2 CAPSULE ORAL 2 TIMES DAILY PRN
Status: DISCONTINUED | OUTPATIENT
Start: 2024-05-31 | End: 2024-06-03 | Stop reason: HOSPADM

## 2024-05-31 RX ORDER — NICOTINE POLACRILEX 4 MG
15 LOZENGE BUCCAL
Status: DISCONTINUED | OUTPATIENT
Start: 2024-05-31 | End: 2024-06-03 | Stop reason: HOSPADM

## 2024-05-31 RX ORDER — PAROXETINE HYDROCHLORIDE 20 MG/1
20 TABLET, FILM COATED ORAL DAILY
Status: DISCONTINUED | OUTPATIENT
Start: 2024-06-01 | End: 2024-06-03 | Stop reason: HOSPADM

## 2024-05-31 RX ORDER — ACETAMINOPHEN 160 MG/5ML
650 SOLUTION ORAL EVERY 4 HOURS PRN
Status: DISCONTINUED | OUTPATIENT
Start: 2024-05-31 | End: 2024-06-03 | Stop reason: HOSPADM

## 2024-05-31 RX ORDER — ACETAMINOPHEN 650 MG/1
650 SUPPOSITORY RECTAL EVERY 4 HOURS PRN
Status: DISCONTINUED | OUTPATIENT
Start: 2024-05-31 | End: 2024-06-03 | Stop reason: HOSPADM

## 2024-05-31 RX ORDER — SODIUM CHLORIDE 9 MG/ML
40 INJECTION, SOLUTION INTRAVENOUS AS NEEDED
Status: DISCONTINUED | OUTPATIENT
Start: 2024-05-31 | End: 2024-06-03 | Stop reason: HOSPADM

## 2024-05-31 RX ORDER — ACETAMINOPHEN 325 MG/1
650 TABLET ORAL EVERY 4 HOURS PRN
Status: DISCONTINUED | OUTPATIENT
Start: 2024-05-31 | End: 2024-06-03 | Stop reason: HOSPADM

## 2024-05-31 RX ORDER — POLYETHYLENE GLYCOL 3350 17 G/17G
17 POWDER, FOR SOLUTION ORAL DAILY PRN
Status: DISCONTINUED | OUTPATIENT
Start: 2024-05-31 | End: 2024-06-03 | Stop reason: HOSPADM

## 2024-05-31 RX ORDER — DONEPEZIL HYDROCHLORIDE 5 MG/1
10 TABLET, FILM COATED ORAL NIGHTLY
Status: DISCONTINUED | OUTPATIENT
Start: 2024-06-01 | End: 2024-06-03 | Stop reason: HOSPADM

## 2024-05-31 RX ORDER — DEXTROSE MONOHYDRATE 25 G/50ML
25 INJECTION, SOLUTION INTRAVENOUS
Status: DISCONTINUED | OUTPATIENT
Start: 2024-05-31 | End: 2024-06-03 | Stop reason: HOSPADM

## 2024-05-31 RX ADMIN — CEFTRIAXONE SODIUM 1000 MG: 1 INJECTION, POWDER, FOR SOLUTION INTRAMUSCULAR; INTRAVENOUS at 22:13

## 2024-05-31 RX ADMIN — SODIUM CHLORIDE 1500 ML: 9 INJECTION, SOLUTION INTRAVENOUS at 20:53

## 2024-05-31 RX ADMIN — SODIUM CHLORIDE 1000 ML: 9 INJECTION, SOLUTION INTRAVENOUS at 22:13

## 2024-05-31 RX ADMIN — IOPAMIDOL 100 ML: 612 INJECTION, SOLUTION INTRAVENOUS at 21:31

## 2024-06-01 LAB
ANION GAP SERPL CALCULATED.3IONS-SCNC: 11.2 MMOL/L (ref 5–15)
B PARAPERT DNA SPEC QL NAA+PROBE: NOT DETECTED
B PERT DNA SPEC QL NAA+PROBE: NOT DETECTED
BACTERIA BLD CULT: ABNORMAL
BOTTLE TYPE: ABNORMAL
BUN SERPL-MCNC: 14 MG/DL (ref 8–23)
BUN/CREAT SERPL: 17.7 (ref 7–25)
C PNEUM DNA NPH QL NAA+NON-PROBE: NOT DETECTED
CALCIUM SPEC-SCNC: 8.7 MG/DL (ref 8.6–10.5)
CHLORIDE SERPL-SCNC: 103 MMOL/L (ref 98–107)
CO2 SERPL-SCNC: 23.8 MMOL/L (ref 22–29)
CREAT SERPL-MCNC: 0.79 MG/DL (ref 0.76–1.27)
D-LACTATE SERPL-SCNC: 1.5 MMOL/L (ref 0.5–2)
DEPRECATED RDW RBC AUTO: 45.3 FL (ref 37–54)
EGFRCR SERPLBLD CKD-EPI 2021: 89.8 ML/MIN/1.73
ERYTHROCYTE [DISTWIDTH] IN BLOOD BY AUTOMATED COUNT: 13.8 % (ref 12.3–15.4)
FLUAV SUBTYP SPEC NAA+PROBE: NOT DETECTED
FLUBV RNA ISLT QL NAA+PROBE: NOT DETECTED
GLUCOSE BLDC GLUCOMTR-MCNC: 159 MG/DL (ref 70–130)
GLUCOSE BLDC GLUCOMTR-MCNC: 175 MG/DL (ref 70–130)
GLUCOSE BLDC GLUCOMTR-MCNC: 195 MG/DL (ref 70–130)
GLUCOSE BLDC GLUCOMTR-MCNC: 203 MG/DL (ref 70–130)
GLUCOSE BLDC GLUCOMTR-MCNC: 268 MG/DL (ref 70–130)
GLUCOSE SERPL-MCNC: 197 MG/DL (ref 65–99)
HADV DNA SPEC NAA+PROBE: NOT DETECTED
HCOV 229E RNA SPEC QL NAA+PROBE: NOT DETECTED
HCOV HKU1 RNA SPEC QL NAA+PROBE: NOT DETECTED
HCOV NL63 RNA SPEC QL NAA+PROBE: NOT DETECTED
HCOV OC43 RNA SPEC QL NAA+PROBE: NOT DETECTED
HCT VFR BLD AUTO: 40.8 % (ref 37.5–51)
HGB BLD-MCNC: 13.5 G/DL (ref 13–17.7)
HMPV RNA NPH QL NAA+NON-PROBE: NOT DETECTED
HPIV1 RNA ISLT QL NAA+PROBE: NOT DETECTED
HPIV2 RNA SPEC QL NAA+PROBE: NOT DETECTED
HPIV3 RNA NPH QL NAA+PROBE: NOT DETECTED
HPIV4 P GENE NPH QL NAA+PROBE: NOT DETECTED
M PNEUMO IGG SER IA-ACNC: NOT DETECTED
MCH RBC QN AUTO: 29.6 PG (ref 26.6–33)
MCHC RBC AUTO-ENTMCNC: 33.1 G/DL (ref 31.5–35.7)
MCV RBC AUTO: 89.5 FL (ref 79–97)
PLATELET # BLD AUTO: 287 10*3/MM3 (ref 140–450)
PMV BLD AUTO: 11.2 FL (ref 6–12)
POTASSIUM SERPL-SCNC: 4 MMOL/L (ref 3.5–5.2)
PROCALCITONIN SERPL-MCNC: 0.07 NG/ML (ref 0–0.25)
RBC # BLD AUTO: 4.56 10*6/MM3 (ref 4.14–5.8)
RHINOVIRUS RNA SPEC NAA+PROBE: NOT DETECTED
RSV RNA NPH QL NAA+NON-PROBE: NOT DETECTED
SARS-COV-2 RNA NPH QL NAA+NON-PROBE: NOT DETECTED
SODIUM SERPL-SCNC: 138 MMOL/L (ref 136–145)
WBC NRBC COR # BLD AUTO: 16.92 10*3/MM3 (ref 3.4–10.8)

## 2024-06-01 PROCEDURE — 63710000001 INSULIN LISPRO (HUMAN) PER 5 UNITS: Performed by: FAMILY MEDICINE

## 2024-06-01 PROCEDURE — G0378 HOSPITAL OBSERVATION PER HR: HCPCS

## 2024-06-01 PROCEDURE — 0202U NFCT DS 22 TRGT SARS-COV-2: CPT | Performed by: FAMILY MEDICINE

## 2024-06-01 PROCEDURE — 25010000002 SODIUM CHLORIDE 0.9 % WITH KCL 20 MEQ 20-0.9 MEQ/L-% SOLUTION: Performed by: FAMILY MEDICINE

## 2024-06-01 PROCEDURE — 82948 REAGENT STRIP/BLOOD GLUCOSE: CPT

## 2024-06-01 PROCEDURE — 63710000001 INSULIN GLARGINE PER 5 UNITS: Performed by: FAMILY MEDICINE

## 2024-06-01 PROCEDURE — 25010000002 CEFTRIAXONE PER 250 MG: Performed by: FAMILY MEDICINE

## 2024-06-01 PROCEDURE — 99232 SBSQ HOSP IP/OBS MODERATE 35: CPT | Performed by: STUDENT IN AN ORGANIZED HEALTH CARE EDUCATION/TRAINING PROGRAM

## 2024-06-01 PROCEDURE — 80048 BASIC METABOLIC PNL TOTAL CA: CPT | Performed by: FAMILY MEDICINE

## 2024-06-01 PROCEDURE — 83605 ASSAY OF LACTIC ACID: CPT | Performed by: EMERGENCY MEDICINE

## 2024-06-01 PROCEDURE — 85027 COMPLETE CBC AUTOMATED: CPT | Performed by: FAMILY MEDICINE

## 2024-06-01 RX ORDER — METOPROLOL TARTRATE 1 MG/ML
5 INJECTION, SOLUTION INTRAVENOUS
Status: DISCONTINUED | OUTPATIENT
Start: 2024-06-01 | End: 2024-06-03 | Stop reason: HOSPADM

## 2024-06-01 RX ORDER — MEMANTINE HYDROCHLORIDE 5 MG/1
10 TABLET ORAL NIGHTLY
Status: DISCONTINUED | OUTPATIENT
Start: 2024-06-01 | End: 2024-06-03 | Stop reason: HOSPADM

## 2024-06-01 RX ORDER — AMLODIPINE BESYLATE 5 MG/1
5 TABLET ORAL
Status: DISCONTINUED | OUTPATIENT
Start: 2024-06-01 | End: 2024-06-03 | Stop reason: HOSPADM

## 2024-06-01 RX ADMIN — INSULIN GLARGINE 7 UNITS: 100 INJECTION, SOLUTION SUBCUTANEOUS at 21:52

## 2024-06-01 RX ADMIN — SODIUM CHLORIDE 2000 MG: 9 INJECTION, SOLUTION INTRAVENOUS at 11:28

## 2024-06-01 RX ADMIN — METOPROLOL TARTRATE 5 MG: 1 INJECTION, SOLUTION INTRAVENOUS at 15:54

## 2024-06-01 RX ADMIN — AMLODIPINE BESYLATE 5 MG: 5 TABLET ORAL at 08:44

## 2024-06-01 RX ADMIN — INSULIN LISPRO 2 UNITS: 100 INJECTION, SOLUTION INTRAVENOUS; SUBCUTANEOUS at 08:44

## 2024-06-01 RX ADMIN — Medication 10 ML: at 00:44

## 2024-06-01 RX ADMIN — PRAVASTATIN SODIUM 40 MG: 20 TABLET ORAL at 08:44

## 2024-06-01 RX ADMIN — POTASSIUM CHLORIDE AND SODIUM CHLORIDE 100 ML/HR: 900; 150 INJECTION, SOLUTION INTRAVENOUS at 12:42

## 2024-06-01 RX ADMIN — PAROXETINE HYDROCHLORIDE 20 MG: 20 TABLET, FILM COATED ORAL at 08:44

## 2024-06-01 RX ADMIN — METOPROLOL TARTRATE 75 MG: 50 TABLET, FILM COATED ORAL at 00:44

## 2024-06-01 RX ADMIN — MEMANTINE 10 MG: 5 TABLET ORAL at 20:20

## 2024-06-01 RX ADMIN — METOPROLOL TARTRATE 75 MG: 50 TABLET, FILM COATED ORAL at 20:21

## 2024-06-01 RX ADMIN — POTASSIUM CHLORIDE AND SODIUM CHLORIDE 100 ML/HR: 900; 150 INJECTION, SOLUTION INTRAVENOUS at 00:44

## 2024-06-01 RX ADMIN — INSULIN LISPRO 6 UNITS: 100 INJECTION, SOLUTION INTRAVENOUS; SUBCUTANEOUS at 21:52

## 2024-06-01 RX ADMIN — INSULIN LISPRO 4 UNITS: 100 INJECTION, SOLUTION INTRAVENOUS; SUBCUTANEOUS at 17:07

## 2024-06-01 RX ADMIN — ACETAMINOPHEN 650 MG: 325 TABLET, FILM COATED ORAL at 20:20

## 2024-06-01 RX ADMIN — METOPROLOL TARTRATE 75 MG: 50 TABLET, FILM COATED ORAL at 08:49

## 2024-06-01 RX ADMIN — RIVAROXABAN 20 MG: 10 TABLET, FILM COATED ORAL at 17:06

## 2024-06-01 RX ADMIN — ACETAMINOPHEN 650 MG: 325 TABLET, FILM COATED ORAL at 04:22

## 2024-06-01 RX ADMIN — DONEPEZIL HYDROCHLORIDE 10 MG: 5 TABLET, FILM COATED ORAL at 20:20

## 2024-06-01 RX ADMIN — INSULIN LISPRO 2 UNITS: 100 INJECTION, SOLUTION INTRAVENOUS; SUBCUTANEOUS at 11:41

## 2024-06-01 NOTE — PROGRESS NOTES
[facility]    Name:  Que Lagunas   Age:  79 y.o.  Sex:  male  :  1945  MRN:  [id number]   Visit Number:  [id number]  Admission Date:  2024  Date Of Service:  24  Primary Care Physician:  Aurea Ayala MD     LOS: 0 days :    Chief Complaint:      Follow-up; altered mental status, falls    Subjective:    Denies any concerning pain, no shortness of air.  No questions.    Hospital Course:    Que Lagunas is a 79-year-old with a history of atrial fibrillation on Xarelto, CAD, hypertension, type 2 diabetes, vascular dementia, prior tobacco use, who presented from home with multiple complaints.  History obtained from ER record, patient, family.  Patient typically lives at home with his daughter.  He is normally ambulatory with use of a walker also uses a wheelchair at times.  He had been more confused today, and had a couple of falls.  Daughter noted increasing weakness. He was able to answer simple questions and knew he was in the hospital at time of admission. Had no complaints.     In the ER he was overall hemodynamically stable and not hypoxic.  His workup was concerning for possible urinary tract infection.  CT imaging of the chest was unremarkable.  CT of the cervical spine was unremarkable.  CT scan of the head was unremarkable.  Patient did have blood cultures obtained.  He was given IV fluid resuscitation and antibiotic coverage with Rocephin.      Observation general floor admission 2024 with UTI, generalized weakness.    Review of Systems:     All systems were reviewed and negative except as mentioned in subjective, assessment and plan.    Vital Signs:    Temp:  [98.2 °F (36.8 °C)-101.1 °F (38.4 °C)] 98.8 °F (37.1 °C)  Heart Rate:  [105-129] 118  Resp:  [16-20] 16  BP: (102-178)/() 102/72    Intake and output:    I/O last 3 completed shifts:  In: 3415 [P.O.:480; I.V.:335; IV Piggyback:2600]  Out: -   I/O this shift:  In: 120  "[P.O.:120]  Out: 200 [Urine:200]    Physical Examination:    General Appearance:  Alert and cooperative.    Head:  Atraumatic and normocephalic.   Eyes: Conjunctivae and sclerae normal, no icterus. No pallor.   Throat: No oral lesions, no thrush, oral mucosa moist.   Neck: Supple, trachea midline, no thyromegaly.   Lungs:   Breath sounds heard bilaterally equally.  No wheezing or crackles. No Pleural rub or bronchial breathing.   Heart:  Normal S1 and S2, no murmur, no gallop, no rub. No JVD.   Abdomen:   Normal bowel sounds, no masses, no organomegaly. Soft, nontender, nondistended, no rebound tenderness.   Extremities: Supple, no edema, no cyanosis, no clubbing.   Skin: Warm.   Neurologic: Alert and oriented to self and place. No facial asymmetry. Moves all four limbs. No tremors.      Laboratory results:    Results from last 7 days   Lab Units 06/01/24  0702 05/31/24 2037   SODIUM mmol/L 138 135*   POTASSIUM mmol/L 4.0 3.7   CHLORIDE mmol/L 103 94*   CO2 mmol/L 23.8 25.8   BUN mg/dL 14 17   CREATININE mg/dL 0.79 0.73*   CALCIUM mg/dL 8.7 9.2   BILIRUBIN mg/dL  --  1.1   ALK PHOS U/L  --  83   ALT (SGPT) U/L  --  11   AST (SGOT) U/L  --  15   GLUCOSE mg/dL 197* 179*     Results from last 7 days   Lab Units 06/01/24  0702 05/31/24 2037   WBC 10*3/mm3 16.92* 14.15*   HEMOGLOBIN g/dL 13.5 14.8   HEMATOCRIT % 40.8 44.4   PLATELETS 10*3/mm3 287 310     Results from last 7 days   Lab Units 05/31/24 2037   INR  1.33*     Results from last 7 days   Lab Units 05/31/24  2313 05/31/24 2037   HSTROP T ng/L 16 17     Results from last 7 days   Lab Units 05/31/24  2211   BLOODCX  Abnormal Stain*     No results for input(s): \"PHART\", \"NOM1HLX\", \"PO2ART\", \"KHZ4BDK\", \"BASEEXCESS\" in the last 8760 hours.   I have reviewed the patient's laboratory results.    Radiology results:    XR Chest 1 View    Result Date: 6/1/2024  PROCEDURE: XR CHEST 1 VW-  HISTORY: weakness  COMPARISON: November 19, 2022.  FINDINGS: Atherosclerosis is " noted. The heart is normal in size. The mediastinum is unremarkable. Prominent interstitial markings, likely chronic. No focal consolidation. There is no pneumothorax.  There are no acute osseous abnormalities.      Impression: No acute cardiopulmonary process.  Continued followup is recommended.    This report was signed and finalized on 6/1/2024 7:58 AM by Royce Willis DO.      CT Cervical Spine Without Contrast    Result Date: 5/31/2024  FINAL REPORT TECHNIQUE: Thin section axial images were obtained through the cervical spine without contrast.  Coronal and sagittal reconstructed images were then provided. CLINICAL HISTORY: fall, trauma FINDINGS: There is normal alignment and curvature.  Prevertebral soft tissues are normal.  There is no fracture.  There is severe degenerative disc disease in the mid-lower cervical spine.     Impression: No acute disease. Authenticated and Electronically Signed by Luis Enrique Preciado M.D. on 05/31/2024 10:30:22 PM    CT Head Without Contrast    Result Date: 5/31/2024  FINAL REPORT TECHNIQUE: Routine axial images through the head were obtained without contrast. CLINICAL HISTORY: fall, trauma, weakness FINDINGS: There is generalized atrophy.  There is mild to moderate ventricular dilation.  Periventricular and deep white matter low-attenuation areas are consistent with chronic small vessel ischemia.  There is no mass or shift in midline structures. There is no intracranial hemorrhage.  There is no acute sinus or osseous abnormality.     Impression: No fracture or acute intracranial abnormality. Authenticated and Electronically Signed by Luis Enrique Preciado M.D. on 05/31/2024 10:29:22 PM    CT Angiogram Chest Pulmonary Embolism    Result Date: 5/31/2024  FINAL REPORT TECHNIQUE: Thin section axial images were obtained through the chest and during the arterial phase of IV contrast administration. Coronal 3-D MIP reconstructed images were also provided. CLINICAL HISTORY: tachycardia, chest pain, syncope,  r/o PE FINDINGS: The pulmonary arteries are well-opacified and there is no filling defect to indicate pulmonary embolism. The thoracic aorta is not optimally opacified due to delayed transit of contrast through the heart, but no acute abnormality is suspected.  The lungs are clear. There is no pleural disease. Mild mediastinal adenopathy is possibly due to edema.  There is no acute osseous abnormality.     Impression: No pulmonary embolism or other acute abnormality. Authenticated and Electronically Signed by Luis Enrique Preciado M.D. on 05/31/2024 10:28:43 PM   I have reviewed the patient's radiology reports.    Medication Review:     I have reviewed the patient's active and prn medications.     Problem List:      Complicated UTI (urinary tract infection)      Assessment:     Complicated urinary tract infection, present on admission, acute  Weakness with impaired mobility and ADLs, POA  Vascular dementia  Atrial fibrillation, chronic  Type 2 diabetes mellitus     Plan:     Fever overnight.  Still having some RVR likely exacerbated by infection.    Comfortable in bed, pleasantly conversational.    Updated daughter Debo on the phone.     UTI  Bacteremia  Rocephin 7-day course.  Blood cultures Klebsiella pneumonia.  Urine culture pending.     Weakness  PT/OT.  At baseline does use a walker and wheelchair and lives with daughter.     Dementia  Complicates all aspects of care.  Had previously been at assisted living had not done well, currently living with daughter.  Has had some issues with weight loss in the past.  Continue home medications.     Atrial fibrillation  Appears rate controlled at this time, will continue with his home metoprolol and Xarelto.  Lopressor as needed for RVR.  RVR likely triggered by infection.        Risk Assessment: High  DVT Prophylaxis: Xarelto  Code Status: Full  Diet: Carb consistent/cardiac  Discharge Plan: A couple more days awaiting sensitivities likely    Brian Joseph Kerley,   06/01/24  13:40  EDT    Dictated utilizing Dragon dictation.

## 2024-06-01 NOTE — H&P
[facility]   HISTORY AND PHYSICAL      Name:  Que Lagunas   Age:  79 y.o.  Sex:  male  :  1945  MRN:  [id number]   Visit Number:  [id number]  Admission Date:  2024  Date Of Service:  24  Primary Care Physician:  Aurea Ayala MD    Chief Complaint:     Weakness, falls, mental status change    History Of Presenting Illness:      79-year-old with a history of atrial fibrillation, CAD, hypertension, type 2 diabetes, vascular dementia, prior tobacco use, who presented from home with multiple complaints.  History obtained from ER record, patient, family.  Patient typically lives at home with his daughter.  He is normally ambulatory with use of a walker also uses a wheelchair at times.  He had been more confused today, and had a couple of falls.  Daughter noted increasing weakness. He was able to answer simple questions and knew he was in the hospital at time of visit. Had no complaints.    In the ER he was overall hemodynamically stable and not hypoxic.  His workup was concerning for possible urinary tract infection.  CT imaging of the chest was unremarkable.  CT of the cervical spine was unremarkable.  CT scan of the head was unremarkable.  Patient did have blood cultures obtained.  He was given IV fluid resuscitation and antibiotic coverage with Rocephin.  Due to concern for complicated UTI and ongoing weakness, we were asked to admit    Review Of Systems:    All systems were reviewed and negative except as mentioned in history of presenting illness, assessment and plan.    Past Medical History: Patient  has a past medical history of Abnormal EKG, Anemia, Anxiety, Arthritis, Atrial fibrillation, CAD (coronary artery disease), Colon polyp (2015), Contact dermatitis, Diabetes, Dyslipidemia, Hypertension, Palpitations, Prostatism, Stroke, and Tattoo.    Past Surgical History: Patient  has a past surgical history that includes Tonsillectomy (); Prostate  "surgery; Eye surgery; and Colonoscopy (04/09/2015).    Social History: Patient  reports that he quit smoking about 23 years ago. His smoking use included cigarettes. He started smoking about 73 years ago. He has a 100 pack-year smoking history. He has never used smokeless tobacco. He reports that he does not drink alcohol and does not use drugs.    Family History:  Patient's family history has been reviewed and found to be noncontributory.     Allergies:      Lisinopril    Home Medications:    Prior to Admission Medications       Prescriptions Last Dose Informant Patient Reported? Taking?    cholecalciferol (VITAMIN D3) 1000 units tablet   Yes No    Take 1 tablet by mouth Daily. Indications: Vitamin D Deficiency    Continuous Blood Gluc Sensor (Dexcom G7 Sensor) misc   No No    1 applicator Every 10 (Ten) Days.    Patient not taking:  Reported on 1/30/2024    donepezil (ARICEPT) 10 MG tablet   No No    Take 1 tablet by mouth Every Night.    furosemide (LASIX) 20 MG tablet   No No    Take 1 tablet by mouth Every Other Day.    Patient not taking:  Reported on 1/30/2024    glucose blood (SOLUS V2 TEST) test strip   No No    Use as instructed    glucose monitor monitoring kit   No No    1 each As Needed (prn).    insulin glargine (Lantus) 100 UNIT/ML injection   No No    Inject 10 Units under the skin into the appropriate area as directed Every Night.    Insulin Lispro (humaLOG) 100 UNIT/ML injection   No No    INJECT 10 UNITS SUBCUTANEOUSLY WITH MEALS    Insulin Pen Needle (PEN NEEDLES 3/16\") 31G X 5 MM misc   No No    Use with insulin daily E11.9    Insulin Syringe-Needle U-100 (Easy Touch Insulin Syringe) 30G X 5/16\" 0.5 ML misc   No No    use as directed with insulin 4 times a day    Lancets misc   No No    Use to test twice daily. E11.9    LifeScan Unistik II Lancets misc   No No    1 Units 3 (Three) Times a Day.    memantine (NAMENDA) 10 MG tablet   No No    Take 1 tablet by mouth Every Night.    metFORMIN " "(GLUCOPHAGE) 500 MG tablet   No No    TAKE 1 TABLET BY MOUTH IN THE MORNING WITH  BREAKFAST  Indications: Type 2 Diabetes    metoprolol tartrate (LOPRESSOR) 100 MG tablet   Yes No    Take 0.75 tablets by mouth 2 (Two) Times a Day. 75mg daily  Indications: High Blood Pressure Disorder    PARoxetine (PAXIL) 20 MG tablet   No No    Take 1 tablet by mouth once daily    pravastatin (PRAVACHOL) 40 MG tablet   No No    TAKE 1 TABLET BY MOUTH ONCE DAILY IN THE EVENING    vitamin C (ASCORBIC ACID) 250 MG tablet   Yes No    Take 1 tablet by mouth Daily.    Xarelto 20 MG tablet   No No    Take 1 tablet by mouth once daily          ED Medications:    Medications   sodium chloride 0.9 % flush 10 mL (has no administration in time range)   sodium chloride 0.9 % flush 10 mL (has no administration in time range)   sodium chloride 0.9 % bolus 1,500 mL (0 mL Intravenous Stopped 5/31/24 2214)   cefTRIAXone (ROCEPHIN) 1,000 mg in sodium chloride 0.9 % 100 mL IVPB-VTB (0 mg Intravenous Stopped 5/31/24 2316)   sodium chloride 0.9 % bolus 1,000 mL (0 mL Intravenous Stopped 5/31/24 2317)   iopamidol (ISOVUE-300) 61 % injection 100 mL (100 mL Intravenous Given 5/31/24 2131)     Vital Signs:  Temp:  [98.2 °F (36.8 °C)] 98.2 °F (36.8 °C)  Heart Rate:  [105-129] 117  Resp:  [18] 18  BP: (115-161)/() 120/86        05/31/24 2018   Weight: 69.9 kg (154 lb)     Body mass index is 20.89 kg/m².    Physical Exam:     Most recent vital Signs: /86   Pulse 117   Temp 98.2 °F (36.8 °C) (Oral)   Resp 18   Ht 182.9 cm (72\")   Wt 69.9 kg (154 lb)   SpO2 100%   BMI 20.89 kg/m²     Physical Exam  Constitutional:       General: He is not in acute distress.     Appearance: He is not toxic-appearing.   HENT:      Mouth/Throat:      Mouth: Mucous membranes are dry.      Pharynx: Oropharynx is clear.   Eyes:      Extraocular Movements: Extraocular movements intact.      Pupils: Pupils are equal, round, and reactive to light.   Cardiovascular:    "   Rate and Rhythm: Tachycardia present. Rhythm irregular.      Pulses: Normal pulses.      Heart sounds: No murmur heard.     No gallop.   Pulmonary:      Effort: Pulmonary effort is normal.      Breath sounds: No wheezing or rales.   Abdominal:      General: Bowel sounds are normal. There is no distension.      Tenderness: There is no abdominal tenderness. There is no guarding.   Musculoskeletal:         General: Normal range of motion.      Right lower leg: No edema.      Left lower leg: No edema.   Skin:     General: Skin is dry.      Capillary Refill: Capillary refill takes less than 2 seconds.      Findings: No lesion.   Neurological:      Mental Status: He is alert. Mental status is at baseline. He is disoriented.      Motor: Weakness present.         Laboratory data:    I have reviewed the labs done in the emergency room.    Results from last 7 days   Lab Units 05/31/24 2037   SODIUM mmol/L 135*   POTASSIUM mmol/L 3.7   CHLORIDE mmol/L 94*   CO2 mmol/L 25.8   BUN mg/dL 17   CREATININE mg/dL 0.73*   CALCIUM mg/dL 9.2   BILIRUBIN mg/dL 1.1   ALK PHOS U/L 83   ALT (SGPT) U/L 11   AST (SGOT) U/L 15   GLUCOSE mg/dL 179*     Results from last 7 days   Lab Units 05/31/24 2037   WBC 10*3/mm3 14.15*   HEMOGLOBIN g/dL 14.8   HEMATOCRIT % 44.4   PLATELETS 10*3/mm3 310     Results from last 7 days   Lab Units 05/31/24 2037   INR  1.33*     Results from last 7 days   Lab Units 05/31/24  2313 05/31/24 2037   HSTROP T ng/L 16 17                     Results from last 7 days   Lab Units 05/31/24 2053   COLOR UA  Yellow   GLUCOSE UA  250 mg/dL (1+)*   KETONES UA  Negative   BLOOD UA  Moderate (2+)*   LEUKOCYTES UA  Large (3+)*   PH, URINE  5.5   BILIRUBIN UA  Negative   UROBILINOGEN UA  1.0 E.U./dL   RBC UA /HPF None Seen   WBC UA /HPF 21-50*       Pain Management Panel          6/29/2016   Pain Management Panel   Creatinine, Urine 100        EKG:      Appears to be likely sinus tachycardia, rate of about 120.  I see no  acute ST elevations or obvious ischemic changes.    Radiology:    CT Cervical Spine Without Contrast    Result Date: 5/31/2024  FINAL REPORT TECHNIQUE: Thin section axial images were obtained through the cervical spine without contrast.  Coronal and sagittal reconstructed images were then provided. CLINICAL HISTORY: fall, trauma FINDINGS: There is normal alignment and curvature.  Prevertebral soft tissues are normal.  There is no fracture.  There is severe degenerative disc disease in the mid-lower cervical spine.     No acute disease. Authenticated and Electronically Signed by Luis Enrique Preciado M.D. on 05/31/2024 10:30:22 PM    CT Head Without Contrast    Result Date: 5/31/2024  FINAL REPORT TECHNIQUE: Routine axial images through the head were obtained without contrast. CLINICAL HISTORY: fall, trauma, weakness FINDINGS: There is generalized atrophy.  There is mild to moderate ventricular dilation.  Periventricular and deep white matter low-attenuation areas are consistent with chronic small vessel ischemia.  There is no mass or shift in midline structures. There is no intracranial hemorrhage.  There is no acute sinus or osseous abnormality.     No fracture or acute intracranial abnormality. Authenticated and Electronically Signed by Luis Enrique Preciado M.D. on 05/31/2024 10:29:22 PM    CT Angiogram Chest Pulmonary Embolism    Result Date: 5/31/2024  FINAL REPORT TECHNIQUE: Thin section axial images were obtained through the chest and during the arterial phase of IV contrast administration. Coronal 3-D MIP reconstructed images were also provided. CLINICAL HISTORY: tachycardia, chest pain, syncope, r/o PE FINDINGS: The pulmonary arteries are well-opacified and there is no filling defect to indicate pulmonary embolism. The thoracic aorta is not optimally opacified due to delayed transit of contrast through the heart, but no acute abnormality is suspected.  The lungs are clear. There is no pleural disease. Mild mediastinal adenopathy  is possibly due to edema.  There is no acute osseous abnormality.     No pulmonary embolism or other acute abnormality. Authenticated and Electronically Signed by Luis Enrique Preciado M.D. on 05/31/2024 10:28:43 PM     Assessment:    Complicated urinary tract infection, present on admission, acute  Weakness with impaired mobility and ADLs, POA  Vascular dementia  Atrial fibrillation, chronic  Type 2 diabetes mellitus    Plan:    Admit for observation    UTI:  No recent urine cultures to compare, antibiotic coverage with Rocephin at 2 g.  Will send urine for culture.  Blood cultures already obtained.  Continue with gentle IV fluids.    Weakness:  Will have patient assessed by PT and OT.  At baseline does use a walker and wheelchair and lives with daughter.    Dementia:  Complicates all aspects of care.  Had previously been at assisted living had not done well, currently living with daughter.  Has had some issues with weight loss in the past.  Continue home medications    Atrial fibrillation:  Appears rate controlled at this time, will continue with his home metoprolol and Xarelto.    Otherwise currently meets observation level care with anticipated stay less than 2 midnights.  Further recommendations are predicated upon improvement of clinical condition.    Risk Assessment: High  DVT Prophylaxis: Xarelto  Code Status: Full  Diet: Carb consistent/cardiac    Advance Care Planning   ACP discussion was held with the patient during this visit. Patient has an advance directive in EMR which is still valid.            Zaria García DO  05/31/24  23:46 EDT    Dictated utilizing Dragon dictation.

## 2024-06-01 NOTE — CASE MANAGEMENT/SOCIAL WORK
Discharge Planning Assessment   [facility]     Patient Name: Que Lagunas  MRN: [id number]  Today's Date: 6/1/2024    Admit Date: 5/31/2024    Plan[facility]   Discharge Needs Assessment       Row Name 06/01/24 1549       Living Environment    People in Home child(juan c), adult    Current Living Arrangements home    Potentially Unsafe Housing Conditions none    In the past 12 months has the electric, gas, oil, or water company threatened to shut off services in your home? No    Primary Care Provided by child(juan c)    Provides Primary Care For no one, unable/limited ability to care for self    Family Caregiver if Needed child(juan c), adult    Quality of Family Relationships involved    Able to Return to Prior Arrangements yes       Resource/Environmental Concerns    Resource/Environmental Concerns none    Transportation Concerns none       Transportation Needs    In the past 12 months, has lack of transportation kept you from medical appointments or from getting medications? no    In the past 12 months, has lack of transportation kept you from meetings, work, or from getting things needed for daily living? No       Food Insecurity    Within the past 12 months, you worried that your food would run out before you got the money to buy more. Never true    Within the past 12 months, the food you bought just didn't last and you didn't have money to get more. Never true       Transition Planning    Patient/Family Anticipates Transition to home with family    Transportation Anticipated family or friend will provide       Discharge Needs Assessment    Readmission Within the Last 30 Days no previous admission in last 30 days    Equipment Currently Used at Home bp cuff;grab bar;Rolator;glucometer;wheelchair    Concerns to be Addressed discharge planning    Anticipated Changes Related to Illness inability to care for self    Provided Post Acute Provider List? BN/A    Provided Post Acute Provider  Quality & Resource List? N/A    Current Discharge Risk chronically ill;cognitively impaired                   Discharge Plan       Row Name 06/01/24 1558       Plan    Plan Home [facility]    Patient/Family in Agreement with Plan yes    Plan Comments Spoke to pt daughter Jennifer on the phone .Confirmed address and phone number He lives with her ,Able to ambulate uses a walker does have a WC if needed .He receives [facility] 3 times a week 3 hours.He requires reminders for bathroom visits Uses depends also She is the POA Plan at this  time is to return  home Daughter to transport                  Continued Care and Services - Admitted Since 5/31/2024    No active coordination exists for this encounter.          Demographic Summary       Row Name 06/01/24 1547       General Information    Admission Type observation    Arrived From home;emergency department    Required Notices Provided Observation Status Notice    Referral Source admission list    Reason for Consult discharge planning    Preferred Language English                   Functional Status       Row Name 06/01/24 1548       Functional Status    Usual Activity Tolerance fair       Physical Activity    On average, how many days per week do you engage in moderate to strenuous exercise (like a brisk walk)? 0 days    On average, how many minutes do you engage in exercise at this level? 0 min    Number of minutes of exercise per week 0       Assessment of Health Literacy    How often do you have someone help you read hospital materials? Always    How often do you have problems learning about your medical condition because of difficulty understanding written information? Always    How often do you have a problem understanding what is told to you about your medical condition? Always    How confident are you filling out medical forms by yourself? Not at all       Functional Status, IADL    Medications completely dependent    Meal  Preparation completely dependent    Housekeeping completely dependent    Laundry completely dependent    Shopping completely dependent       Mental Status    General Appearance WDL WDL                   Psychosocial    No documentation.                  Abuse/Neglect    No documentation.                  Legal    No documentation.                  Substance Abuse    No documentation.                  Patient Forms    No documentation.                     Salma Duran RN

## 2024-06-01 NOTE — PAYOR COMM NOTE
"OBSERVATION NOTIFICATION        Bismark Rosenthal (79 y.o. Male)       Date of Birth   1945    Social Security Number       Address   [address] [place] [address]  [place] [address]    Home Phone   [phone]    MRN   [id number]       Sikhism   Religion    Marital Status                               Admission Date   24    Admission Type   Emergency    Admitting Provider   Zaria García DO    Attending Provider   Zaria García DO    Department, Room/Bed   [facility] TELEMETRY 3, 313/1       Discharge Date       Discharge Disposition       Discharge Destination                                 Attending Provider: Zaria García DO    Allergies: Lisinopril    Isolation: None   Infection: None   Code Status: CPR    Ht: 182.9 cm (72\")   Wt: 73.5 kg (162 lb 0.6 oz)    Admission Cmt: None   Principal Problem: Complicated UTI (urinary tract infection) [N39.0]                   Active Insurance as of 2024       Primary Coverage       Payor Plan Insurance Group Employer/Plan Group    ANTH MEDICARE REPLACEMENT ANTH MEDICARE ADVANTAGE [id number]       Payor Plan Address Payor Plan Phone Number Payor Plan Fax Number Effective Dates    [address] [phone]  2018 - None Entered    [facility] [id number]         Subscriber Name Subscriber Birth Date Member ID       BISMARK ROSENTHAL 1945 [id number]                     Emergency Contacts        (Rel.) Home Phone Work Phone Mobile Phone    Jennifer Yancey (Daughter) -- -- [phone]                 History & Physical        Zaria García DO at [facility]            [facility] HOSPITALIST   HISTORY AND PHYSICAL      Name:  Bismark Rosenthal   Age:  79 y.o.  Sex:  male  :  1945  MRN:  [id number]   Visit Number:  [id number]  Admission Date:  2024  Date Of Service:  24  Primary Care Physician:  Aurea Ayala MD    Chief Complaint: "     Weakness, falls, mental status change    History Of Presenting Illness:      79-year-old with a history of atrial fibrillation, CAD, hypertension, type 2 diabetes, vascular dementia, prior tobacco use, who presented from home with multiple complaints.  History obtained from ER record, patient, family.  Patient typically lives at home with his daughter.  He is normally ambulatory with use of a walker also uses a wheelchair at times.  He had been more confused today, and had a couple of falls.  Daughter noted increasing weakness. He was able to answer simple questions and knew he was in the hospital at time of visit. Had no complaints.    In the ER he was overall hemodynamically stable and not hypoxic.  His workup was concerning for possible urinary tract infection.  CT imaging of the chest was unremarkable.  CT of the cervical spine was unremarkable.  CT scan of the head was unremarkable.  Patient did have blood cultures obtained.  He was given IV fluid resuscitation and antibiotic coverage with Rocephin.  Due to concern for complicated UTI and ongoing weakness, we were asked to admit    Review Of Systems:    All systems were reviewed and negative except as mentioned in history of presenting illness, assessment and plan.    Past Medical History: Patient  has a past medical history of Abnormal EKG, Anemia, Anxiety, Arthritis, Atrial fibrillation, CAD (coronary artery disease), Colon polyp (04/09/2015), Contact dermatitis, Diabetes, Dyslipidemia, Hypertension, Palpitations, Prostatism, Stroke, and Tattoo.    Past Surgical History: Patient  has a past surgical history that includes Tonsillectomy (1947); Prostate surgery; Eye surgery; and Colonoscopy (04/09/2015).    Social History: Patient  reports that he quit smoking about 23 years ago. His smoking use included cigarettes. He started smoking about 73 years ago. He has a 100 pack-year smoking history. He has never used smokeless tobacco. He reports that he does not  "drink alcohol and does not use drugs.    Family History:  Patient's family history has been reviewed and found to be noncontributory.     Allergies:      Lisinopril    Home Medications:    Prior to Admission Medications       Prescriptions Last Dose Informant Patient Reported? Taking?    cholecalciferol (VITAMIN D3) 1000 units tablet   Yes No    Take 1 tablet by mouth Daily. Indications: Vitamin D Deficiency    Continuous Blood Gluc Sensor (Dexcom G7 Sensor) misc   No No    1 applicator Every 10 (Ten) Days.    Patient not taking:  Reported on 1/30/2024    donepezil (ARICEPT) 10 MG tablet   No No    Take 1 tablet by mouth Every Night.    furosemide (LASIX) 20 MG tablet   No No    Take 1 tablet by mouth Every Other Day.    Patient not taking:  Reported on 1/30/2024    glucose blood (SOLUS V2 TEST) test strip   No No    Use as instructed    glucose monitor monitoring kit   No No    1 each As Needed (prn).    insulin glargine (Lantus) 100 UNIT/ML injection   No No    Inject 10 Units under the skin into the appropriate area as directed Every Night.    Insulin Lispro (humaLOG) 100 UNIT/ML injection   No No    INJECT 10 UNITS SUBCUTANEOUSLY WITH MEALS    Insulin Pen Needle (PEN NEEDLES 3/16\") 31G X 5 MM misc   No No    Use with insulin daily E11.9    Insulin Syringe-Needle U-100 (Easy Touch Insulin Syringe) 30G X 5/16\" 0.5 ML misc   No No    use as directed with insulin 4 times a day    Lancets misc   No No    Use to test twice daily. E11.9    LifeScan Unistik II Lancets misc   No No    1 Units 3 (Three) Times a Day.    memantine (NAMENDA) 10 MG tablet   No No    Take 1 tablet by mouth Every Night.    metFORMIN (GLUCOPHAGE) 500 MG tablet   No No    TAKE 1 TABLET BY MOUTH IN THE MORNING WITH  BREAKFAST  Indications: Type 2 Diabetes    metoprolol tartrate (LOPRESSOR) 100 MG tablet   Yes No    Take 0.75 tablets by mouth 2 (Two) Times a Day. 75mg daily  Indications: High Blood Pressure Disorder    PARoxetine (PAXIL) 20 MG " "tablet   No No    Take 1 tablet by mouth once daily    pravastatin (PRAVACHOL) 40 MG tablet   No No    TAKE 1 TABLET BY MOUTH ONCE DAILY IN THE EVENING    vitamin C (ASCORBIC ACID) 250 MG tablet   Yes No    Take 1 tablet by mouth Daily.    Xarelto 20 MG tablet   No No    Take 1 tablet by mouth once daily          ED Medications:    Medications   sodium chloride 0.9 % flush 10 mL (has no administration in time range)   sodium chloride 0.9 % flush 10 mL (has no administration in time range)   sodium chloride 0.9 % bolus 1,500 mL (0 mL Intravenous Stopped 5/31/24 2214)   cefTRIAXone (ROCEPHIN) 1,000 mg in sodium chloride 0.9 % 100 mL IVPB-VTB (0 mg Intravenous Stopped 5/31/24 2316)   sodium chloride 0.9 % bolus 1,000 mL (0 mL Intravenous Stopped 5/31/24 2317)   iopamidol (ISOVUE-300) 61 % injection 100 mL (100 mL Intravenous Given 5/31/24 2131)     Vital Signs:  Temp:  [98.2 °F (36.8 °C)] 98.2 °F (36.8 °C)  Heart Rate:  [105-129] 117  Resp:  [18] 18  BP: (115-161)/() 120/86        05/31/24 2018   Weight: 69.9 kg (154 lb)     Body mass index is 20.89 kg/m².    Physical Exam:     Most recent vital Signs: /86   Pulse 117   Temp 98.2 °F (36.8 °C) (Oral)   Resp 18   Ht 182.9 cm (72\")   Wt 69.9 kg (154 lb)   SpO2 100%   BMI 20.89 kg/m²     Physical Exam  Constitutional:       General: He is not in acute distress.     Appearance: He is not toxic-appearing.   HENT:      Mouth/Throat:      Mouth: Mucous membranes are dry.      Pharynx: Oropharynx is clear.   Eyes:      Extraocular Movements: Extraocular movements intact.      Pupils: Pupils are equal, round, and reactive to light.   Cardiovascular:      Rate and Rhythm: Tachycardia present. Rhythm irregular.      Pulses: Normal pulses.      Heart sounds: No murmur heard.     No gallop.   Pulmonary:      Effort: Pulmonary effort is normal.      Breath sounds: No wheezing or rales.   Abdominal:      General: Bowel sounds are normal. There is no distension.     "  Tenderness: There is no abdominal tenderness. There is no guarding.   Musculoskeletal:         General: Normal range of motion.      Right lower leg: No edema.      Left lower leg: No edema.   Skin:     General: Skin is dry.      Capillary Refill: Capillary refill takes less than 2 seconds.      Findings: No lesion.   Neurological:      Mental Status: He is alert. Mental status is at baseline. He is disoriented.      Motor: Weakness present.         Laboratory data:    I have reviewed the labs done in the emergency room.    Results from last 7 days   Lab Units 05/31/24 2037   SODIUM mmol/L 135*   POTASSIUM mmol/L 3.7   CHLORIDE mmol/L 94*   CO2 mmol/L 25.8   BUN mg/dL 17   CREATININE mg/dL 0.73*   CALCIUM mg/dL 9.2   BILIRUBIN mg/dL 1.1   ALK PHOS U/L 83   ALT (SGPT) U/L 11   AST (SGOT) U/L 15   GLUCOSE mg/dL 179*     Results from last 7 days   Lab Units 05/31/24 2037   WBC 10*3/mm3 14.15*   HEMOGLOBIN g/dL 14.8   HEMATOCRIT % 44.4   PLATELETS 10*3/mm3 310     Results from last 7 days   Lab Units 05/31/24 2037   INR  1.33*     Results from last 7 days   Lab Units 05/31/24  2313 05/31/24 2037   HSTROP T ng/L 16 17                     Results from last 7 days   Lab Units 05/31/24 2053   COLOR UA  Yellow   GLUCOSE UA  250 mg/dL (1+)*   KETONES UA  Negative   BLOOD UA  Moderate (2+)*   LEUKOCYTES UA  Large (3+)*   PH, URINE  5.5   BILIRUBIN UA  Negative   UROBILINOGEN UA  1.0 E.U./dL   RBC UA /HPF None Seen   WBC UA /HPF 21-50*       Pain Management Panel          6/29/2016   Pain Management Panel   Creatinine, Urine 100        EKG:      Appears to be likely sinus tachycardia, rate of about 120.  I see no acute ST elevations or obvious ischemic changes.    Radiology:    CT Cervical Spine Without Contrast    Result Date: 5/31/2024  FINAL REPORT TECHNIQUE: Thin section axial images were obtained through the cervical spine without contrast.  Coronal and sagittal reconstructed images were then provided. CLINICAL  HISTORY: fall, trauma FINDINGS: There is normal alignment and curvature.  Prevertebral soft tissues are normal.  There is no fracture.  There is severe degenerative disc disease in the mid-lower cervical spine.     No acute disease. Authenticated and Electronically Signed by Luis Enrique Preciado M.D. on 05/31/2024 10:30:22 PM    CT Head Without Contrast    Result Date: 5/31/2024  FINAL REPORT TECHNIQUE: Routine axial images through the head were obtained without contrast. CLINICAL HISTORY: fall, trauma, weakness FINDINGS: There is generalized atrophy.  There is mild to moderate ventricular dilation.  Periventricular and deep white matter low-attenuation areas are consistent with chronic small vessel ischemia.  There is no mass or shift in midline structures. There is no intracranial hemorrhage.  There is no acute sinus or osseous abnormality.     No fracture or acute intracranial abnormality. Authenticated and Electronically Signed by Luis Enrique Preciado M.D. on 05/31/2024 10:29:22 PM    CT Angiogram Chest Pulmonary Embolism    Result Date: 5/31/2024  FINAL REPORT TECHNIQUE: Thin section axial images were obtained through the chest and during the arterial phase of IV contrast administration. Coronal 3-D MIP reconstructed images were also provided. CLINICAL HISTORY: tachycardia, chest pain, syncope, r/o PE FINDINGS: The pulmonary arteries are well-opacified and there is no filling defect to indicate pulmonary embolism. The thoracic aorta is not optimally opacified due to delayed transit of contrast through the heart, but no acute abnormality is suspected.  The lungs are clear. There is no pleural disease. Mild mediastinal adenopathy is possibly due to edema.  There is no acute osseous abnormality.     No pulmonary embolism or other acute abnormality. Authenticated and Electronically Signed by Luis Enrique Preciado M.D. on 05/31/2024 10:28:43 PM     Assessment:    Complicated urinary tract infection, present on admission, acute  Weakness with  impaired mobility and ADLs, POA  Vascular dementia  Atrial fibrillation, chronic  Type 2 diabetes mellitus    Plan:    Admit for observation    UTI:  No recent urine cultures to compare, antibiotic coverage with Rocephin at 2 g.  Will send urine for culture.  Blood cultures already obtained.  Continue with gentle IV fluids.    Weakness:  Will have patient assessed by PT and OT.  At baseline does use a walker and wheelchair and lives with daughter.    Dementia:  Complicates all aspects of care.  Had previously been at assisted living had not done well, currently living with daughter.  Has had some issues with weight loss in the past.  Continue home medications    Atrial fibrillation:  Appears rate controlled at this time, will continue with his home metoprolol and Xarelto.    Otherwise currently meets observation level care with anticipated stay less than 2 midnights.  Further recommendations are predicated upon improvement of clinical condition.    Risk Assessment: High  DVT Prophylaxis: Xarelto  Code Status: Full  Diet: Carb consistent/cardiac    Advance Care Planning  ACP discussion was held with the patient during this visit. Patient has an advance directive in EMR which is still valid.            Zaria García DO  05/31/24  23:46 EDT    Dictated utilizing Dragon dictation.    Electronically signed by Zaria García DO at 06/01/24 0643       Vital Signs (last 2 days)       Date/Time Temp Temp src Pulse Resp BP Patient Position SpO2    06/01/24 0548 -- -- 109 -- -- -- --    06/01/24 0541 -- -- 107 -- -- -- --    06/01/24 0424 -- -- 120 -- 160/100 -- --    06/01/24 0423 -- -- 118 -- 141/103 -- --    06/01/24 0421 -- -- 113 -- -- -- --    06/01/24 0348 101.1 (38.4) Oral 116 16 178/102 Lying --    06/01/24 0101 -- -- 118 -- -- -- 93    05/31/24 2347 98.5 (36.9) Oral 120 20 154/88 Lying 93    05/31/24 2317 -- -- 117 -- -- -- 100    05/31/24 2230 -- -- 120 -- 120/86 -- 95    05/31/24 2147 -- -- 105 -- --  -- 96    05/31/24 2119 -- -- 122 -- -- -- 94    05/31/24 2100 -- -- 123 -- 115/87 -- 94    05/31/24 2051 -- -- 124 -- -- -- --    05/31/24 2050 -- -- -- -- -- -- 97    05/31/24 2032 -- -- 129 -- 118/83 -- 90    05/31/24 2018 98.2 (36.8) Oral 129 18 161/106 Sitting 93          Facility-Administered Medications as of 6/1/2024   Medication Dose Route Frequency Provider Last Rate Last Admin    acetaminophen (TYLENOL) tablet 650 mg  650 mg Oral Q4H PRN Zaria García DO   650 mg at 06/01/24 0422    Or    acetaminophen (TYLENOL) 160 MG/5ML oral solution 650 mg  650 mg Oral Q4H PRN Zaria García DO        Or    acetaminophen (TYLENOL) suppository 650 mg  650 mg Rectal Q4H PRN Zaria García DO        aluminum-magnesium hydroxide-simethicone (MAALOX MAX) 400-400-40 MG/5ML suspension 15 mL  15 mL Oral Q6H PRN Zaria García DO        amLODIPine (NORVASC) tablet 5 mg  5 mg Oral Q24H Zaria García DO        sennosides-docusate (PERICOLACE) 8.6-50 MG per tablet 2 tablet  2 tablet Oral BID PRN Zaria García DO        And    polyethylene glycol (MIRALAX) packet 17 g  17 g Oral Daily PRN Zaria García DO        And    bisacodyl (DULCOLAX) EC tablet 5 mg  5 mg Oral Daily PRN Zaria García DO        And    bisacodyl (DULCOLAX) suppository 10 mg  10 mg Rectal Daily PRN Zaria García DO        [COMPLETED] cefTRIAXone (ROCEPHIN) 1,000 mg in sodium chloride 0.9 % 100 mL IVPB-VTB  1,000 mg Intravenous Once Preet Daniel MD   Stopped at 05/31/24 2316    cefTRIAXone (ROCEPHIN) 2,000 mg in sodium chloride 0.9 % 100 mL IVPB-VTB  2,000 mg Intravenous Q24H Zaria García,         dextrose (D50W) (25 g/50 mL) IV injection 25 g  25 g Intravenous Q15 Min PRN Zaria García,         dextrose (GLUTOSE) oral gel 15 g  15 g Oral Q15 Min PRN Zaria García DO        donepezil (ARICEPT) tablet 10 mg  10 mg Oral Nightly  Zaria García DO        glucagon (GLUCAGEN) injection 1 mg  1 mg Intramuscular Q15 Min PRN Zaria García DO        insulin glargine (LANTUS, SEMGLEE) injection 7 Units  7 Units Subcutaneous Nightly Zaria García DO        Insulin Lispro (humaLOG) injection 2-9 Units  2-9 Units Subcutaneous 4x Daily AC & at Bedtime Zaria García DO        [COMPLETED] iopamidol (ISOVUE-300) 61 % injection 100 mL  100 mL Intravenous Once in imaging Preet Daniel MD   100 mL at 05/31/24 2131    memantine (NAMENDA) tablet 10 mg  10 mg Oral Nightly Zaria García DO        metoprolol tartrate (LOPRESSOR) tablet 75 mg  75 mg Oral Q12H Zaria García DO   75 mg at 06/01/24 0044    nitroglycerin (NITROSTAT) SL tablet 0.4 mg  0.4 mg Sublingual Q5 Min PRN Zaria García DO        ondansetron ODT (ZOFRAN-ODT) disintegrating tablet 4 mg  4 mg Oral Q6H PRN Zaria García DO        Or    ondansetron (ZOFRAN) injection 4 mg  4 mg Intravenous Q6H PRN Zaria García DO        PARoxetine (PAXIL) tablet 20 mg  20 mg Oral Daily Zaria García DO        pravastatin (PRAVACHOL) tablet 40 mg  40 mg Oral Daily Zaria García DO        rivaroxaban (XARELTO) tablet 20 mg  20 mg Oral Daily With Dinner Zaria García DO        [COMPLETED] sodium chloride 0.9 % bolus 1,000 mL  1,000 mL Intravenous Once Preet Daniel MD   Stopped at 05/31/24 2317    [COMPLETED] sodium chloride 0.9 % bolus 1,500 mL  1,500 mL Intravenous Once Preet Daniel MD   Stopped at 05/31/24 2214    sodium chloride 0.9 % flush 10 mL  10 mL Intravenous PRN Preet Daniel MD        sodium chloride 0.9 % flush 10 mL  10 mL Intravenous PRN Sponaugle, Christopher B, MD        sodium chloride 0.9 % flush 10 mL  10 mL Intravenous Q12H Zaria García,    10 mL at 06/01/24 0044    sodium chloride 0.9 % flush 10 mL  10 mL Intravenous  "PRN Zaria Garcíaus, DO        sodium chloride 0.9 % infusion 40 mL  40 mL Intravenous PRN Zaria Garcíaus, DO        sodium chloride 0.9 % with KCl 20 mEq/L infusion  100 mL/hr Intravenous Continuous Zaria García,  mL/hr at 06/01/24 0405 100 mL/hr at 06/01/24 0405     Lab Results (last 48 hours)       Procedure Component Value Units Date/Time    Procalcitonin [[id number]]  (Normal) Collected: 05/31/24 2037    Specimen: Blood Updated: 06/01/24 0044     Procalcitonin 0.07 ng/mL     Narrative:      As a Marker for Sepsis (Non-Neonates):    1. <0.5 ng/mL represents a low risk of severe sepsis and/or septic shock.  2. >2 ng/mL represents a high risk of severe sepsis and/or septic shock.    As a Marker for Lower Respiratory Tract Infections that require antibiotic therapy:    PCT on Admission    Antibiotic Therapy       6-12 Hrs later    >0.5                Strongly Recommended  >0.25 - <0.5        Recommended   0.1 - 0.25          Discouraged              Remeasure/reassess PCT  <0.1                Strongly Discouraged     Remeasure/reassess PCT    As 28 day mortality risk marker: \"Change in Procalcitonin Result\" (>80% or <=80%) if Day 0 (or Day 1) and Day 4 values are available. Refer to http://www.TRIBAXs-pct-calculator.com    Change in PCT <=80%  A decrease of PCT levels below or equal to 80% defines a positive change in PCT test result representing a higher risk for 28-day all-cause mortality of patients diagnosed with severe sepsis for septic shock.    Change in PCT >80%  A decrease of PCT levels of more than 80% defines a negative change in PCT result representing a lower risk for 28-day all-cause mortality of patients diagnosed with severe sepsis or septic shock.       POC Glucose Once [[id number]]  (Abnormal) Collected: 06/01/24 0001    Specimen: Blood Updated: 06/01/24 0002     Glucose 159 mg/dL      Comment: Serial Number: [id number]:  [id number]       Urine Culture - Urine, " Urine, Clean Catch [[id number]] Collected: 05/31/24 2053    Specimen: Urine, Clean Catch Updated: 05/31/24 2358    High Sensitivity Troponin T 2Hr [[id number]]  (Normal) Collected: 05/31/24 2313    Specimen: Blood Updated: 05/31/24 2340     HS Troponin T 16 ng/L      Comment: Specimen hemolyzed.  Results may be falsely decreased.        Troponin T Delta -1 ng/L     Narrative:      High Sensitive Troponin T Reference Range:  <14.0 ng/L- Negative Female for AMI  <22.0 ng/L- Negative Male for AMI  >=14 - Abnormal Female indicating possible myocardial injury.  >=22 - Abnormal Male indicating possible myocardial injury.   Clinicians would have to utilize clinical acumen, EKG, Troponin, and serial changes to determine if it is an Acute Myocardial Infarction or myocardial injury due to an underlying chronic condition.         Blood Culture - Blood, Arm, Left [[id number]] Collected: 05/31/24 2211    Specimen: Blood from Arm, Left Updated: 05/31/24 2216    Blood Culture - Blood, Hand, Right [[id number]] Collected: 05/31/24 2211    Specimen: Blood from Hand, Right Updated: 05/31/24 2216    High Sensitivity Troponin T [[id number]]  (Normal) Collected: 05/31/24 2037    Specimen: Blood Updated: 05/31/24 2118     HS Troponin T 17 ng/L     Narrative:      High Sensitive Troponin T Reference Range:  <14.0 ng/L- Negative Female for AMI  <22.0 ng/L- Negative Male for AMI  >=14 - Abnormal Female indicating possible myocardial injury.  >=22 - Abnormal Male indicating possible myocardial injury.   Clinicians would have to utilize clinical acumen, EKG, Troponin, and serial changes to determine if it is an Acute Myocardial Infarction or myocardial injury due to an underlying chronic condition.         TSH [[id number]]  (Normal) Collected: 05/31/24 2037    Specimen: Blood Updated: 05/31/24 2118     TSH 1.730 uIU/mL     T4, Free [[id number]]  (Normal) Collected: 05/31/24 2037    Specimen: Blood Updated: 05/31/24 2118     Free T4 1.25 ng/dL      Lactic Acid, Plasma [[id number]]  (Abnormal) Collected: 05/31/24 2037    Specimen: Blood Updated: 05/31/24 2118     Lactate 2.2 mmol/L     Comprehensive Metabolic Panel [[id number]]  (Abnormal) Collected: 05/31/24 2037    Specimen: Blood Updated: 05/31/24 2111     Glucose 179 mg/dL      BUN 17 mg/dL      Creatinine 0.73 mg/dL      Sodium 135 mmol/L      Potassium 3.7 mmol/L      Chloride 94 mmol/L      CO2 25.8 mmol/L      Calcium 9.2 mg/dL      Total Protein 6.7 g/dL      Albumin 3.6 g/dL      ALT (SGPT) 11 U/L      AST (SGOT) 15 U/L      Alkaline Phosphatase 83 U/L      Total Bilirubin 1.1 mg/dL      Globulin 3.1 gm/dL      A/G Ratio 1.2 g/dL      BUN/Creatinine Ratio 23.3     Anion Gap 15.2 mmol/L      eGFR 92.0 mL/min/1.73     Narrative:      GFR Normal >60  Chronic Kidney Disease <60  Kidney Failure <15    The GFR formula is only valid for adults with stable renal function between ages 18 and 70.    Magnesium [[id number]]  (Normal) Collected: 05/31/24 2037    Specimen: Blood Updated: 05/31/24 2111     Magnesium 1.6 mg/dL     Phosphorus [[id number]]  (Normal) Collected: 05/31/24 2037    Specimen: Blood Updated: 05/31/24 2111     Phosphorus 2.6 mg/dL     Urinalysis, Microscopic Only - Urine, Clean Catch [[id number]]  (Abnormal) Collected: 05/31/24 2053    Specimen: Urine, Clean Catch Updated: 05/31/24 2105     RBC, UA None Seen /HPF      WBC, UA 21-50 /HPF      Bacteria, UA 3+ /HPF      Squamous Epithelial Cells, UA None Seen /HPF      Hyaline Casts, UA None Seen /LPF      Methodology Manual Light Microscopy    Protime-INR [[id number]]  (Abnormal) Collected: 05/31/24 2037    Specimen: Blood Updated: 05/31/24 2104     Protime 17.1 Seconds      INR 1.33    Narrative:      Suggested INR therapeutic range for stable oral anticoagulant therapy:    Low Intensity therapy:   1.5-2.0  Moderate Intensity therapy:   2.0-3.0  High Intensity therapy:   2.5-4.0    Urinalysis With Microscopic If Indicated (No Culture) - Urine, Clean  Catch [[id number]]  (Abnormal) Collected: 05/31/24 2053    Specimen: Urine, Clean Catch Updated: 05/31/24 2100     Color, UA Yellow     Appearance, UA Turbid     pH, UA 5.5     Specific Gravity, UA 1.011     Glucose,  mg/dL (1+)     Ketones, UA Negative     Bilirubin, UA Negative     Blood, UA Moderate (2+)     Protein, UA 30 mg/dL (1+)     Leuk Esterase, UA Large (3+)     Nitrite, UA Negative     Urobilinogen, UA 1.0 E.U./dL    [place] Draw [[id number]] Collected: 05/31/24 2037    Specimen: Blood Updated: 05/31/24 2045    Narrative:      The following orders were created for panel order [place] Draw.  Procedure                               Abnormality         Status                     ---------                               -----------         ------                     Green Top (Gel)[[id number]]                                  Final result               Lavender Top[[id number]]                                     Final result               Gold Top - SST[[id number]]                                   Final result               Light Blue Top[[id number]]                                   Final result                 Please view results for these tests on the individual orders.    Gold Top - SST [[id number]] Collected: 05/31/24 2037    Specimen: Blood Updated: 05/31/24 2045     Extra Tube Hold for add-ons.     Comment: Auto resulted.       Green Top (Gel) [[id number]] Collected: 05/31/24 2037    Specimen: Blood Updated: 05/31/24 2045     Extra Tube Hold for add-ons.     Comment: Auto resulted.       Lavender Top [[id number]] Collected: 05/31/24 2037    Specimen: Blood Updated: 05/31/24 2045     Extra Tube hold for add-on     Comment: Auto resulted       Light Blue Top [[id number]] Collected: 05/31/24 2037    Specimen: Blood Updated: 05/31/24 2045     Extra Tube Hold for add-ons.     Comment: Auto resulted       [place] Draw [[id number]] Collected: 05/31/24 2037    Specimen: Blood Updated: 05/31/24 2045    Narrative:       The following orders were created for panel order [place] Draw.  Procedure                               Abnormality         Status                     ---------                               -----------         ------                     Mitchell Top[[id number]]                                         Final result                 Please view results for these tests on the individual orders.    Gray Top [[id number]] Collected: 05/31/24 2037    Specimen: Blood Updated: 05/31/24 2045     Extra Tube Hold for add-ons.     Comment: Auto resulted.       CBC & Differential [[id number]]  (Abnormal) Collected: 05/31/24 2037    Specimen: Blood Updated: 05/31/24 2043    Narrative:      The following orders were created for panel order CBC & Differential.  Procedure                               Abnormality         Status                     ---------                               -----------         ------                     CBC Auto Differential[[id number]]        Abnormal            Final result                 Please view results for these tests on the individual orders.    CBC Auto Differential [[id number]]  (Abnormal) Collected: 05/31/24 2037    Specimen: Blood Updated: 05/31/24 2043     WBC 14.15 10*3/mm3      RBC 5.00 10*6/mm3      Hemoglobin 14.8 g/dL      Hematocrit 44.4 %      MCV 88.8 fL      MCH 29.6 pg      MCHC 33.3 g/dL      RDW 13.5 %      RDW-SD 43.9 fl      MPV 10.7 fL      Platelets 310 10*3/mm3      Neutrophil % 70.3 %      Lymphocyte % 17.2 %      Monocyte % 11.0 %      Eosinophil % 0.4 %      Basophil % 0.5 %      Immature Grans % 0.6 %      Neutrophils, Absolute 9.94 10*3/mm3      Lymphocytes, Absolute 2.44 10*3/mm3      Monocytes, Absolute 1.56 10*3/mm3      Eosinophils, Absolute 0.05 10*3/mm3      Basophils, Absolute 0.07 10*3/mm3      Immature Grans, Absolute 0.09 10*3/mm3      nRBC 0.0 /100 WBC           Imaging Results (Last 48 Hours)       Procedure Component Value Units Date/Time    CT Cervical Spine  Without Contrast [[id number]] Collected: 05/31/24 2221     Updated: 05/31/24 2231    Narrative:      FINAL REPORT    TECHNIQUE:  Thin section axial images were obtained through the cervical  spine without contrast.  Coronal and sagittal reconstructed  images were then provided.    CLINICAL HISTORY:  fall, trauma    FINDINGS:  There is normal alignment and curvature.  Prevertebral soft  tissues are normal.  There is no fracture.  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      Impression:      No acute disease.    Authenticated and Electronically Signed by Luis Enrique Preciado M.D. on  05/31/2024 10:30:22 PM    CT Head Without Contrast [[id number]] Collected: 05/31/24 2222     Updated: 05/31/24 2230    Narrative:      FINAL REPORT    TECHNIQUE:  Routine axial images through the head were obtained without  contrast.    CLINICAL HISTORY:  fall, trauma, weakness    FINDINGS:  There is generalized atrophy.  There is mild to moderate  ventricular dilation.  Periventricular and deep white matter  low-attenuation areas are consistent with chronic small vessel  ischemia.  There is no mass or shift in midline structures.  There is no intracranial hemorrhage.  There is no acute sinus or  osseous abnormality.      Impression:      No fracture or acute intracranial abnormality.    Authenticated and Electronically Signed by Luis Enrique Preciado M.D. on  05/31/2024 10:29:22 PM    CT Angiogram Chest Pulmonary Embolism [[id number]] Collected: 05/31/24 2222     Updated: 05/31/24 2229    Narrative:      FINAL REPORT    TECHNIQUE:  Thin section axial images were obtained through the chest and  during the arterial phase of IV contrast administration. Coronal  3-D MIP reconstructed images were also provided.    CLINICAL HISTORY:  tachycardia, chest pain, syncope, r/o PE    FINDINGS:  The pulmonary arteries are well-opacified and there is no  filling defect to indicate pulmonary embolism. The thoracic  aorta is not optimally  opacified due to delayed transit of  contrast through the heart, but no acute abnormality is  suspected.  The lungs are clear. There is no pleural disease.  Mild mediastinal adenopathy is possibly due to edema.  There is  no acute osseous abnormality.      Impression:      No pulmonary embolism or other acute abnormality.    Authenticated and Electronically Signed by Luis Enrique Preciado M.D. on  05/31/2024 10:28:43 PM    XR Chest 1 View [[id number]] Resulted: 05/31/24 2046     Updated: 05/31/24 2046          Orders (last 48 hrs)        Start     Ordered    06/01/24 2100  donepezil (ARICEPT) tablet 10 mg  Nightly         05/31/24 2358 06/01/24 2100  memantine (NAMENDA) tablet 10 mg  Nightly         06/01/24 0010    06/01/24 2100  insulin glargine (LANTUS, SEMGLEE) injection 7 Units  Nightly         06/01/24 0011    06/01/24 1800  rivaroxaban (XARELTO) tablet 20 mg  Daily With Dinner         05/31/24 2358 06/01/24 1000  cefTRIAXone (ROCEPHIN) 2,000 mg in sodium chloride 0.9 % 100 mL IVPB-VTB  Every 24 Hours         05/31/24 2356 06/01/24 0900  PARoxetine (PAXIL) tablet 20 mg  Daily         05/31/24 2358 06/01/24 0900  pravastatin (PRAVACHOL) tablet 40 mg  Daily         05/31/24 2359 06/01/24 0900  amLODIPine (NORVASC) tablet 5 mg  Every 24 Hours Scheduled         06/01/24 0644    06/01/24 0800  Oral Care  2 Times Daily       05/31/24 2356 06/01/24 0730  Insulin Lispro (humaLOG) injection 2-9 Units  4 Times Daily Before Meals & Nightly         05/31/24 2356 06/01/24 0700  POC Glucose 4x Daily Before Meals & at Bedtime  4 Times Daily Before Meals & at Bedtime      Comments: Complete no more than 45 minutes prior to patient eating      05/31/24 2356 06/01/24 0647  Basic Metabolic Panel  Once         06/01/24 0646    06/01/24 0647  CBC (No Diff)  Once         06/01/24 0646    06/01/24 0647  Respiratory Panel PCR w/COVID-19(SARS-CoV-2) BRENDA/AMANDA/LEWIS/PAD/COR/JESICA In-House, NP Swab in UTM/VTM, 2 HR TAT - Swab,  "Nasopharynx  Once         06/01/24 0646    06/01/24 0644  Code Status and Medical Interventions:  Continuous         06/01/24 0643    06/01/24 0045  sodium chloride 0.9 % flush 10 mL  Every 12 Hours Scheduled         05/31/24 2356 06/01/24 0045  sodium chloride 0.9 % with KCl 20 mEq/L infusion  Continuous         05/31/24 2356    06/01/24 0045  metoprolol tartrate (LOPRESSOR) tablet 75 mg  Every 12 Hours Scheduled         05/31/24 2357 06/01/24 0045  insulin glargine (LANTUS, SEMGLEE) injection 7 Units  Nightly,   Status:  Discontinued         05/31/24 2359 06/01/24 0003  POC Glucose Once  PROCEDURE ONCE        Comments: Complete no more than 45 minutes prior to patient eating      06/01/24 0001    06/01/24 0000  Vital Signs  Every 4 Hours       05/31/24 2356 05/31/24 2357  Procalcitonin  STAT         05/31/24 2356 05/31/24 2356  PT Consult: Eval & Treat Functional Mobility Below Baseline  Once         05/31/24 2356 05/31/24 2356  OT Consult: Eval & Treat ADL Performance Below Baseline  Once         05/31/24 2356 05/31/24 2356  Inpatient Nutrition Consult  Once        Provider:  (Not yet assigned)    05/31/24 2356 05/31/24 2356  Urine Culture - Urine, Urine, Clean Catch  Once         05/31/24 2356 05/31/24 2355  aluminum-magnesium hydroxide-simethicone (MAALOX MAX) 400-400-40 MG/5ML suspension 15 mL  Every 6 Hours PRN         05/31/24 2356 05/31/24 2355  ondansetron ODT (ZOFRAN-ODT) disintegrating tablet 4 mg  Every 6 Hours PRN        Placed in \"Or\" Linked Group    05/31/24 2356 05/31/24 2355  ondansetron (ZOFRAN) injection 4 mg  Every 6 Hours PRN        Placed in \"Or\" Linked Group    05/31/24 2356 05/31/24 2355  sennosides-docusate (PERICOLACE) 8.6-50 MG per tablet 2 tablet  2 Times Daily PRN        Placed in \"And\" Linked Group    05/31/24 2356 05/31/24 2350  polyethylene glycol (MIRALAX) packet 17 g  Daily PRN        Placed in \"And\" Linked Group    05/31/24 2356    05/31/24 " "2355  bisacodyl (DULCOLAX) EC tablet 5 mg  Daily PRN        Placed in \"And\" Linked Group    05/31/24 2356 05/31/24 2355  bisacodyl (DULCOLAX) suppository 10 mg  Daily PRN        Placed in \"And\" Linked Group    05/31/24 2356 05/31/24 2355  acetaminophen (TYLENOL) tablet 650 mg  Every 4 Hours PRN        Placed in \"Or\" Linked Group    05/31/24 2356 05/31/24 2355  acetaminophen (TYLENOL) 160 MG/5ML oral solution 650 mg  Every 4 Hours PRN        Placed in \"Or\" Linked Group    05/31/24 2356 05/31/24 2355  acetaminophen (TYLENOL) suppository 650 mg  Every 4 Hours PRN        Placed in \"Or\" Linked Group    05/31/24 2356 05/31/24 2355  Initiate & Follow Urinary Tract Infection Treatment Protocol  Continuous         05/31/24 2356 05/31/24 2355  Intake & Output  Every Shift       05/31/24 2356 05/31/24 2355  Weigh Patient  Once         05/31/24 2356 05/31/24 2355  Insert Peripheral IV  Once         05/31/24 2356 05/31/24 2355  Saline Lock & Maintain IV Access  Continuous,   Status:  Canceled         05/31/24 2356 05/31/24 2355  Place Sequential Compression Device  Once         05/31/24 2356 05/31/24 2355  Maintain Sequential Compression Device  Continuous         05/31/24 2356 05/31/24 2355  Continuous Cardiac Monitoring  Continuous        Comments: Follow Standing Orders As Outlined in Process Instructions (Open Order Report to View Full Instructions)    05/31/24 2356 05/31/24 2355  Maintain IV Access  Continuous         05/31/24 2356 05/31/24 2355  Telemetry - Place Orders & Notify Provider of Results When Patient Experiences Acute Chest Pain, Dysrhythmia or Respiratory Distress  Continuous        Comments: Open Order Report to View Parameters Requiring Provider Notification    05/31/24 2356 05/31/24 2355  May Be Off Telemetry for Tests  Continuous         05/31/24 2356 05/31/24 2355  Diet: Cardiac, Diabetic; Healthy Heart (2-3 Na+); Consistent Carbohydrate; Fluid Consistency: " "Thin (IDDSI 0)  Diet Effective Now         05/31/24 2356    05/31/24 2354  nitroglycerin (NITROSTAT) SL tablet 0.4 mg  Every 5 Minutes PRN         05/31/24 2356 05/31/24 2354  dextrose (GLUTOSE) oral gel 15 g  Every 15 Minutes PRN         05/31/24 2356    05/31/24 2354  dextrose (D50W) (25 g/50 mL) IV injection 25 g  Every 15 Minutes PRN         05/31/24 2356 05/31/24 2354  glucagon (GLUCAGEN) injection 1 mg  Every 15 Minutes PRN         05/31/24 2356    05/31/24 2354  sodium chloride 0.9 % flush 10 mL  As Needed         05/31/24 2356 05/31/24 2354  sodium chloride 0.9 % infusion 40 mL  As Needed         05/31/24 2356 05/31/24 2337  STAT Lactic Acid, Reflex  PROCEDURE ONCE         05/31/24 2118 05/31/24 2239  Initiate Observation Status  Once         05/31/24 2239 05/31/24 2239  Cardiac Monitoring  Continuous,   Status:  Canceled        Comments: Follow Standing Orders As Outlined in Process Instructions (Open Order Report to View Full Instructions)    05/31/24 2239 05/31/24 2237  High Sensitivity Troponin T 2Hr  PROCEDURE ONCE         05/31/24 2118 05/31/24 2147  iopamidol (ISOVUE-300) 61 % injection 100 mL  Once in Imaging         05/31/24 2131 05/31/24 2132  sodium chloride 0.9 % bolus 1,000 mL  Once         05/31/24 2116 05/31/24 2131  cefTRIAXone (ROCEPHIN) 1,000 mg in sodium chloride 0.9 % 100 mL IVPB-VTB  Once         05/31/24 2116 05/31/24 2117  Blood Culture - Blood, Hand, Right  Once        Placed in \"And\" Linked Group    05/31/24 2116 05/31/24 2117  Blood Culture - Blood, Arm, Left  Once        Placed in \"And\" Linked Group    05/31/24 2116 05/31/24 2100  Urinalysis, Microscopic Only - Urine, Clean Catch  Once         05/31/24 2059 05/31/24 2055  sodium chloride 0.9 % bolus 1,500 mL  Once         05/31/24 2039 05/31/24 2047  CT Angiogram Chest Pulmonary Embolism  1 Time Imaging         05/31/24 2039 05/31/24 2046  ECG 12 Lead Tachycardia  Once         " 05/31/24 2045 05/31/24 2040  CT Cervical Spine Without Contrast  1 Time Imaging         05/31/24 2039 05/31/24 2040  CBC Auto Differential  Once         05/31/24 2039 05/31/24 2040  CT Angiogram Chest  1 Time Imaging,   Status:  Canceled         05/31/24 2039 05/31/24 2039  CBC & Differential  Once         05/31/24 2039 05/31/24 2039  Comprehensive Metabolic Panel  Once         05/31/24 2039 05/31/24 2039  Protime-INR  Once         05/31/24 2039 05/31/24 2039  Urinalysis With Microscopic If Indicated (No Culture) - Urine, Clean Catch  Once         05/31/24 2039 05/31/24 2039  High Sensitivity Troponin T  Once         05/31/24 2039 05/31/24 2039  Lactic Acid, Plasma  Once         05/31/24 2039 05/31/24 2039  Magnesium  Once         05/31/24 2039 05/31/24 2039  Phosphorus  Once         05/31/24 2039 05/31/24 2039  TSH  Once         05/31/24 2039 05/31/24 2039  T4, Free  Once         05/31/24 2039 05/31/24 2039  XR Chest 1 View  1 Time Imaging         05/31/24 2039 05/31/24 2039  CT Head Without Contrast  1 Time Imaging         05/31/24 2039 05/31/24 2037  Insert peripheral IV  Once         05/31/24 2036 05/31/24 2037  [place] Draw  Once         05/31/24 2036 05/31/24 2037  Mitchell Top  PROCEDURE ONCE         05/31/24 2036 05/31/24 2036  sodium chloride 0.9 % flush 10 mL  As Needed         05/31/24 2036 05/31/24 2028  Insert peripheral IV  Once         05/31/24 2027 05/31/24 2028  [place] Draw  Once         05/31/24 2027 05/31/24 2028  Green Top (Gel)  PROCEDURE ONCE         05/31/24 2027 05/31/24 2028  Lavender Top  PROCEDURE ONCE         05/31/24 2027 05/31/24 2028  Gold Top - SST  PROCEDURE ONCE         05/31/24 2027 05/31/24 2028  Light Blue Top  PROCEDURE ONCE         05/31/24 2027 05/31/24 2027  sodium chloride 0.9 % flush 10 mL  As Needed         05/31/24 2027    Unscheduled  Follow Hypoglycemia Standing Orders For Blood Glucose <70 &  Notify Provider of Treatment  As Needed      Comments: Follow Hypoglycemia Orders As Outlined in Process Instructions (Open Order Report to View Full Instructions)  Notify Provider Any Time Hypoglycemia Treatment is Administered    05/31/24 7412    Pending  Inpatient Case Management  Consult  Once        Provider:  (Not yet assigned)    Pending                  Physician Progress Notes (last 48 hours)  Notes from 05/30/24 0659 through 06/01/24 0659   No notes of this type exist for this encounter.       Consult Notes (last 48 hours)  Notes from 05/30/24 0659 through 06/01/24 0659   No notes of this type exist for this encounter.

## 2024-06-01 NOTE — ED PROVIDER NOTES
EMERGENCY DEPARTMENT ENCOUNTER    Pt Name: Que Lagunas  MRN: [id number]  Pt :   1945  Room Number:  02SF/02  Date of encounter:  2024  PCP: Aurea Ayala MD  ED Provider: Preet Daniel MD    Historian: Patient, daughter      HPI:  Chief Complaint   Patient presents with    Fall    Hypertension    Weakness - Generalized     Daughter states pt has fallen twice today and denies hitting head. Pt woke up from nap and daughter states he was weak, unable to stand on his own and confused. Pt denies pain att.           Context: Que Lagunas is a 79 y.o. male who presents to the ED c/o weakness, multiple falls.  The patient fell trying to transfer from his wheelchair to his walker.  Patient has been more confused than usual on her baseline dementia background.  He generally can stand with a walker but unable to today.  Patient reports generalized weakness but denies any other complaints.  Does take Xarelto, unclear whether he had head trauma during the falls.      PAST MEDICAL HISTORY  Past Medical History:   Diagnosis Date    Abnormal EKG     Bifascicular block and no prior ischemia evaluation.     Anemia     Anxiety     Arthritis     Atrial fibrillation     CHADS VASc=3.  Continue Xarelto 20.  vas continue Zaroxolyn 20 has a relative 20 Kn[place] can add Lantus 40    CAD (coronary artery disease)     Colon polyp 2015    Contact dermatitis     Diabetes     Dyslipidemia     Hypertension     Palpitations     Prostatism     Stroke     Tattoo          PAST SURGICAL HISTORY  Past Surgical History:   Procedure Laterality Date    COLONOSCOPY  2015    EYE SURGERY      PROSTATE SURGERY      TONSILLECTOMY           FAMILY HISTORY  Family History   Problem Relation Age of Onset    Diabetes Other     Cancer Other         NO PROSTATE CANCER HISTORY    Hypertension Other     Cancer Other     Diabetes Other     Liver disease Mother     Liver disease Father     Colon cancer Neg Hx      Stomach cancer Neg Hx          SOCIAL HISTORY  Social History     Socioeconomic History    Marital status:    Tobacco Use    Smoking status: Former     Current packs/day: 0.00     Average packs/day: 2.0 packs/day for 50.0 years (100.0 ttl pk-yrs)     Types: Cigarettes     Start date:      Quit date:      Years since quittin.4    Smokeless tobacco: Never    Tobacco comments:     STOPPED 5 YEARS AGO   Vaping Use    Vaping status: Never Used   Substance and Sexual Activity    Alcohol use: No    Drug use: No    Sexual activity: Defer         ALLERGIES  Lisinopril        REVIEW OF SYSTEMS  Review of Systems   Constitutional:  Negative for chills and fever.   HENT:  Negative for sore throat and trouble swallowing.    Eyes:  Negative for pain and redness.   Respiratory:  Negative for cough and shortness of breath.    Cardiovascular:  Negative for chest pain and leg swelling.   Gastrointestinal:  Negative for abdominal pain, nausea and vomiting.   Genitourinary:  Negative for dysuria and urgency.   Musculoskeletal:  Negative for back pain and neck pain.   Skin:  Negative for rash and wound.   Neurological:  Positive for weakness. Negative for dizziness.        All systems reviewed and negative except for those discussed in HPI.       PHYSICAL EXAM    I have reviewed the triage vital signs and nursing notes.    ED Triage Vitals [24 2018]   Temp Heart Rate Resp BP SpO2   98.2 °F (36.8 °C) (!) 129 18 (!) 161/106 93 %      Temp src Heart Rate Source Patient Position BP Location FiO2 (%)   Oral Monitor Sitting Left arm --       Physical Exam  Constitutional:       Appearance: Normal appearance. He is not ill-appearing.   HENT:      Head: Normocephalic and atraumatic.      Right Ear: External ear normal.      Left Ear: External ear normal.      Nose: Nose normal.      Mouth/Throat:      Mouth: Mucous membranes are moist.      Pharynx: Oropharynx is clear.   Eyes:      Extraocular Movements: Extraocular  movements intact.      Conjunctiva/sclera: Conjunctivae normal.      Pupils: Pupils are equal, round, and reactive to light.   Cardiovascular:      Rate and Rhythm: Normal rate and regular rhythm.      Pulses:           Radial pulses are 2+ on the right side and 2+ on the left side.      Heart sounds: No murmur heard.  Pulmonary:      Effort: Pulmonary effort is normal.      Breath sounds: Normal breath sounds.   Abdominal:      General: There is no distension.      Tenderness: There is no abdominal tenderness. There is no guarding.   Musculoskeletal:         General: No swelling or deformity.      Cervical back: Normal range of motion and neck supple.   Skin:     General: Skin is warm and dry.      Capillary Refill: Capillary refill takes less than 2 seconds.      Findings: No rash.   Neurological:      General: No focal deficit present.      Mental Status: He is alert. He is confused.      GCS: GCS eye subscore is 4. GCS verbal subscore is 5. GCS motor subscore is 6.      Cranial Nerves: Cranial nerves 2-12 are intact.      Sensory: Sensation is intact.      Motor: Motor function is intact.      Coordination: Coordination is intact.            LAB RESULTS  Recent Results (from the past 24 hour(s))   Green Top (Gel)    Collection Time: 05/31/24  8:37 PM   Result Value Ref Range    Extra Tube Hold for add-ons.    Lavender Top    Collection Time: 05/31/24  8:37 PM   Result Value Ref Range    Extra Tube hold for add-on    Gold Top - SST    Collection Time: 05/31/24  8:37 PM   Result Value Ref Range    Extra Tube Hold for add-ons.    Light Blue Top    Collection Time: 05/31/24  8:37 PM   Result Value Ref Range    Extra Tube Hold for add-ons.    Gray Top    Collection Time: 05/31/24  8:37 PM   Result Value Ref Range    Extra Tube Hold for add-ons.    Comprehensive Metabolic Panel    Collection Time: 05/31/24  8:37 PM    Specimen: Blood   Result Value Ref Range    Glucose 179 (H) 65 - 99 mg/dL    BUN 17 8 - 23 mg/dL     Creatinine 0.73 (L) 0.76 - 1.27 mg/dL    Sodium 135 (L) 136 - 145 mmol/L    Potassium 3.7 3.5 - 5.2 mmol/L    Chloride 94 (L) 98 - 107 mmol/L    CO2 25.8 22.0 - 29.0 mmol/L    Calcium 9.2 8.6 - 10.5 mg/dL    Total Protein 6.7 6.0 - 8.5 g/dL    Albumin 3.6 3.5 - 5.2 g/dL    ALT (SGPT) 11 1 - 41 U/L    AST (SGOT) 15 1 - 40 U/L    Alkaline Phosphatase 83 39 - 117 U/L    Total Bilirubin 1.1 0.0 - 1.2 mg/dL    Globulin 3.1 gm/dL    A/G Ratio 1.2 g/dL    BUN/Creatinine Ratio 23.3 7.0 - 25.0    Anion Gap 15.2 (H) 5.0 - 15.0 mmol/L    eGFR 92.0 >60.0 mL/min/1.73   Protime-INR    Collection Time: 05/31/24  8:37 PM    Specimen: Blood   Result Value Ref Range    Protime 17.1 (H) 12.3 - 15.1 Seconds    INR 1.33 (H) 0.90 - 1.10   High Sensitivity Troponin T    Collection Time: 05/31/24  8:37 PM    Specimen: Blood   Result Value Ref Range    HS Troponin T 17 <22 ng/L   Lactic Acid, Plasma    Collection Time: 05/31/24  8:37 PM    Specimen: Blood   Result Value Ref Range    Lactate 2.2 (C) 0.5 - 2.0 mmol/L   Magnesium    Collection Time: 05/31/24  8:37 PM    Specimen: Blood   Result Value Ref Range    Magnesium 1.6 1.6 - 2.4 mg/dL   Phosphorus    Collection Time: 05/31/24  8:37 PM    Specimen: Blood   Result Value Ref Range    Phosphorus 2.6 2.5 - 4.5 mg/dL   TSH    Collection Time: 05/31/24  8:37 PM    Specimen: Blood   Result Value Ref Range    TSH 1.730 0.270 - 4.200 uIU/mL   T4, Free    Collection Time: 05/31/24  8:37 PM    Specimen: Blood   Result Value Ref Range    Free T4 1.25 0.92 - 1.68 ng/dL   CBC Auto Differential    Collection Time: 05/31/24  8:37 PM    Specimen: Blood   Result Value Ref Range    WBC 14.15 (H) 3.40 - 10.80 10*3/mm3    RBC 5.00 4.14 - 5.80 10*6/mm3    Hemoglobin 14.8 13.0 - 17.7 g/dL    Hematocrit 44.4 37.5 - 51.0 %    MCV 88.8 79.0 - 97.0 fL    MCH 29.6 26.6 - 33.0 pg    MCHC 33.3 31.5 - 35.7 g/dL    RDW 13.5 12.3 - 15.4 %    RDW-SD 43.9 37.0 - 54.0 fl    MPV 10.7 6.0 - 12.0 fL    Platelets 310 140 -  450 10*3/mm3    Neutrophil % 70.3 42.7 - 76.0 %    Lymphocyte % 17.2 (L) 19.6 - 45.3 %    Monocyte % 11.0 5.0 - 12.0 %    Eosinophil % 0.4 0.3 - 6.2 %    Basophil % 0.5 0.0 - 1.5 %    Immature Grans % 0.6 (H) 0.0 - 0.5 %    Neutrophils, Absolute 9.94 (H) 1.70 - 7.00 10*3/mm3    Lymphocytes, Absolute 2.44 0.70 - 3.10 10*3/mm3    Monocytes, Absolute 1.56 (H) 0.10 - 0.90 10*3/mm3    Eosinophils, Absolute 0.05 0.00 - 0.40 10*3/mm3    Basophils, Absolute 0.07 0.00 - 0.20 10*3/mm3    Immature Grans, Absolute 0.09 (H) 0.00 - 0.05 10*3/mm3    nRBC 0.0 0.0 - 0.2 /100 WBC   Urinalysis With Microscopic If Indicated (No Culture) - Urine, Clean Catch    Collection Time: 05/31/24  8:53 PM    Specimen: Urine, Clean Catch   Result Value Ref Range    Color, UA Yellow Yellow, Straw    Appearance, UA Turbid (A) Clear    pH, UA 5.5 5.0 - 8.0    Specific Gravity, UA 1.011 1.005 - 1.030    Glucose,  mg/dL (1+) (A) Negative    Ketones, UA Negative Negative    Bilirubin, UA Negative Negative    Blood, UA Moderate (2+) (A) Negative    Protein, UA 30 mg/dL (1+) (A) Negative    Leuk Esterase, UA Large (3+) (A) Negative    Nitrite, UA Negative Negative    Urobilinogen, UA 1.0 E.U./dL 0.2 - 1.0 E.U./dL   Urinalysis, Microscopic Only - Urine, Clean Catch    Collection Time: 05/31/24  8:53 PM    Specimen: Urine, Clean Catch   Result Value Ref Range    RBC, UA None Seen None Seen, 0-2 /HPF    WBC, UA 21-50 (A) None Seen, 0-2 /HPF    Bacteria, UA 3+ (A) None Seen /HPF    Squamous Epithelial Cells, UA None Seen None Seen, 0-2 /HPF    Hyaline Casts, UA None Seen None Seen /LPF    Methodology Manual Light Microscopy        If labs were ordered, I independently reviewed the results and considered them in treating the patient.        RADIOLOGY  CT Cervical Spine Without Contrast    Result Date: 5/31/2024  FINAL REPORT TECHNIQUE: Thin section axial images were obtained through the cervical spine without contrast.  Coronal and sagittal reconstructed  images were then provided. CLINICAL HISTORY: fall, trauma FINDINGS: There is normal alignment and curvature.  Prevertebral soft tissues are normal.  There is no fracture.  There is severe degenerative disc disease in the mid-lower cervical spine.     No acute disease. Authenticated and Electronically Signed by Luis Enrique Preciado M.D. on 05/31/2024 10:30:22 PM    CT Head Without Contrast    Result Date: 5/31/2024  FINAL REPORT TECHNIQUE: Routine axial images through the head were obtained without contrast. CLINICAL HISTORY: fall, trauma, weakness FINDINGS: There is generalized atrophy.  There is mild to moderate ventricular dilation.  Periventricular and deep white matter low-attenuation areas are consistent with chronic small vessel ischemia.  There is no mass or shift in midline structures. There is no intracranial hemorrhage.  There is no acute sinus or osseous abnormality.     No fracture or acute intracranial abnormality. Authenticated and Electronically Signed by Luis Enrique Preciado M.D. on 05/31/2024 10:29:22 PM    CT Angiogram Chest Pulmonary Embolism    Result Date: 5/31/2024  FINAL REPORT TECHNIQUE: Thin section axial images were obtained through the chest and during the arterial phase of IV contrast administration. Coronal 3-D MIP reconstructed images were also provided. CLINICAL HISTORY: tachycardia, chest pain, syncope, r/o PE FINDINGS: The pulmonary arteries are well-opacified and there is no filling defect to indicate pulmonary embolism. The thoracic aorta is not optimally opacified due to delayed transit of contrast through the heart, but no acute abnormality is suspected.  The lungs are clear. There is no pleural disease. Mild mediastinal adenopathy is possibly due to edema.  There is no acute osseous abnormality.     No pulmonary embolism or other acute abnormality. Authenticated and Electronically Signed by Luis Enrique Preciado M.D. on 05/31/2024 10:28:43 PM         PROCEDURES    Procedures    Interpretations    O2 Sat: The  patients oxygen saturation was 96% on Room Air.  This was independently interpreted by me as Normal    EKG: I reviewed and independently interpreted the EKG as tachycardia rate of 120, left axis deviation, elevated QRS duration, no T wave inversions, no ST elevation    Cardiac Monitoring: I reviewed and independently interpreted the Rhythm Strip as Sinus Tachycardia rate of 105    Radiology: I ordered and independently reviewed the above noted radiographic studies.  I viewed images of CT Head which showed No intracranial hemorrhage per my independent interpretation. See radiologist's dictation for official interpretation.     Radiology: I ordered and independently reviewed the above noted radiographic studies.  I viewed images of CT cervical spine which showed No fracture per my independent interpretation. See radiologist's dictation for official interpretation    Radiology: I ordered and independently reviewed the above noted radiographic studies.  I viewed images of CT Chest which showed No pulmonary process per my independent interpretation. See radiologist's dictation for official interpretation        MEDICATIONS GIVEN IN ER    Medications   sodium chloride 0.9 % flush 10 mL (has no administration in time range)   sodium chloride 0.9 % flush 10 mL (has no administration in time range)   cefTRIAXone (ROCEPHIN) 1,000 mg in sodium chloride 0.9 % 100 mL IVPB-VTB (1,000 mg Intravenous New Bag 5/31/24 2213)   sodium chloride 0.9 % bolus 1,000 mL (1,000 mL Intravenous New Bag 5/31/24 2213)   sodium chloride 0.9 % bolus 1,500 mL (0 mL Intravenous Stopped 5/31/24 2214)   iopamidol (ISOVUE-300) 61 % injection 100 mL (100 mL Intravenous Given 5/31/24 2131)         MEDICAL DECISION MAKING, PROGRESS, and CONSULTS    All labs, if obtained, have been independently reviewed by me.  All radiology studies, if obtained, have been reviewed by me and the radiologist dictating the report.  All EKG's, if obtained, have been  independently viewed and interpreted by me      Discussion below represents my analysis of pertinent findings related to patient's condition, differential diagnosis, treatment plan and final disposition.      Differential diagnosis:    79-year-old male presented with multiple falls, weakness, confusion.  First of all concerned about trauma to the head especially given the patient is on Xarelto, some intracranial process, versus cervical spine process.  Given the tachycardia on presentation, concerned about possible PE as a cause for falls and is on Xarelto that seems somewhat less likely.  As well concerned about anemia, Carolann abnormality, infectious process will obtain broad laboratory workup including lactic acid, electrolytes, CT scan head, cervical spine and chest    Additional Sources:  External (non-ED) record review:  PCP visit 1/30/2024 for multiple chronic medications       Orders placed during this visit:  Orders Placed This Encounter   Procedures    Blood Culture - Blood,    Blood Culture - Blood,    XR Chest 1 View    CT Head Without Contrast    CT Cervical Spine Without Contrast    CT Angiogram Chest Pulmonary Embolism    Barker Draw    Barker Draw    Comprehensive Metabolic Panel    Protime-INR    Urinalysis With Microscopic If Indicated (No Culture) - Urine, Clean Catch    High Sensitivity Troponin T    Lactic Acid, Plasma    Magnesium    Phosphorus    TSH    T4, Free    CBC Auto Differential    Urinalysis, Microscopic Only - Urine, Clean Catch    STAT Lactic Acid, Reflex    High Sensitivity Troponin T 2Hr    ECG 12 Lead Tachycardia    Insert peripheral IV    Insert peripheral IV    Initiate Observation Status    Green Top (Gel)    Lavender Top    Gold Top - SST    Light Blue Top    Gray Top    CBC & Differential         Additional orders considered but not ordered:  None    ED Course:    Consultants:  Hospitalist    ED Course as of 05/31/24 2239   Fri May 31, 2024   2116 Urinalysis With  Microscopic If Indicated (No Culture) - Urine, Clean Catch(!)  Urine appears grossly infected, IV antibiotics and blood cultures are ordered [CS]   2125 Lactate(!!): 2.2  Lactic acid elevated, blood cultures and fluids are been ordered as well as antibiotics [CS]   2232 Patient's urine is infected, lactate and white blood cell count are elevated, remains weak and well below his baseline, given that I do not think is a good candidate for outpatient management.  Will speak to the hospitalist about admission [CS]   2239 Spoke directly to Dr. García agrees to evaluate the patient for admission [CS]      ED Course User Index  [CS] Preet Daniel MD           After my consideration of clinical presentation and any laboratory/radiology studies obtained, I discussed the findings with the patient/patient representative who is in agreement with the treatment plan and the final disposition. Risks and benefits of admission was discussed     AS OF 22:39 EDT VITALS:    BP - 115/87  HR - 105  TEMP - 98.2 °F (36.8 °C) (Oral)  O2 SATS - 96%    I reviewed the patients prescription monitoring report if available prior to discharge    DIAGNOSIS  Final diagnoses:   Acute cystitis without hematuria   Multiple falls   Complicated UTI (urinary tract infection)         DISPOSITION  ED Disposition       ED Disposition   Decision to Admit    Condition   --    Comment   Level of Care: Telemetry [5]   Diagnosis: Complicated UTI (urinary tract infection) [798179]   Admitting Physician: COREY GARCÍA [[id number]]   Attending Physician: COREY GARCÍA [[id number]]                     Please note that portions of this document were completed with voice recognition software.        Preet Daniel MD  05/31/24 [id number]

## 2024-06-01 NOTE — PLAN OF CARE
Goal Outcome Evaluation:  Plan of Care Reviewed With: patient        Problem: Adult Inpatient Plan of Care  Goal: Plan of Care Review  Outcome: Ongoing, Progressing  Flowsheets (Taken 6/1/2024 1701)  Plan of Care Reviewed With: patient  Goal: Patient-Specific Goal (Individualized)  Outcome: Ongoing, Progressing  Goal: Absence of Hospital-Acquired Illness or Injury  Outcome: Ongoing, Progressing  Intervention: Identify and Manage Fall Risk  Recent Flowsheet Documentation  Taken 6/1/2024 1629 by Elizabeth Crews RN  Safety Promotion/Fall Prevention:   activity supervised   assistive device/personal items within reach   clutter free environment maintained   toileting scheduled   safety round/check completed   room organization consistent   fall prevention program maintained   lighting adjusted   nonskid shoes/slippers when out of bed  Taken 6/1/2024 1400 by Elizabeth Crews RN  Safety Promotion/Fall Prevention:   activity supervised   assistive device/personal items within reach   clutter free environment maintained   toileting scheduled   safety round/check completed   room organization consistent   fall prevention program maintained   lighting adjusted   nonskid shoes/slippers when out of bed  Taken 6/1/2024 1143 by Elizabeth Crews RN  Safety Promotion/Fall Prevention:   activity supervised   assistive device/personal items within reach   clutter free environment maintained   toileting scheduled   safety round/check completed   room organization consistent   fall prevention program maintained   lighting adjusted   nonskid shoes/slippers when out of bed  Taken 6/1/2024 0936 by Elizabeth Crews, RN  Safety Promotion/Fall Prevention:   activity supervised   assistive device/personal items within reach   clutter free environment maintained   toileting scheduled   safety round/check completed   room organization consistent   fall prevention program maintained   lighting adjusted   nonskid shoes/slippers when out of bed  Taken  6/1/2024 0830 by Elizabeth Crews RN  Safety Promotion/Fall Prevention:   activity supervised   assistive device/personal items within reach   clutter free environment maintained   toileting scheduled   safety round/check completed   room organization consistent   fall prevention program maintained   lighting adjusted   nonskid shoes/slippers when out of bed  Intervention: Prevent Skin Injury  Recent Flowsheet Documentation  Taken 6/1/2024 1629 by Elizabeth Crews RN  Body Position:   tilted   right  Taken 6/1/2024 1400 by Elizabeth Crews RN  Body Position:   tilted   left  Skin Protection:   adhesive use limited   tubing/devices free from skin contact   skin-to-skin areas padded   skin-to-device areas padded  Taken 6/1/2024 1143 by Elizabeth Crews RN  Body Position:   tilted   right  Taken 6/1/2024 0936 by Elizabeth Crews RN  Body Position:   tilted   left  Skin Protection:   adhesive use limited   tubing/devices free from skin contact   skin-to-skin areas padded   skin-to-device areas padded   incontinence pads utilized  Taken 6/1/2024 0830 by Elizabeth Crews RN  Body Position:   turned   left  Skin Protection:   adhesive use limited   tubing/devices free from skin contact   skin-to-skin areas padded   skin-to-device areas padded  Intervention: Prevent and Manage VTE (Venous Thromboembolism) Risk  Recent Flowsheet Documentation  Taken 6/1/2024 1629 by Elizabeth Crews RN  Activity Management: activity minimized  Taken 6/1/2024 1400 by Elizabeth Crews RN  Activity Management: activity minimized  Taken 6/1/2024 1143 by Elizabeth Crews RN  Activity Management: activity minimized  Taken 6/1/2024 0936 by Elizabeth Crews RN  Activity Management: activity minimized  Taken 6/1/2024 0830 by Elizabeth Crews RN  Activity Management: activity encouraged  VTE Prevention/Management:   bilateral   sequential compression devices off   patient refused intervention  Range of Motion: active ROM (range of motion) encouraged  Intervention: Prevent  Infection  Recent Flowsheet Documentation  Taken 6/1/2024 1629 by Elizabeth Crews RN  Infection Prevention:   single patient room provided   rest/sleep promoted   hand hygiene promoted   equipment surfaces disinfected   environmental surveillance performed  Taken 6/1/2024 1400 by Elizabeth Crews RN  Infection Prevention:   rest/sleep promoted   single patient room provided   hand hygiene promoted   equipment surfaces disinfected   environmental surveillance performed  Taken 6/1/2024 1143 by Elizabeth Crews RN  Infection Prevention:   single patient room provided   rest/sleep promoted   hand hygiene promoted   equipment surfaces disinfected   environmental surveillance performed  Taken 6/1/2024 0936 by Elizabeth Crews RN  Infection Prevention:   single patient room provided   rest/sleep promoted   hand hygiene promoted   equipment surfaces disinfected   environmental surveillance performed  Taken 6/1/2024 0830 by Elizabeth Crews RN  Infection Prevention:   rest/sleep promoted   single patient room provided   hand hygiene promoted   equipment surfaces disinfected   environmental surveillance performed  Goal: Optimal Comfort and Wellbeing  Outcome: Ongoing, Progressing  Intervention: Provide Person-Centered Care  Recent Flowsheet Documentation  Taken 6/1/2024 0830 by Elizabeth Crews RN  Trust Relationship/Rapport:   care explained   choices provided   emotional support provided   empathic listening provided   questions answered   questions encouraged   reassurance provided   thoughts/feelings acknowledged  Goal: Readiness for Transition of Care  Outcome: Ongoing, Progressing     Problem: Skin Injury Risk Increased  Goal: Skin Health and Integrity  Outcome: Ongoing, Progressing  Intervention: Optimize Skin Protection  Recent Flowsheet Documentation  Taken 6/1/2024 1629 by Elizabeth Crews RN  Head of Bed (HOB) Positioning: HOB elevated  Taken 6/1/2024 1400 by Elizabeth Crews RN  Pressure Reduction Techniques:   frequent weight  shift encouraged   weight shift assistance provided   pressure points protected   heels elevated off bed  Head of Bed (HOB) Positioning: HOB elevated  Skin Protection:   adhesive use limited   tubing/devices free from skin contact   skin-to-skin areas padded   skin-to-device areas padded  Taken 6/1/2024 1143 by Elizabeth Crews RN  Head of Bed (HOB) Positioning: HOB elevated  Taken 6/1/2024 0936 by Elizabeth Crews RN  Pressure Reduction Techniques:   frequent weight shift encouraged   weight shift assistance provided   pressure points protected   heels elevated off bed  Head of Bed (HOB) Positioning: HOB elevated  Skin Protection:   adhesive use limited   tubing/devices free from skin contact   skin-to-skin areas padded   skin-to-device areas padded   incontinence pads utilized  Taken 6/1/2024 0830 by Elizabeth Crews RN  Pressure Reduction Techniques:   weight shift assistance provided   frequent weight shift encouraged   pressure points protected   heels elevated off bed  Head of Bed (HOB) Positioning: HOB elevated  Skin Protection:   adhesive use limited   tubing/devices free from skin contact   skin-to-skin areas padded   skin-to-device areas padded     Problem: Fall Injury Risk  Goal: Absence of Fall and Fall-Related Injury  Outcome: Ongoing, Progressing  Intervention: Identify and Manage Contributors  Recent Flowsheet Documentation  Taken 6/1/2024 1629 by Elizabeth Crews RN  Medication Review/Management: medications reviewed  Self-Care Promotion:   independence encouraged   BADL personal objects within reach   BADL personal routines maintained   meal set-up provided  Taken 6/1/2024 1400 by Elizabeth Crews RN  Medication Review/Management: medications reviewed  Taken 6/1/2024 1143 by Elizabeth Crews RN  Medication Review/Management: medications reviewed  Self-Care Promotion:   independence encouraged   BADL personal objects within reach   meal set-up provided   BADL personal routines maintained  Taken 6/1/2024 0830 by  Elizabeth Crews RN  Medication Review/Management: medications reviewed  Self-Care Promotion:   independence encouraged   BADL personal objects within reach   BADL personal routines maintained   meal set-up provided  Intervention: Promote Injury-Free Environment  Recent Flowsheet Documentation  Taken 6/1/2024 1629 by Elizabeth Crews RN  Safety Promotion/Fall Prevention:   activity supervised   assistive device/personal items within reach   clutter free environment maintained   toileting scheduled   safety round/check completed   room organization consistent   fall prevention program maintained   lighting adjusted   nonskid shoes/slippers when out of bed  Taken 6/1/2024 1400 by Elizabeth Crews RN  Safety Promotion/Fall Prevention:   activity supervised   assistive device/personal items within reach   clutter free environment maintained   toileting scheduled   safety round/check completed   room organization consistent   fall prevention program maintained   lighting adjusted   nonskid shoes/slippers when out of bed  Taken 6/1/2024 1143 by Elizabeth Crews RN  Safety Promotion/Fall Prevention:   activity supervised   assistive device/personal items within reach   clutter free environment maintained   toileting scheduled   safety round/check completed   room organization consistent   fall prevention program maintained   lighting adjusted   nonskid shoes/slippers when out of bed  Taken 6/1/2024 0936 by Elizabeth Crews RN  Safety Promotion/Fall Prevention:   activity supervised   assistive device/personal items within reach   clutter free environment maintained   toileting scheduled   safety round/check completed   room organization consistent   fall prevention program maintained   lighting adjusted   nonskid shoes/slippers when out of bed  Taken 6/1/2024 0830 by Elizabeth Crews, CHON  Safety Promotion/Fall Prevention:   activity supervised   assistive device/personal items within reach   clutter free environment maintained    toileting scheduled   safety round/check completed   room organization consistent   fall prevention program maintained   lighting adjusted   nonskid shoes/slippers when out of bed     Problem: UTI (Urinary Tract Infection)  Goal: Improved Infection Symptoms  Outcome: Ongoing, Progressing     Problem: Acute Confusion (Hospitalized Older Adult)  Goal: Optimal Cognitive Function  Outcome: Ongoing, Progressing     Problem: Bowel Elimination Impaired (Hospitalized Older Adult)  Goal: Effective Bowel Elimination  Outcome: Ongoing, Progressing     Problem: Depression (Hospitalized Older Adult)  Goal: Depressive Symptoms Identified and Managed  Outcome: Ongoing, Progressing  Intervention: Monitor and Manage Depressive Symptoms  Recent Flowsheet Documentation  Taken 6/1/2024 0830 by Elizabeth Crews RN  Family/Support System Care:   support provided   self-care encouraged     Problem: Fluid Imbalance (Hospitalized Older Adult)  Goal: Fluid Balance  Outcome: Ongoing, Progressing     Problem: Functional Ability Impaired (Hospitalized Older Adult)  Goal: Optimal Functional Ability  Outcome: Ongoing, Progressing  Intervention: Promote Functional Ability  Recent Flowsheet Documentation  Taken 6/1/2024 1629 by Elizabeth Crews RN  Activity Assistance Provided: assistance, 1 person  Self-Care Promotion:   independence encouraged   BADL personal objects within reach   BADL personal routines maintained   meal set-up provided  Taken 6/1/2024 1143 by Elizabeth Crews RN  Activity Assistance Provided: assistance, 1 person  Self-Care Promotion:   independence encouraged   BADL personal objects within reach   meal set-up provided   BADL personal routines maintained  Taken 6/1/2024 0936 by Elizabeth Crews, RN  Activity Assistance Provided: assistance, 1 person  Taken 6/1/2024 0830 by Elizabeth Crews RN  Activity Assistance Provided: assistance, 1 person  Self-Care Promotion:   independence encouraged   BADL personal objects within reach   BADL  personal routines maintained   meal set-up provided     Problem: Oral Intake Inadequate (Hospitalized Older Adult)  Goal: Optimal Nutrition Intake  Outcome: Ongoing, Progressing     Problem: Sleep Disturbance (Hospitalized Older Adult)  Goal: Adequate Sleep/Rest  Outcome: Ongoing, Progressing     Problem: Urinary Incontinence (Hospitalized Older Adult)  Goal: Urinary Incontinence Managed  Outcome: Ongoing, Progressing  Intervention: Promote Urinary Continence  Recent Flowsheet Documentation  Taken 6/1/2024 1629 by Elizabeth Crews RN  Perineal Care:   perineum cleansed   protective cream/ointment applied   absorbent brief/pad changed     Problem: Confusion Acute  Goal: Optimal Cognitive Function  Outcome: Ongoing, Progressing     Problem: Adjustment to Illness (Sepsis/Septic Shock)  Goal: Optimal Coping  Outcome: Ongoing, Progressing  Intervention: Optimize Psychosocial Adjustment to Illness  Recent Flowsheet Documentation  Taken 6/1/2024 0830 by Elizabeth Crews RN  Family/Support System Care:   support provided   self-care encouraged     Problem: Bleeding (Sepsis/Septic Shock)  Goal: Absence of Bleeding  Outcome: Ongoing, Progressing     Problem: Glycemic Control Impaired (Sepsis/Septic Shock)  Goal: Blood Glucose Level Within Desired Range  Outcome: Ongoing, Progressing     Problem: Infection Progression (Sepsis/Septic Shock)  Goal: Absence of Infection Signs and Symptoms  Outcome: Ongoing, Progressing  Intervention: Initiate Sepsis Management  Recent Flowsheet Documentation  Taken 6/1/2024 1629 by Elizabeth Crews RN  Infection Prevention:   single patient room provided   rest/sleep promoted   hand hygiene promoted   equipment surfaces disinfected   environmental surveillance performed  Taken 6/1/2024 1400 by Elizabeth Crews, RN  Infection Prevention:   rest/sleep promoted   single patient room provided   hand hygiene promoted   equipment surfaces disinfected   environmental surveillance performed  Taken 6/1/2024 1143  by Crews, Elizabeth, RN  Infection Prevention:   single patient room provided   rest/sleep promoted   hand hygiene promoted   equipment surfaces disinfected   environmental surveillance performed  Taken 6/1/2024 0936 by Elizabeth Crews RN  Infection Prevention:   single patient room provided   rest/sleep promoted   hand hygiene promoted   equipment surfaces disinfected   environmental surveillance performed  Taken 6/1/2024 0830 by Elizabeth Crews RN  Infection Prevention:   rest/sleep promoted   single patient room provided   hand hygiene promoted   equipment surfaces disinfected   environmental surveillance performed  Intervention: Promote Recovery  Recent Flowsheet Documentation  Taken 6/1/2024 1629 by Elizabeth Crews RN  Activity Management: activity minimized  Taken 6/1/2024 1400 by Elizabeth Crews RN  Activity Management: activity minimized  Taken 6/1/2024 1143 by Elizabeth Crews RN  Activity Management: activity minimized  Taken 6/1/2024 0936 by Elizabeth Crews RN  Activity Management: activity minimized  Taken 6/1/2024 0830 by Elizabeth Crews RN  Activity Management: activity encouraged     Problem: Nutrition Impaired (Sepsis/Septic Shock)  Goal: Optimal Nutrition Intake  Outcome: Ongoing, Progressing     Problem: Infection  Goal: Absence of Infection Signs and Symptoms  Outcome: Ongoing, Progressing

## 2024-06-01 NOTE — PLAN OF CARE
Goal Outcome Evaluation:              Outcome Evaluation: Patient was a new admission to the floor from the emergency room. Patient is hypertensive this am, MD notified, no new orders this time. Patient also running a fever, PRN Tylenol given. IV fluids infusing per order. Patient currently disoriented to time.

## 2024-06-02 LAB
ANION GAP SERPL CALCULATED.3IONS-SCNC: 8.1 MMOL/L (ref 5–15)
BASOPHILS # BLD AUTO: 0.05 10*3/MM3 (ref 0–0.2)
BASOPHILS NFR BLD AUTO: 0.5 % (ref 0–1.5)
BUN SERPL-MCNC: 14 MG/DL (ref 8–23)
BUN/CREAT SERPL: 19.7 (ref 7–25)
CALCIUM SPEC-SCNC: 8.5 MG/DL (ref 8.6–10.5)
CHLORIDE SERPL-SCNC: 110 MMOL/L (ref 98–107)
CO2 SERPL-SCNC: 25.9 MMOL/L (ref 22–29)
CREAT SERPL-MCNC: 0.71 MG/DL (ref 0.76–1.27)
DEPRECATED RDW RBC AUTO: 48 FL (ref 37–54)
EGFRCR SERPLBLD CKD-EPI 2021: 92.7 ML/MIN/1.73
EOSINOPHIL # BLD AUTO: 0.11 10*3/MM3 (ref 0–0.4)
EOSINOPHIL NFR BLD AUTO: 1.2 % (ref 0.3–6.2)
ERYTHROCYTE [DISTWIDTH] IN BLOOD BY AUTOMATED COUNT: 14.3 % (ref 12.3–15.4)
GLUCOSE BLDC GLUCOMTR-MCNC: 115 MG/DL (ref 70–130)
GLUCOSE BLDC GLUCOMTR-MCNC: 196 MG/DL (ref 70–130)
GLUCOSE BLDC GLUCOMTR-MCNC: 228 MG/DL (ref 70–130)
GLUCOSE BLDC GLUCOMTR-MCNC: 257 MG/DL (ref 70–130)
GLUCOSE SERPL-MCNC: 118 MG/DL (ref 65–99)
HCT VFR BLD AUTO: 38.7 % (ref 37.5–51)
HGB BLD-MCNC: 12.6 G/DL (ref 13–17.7)
IMM GRANULOCYTES # BLD AUTO: 0.04 10*3/MM3 (ref 0–0.05)
IMM GRANULOCYTES NFR BLD AUTO: 0.4 % (ref 0–0.5)
LYMPHOCYTES # BLD AUTO: 3.04 10*3/MM3 (ref 0.7–3.1)
LYMPHOCYTES NFR BLD AUTO: 32.2 % (ref 19.6–45.3)
MCH RBC QN AUTO: 29.7 PG (ref 26.6–33)
MCHC RBC AUTO-ENTMCNC: 32.6 G/DL (ref 31.5–35.7)
MCV RBC AUTO: 91.3 FL (ref 79–97)
MONOCYTES # BLD AUTO: 1.14 10*3/MM3 (ref 0.1–0.9)
MONOCYTES NFR BLD AUTO: 12.1 % (ref 5–12)
NEUTROPHILS NFR BLD AUTO: 5.07 10*3/MM3 (ref 1.7–7)
NEUTROPHILS NFR BLD AUTO: 53.6 % (ref 42.7–76)
NRBC BLD AUTO-RTO: 0 /100 WBC (ref 0–0.2)
PLATELET # BLD AUTO: 283 10*3/MM3 (ref 140–450)
PMV BLD AUTO: 11.5 FL (ref 6–12)
POTASSIUM SERPL-SCNC: 4.1 MMOL/L (ref 3.5–5.2)
RBC # BLD AUTO: 4.24 10*6/MM3 (ref 4.14–5.8)
SODIUM SERPL-SCNC: 144 MMOL/L (ref 136–145)
WBC NRBC COR # BLD AUTO: 9.45 10*3/MM3 (ref 3.4–10.8)

## 2024-06-02 PROCEDURE — 82948 REAGENT STRIP/BLOOD GLUCOSE: CPT

## 2024-06-02 PROCEDURE — 25010000002 CEFTRIAXONE PER 250 MG: Performed by: FAMILY MEDICINE

## 2024-06-02 PROCEDURE — 63710000001 INSULIN LISPRO (HUMAN) PER 5 UNITS: Performed by: FAMILY MEDICINE

## 2024-06-02 PROCEDURE — 97161 PT EVAL LOW COMPLEX 20 MIN: CPT

## 2024-06-02 PROCEDURE — 99232 SBSQ HOSP IP/OBS MODERATE 35: CPT | Performed by: STUDENT IN AN ORGANIZED HEALTH CARE EDUCATION/TRAINING PROGRAM

## 2024-06-02 PROCEDURE — 85025 COMPLETE CBC W/AUTO DIFF WBC: CPT | Performed by: STUDENT IN AN ORGANIZED HEALTH CARE EDUCATION/TRAINING PROGRAM

## 2024-06-02 PROCEDURE — 80048 BASIC METABOLIC PNL TOTAL CA: CPT | Performed by: STUDENT IN AN ORGANIZED HEALTH CARE EDUCATION/TRAINING PROGRAM

## 2024-06-02 PROCEDURE — 25010000002 SODIUM CHLORIDE 0.9 % WITH KCL 20 MEQ 20-0.9 MEQ/L-% SOLUTION: Performed by: FAMILY MEDICINE

## 2024-06-02 PROCEDURE — 63710000001 INSULIN GLARGINE PER 5 UNITS: Performed by: FAMILY MEDICINE

## 2024-06-02 RX ADMIN — PAROXETINE HYDROCHLORIDE 20 MG: 20 TABLET, FILM COATED ORAL at 08:24

## 2024-06-02 RX ADMIN — INSULIN LISPRO 4 UNITS: 100 INJECTION, SOLUTION INTRAVENOUS; SUBCUTANEOUS at 11:32

## 2024-06-02 RX ADMIN — INSULIN LISPRO 6 UNITS: 100 INJECTION, SOLUTION INTRAVENOUS; SUBCUTANEOUS at 16:30

## 2024-06-02 RX ADMIN — INSULIN LISPRO 2 UNITS: 100 INJECTION, SOLUTION INTRAVENOUS; SUBCUTANEOUS at 21:29

## 2024-06-02 RX ADMIN — RIVAROXABAN 20 MG: 10 TABLET, FILM COATED ORAL at 17:23

## 2024-06-02 RX ADMIN — INSULIN GLARGINE 7 UNITS: 100 INJECTION, SOLUTION SUBCUTANEOUS at 21:29

## 2024-06-02 RX ADMIN — SODIUM CHLORIDE 2000 MG: 9 INJECTION, SOLUTION INTRAVENOUS at 10:24

## 2024-06-02 RX ADMIN — MEMANTINE 10 MG: 5 TABLET ORAL at 21:29

## 2024-06-02 RX ADMIN — POTASSIUM CHLORIDE AND SODIUM CHLORIDE 100 ML/HR: 900; 150 INJECTION, SOLUTION INTRAVENOUS at 21:29

## 2024-06-02 RX ADMIN — DONEPEZIL HYDROCHLORIDE 10 MG: 5 TABLET, FILM COATED ORAL at 21:29

## 2024-06-02 RX ADMIN — PRAVASTATIN SODIUM 40 MG: 20 TABLET ORAL at 08:24

## 2024-06-02 RX ADMIN — Medication 10 ML: at 08:25

## 2024-06-02 RX ADMIN — POTASSIUM CHLORIDE AND SODIUM CHLORIDE 100 ML/HR: 900; 150 INJECTION, SOLUTION INTRAVENOUS at 11:32

## 2024-06-02 RX ADMIN — AMLODIPINE BESYLATE 5 MG: 5 TABLET ORAL at 08:24

## 2024-06-02 RX ADMIN — Medication 10 ML: at 21:29

## 2024-06-02 RX ADMIN — METOPROLOL TARTRATE 75 MG: 50 TABLET, FILM COATED ORAL at 21:29

## 2024-06-02 RX ADMIN — METOPROLOL TARTRATE 75 MG: 50 TABLET, FILM COATED ORAL at 08:24

## 2024-06-02 NOTE — PLAN OF CARE
Goal Outcome Evaluation:  Plan of Care Reviewed With: patient      VSS, pt afib on telemetry, and pt maintaining o2 >90% on RA. Medications and fluids administered per MAR. FSBS monitored per orders, insulin administered per MAR/orders. PT worked with PT during shift, sat up in chair part of shift. Discharge is pending.       Problem: Adult Inpatient Plan of Care  Goal: Plan of Care Review  Outcome: Ongoing, Progressing  Flowsheets (Taken 6/2/2024 1730)  Plan of Care Reviewed With: patient  Goal: Patient-Specific Goal (Individualized)  Outcome: Ongoing, Progressing  Goal: Absence of Hospital-Acquired Illness or Injury  Outcome: Ongoing, Progressing  Intervention: Identify and Manage Fall Risk  Recent Flowsheet Documentation  Taken 6/2/2024 1632 by Elizabeth Crews RN  Safety Promotion/Fall Prevention:   activity supervised   assistive device/personal items within reach   clutter free environment maintained   toileting scheduled   safety round/check completed   room organization consistent   fall prevention program maintained   lighting adjusted   nonskid shoes/slippers when out of bed  Taken 6/2/2024 1332 by Elizabeth Crews, CHON  Safety Promotion/Fall Prevention:   activity supervised   assistive device/personal items within reach   clutter free environment maintained   toileting scheduled   safety round/check completed   room organization consistent   nonskid shoes/slippers when out of bed   fall prevention program maintained   lighting adjusted  Taken 6/2/2024 1135 by Elizabeth Crews, RN  Safety Promotion/Fall Prevention:   activity supervised   toileting scheduled   room organization consistent   safety round/check completed   nonskid shoes/slippers when out of bed   gait belt   fall prevention program maintained   assistive device/personal items within reach   clutter free environment maintained  Taken 6/2/2024 0953 by Elizabeth Crews, RN  Safety Promotion/Fall Prevention:   activity supervised   assistive  device/personal items within reach   clutter free environment maintained   toileting scheduled   safety round/check completed   room organization consistent   fall prevention program maintained   lighting adjusted   nonskid shoes/slippers when out of bed  Taken 6/2/2024 0800 by Elizabeth Crews RN  Safety Promotion/Fall Prevention:   activity supervised   assistive device/personal items within reach   clutter free environment maintained   safety round/check completed   toileting scheduled   room organization consistent   fall prevention program maintained   lighting adjusted   nonskid shoes/slippers when out of bed  Intervention: Prevent Skin Injury  Recent Flowsheet Documentation  Taken 6/2/2024 1632 by Elizabeth Crews RN  Body Position:   sitting up in bed   position changed independently  Taken 6/2/2024 1332 by Elizabeth Crews RN  Body Position:   tilted   left  Skin Protection:   adhesive use limited   tubing/devices free from skin contact   skin-to-skin areas padded   skin-to-device areas padded   incontinence pads utilized  Taken 6/2/2024 1135 by Elizabeth Crews RN  Body Position:   legs elevated   weight shifting  Taken 6/2/2024 0953 by Elizabeth Crews RN  Body Position:   tilted   right  Skin Protection:   adhesive use limited   tubing/devices free from skin contact   skin-to-skin areas padded   skin-to-device areas padded   incontinence pads utilized  Taken 6/2/2024 0800 by Elizabeth Crews RN  Body Position:   position changed independently   sitting up in bed  Skin Protection:   adhesive use limited   tubing/devices free from skin contact   skin-to-device areas padded   skin-to-skin areas padded  Intervention: Prevent and Manage VTE (Venous Thromboembolism) Risk  Recent Flowsheet Documentation  Taken 6/2/2024 1632 by Elizabeth Crews RN  Activity Management: activity encouraged  Taken 6/2/2024 1332 by Elizabeth Crews RN  Activity Management:   activity encouraged   ambulated in room  Taken 6/2/2024 1135 by Mars  CHON Johnson  Activity Management:   activity encouraged   ambulated in room   up in chair  Taken 6/2/2024 0953 by Elizabeth Crews RN  Activity Management: activity encouraged  Taken 6/2/2024 0800 by Elizabeth Crews RN  Activity Management: activity encouraged  VTE Prevention/Management:   bilateral   sequential compression devices off   patient refused intervention  Range of Motion: active ROM (range of motion) encouraged  Intervention: Prevent Infection  Recent Flowsheet Documentation  Taken 6/2/2024 1632 by Elizabeth Crews RN  Infection Prevention:   single patient room provided   rest/sleep promoted   hand hygiene promoted   equipment surfaces disinfected   environmental surveillance performed  Taken 6/2/2024 1332 by Elizabeth Crews RN  Infection Prevention:   single patient room provided   rest/sleep promoted   hand hygiene promoted   equipment surfaces disinfected   environmental surveillance performed  Taken 6/2/2024 1135 by Elizabeth Crews RN  Infection Prevention:   single patient room provided   rest/sleep promoted   hand hygiene promoted   equipment surfaces disinfected   environmental surveillance performed  Taken 6/2/2024 0953 by Elizabeth Crews RN  Infection Prevention:   single patient room provided   rest/sleep promoted   hand hygiene promoted   equipment surfaces disinfected   environmental surveillance performed  Taken 6/2/2024 0800 by Elizabeth Crews RN  Infection Prevention:   rest/sleep promoted   single patient room provided   hand hygiene promoted   equipment surfaces disinfected   environmental surveillance performed  Goal: Optimal Comfort and Wellbeing  Outcome: Ongoing, Progressing  Goal: Readiness for Transition of Care  Outcome: Ongoing, Progressing     Problem: Skin Injury Risk Increased  Goal: Skin Health and Integrity  Outcome: Ongoing, Progressing  Intervention: Optimize Skin Protection  Recent Flowsheet Documentation  Taken 6/2/2024 1632 by Elizabeth Crews RN  Head of Bed (HOB) Positioning:  HOB elevated  Taken 6/2/2024 1332 by Elizabeth Crews RN  Pressure Reduction Techniques:   frequent weight shift encouraged   weight shift assistance provided   pressure points protected   heels elevated off bed  Head of Bed (HOB) Positioning: HOB elevated  Skin Protection:   adhesive use limited   tubing/devices free from skin contact   skin-to-skin areas padded   skin-to-device areas padded   incontinence pads utilized  Taken 6/2/2024 0953 by Elizabeth Crews RN  Pressure Reduction Techniques:   frequent weight shift encouraged   weight shift assistance provided   pressure points protected   heels elevated off bed  Head of Bed (HOB) Positioning: HOB elevated  Skin Protection:   adhesive use limited   tubing/devices free from skin contact   skin-to-skin areas padded   skin-to-device areas padded   incontinence pads utilized  Taken 6/2/2024 0800 by Elizabeth Crews RN  Pressure Reduction Techniques:   frequent weight shift encouraged   weight shift assistance provided   pressure points protected   heels elevated off bed  Head of Bed (HOB) Positioning: HOB elevated  Skin Protection:   adhesive use limited   tubing/devices free from skin contact   skin-to-device areas padded   skin-to-skin areas padded     Problem: Fall Injury Risk  Goal: Absence of Fall and Fall-Related Injury  Outcome: Ongoing, Progressing  Intervention: Identify and Manage Contributors  Recent Flowsheet Documentation  Taken 6/2/2024 1632 by Elizabeth Crews RN  Medication Review/Management: medications reviewed  Self-Care Promotion:   BADL personal routines maintained   meal set-up provided   BADL personal objects within reach   independence encouraged  Taken 6/2/2024 1332 by Elizabeth Crews, RN  Medication Review/Management: medications reviewed  Taken 6/2/2024 1135 by Elizabeth Crews, RN  Medication Review/Management: medications reviewed  Self-Care Promotion:   independence encouraged   BADL personal objects within reach   BADL personal routines  maintained   meal set-up provided  Taken 6/2/2024 0953 by Elizabeth Crews RN  Medication Review/Management: medications reviewed  Taken 6/2/2024 0800 by Elizabeth Crews RN  Medication Review/Management: medications reviewed  Self-Care Promotion:   independence encouraged   BADL personal objects within reach   BADL personal routines maintained   meal set-up provided  Intervention: Promote Injury-Free Environment  Recent Flowsheet Documentation  Taken 6/2/2024 1632 by Elizabeth Crews RN  Safety Promotion/Fall Prevention:   activity supervised   assistive device/personal items within reach   clutter free environment maintained   toileting scheduled   safety round/check completed   room organization consistent   fall prevention program maintained   lighting adjusted   nonskid shoes/slippers when out of bed  Taken 6/2/2024 1332 by Elizabeth Crews RN  Safety Promotion/Fall Prevention:   activity supervised   assistive device/personal items within reach   clutter free environment maintained   toileting scheduled   safety round/check completed   room organization consistent   nonskid shoes/slippers when out of bed   fall prevention program maintained   lighting adjusted  Taken 6/2/2024 1135 by Elizabeth Crews RN  Safety Promotion/Fall Prevention:   activity supervised   toileting scheduled   room organization consistent   safety round/check completed   nonskid shoes/slippers when out of bed   gait belt   fall prevention program maintained   assistive device/personal items within reach   clutter free environment maintained  Taken 6/2/2024 0953 by Elizabeth Crews RN  Safety Promotion/Fall Prevention:   activity supervised   assistive device/personal items within reach   clutter free environment maintained   toileting scheduled   safety round/check completed   room organization consistent   fall prevention program maintained   lighting adjusted   nonskid shoes/slippers when out of bed  Taken 6/2/2024 0800 by Elizabeth Crews  RN  Safety Promotion/Fall Prevention:   activity supervised   assistive device/personal items within reach   clutter free environment maintained   safety round/check completed   toileting scheduled   room organization consistent   fall prevention program maintained   lighting adjusted   nonskid shoes/slippers when out of bed     Problem: UTI (Urinary Tract Infection)  Goal: Improved Infection Symptoms  Outcome: Ongoing, Progressing     Problem: Acute Confusion (Hospitalized Older Adult)  Goal: Optimal Cognitive Function  Outcome: Ongoing, Progressing     Problem: Bowel Elimination Impaired (Hospitalized Older Adult)  Goal: Effective Bowel Elimination  Outcome: Ongoing, Progressing     Problem: Depression (Hospitalized Older Adult)  Goal: Depressive Symptoms Identified and Managed  Outcome: Ongoing, Progressing  Intervention: Monitor and Manage Depressive Symptoms  Recent Flowsheet Documentation  Taken 6/2/2024 0800 by Elizabeth Crews, RN  Family/Support System Care:   self-care encouraged   support provided     Problem: Fluid Imbalance (Hospitalized Older Adult)  Goal: Fluid Balance  Outcome: Ongoing, Progressing     Problem: Functional Ability Impaired (Hospitalized Older Adult)  Goal: Optimal Functional Ability  Outcome: Ongoing, Progressing  Intervention: Promote Functional Ability  Recent Flowsheet Documentation  Taken 6/2/2024 1632 by Elizabeth Crews, RN  Activity Assistance Provided: assistance, 1 person  Self-Care Promotion:   BADL personal routines maintained   meal set-up provided   BADL personal objects within reach   independence encouraged  Taken 6/2/2024 1332 by Elizabeth Crews, RN  Activity Assistance Provided: assistance, 1 person  Taken 6/2/2024 1135 by Elizabeth Crews, RN  Activity Assistance Provided: assistance, 1 person  Self-Care Promotion:   independence encouraged   BADL personal objects within reach   BADL personal routines maintained   meal set-up provided  Taken 6/2/2024 0953 by Elizabeth Crews,  RN  Activity Assistance Provided: assistance, 1 person  Taken 6/2/2024 0800 by Elizabeth Crews RN  Activity Assistance Provided: assistance, 1 person  Self-Care Promotion:   independence encouraged   BADL personal objects within reach   BADL personal routines maintained   meal set-up provided     Problem: Oral Intake Inadequate (Hospitalized Older Adult)  Goal: Optimal Nutrition Intake  Outcome: Ongoing, Progressing     Problem: Sleep Disturbance (Hospitalized Older Adult)  Goal: Adequate Sleep/Rest  Outcome: Ongoing, Progressing     Problem: Urinary Incontinence (Hospitalized Older Adult)  Goal: Urinary Incontinence Managed  Outcome: Ongoing, Progressing  Intervention: Promote Urinary Continence  Recent Flowsheet Documentation  Taken 6/2/2024 1332 by Elizabeth Crews RN  Perineal Care:   perineum cleansed   protective cream/ointment applied  Taken 6/2/2024 1135 by Elizabeth Crews RN  Perineal Care: perineum cleansed  Taken 6/2/2024 0953 by Elizabeth Crews RN  Perineal Care:   perineum cleansed   protective cream/ointment applied     Problem: Confusion Acute  Goal: Optimal Cognitive Function  Outcome: Ongoing, Progressing     Problem: Adjustment to Illness (Sepsis/Septic Shock)  Goal: Optimal Coping  Outcome: Ongoing, Progressing  Intervention: Optimize Psychosocial Adjustment to Illness  Recent Flowsheet Documentation  Taken 6/2/2024 0800 by Elizabeth Crews RN  Family/Support System Care:   self-care encouraged   support provided     Problem: Bleeding (Sepsis/Septic Shock)  Goal: Absence of Bleeding  Outcome: Ongoing, Progressing     Problem: Glycemic Control Impaired (Sepsis/Septic Shock)  Goal: Blood Glucose Level Within Desired Range  Outcome: Ongoing, Progressing     Problem: Infection Progression (Sepsis/Septic Shock)  Goal: Absence of Infection Signs and Symptoms  Outcome: Ongoing, Progressing  Intervention: Initiate Sepsis Management  Recent Flowsheet Documentation  Taken 6/2/2024 1632 by Elizabeth Crews  RN  Infection Prevention:   single patient room provided   rest/sleep promoted   hand hygiene promoted   equipment surfaces disinfected   environmental surveillance performed  Taken 6/2/2024 1332 by Elizabeth Crews RN  Infection Prevention:   single patient room provided   rest/sleep promoted   hand hygiene promoted   equipment surfaces disinfected   environmental surveillance performed  Taken 6/2/2024 1135 by Elizabeth Crews RN  Infection Prevention:   single patient room provided   rest/sleep promoted   hand hygiene promoted   equipment surfaces disinfected   environmental surveillance performed  Taken 6/2/2024 0953 by Elizabeth Crews RN  Infection Prevention:   single patient room provided   rest/sleep promoted   hand hygiene promoted   equipment surfaces disinfected   environmental surveillance performed  Taken 6/2/2024 0800 by Elizabeth Crews RN  Infection Prevention:   rest/sleep promoted   single patient room provided   hand hygiene promoted   equipment surfaces disinfected   environmental surveillance performed  Intervention: Promote Recovery  Recent Flowsheet Documentation  Taken 6/2/2024 1632 by Elizabeth Crews RN  Activity Management: activity encouraged  Taken 6/2/2024 1332 by Elizabeth Crews RN  Activity Management:   activity encouraged   ambulated in room  Taken 6/2/2024 1135 by Elizabeth Crews RN  Activity Management:   activity encouraged   ambulated in room   up in chair  Taken 6/2/2024 0953 by Elizabeth Crews RN  Activity Management: activity encouraged  Taken 6/2/2024 0800 by Elizabeth Crews RN  Activity Management: activity encouraged     Problem: Nutrition Impaired (Sepsis/Septic Shock)  Goal: Optimal Nutrition Intake  Outcome: Ongoing, Progressing     Problem: Infection  Goal: Absence of Infection Signs and Symptoms  Outcome: Ongoing, Progressing

## 2024-06-02 NOTE — PROGRESS NOTES
[facility]    Name:  Que Lagunas   Age:  79 y.o.  Sex:  male  :  1945  MRN:  [id number]   Visit Number:  [id number]  Admission Date:  2024  Date Of Service:  24  Primary Care Physician:  Aurea Ayala MD     LOS: 0 days :    Chief Complaint:      Follow-up; altered mental status, falls    Subjective:    Denies any concerning pain, no shortness of air.  No questions.    Hospital Course:    Que Lagunas is a 79-year-old with a history of atrial fibrillation on Xarelto, CAD, hypertension, type 2 diabetes, vascular dementia, prior tobacco use, who presented from home with multiple complaints.  History obtained from ER record, patient, family.  Patient typically lives at home with his daughter.  He is normally ambulatory with use of a walker also uses a wheelchair at times.  He had been more confused today, and had a couple of falls.  Daughter noted increasing weakness. He was able to answer simple questions and knew he was in the hospital at time of admission. Had no complaints.     In the ER he was overall hemodynamically stable and not hypoxic.  His workup was concerning for possible urinary tract infection.  CT imaging of the chest was unremarkable.  CT of the cervical spine was unremarkable.  CT scan of the head was unremarkable.  Patient did have blood cultures obtained.  He was given IV fluid resuscitation and antibiotic coverage with Rocephin.      Observation general floor admission 2024 with UTI, generalized weakness.    Review of Systems:     All systems were reviewed and negative except as mentioned in subjective, assessment and plan.    Vital Signs:    Temp:  [97.4 °F (36.3 °C)-99 °F (37.2 °C)] 98.6 °F (37 °C)  Heart Rate:  [114-124] 114  Resp:  [16] 16  BP: (114-128)/(77-99) 118/88    Intake and output:    I/O last 3 completed shifts:  In: 5200 [P.O.:600; I.V.:2000; IV Piggyback:2600]  Out: 200 [Urine:200]  I/O this shift:  In:  "840 [P.O.:840]  Out: -     Physical Examination:    General Appearance:  Alert and cooperative.    Head:  Atraumatic and normocephalic.   Eyes: Conjunctivae and sclerae normal, no icterus. No pallor.   Throat: No oral lesions, no thrush, oral mucosa moist.   Neck: Supple, trachea midline, no thyromegaly.   Lungs:   Breath sounds heard bilaterally equally.  No wheezing or crackles. No Pleural rub or bronchial breathing.   Heart:  Normal S1 and S2, no murmur, no gallop, no rub. No JVD.   Abdomen:   Normal bowel sounds, no masses, no organomegaly. Soft, nontender, nondistended, no rebound tenderness.   Extremities: Supple, no edema, no cyanosis, no clubbing.   Skin: Warm.   Neurologic: Alert and oriented to self and place. No facial asymmetry. Moves all four limbs. No tremors.      Laboratory results:    Results from last 7 days   Lab Units 06/02/24  0622 06/01/24 0702 05/31/24 2037   SODIUM mmol/L 144 138 135*   POTASSIUM mmol/L 4.1 4.0 3.7   CHLORIDE mmol/L 110* 103 94*   CO2 mmol/L 25.9 23.8 25.8   BUN mg/dL 14 14 17   CREATININE mg/dL 0.71* 0.79 0.73*   CALCIUM mg/dL 8.5* 8.7 9.2   BILIRUBIN mg/dL  --   --  1.1   ALK PHOS U/L  --   --  83   ALT (SGPT) U/L  --   --  11   AST (SGOT) U/L  --   --  15   GLUCOSE mg/dL 118* 197* 179*     Results from last 7 days   Lab Units 06/02/24 0622 06/01/24  0702 05/31/24 2037   WBC 10*3/mm3 9.45 16.92* 14.15*   HEMOGLOBIN g/dL 12.6* 13.5 14.8   HEMATOCRIT % 38.7 40.8 44.4   PLATELETS 10*3/mm3 283 287 310     Results from last 7 days   Lab Units 05/31/24 2037   INR  1.33*     Results from last 7 days   Lab Units 05/31/24  2313 05/31/24 2037   HSTROP T ng/L 16 17     Results from last 7 days   Lab Units 05/31/24 2211 05/31/24 2053   BLOODCX  Gram Negative Bacilli*  No growth at 24 hours  --    URINECX   --  >100,000 CFU/mL Gram Negative Bacilli*     No results for input(s): \"PHART\", \"SDM9EHR\", \"PO2ART\", \"XCF5NOG\", \"BASEEXCESS\" in the last 8760 hours.   I have reviewed the " patient's laboratory results.    Radiology results:    XR Chest 1 View    Result Date: 6/1/2024  PROCEDURE: XR CHEST 1 VW-  HISTORY: weakness  COMPARISON: November 19, 2022.  FINDINGS: Atherosclerosis is noted. The heart is normal in size. The mediastinum is unremarkable. Prominent interstitial markings, likely chronic. No focal consolidation. There is no pneumothorax.  There are no acute osseous abnormalities.      Impression: No acute cardiopulmonary process.  Continued followup is recommended.    This report was signed and finalized on 6/1/2024 7:58 AM by Royec Willis DO.      CT Cervical Spine Without Contrast    Result Date: 5/31/2024  FINAL REPORT TECHNIQUE: Thin section axial images were obtained through the cervical spine without contrast.  Coronal and sagittal reconstructed images were then provided. CLINICAL HISTORY: fall, trauma FINDINGS: There is normal alignment and curvature.  Prevertebral soft tissues are normal.  There is no fracture.  There is severe degenerative disc disease in the mid-lower cervical spine.     Impression: No acute disease. Authenticated and Electronically Signed by Luis Enrique Preciado M.D. on 05/31/2024 10:30:22 PM    CT Head Without Contrast    Result Date: 5/31/2024  FINAL REPORT TECHNIQUE: Routine axial images through the head were obtained without contrast. CLINICAL HISTORY: fall, trauma, weakness FINDINGS: There is generalized atrophy.  There is mild to moderate ventricular dilation.  Periventricular and deep white matter low-attenuation areas are consistent with chronic small vessel ischemia.  There is no mass or shift in midline structures. There is no intracranial hemorrhage.  There is no acute sinus or osseous abnormality.     Impression: No fracture or acute intracranial abnormality. Authenticated and Electronically Signed by Luis Enrique Preciado M.D. on 05/31/2024 10:29:22 PM    CT Angiogram Chest Pulmonary Embolism    Result Date: 5/31/2024  FINAL REPORT TECHNIQUE: Thin section axial  images were obtained through the chest and during the arterial phase of IV contrast administration. Coronal 3-D MIP reconstructed images were also provided. CLINICAL HISTORY: tachycardia, chest pain, syncope, r/o PE FINDINGS: The pulmonary arteries are well-opacified and there is no filling defect to indicate pulmonary embolism. The thoracic aorta is not optimally opacified due to delayed transit of contrast through the heart, but no acute abnormality is suspected.  The lungs are clear. There is no pleural disease. Mild mediastinal adenopathy is possibly due to edema.  There is no acute osseous abnormality.     Impression: No pulmonary embolism or other acute abnormality. Authenticated and Electronically Signed by Luis Enrique Preciado M.D. on 05/31/2024 10:28:43 PM   I have reviewed the patient's radiology reports.    Medication Review:     I have reviewed the patient's active and prn medications.     Problem List:      Complicated UTI (urinary tract infection)      Assessment:     Complicated urinary tract infection, present on admission, acute  Weakness with impaired mobility and ADLs, POA  Vascular dementia  Atrial fibrillation, chronic  Type 2 diabetes mellitus     Plan:     Comfortable in bed, pleasantly conversational.    Updated daughter Debo on the phone.     UTI  Bacteremia  Rocephin 7-day course.  Blood cultures Klebsiella pneumonia.  Urine culture pending.     Weakness  PT/OT.  At baseline does use a walker and wheelchair and lives with daughter.     Dementia  Complicates all aspects of care.  Had previously been at assisted living had not done well, currently living with daughter.  Has had some issues with weight loss in the past.  Continue home medications.     Atrial fibrillation  Appears rate controlled at this time, will continue with his home metoprolol and Xarelto.  Lopressor as needed for RVR.  RVR likely triggered by infection.        Risk Assessment: High  DVT Prophylaxis: Xarelto  Code Status: Full  Diet:  Carb consistent/cardiac  Discharge Plan: A couple more days awaiting sensitivities likely. [facility].    Brian Joseph Kerley, DO  06/02/24  14:40 EDT    Dictated utilizing Dragon dictation.

## 2024-06-02 NOTE — PAYOR COMM NOTE
"Inpatient notification:    Bismark Rosenthal (79 y.o. Male)       Date of Birth   1945    Social Security Number       Address   [address] [place] [address] [address] [place] [address]    Home Phone   [phone]    MRN   [id number]       Episcopalian   Religious    Marital Status                               Admission Date   24    Admission Type   Emergency    Admitting Provider   Zaria García DO    Attending Provider   Zaria García DO    Department, Room/Bed   [facility] TELEMETRY 3, 313/1       Discharge Date       Discharge Disposition       Discharge Destination                                 Attending Provider: Zaria García DO    Allergies: Lisinopril    Isolation: None   Infection: None   Code Status: CPR    Ht: 182.9 cm (72\")   Wt: 73.5 kg (162 lb 0.6 oz)    Admission Cmt: None   Principal Problem: Complicated UTI (urinary tract infection) [N39.0]                   Active Insurance as of 2024       Primary Coverage       Payor Plan Insurance Group Employer/Plan Group    ANTH MEDICARE REPLACEMENT [facility] MEDICARE ADVANTAGE [id number]       Payor Plan Address Payor Plan Phone Number Payor Plan Fax Number Effective Dates    [address] [phone]  2018 - None Entered    [facility] [id number]         Subscriber Name Subscriber Birth Date Member ID       BISMARK ROSENTHAL 1945 [id number]                     Emergency Contacts        (Rel.) Home Phone Work Phone Mobile Phone    Jennifer Yancey (Daughter) -- -- [phone]                 History & Physical        Zaria García DO at 24 [facility]6            [facility] HOSPITALIST   HISTORY AND PHYSICAL      Name:  Bismark Rosenthal   Age:  79 y.o.  Sex:  male  :  1945  MRN:  [id number]   Visit Number:  [id number]  Admission Date:  2024  Date Of Service:  24  Primary Care Physician:  Aurea Ayala MD    Chief Complaint:     Weakness, " falls, mental status change    History Of Presenting Illness:      79-year-old with a history of atrial fibrillation, CAD, hypertension, type 2 diabetes, vascular dementia, prior tobacco use, who presented from home with multiple complaints.  History obtained from ER record, patient, family.  Patient typically lives at home with his daughter.  He is normally ambulatory with use of a walker also uses a wheelchair at times.  He had been more confused today, and had a couple of falls.  Daughter noted increasing weakness. He was able to answer simple questions and knew he was in the hospital at time of visit. Had no complaints.    In the ER he was overall hemodynamically stable and not hypoxic.  His workup was concerning for possible urinary tract infection.  CT imaging of the chest was unremarkable.  CT of the cervical spine was unremarkable.  CT scan of the head was unremarkable.  Patient did have blood cultures obtained.  He was given IV fluid resuscitation and antibiotic coverage with Rocephin.  Due to concern for complicated UTI and ongoing weakness, we were asked to admit    Review Of Systems:    All systems were reviewed and negative except as mentioned in history of presenting illness, assessment and plan.    Past Medical History: Patient  has a past medical history of Abnormal EKG, Anemia, Anxiety, Arthritis, Atrial fibrillation, CAD (coronary artery disease), Colon polyp (04/09/2015), Contact dermatitis, Diabetes, Dyslipidemia, Hypertension, Palpitations, Prostatism, Stroke, and Tattoo.    Past Surgical History: Patient  has a past surgical history that includes Tonsillectomy (1947); Prostate surgery; Eye surgery; and Colonoscopy (04/09/2015).    Social History: Patient  reports that he quit smoking about 23 years ago. His smoking use included cigarettes. He started smoking about 73 years ago. He has a 100 pack-year smoking history. He has never used smokeless tobacco. He reports that he does not drink alcohol  "and does not use drugs.    Family History:  Patient's family history has been reviewed and found to be noncontributory.     Allergies:      Lisinopril    Home Medications:    Prior to Admission Medications       Prescriptions Last Dose Informant Patient Reported? Taking?    cholecalciferol (VITAMIN D3) 1000 units tablet   Yes No    Take 1 tablet by mouth Daily. Indications: Vitamin D Deficiency    Continuous Blood Gluc Sensor (Dexcom G7 Sensor) misc   No No    1 applicator Every 10 (Ten) Days.    Patient not taking:  Reported on 1/30/2024    donepezil (ARICEPT) 10 MG tablet   No No    Take 1 tablet by mouth Every Night.    furosemide (LASIX) 20 MG tablet   No No    Take 1 tablet by mouth Every Other Day.    Patient not taking:  Reported on 1/30/2024    glucose blood (SOLUS V2 TEST) test strip   No No    Use as instructed    glucose monitor monitoring kit   No No    1 each As Needed (prn).    insulin glargine (Lantus) 100 UNIT/ML injection   No No    Inject 10 Units under the skin into the appropriate area as directed Every Night.    Insulin Lispro (humaLOG) 100 UNIT/ML injection   No No    INJECT 10 UNITS SUBCUTANEOUSLY WITH MEALS    Insulin Pen Needle (PEN NEEDLES 3/16\") 31G X 5 MM misc   No No    Use with insulin daily E11.9    Insulin Syringe-Needle U-100 (Easy Touch Insulin Syringe) 30G X 5/16\" 0.5 ML misc   No No    use as directed with insulin 4 times a day    Lancets misc   No No    Use to test twice daily. E11.9    LifeScan Unistik II Lancets misc   No No    1 Units 3 (Three) Times a Day.    memantine (NAMENDA) 10 MG tablet   No No    Take 1 tablet by mouth Every Night.    metFORMIN (GLUCOPHAGE) 500 MG tablet   No No    TAKE 1 TABLET BY MOUTH IN THE MORNING WITH  BREAKFAST  Indications: Type 2 Diabetes    metoprolol tartrate (LOPRESSOR) 100 MG tablet   Yes No    Take 0.75 tablets by mouth 2 (Two) Times a Day. 75mg daily  Indications: High Blood Pressure Disorder    PARoxetine (PAXIL) 20 MG tablet   No No    " "Take 1 tablet by mouth once daily    pravastatin (PRAVACHOL) 40 MG tablet   No No    TAKE 1 TABLET BY MOUTH ONCE DAILY IN THE EVENING    vitamin C (ASCORBIC ACID) 250 MG tablet   Yes No    Take 1 tablet by mouth Daily.    Xarelto 20 MG tablet   No No    Take 1 tablet by mouth once daily          ED Medications:    Medications   sodium chloride 0.9 % flush 10 mL (has no administration in time range)   sodium chloride 0.9 % flush 10 mL (has no administration in time range)   sodium chloride 0.9 % bolus 1,500 mL (0 mL Intravenous Stopped 5/31/24 2214)   cefTRIAXone (ROCEPHIN) 1,000 mg in sodium chloride 0.9 % 100 mL IVPB-VTB (0 mg Intravenous Stopped 5/31/24 2316)   sodium chloride 0.9 % bolus 1,000 mL (0 mL Intravenous Stopped 5/31/24 2317)   iopamidol (ISOVUE-300) 61 % injection 100 mL (100 mL Intravenous Given 5/31/24 2131)     Vital Signs:  Temp:  [98.2 °F (36.8 °C)] 98.2 °F (36.8 °C)  Heart Rate:  [105-129] 117  Resp:  [18] 18  BP: (115-161)/() 120/86        05/31/24 2018   Weight: 69.9 kg (154 lb)     Body mass index is 20.89 kg/m².    Physical Exam:     Most recent vital Signs: /86   Pulse 117   Temp 98.2 °F (36.8 °C) (Oral)   Resp 18   Ht 182.9 cm (72\")   Wt 69.9 kg (154 lb)   SpO2 100%   BMI 20.89 kg/m²     Physical Exam  Constitutional:       General: He is not in acute distress.     Appearance: He is not toxic-appearing.   HENT:      Mouth/Throat:      Mouth: Mucous membranes are dry.      Pharynx: Oropharynx is clear.   Eyes:      Extraocular Movements: Extraocular movements intact.      Pupils: Pupils are equal, round, and reactive to light.   Cardiovascular:      Rate and Rhythm: Tachycardia present. Rhythm irregular.      Pulses: Normal pulses.      Heart sounds: No murmur heard.     No gallop.   Pulmonary:      Effort: Pulmonary effort is normal.      Breath sounds: No wheezing or rales.   Abdominal:      General: Bowel sounds are normal. There is no distension.      Tenderness: " There is no abdominal tenderness. There is no guarding.   Musculoskeletal:         General: Normal range of motion.      Right lower leg: No edema.      Left lower leg: No edema.   Skin:     General: Skin is dry.      Capillary Refill: Capillary refill takes less than 2 seconds.      Findings: No lesion.   Neurological:      Mental Status: He is alert. Mental status is at baseline. He is disoriented.      Motor: Weakness present.         Laboratory data:    I have reviewed the labs done in the emergency room.    Results from last 7 days   Lab Units 05/31/24 2037   SODIUM mmol/L 135*   POTASSIUM mmol/L 3.7   CHLORIDE mmol/L 94*   CO2 mmol/L 25.8   BUN mg/dL 17   CREATININE mg/dL 0.73*   CALCIUM mg/dL 9.2   BILIRUBIN mg/dL 1.1   ALK PHOS U/L 83   ALT (SGPT) U/L 11   AST (SGOT) U/L 15   GLUCOSE mg/dL 179*     Results from last 7 days   Lab Units 05/31/24 2037   WBC 10*3/mm3 14.15*   HEMOGLOBIN g/dL 14.8   HEMATOCRIT % 44.4   PLATELETS 10*3/mm3 310     Results from last 7 days   Lab Units 05/31/24 2037   INR  1.33*     Results from last 7 days   Lab Units 05/31/24  2313 05/31/24 2037   HSTROP T ng/L 16 17                     Results from last 7 days   Lab Units 05/31/24 2053   COLOR UA  Yellow   GLUCOSE UA  250 mg/dL (1+)*   KETONES UA  Negative   BLOOD UA  Moderate (2+)*   LEUKOCYTES UA  Large (3+)*   PH, URINE  5.5   BILIRUBIN UA  Negative   UROBILINOGEN UA  1.0 E.U./dL   RBC UA /HPF None Seen   WBC UA /HPF 21-50*       Pain Management Panel          6/29/2016   Pain Management Panel   Creatinine, Urine 100        EKG:      Appears to be likely sinus tachycardia, rate of about 120.  I see no acute ST elevations or obvious ischemic changes.    Radiology:    CT Cervical Spine Without Contrast    Result Date: 5/31/2024  FINAL REPORT TECHNIQUE: Thin section axial images were obtained through the cervical spine without contrast.  Coronal and sagittal reconstructed images were then provided. CLINICAL HISTORY: fall,  trauma FINDINGS: There is normal alignment and curvature.  Prevertebral soft tissues are normal.  There is no fracture.  There is severe degenerative disc disease in the mid-lower cervical spine.     No acute disease. Authenticated and Electronically Signed by Luis Enrique Preciado M.D. on 05/31/2024 10:30:22 PM    CT Head Without Contrast    Result Date: 5/31/2024  FINAL REPORT TECHNIQUE: Routine axial images through the head were obtained without contrast. CLINICAL HISTORY: fall, trauma, weakness FINDINGS: There is generalized atrophy.  There is mild to moderate ventricular dilation.  Periventricular and deep white matter low-attenuation areas are consistent with chronic small vessel ischemia.  There is no mass or shift in midline structures. There is no intracranial hemorrhage.  There is no acute sinus or osseous abnormality.     No fracture or acute intracranial abnormality. Authenticated and Electronically Signed by Luis Enrique Preciado M.D. on 05/31/2024 10:29:22 PM    CT Angiogram Chest Pulmonary Embolism    Result Date: 5/31/2024  FINAL REPORT TECHNIQUE: Thin section axial images were obtained through the chest and during the arterial phase of IV contrast administration. Coronal 3-D MIP reconstructed images were also provided. CLINICAL HISTORY: tachycardia, chest pain, syncope, r/o PE FINDINGS: The pulmonary arteries are well-opacified and there is no filling defect to indicate pulmonary embolism. The thoracic aorta is not optimally opacified due to delayed transit of contrast through the heart, but no acute abnormality is suspected.  The lungs are clear. There is no pleural disease. Mild mediastinal adenopathy is possibly due to edema.  There is no acute osseous abnormality.     No pulmonary embolism or other acute abnormality. Authenticated and Electronically Signed by Luis Enrique Preciado M.D. on 05/31/2024 10:28:43 PM     Assessment:    Complicated urinary tract infection, present on admission, acute  Weakness with impaired mobility and  ADLs, POA  Vascular dementia  Atrial fibrillation, chronic  Type 2 diabetes mellitus    Plan:    Admit for observation    UTI:  No recent urine cultures to compare, antibiotic coverage with Rocephin at 2 g.  Will send urine for culture.  Blood cultures already obtained.  Continue with gentle IV fluids.    Weakness:  Will have patient assessed by PT and OT.  At baseline does use a walker and wheelchair and lives with daughter.    Dementia:  Complicates all aspects of care.  Had previously been at assisted living had not done well, currently living with daughter.  Has had some issues with weight loss in the past.  Continue home medications    Atrial fibrillation:  Appears rate controlled at this time, will continue with his home metoprolol and Xarelto.    Otherwise currently meets observation level care with anticipated stay less than 2 midnights.  Further recommendations are predicated upon improvement of clinical condition.    Risk Assessment: High  DVT Prophylaxis: Xarelto  Code Status: Full  Diet: Carb consistent/cardiac    Advance Care Planning  ACP discussion was held with the patient during this visit. Patient has an advance directive in EMR which is still valid.            Zaria García DO  05/31/24  23:46 EDT    Dictated utilizing Dragon dictation.    Electronically signed by Zaria García DO at 06/01/24 0643       Vital Signs (last 2 days)       Date/Time Temp Temp src Pulse Resp BP Patient Position SpO2    06/02/24 1109 -- -- -- -- 118/88 -- --    06/02/24 1100 98.6 (37) Oral 114 16 114/99 Lying 91    06/02/24 0700 98.1 (36.7) Oral 118 16 127/95 Lying 98    06/01/24 2316 97.4 (36.3) Axillary 116 16 118/77 Lying 94    06/01/24 1922 99 (37.2) Oral 120 16 117/95 Lying 97    06/01/24 1628 -- -- 114 -- -- -- 96    06/01/24 1620 98.5 (36.9) Temporal -- -- -- -- --    06/01/24 1554 -- -- 124 -- 128/82 -- --    06/01/24 1105 98.8 (37.1) Temporal 118 16 102/72 Lying 98    06/01/24 0849 -- -- 120 -- --  -- --    06/01/24 0839 -- -- -- -- -- -- 99    06/01/24 0748 98.7 (37.1) Temporal 118 16 104/68 Lying --    06/01/24 0548 -- -- 109 -- -- -- --    06/01/24 0541 -- -- 107 -- -- -- --    06/01/24 0424 -- -- 120 -- 160/100 -- --    06/01/24 0423 -- -- 118 -- 141/103 -- --    06/01/24 0421 -- -- 113 -- -- -- --    06/01/24 0348 101.1 (38.4) Oral 116 16 178/102 Lying --    06/01/24 0101 -- -- 118 -- -- -- 93    05/31/24 2347 98.5 (36.9) Oral 120 20 154/88 Lying 93    05/31/24 2317 -- -- 117 -- -- -- 100    05/31/24 2230 -- -- 120 -- 120/86 -- 95    05/31/24 2147 -- -- 105 -- -- -- 96    05/31/24 2119 -- -- 122 -- -- -- 94    05/31/24 2100 -- -- 123 -- 115/87 -- 94    05/31/24 2051 -- -- 124 -- -- -- --    05/31/24 2050 -- -- -- -- -- -- 97    05/31/24 2032 -- -- 129 -- 118/83 -- 90    05/31/24 2018 98.2 (36.8) Oral 129 18 161/106 Sitting 93          Facility-Administered Medications as of 6/2/2024   Medication Dose Route Frequency Provider Last Rate Last Admin    acetaminophen (TYLENOL) tablet 650 mg  650 mg Oral Q4H PRZaria Cheney DO   650 mg at 06/01/24 2020    Or    acetaminophen (TYLENOL) 160 MG/5ML oral solution 650 mg  650 mg Oral Q4H PRZaria Cheney DO        Or    acetaminophen (TYLENOL) suppository 650 mg  650 mg Rectal Q4H PRZaria Cheney DO        aluminum-magnesium hydroxide-simethicone (MAALOX MAX) 400-400-40 MG/5ML suspension 15 mL  15 mL Oral Q6H PRN Zaria García DO        amLODIPine (NORVASC) tablet 5 mg  5 mg Oral Q24H Zaria García DO   5 mg at 06/02/24 0824    sennosides-docusate (PERICOLACE) 8.6-50 MG per tablet 2 tablet  2 tablet Oral BID PRN Zaria García DO        And    polyethylene glycol (MIRALAX) packet 17 g  17 g Oral Daily PRN Zaria García DO        And    bisacodyl (DULCOLAX) EC tablet 5 mg  5 mg Oral Daily PRN Zaria García DO        And    bisacodyl (DULCOLAX) suppository 10 mg  10 mg Rectal Daily PRN  Zaria García, DO        [COMPLETED] cefTRIAXone (ROCEPHIN) 1,000 mg in sodium chloride 0.9 % 100 mL IVPB-VTB  1,000 mg Intravenous Once Preet Daniel MD   Stopped at 05/31/24 2316    cefTRIAXone (ROCEPHIN) 2,000 mg in sodium chloride 0.9 % 100 mL IVPB-VTB  2,000 mg Intravenous Q24H Zaria García  mL/hr at 06/02/24 1024 2,000 mg at 06/02/24 1024    dextrose (D50W) (25 g/50 mL) IV injection 25 g  25 g Intravenous Q15 Min PRN Zaria García DO        dextrose (GLUTOSE) oral gel 15 g  15 g Oral Q15 Min PRN Zaria García DO        donepezil (ARICEPT) tablet 10 mg  10 mg Oral Nightly Zaria García DO   10 mg at 06/01/24 2020    glucagon (GLUCAGEN) injection 1 mg  1 mg Intramuscular Q15 Min PRN Zaria García DO        insulin glargine (LANTUS, SEMGLEE) injection 7 Units  7 Units Subcutaneous Nightly Zaria García DO   7 Units at 06/01/24 2152    Insulin Lispro (humaLOG) injection 2-9 Units  2-9 Units Subcutaneous 4x Daily AC & at Bedtime Zaria García DO   4 Units at 06/02/24 1132    [COMPLETED] iopamidol (ISOVUE-300) 61 % injection 100 mL  100 mL Intravenous Once in imaging Preet Daniel MD   100 mL at 05/31/24 2131    memantine (NAMENDA) tablet 10 mg  10 mg Oral Nightly Zaria García DO   10 mg at 06/01/24 2020    metoprolol tartrate (LOPRESSOR) injection 5 mg  5 mg Intravenous Q5 Min PRN Kerley, Brian Joseph, DO   5 mg at 06/01/24 1554    metoprolol tartrate (LOPRESSOR) tablet 75 mg  75 mg Oral Q12H Zaria García DO   75 mg at 06/02/24 0824    nitroglycerin (NITROSTAT) SL tablet 0.4 mg  0.4 mg Sublingual Q5 Min PRN Zaria García DO        ondansetron ODT (ZOFRAN-ODT) disintegrating tablet 4 mg  4 mg Oral Q6H PRN Zaria García DO        Or    ondansetron (ZOFRAN) injection 4 mg  4 mg Intravenous Q6H PRN Zaria García DO        PARoxetine (PAXIL) tablet 20 mg  20 mg  Oral Daily Ricky Zaria Rosendo, DO   20 mg at 06/02/24 0824    pravastatin (PRAVACHOL) tablet 40 mg  40 mg Oral Daily FrederickZaria yoonus, DO   40 mg at 06/02/24 0824    rivaroxaban (XARELTO) tablet 20 mg  20 mg Oral Daily With Dinner FrederickZaria yoon, DO   20 mg at 06/01/24 1706    [COMPLETED] sodium chloride 0.9 % bolus 1,000 mL  1,000 mL Intravenous Once Preet Daniel MD   Stopped at 05/31/24 2317    [COMPLETED] sodium chloride 0.9 % bolus 1,500 mL  1,500 mL Intravenous Once Preet Daniel MD   Stopped at 05/31/24 2214    sodium chloride 0.9 % flush 10 mL  10 mL Intravenous PRN Preet Daniel MD        sodium chloride 0.9 % flush 10 mL  10 mL Intravenous PRN JavinaPreet joaquin MD        sodium chloride 0.9 % flush 10 mL  10 mL Intravenous Q12H Zaria Garcíaus, DO   10 mL at 06/02/24 0825    sodium chloride 0.9 % flush 10 mL  10 mL Intravenous PRN Zaria García Rosendo, DO        sodium chloride 0.9 % infusion 40 mL  40 mL Intravenous PRN Zaria García Rosendo, DO        sodium chloride 0.9 % with KCl 20 mEq/L infusion  100 mL/hr Intravenous Continuous Zaria Garcíaus,  mL/hr at 06/02/24 1132 100 mL/hr at 06/02/24 1132     Lab Results (last 48 hours)       Procedure Component Value Units Date/Time    POC Glucose Once [[id number]]  (Abnormal) Collected: 06/02/24 1128    Specimen: Blood Updated: 06/02/24 1130     Glucose 228 mg/dL      Comment: Serial Number: [id number]:  [id number]       Urine Culture - Urine, Urine, Clean Catch [[id number]]  (Abnormal) Collected: 05/31/24 2053    Specimen: Urine, Clean Catch Updated: 06/02/24 0953     Urine Culture >100,000 CFU/mL Gram Negative Bacilli    Narrative:      Colonization of the urinary tract without infection is common. Treatment is discouraged unless the patient is symptomatic, pregnant, or undergoing an invasive urologic procedure.    POC Glucose Once [[id number]]  (Normal) Collected:  06/02/24 0728    Specimen: Blood Updated: 06/02/24 0731     Glucose 115 mg/dL      Comment: Serial Number: [id number]:  [id number]       Basic Metabolic Panel [[id number]]  (Abnormal) Collected: 06/02/24 0622    Specimen: Blood Updated: 06/02/24 0712     Glucose 118 mg/dL      BUN 14 mg/dL      Creatinine 0.71 mg/dL      Sodium 144 mmol/L      Potassium 4.1 mmol/L      Chloride 110 mmol/L      CO2 25.9 mmol/L      Calcium 8.5 mg/dL      BUN/Creatinine Ratio 19.7     Anion Gap 8.1 mmol/L      eGFR 92.7 mL/min/1.73     Narrative:      GFR Normal >60  Chronic Kidney Disease <60  Kidney Failure <15    The GFR formula is only valid for adults with stable renal function between ages 18 and 70.    CBC & Differential [[id number]]  (Abnormal) Collected: 06/02/24 0622    Specimen: Blood Updated: 06/02/24 0655    Narrative:      The following orders were created for panel order CBC & Differential.  Procedure                               Abnormality         Status                     ---------                               -----------         ------                     CBC Auto Differential[[id number]]        Abnormal            Final result                 Please view results for these tests on the individual orders.    CBC Auto Differential [[id number]]  (Abnormal) Collected: 06/02/24 0622    Specimen: Blood Updated: 06/02/24 0655     WBC 9.45 10*3/mm3      RBC 4.24 10*6/mm3      Hemoglobin 12.6 g/dL      Hematocrit 38.7 %      MCV 91.3 fL      MCH 29.7 pg      MCHC 32.6 g/dL      RDW 14.3 %      RDW-SD 48.0 fl      MPV 11.5 fL      Platelets 283 10*3/mm3      Neutrophil % 53.6 %      Lymphocyte % 32.2 %      Monocyte % 12.1 %      Eosinophil % 1.2 %      Basophil % 0.5 %      Immature Grans % 0.4 %      Neutrophils, Absolute 5.07 10*3/mm3      Lymphocytes, Absolute 3.04 10*3/mm3      Monocytes, Absolute 1.14 10*3/mm3      Eosinophils, Absolute 0.11 10*3/mm3      Basophils, Absolute 0.05 10*3/mm3      Immature Grans,  Absolute 0.04 10*3/mm3      nRBC 0.0 /100 WBC     Blood Culture - Blood, Arm, Left [[id number]]  (Abnormal) Collected: 05/31/24 2211    Specimen: Blood from Arm, Left Updated: 06/02/24 0639     Blood Culture Gram Negative Bacilli     Isolated from Pediatric Bottle     Gram Stain Pediatric Bottle Gram negative bacilli    Blood Culture - Blood, Hand, Right [[id number]]  (Normal) Collected: 05/31/24 2211    Specimen: Blood from Hand, Right Updated: 06/01/24 2231     Blood Culture No growth at 24 hours    POC Glucose Once [[id number]]  (Abnormal) Collected: 06/01/24 2037    Specimen: Blood Updated: 06/01/24 2039     Glucose 268 mg/dL      Comment: Serial Number: [id number]:  [id number]       POC Glucose Once [[id number]]  (Abnormal) Collected: 06/01/24 1637    Specimen: Blood Updated: 06/01/24 1643     Glucose 203 mg/dL      Comment: Serial Number: [id number]:  [id number]       Blood Culture ID, PCR - Blood, Arm, Left [[id number]]  (Abnormal) Collected: 05/31/24 2211    Specimen: Blood from Arm, Left Updated: 06/01/24 1259     BCID, PCR Klebsiella pneumoniae group. Identification by BCID2 PCR.     BOTTLE TYPE Pediatric Bottle    Narrative:      No resistance genes detected.    Respiratory Panel PCR w/COVID-19(SARS-CoV-2) BRENDA/AMANDA/LEWIS/PAD/COR/JESICA In-House, NP Swab in UTM/VTM, 2 HR TAT - Swab, Nasopharynx [[id number]]  (Normal) Collected: 06/01/24 0851    Specimen: Swab from Nasopharynx Updated: 06/01/24 1245     ADENOVIRUS, PCR Not Detected     Coronavirus 229E Not Detected     Coronavirus HKU1 Not Detected     Coronavirus NL63 Not Detected     Coronavirus OC43 Not Detected     COVID19 Not Detected     Human Metapneumovirus Not Detected     Human Rhinovirus/Enterovirus Not Detected     Influenza A PCR Not Detected     Influenza B PCR Not Detected     Parainfluenza Virus 1 Not Detected     Parainfluenza Virus 2 Not Detected     Parainfluenza Virus 3 Not Detected     Parainfluenza Virus 4 Not Detected     RSV, PCR Not  Detected     Bordetella pertussis pcr Not Detected     Bordetella parapertussis PCR Not Detected     Chlamydophila pneumoniae PCR Not Detected     Mycoplasma pneumo by PCR Not Detected    Narrative:      In the setting of a positive respiratory panel with a viral infection PLUS a negative procalcitonin without other underlying concern for bacterial infection, consider observing off antibiotics or discontinuation of antibiotics and continue supportive care. If the respiratory panel is positive for atypical bacterial infection (Bordetella pertussis, Chlamydophila pneumoniae, or Mycoplasma pneumoniae), consider antibiotic de-escalation to target atypical bacterial infection.    POC Glucose Once [[id number]]  (Abnormal) Collected: 06/01/24 1108    Specimen: Blood Updated: 06/01/24 1136     Glucose 175 mg/dL      Comment: Serial Number: [id number]:  [id number]       STAT Lactic Acid, Reflex [[id number]]  (Normal) Collected: 06/01/24 0815    Specimen: Blood Updated: 06/01/24 0851     Lactate 1.5 mmol/L     POC Glucose Once [[id number]]  (Abnormal) Collected: 06/01/24 0747    Specimen: Blood Updated: 06/01/24 0812     Glucose 195 mg/dL      Comment: Serial Number: [id number]:  [id number]       Basic Metabolic Panel [[id number]]  (Abnormal) Collected: 06/01/24 0702    Specimen: Blood Updated: 06/01/24 0748     Glucose 197 mg/dL      BUN 14 mg/dL      Creatinine 0.79 mg/dL      Sodium 138 mmol/L      Potassium 4.0 mmol/L      Comment: Specimen hemolyzed.  Result may be falsely elevated.        Chloride 103 mmol/L      CO2 23.8 mmol/L      Calcium 8.7 mg/dL      BUN/Creatinine Ratio 17.7     Anion Gap 11.2 mmol/L      eGFR 89.8 mL/min/1.73     Narrative:      GFR Normal >60  Chronic Kidney Disease <60  Kidney Failure <15    The GFR formula is only valid for adults with stable renal function between ages 18 and 70.    CBC (No Diff) [[id number]]  (Abnormal) Collected: 06/01/24 0702    Specimen: Blood Updated: 06/01/24 0724  "    WBC 16.92 10*3/mm3      RBC 4.56 10*6/mm3      Hemoglobin 13.5 g/dL      Hematocrit 40.8 %      MCV 89.5 fL      MCH 29.6 pg      MCHC 33.1 g/dL      RDW 13.8 %      RDW-SD 45.3 fl      MPV 11.2 fL      Platelets 287 10*3/mm3     Procalcitonin [[id number]]  (Normal) Collected: 05/31/24 2037    Specimen: Blood Updated: 06/01/24 0044     Procalcitonin 0.07 ng/mL     Narrative:      As a Marker for Sepsis (Non-Neonates):    1. <0.5 ng/mL represents a low risk of severe sepsis and/or septic shock.  2. >2 ng/mL represents a high risk of severe sepsis and/or septic shock.    As a Marker for Lower Respiratory Tract Infections that require antibiotic therapy:    PCT on Admission    Antibiotic Therapy       6-12 Hrs later    >0.5                Strongly Recommended  >0.25 - <0.5        Recommended   0.1 - 0.25          Discouraged              Remeasure/reassess PCT  <0.1                Strongly Discouraged     Remeasure/reassess PCT    As 28 day mortality risk marker: \"Change in Procalcitonin Result\" (>80% or <=80%) if Day 0 (or Day 1) and Day 4 values are available. Refer to [email or web address]    Change in PCT <=80%  A decrease of PCT levels below or equal to 80% defines a positive change in PCT test result representing a higher risk for 28-day all-cause mortality of patients diagnosed with severe sepsis for septic shock.    Change in PCT >80%  A decrease of PCT levels of more than 80% defines a negative change in PCT result representing a lower risk for 28-day all-cause mortality of patients diagnosed with severe sepsis or septic shock.       POC Glucose Once [[id number]]  (Abnormal) Collected: 06/01/24 0001    Specimen: Blood Updated: 06/01/24 0002     Glucose 159 mg/dL      Comment: Serial Number: [id number]:  [id number]       High Sensitivity Troponin T 2Hr [[id number]]  (Normal) Collected: 05/31/24 2313    Specimen: Blood Updated: 05/31/24 2340     HS Troponin T 16 ng/L      Comment: Specimen " hemolyzed.  Results may be falsely decreased.        Troponin T Delta -1 ng/L     Narrative:      High Sensitive Troponin T Reference Range:  <14.0 ng/L- Negative Female for AMI  <22.0 ng/L- Negative Male for AMI  >=14 - Abnormal Female indicating possible myocardial injury.  >=22 - Abnormal Male indicating possible myocardial injury.   Clinicians would have to utilize clinical acumen, EKG, Troponin, and serial changes to determine if it is an Acute Myocardial Infarction or myocardial injury due to an underlying chronic condition.         High Sensitivity Troponin T [[id number]]  (Normal) Collected: 05/31/24 2037    Specimen: Blood Updated: 05/31/24 2118     HS Troponin T 17 ng/L     Narrative:      High Sensitive Troponin T Reference Range:  <14.0 ng/L- Negative Female for AMI  <22.0 ng/L- Negative Male for AMI  >=14 - Abnormal Female indicating possible myocardial injury.  >=22 - Abnormal Male indicating possible myocardial injury.   Clinicians would have to utilize clinical acumen, EKG, Troponin, and serial changes to determine if it is an Acute Myocardial Infarction or myocardial injury due to an underlying chronic condition.         TSH [[id number]]  (Normal) Collected: 05/31/24 2037    Specimen: Blood Updated: 05/31/24 2118     TSH 1.730 uIU/mL     T4, Free [[id number]]  (Normal) Collected: 05/31/24 2037    Specimen: Blood Updated: 05/31/24 2118     Free T4 1.25 ng/dL     Lactic Acid, Plasma [[id number]]  (Abnormal) Collected: 05/31/24 2037    Specimen: Blood Updated: 05/31/24 2118     Lactate 2.2 mmol/L     Comprehensive Metabolic Panel [[id number]]  (Abnormal) Collected: 05/31/24 2037    Specimen: Blood Updated: 05/31/24 2111     Glucose 179 mg/dL      BUN 17 mg/dL      Creatinine 0.73 mg/dL      Sodium 135 mmol/L      Potassium 3.7 mmol/L      Chloride 94 mmol/L      CO2 25.8 mmol/L      Calcium 9.2 mg/dL      Total Protein 6.7 g/dL      Albumin 3.6 g/dL      ALT (SGPT) 11 U/L      AST (SGOT) 15 U/L       Alkaline Phosphatase 83 U/L      Total Bilirubin 1.1 mg/dL      Globulin 3.1 gm/dL      A/G Ratio 1.2 g/dL      BUN/Creatinine Ratio 23.3     Anion Gap 15.2 mmol/L      eGFR 92.0 mL/min/1.73     Narrative:      GFR Normal >60  Chronic Kidney Disease <60  Kidney Failure <15    The GFR formula is only valid for adults with stable renal function between ages 18 and 70.    Magnesium [[id number]]  (Normal) Collected: 05/31/24 2037    Specimen: Blood Updated: 05/31/24 2111     Magnesium 1.6 mg/dL     Phosphorus [[id number]]  (Normal) Collected: 05/31/24 2037    Specimen: Blood Updated: 05/31/24 2111     Phosphorus 2.6 mg/dL     Urinalysis, Microscopic Only - Urine, Clean Catch [[id number]]  (Abnormal) Collected: 05/31/24 2053    Specimen: Urine, Clean Catch Updated: 05/31/24 2105     RBC, UA None Seen /HPF      WBC, UA 21-50 /HPF      Bacteria, UA 3+ /HPF      Squamous Epithelial Cells, UA None Seen /HPF      Hyaline Casts, UA None Seen /LPF      Methodology Manual Light Microscopy    Protime-INR [[id number]]  (Abnormal) Collected: 05/31/24 2037    Specimen: Blood Updated: 05/31/24 2104     Protime 17.1 Seconds      INR 1.33    Narrative:      Suggested INR therapeutic range for stable oral anticoagulant therapy:    Low Intensity therapy:   1.5-2.0  Moderate Intensity therapy:   2.0-3.0  High Intensity therapy:   2.5-4.0    Urinalysis With Microscopic If Indicated (No Culture) - Urine, Clean Catch [[id number]]  (Abnormal) Collected: 05/31/24 2053    Specimen: Urine, Clean Catch Updated: 05/31/24 2100     Color, UA Yellow     Appearance, UA Turbid     pH, UA 5.5     Specific Gravity, UA 1.011     Glucose,  mg/dL (1+)     Ketones, UA Negative     Bilirubin, UA Negative     Blood, UA Moderate (2+)     Protein, UA 30 mg/dL (1+)     Leuk Esterase, UA Large (3+)     Nitrite, UA Negative     Urobilinogen, UA 1.0 E.U./dL    [place] Draw [[id number]] Collected: 05/31/24 2037    Specimen: Blood Updated: 05/31/24 2045    Narrative:       The following orders were created for panel order [place] Draw.  Procedure                               Abnormality         Status                     ---------                               -----------         ------                     Green Top (Gel)[[id number]]                                  Final result               Lavender Top[[id number]]                                     Final result               Gold Top - SST[[id number]]                                   Final result               Light Blue Top[[id number]]                                   Final result                 Please view results for these tests on the individual orders.    Gold Top - SST [[id number]] Collected: 05/31/24 2037    Specimen: Blood Updated: 05/31/24 2045     Extra Tube Hold for add-ons.     Comment: Auto resulted.       Green Top (Gel) [[id number]] Collected: 05/31/24 2037    Specimen: Blood Updated: 05/31/24 2045     Extra Tube Hold for add-ons.     Comment: Auto resulted.       Lavender Top [[id number]] Collected: 05/31/24 2037    Specimen: Blood Updated: 05/31/24 2045     Extra Tube hold for add-on     Comment: Auto resulted       Light Blue Top [[id number]] Collected: 05/31/24 2037    Specimen: Blood Updated: 05/31/24 2045     Extra Tube Hold for add-ons.     Comment: Auto resulted       [place] Draw [[id number]] Collected: 05/31/24 2037    Specimen: Blood Updated: 05/31/24 2045    Narrative:      The following orders were created for panel order [place] Draw.  Procedure                               Abnormality         Status                     ---------                               -----------         ------                     Mitchell Top[[id number]]                                         Final result                 Please view results for these tests on the individual orders.    Gray Top [[id number]] Collected: 05/31/24 2037    Specimen: Blood Updated: 05/31/24 2045     Extra Tube Hold for add-ons.     Comment: Auto resulted.        CBC & Differential [[id number]]  (Abnormal) Collected: 05/31/24 2037    Specimen: Blood Updated: 05/31/24 2043    Narrative:      The following orders were created for panel order CBC & Differential.  Procedure                               Abnormality         Status                     ---------                               -----------         ------                     CBC Auto Differential[[id number]]        Abnormal            Final result                 Please view results for these tests on the individual orders.    CBC Auto Differential [[id number]]  (Abnormal) Collected: 05/31/24 2037    Specimen: Blood Updated: 05/31/24 2043     WBC 14.15 10*3/mm3      RBC 5.00 10*6/mm3      Hemoglobin 14.8 g/dL      Hematocrit 44.4 %      MCV 88.8 fL      MCH 29.6 pg      MCHC 33.3 g/dL      RDW 13.5 %      RDW-SD 43.9 fl      MPV 10.7 fL      Platelets 310 10*3/mm3      Neutrophil % 70.3 %      Lymphocyte % 17.2 %      Monocyte % 11.0 %      Eosinophil % 0.4 %      Basophil % 0.5 %      Immature Grans % 0.6 %      Neutrophils, Absolute 9.94 10*3/mm3      Lymphocytes, Absolute 2.44 10*3/mm3      Monocytes, Absolute 1.56 10*3/mm3      Eosinophils, Absolute 0.05 10*3/mm3      Basophils, Absolute 0.07 10*3/mm3      Immature Grans, Absolute 0.09 10*3/mm3      nRBC 0.0 /100 WBC           Orders (last 48 hrs)        Start     Ordered    06/02/24 1139  Inpatient Admission  Once         06/02/24 1138    06/02/24 1131  POC Glucose Once  PROCEDURE ONCE        Comments: Complete no more than 45 minutes prior to patient eating      06/02/24 1128    06/02/24 0732  POC Glucose Once  PROCEDURE ONCE        Comments: Complete no more than 45 minutes prior to patient eating      06/02/24 0728    06/02/24 0600  Basic Metabolic Panel  Daily       06/01/24 1346    06/02/24 0600  CBC & Differential  Daily       06/01/24 1346    06/02/24 0600  CBC Auto Differential  PROCEDURE ONCE         06/01/24 2202    06/01/24 2100  donepezil (ARICEPT)  tablet 10 mg  Nightly         05/31/24 2358    06/01/24 2100  memantine (NAMENDA) tablet 10 mg  Nightly         06/01/24 0010    06/01/24 2100  insulin glargine (LANTUS, SEMGLEE) injection 7 Units  Nightly         06/01/24 0011    06/01/24 2040  POC Glucose Once  PROCEDURE ONCE        Comments: Complete no more than 45 minutes prior to patient eating      06/01/24 2037    06/01/24 1800  rivaroxaban (XARELTO) tablet 20 mg  Daily With Dinner         05/31/24 2358    06/01/24 1644  POC Glucose Once  PROCEDURE ONCE        Comments: Complete no more than 45 minutes prior to patient eating      06/01/24 1637    06/01/24 1345  metoprolol tartrate (LOPRESSOR) injection 5 mg  Every 5 Minutes PRN         06/01/24 1345    06/01/24 1149  Blood Culture ID, PCR - Blood, Arm, Left  Once         06/01/24 1148    06/01/24 1137  POC Glucose Once  PROCEDURE ONCE        Comments: Complete no more than 45 minutes prior to patient eating      06/01/24 1108    06/01/24 1000  cefTRIAXone (ROCEPHIN) 2,000 mg in sodium chloride 0.9 % 100 mL IVPB-VTB  Every 24 Hours         05/31/24 2356 06/01/24 0900  PARoxetine (PAXIL) tablet 20 mg  Daily         05/31/24 2358 06/01/24 0900  pravastatin (PRAVACHOL) tablet 40 mg  Daily         05/31/24 2359 06/01/24 0900  amLODIPine (NORVASC) tablet 5 mg  Every 24 Hours Scheduled         06/01/24 0644    06/01/24 0813  POC Glucose Once  PROCEDURE ONCE        Comments: Complete no more than 45 minutes prior to patient eating      06/01/24 0747    06/01/24 0802  STAT Lactic Acid, Reflex  PROCEDURE ONCE         05/31/24 2118    06/01/24 0800  Oral Care  2 Times Daily       05/31/24 2356 06/01/24 0730  Insulin Lispro (humaLOG) injection 2-9 Units  4 Times Daily Before Meals & Nightly         05/31/24 2356 06/01/24 0700  POC Glucose 4x Daily Before Meals & at Bedtime  4 Times Daily Before Meals & at Bedtime      Comments: Complete no more than 45 minutes prior to patient eating      05/31/24 1026  "   06/01/24 0647  Basic Metabolic Panel  Once         06/01/24 0646    06/01/24 0647  CBC (No Diff)  Once         06/01/24 0646    06/01/24 0647  Respiratory Panel PCR w/COVID-19(SARS-CoV-2) BRENDA/AMANDA/LEWIS/PAD/COR/JESICA In-House, NP Swab in UTM/VTM, 2 HR TAT - Swab, Nasopharynx  Once         06/01/24 0646    06/01/24 0644  Code Status and Medical Interventions:  Continuous         06/01/24 0643    06/01/24 0045  sodium chloride 0.9 % flush 10 mL  Every 12 Hours Scheduled         05/31/24 2356 06/01/24 0045  sodium chloride 0.9 % with KCl 20 mEq/L infusion  Continuous         05/31/24 2356 06/01/24 0045  metoprolol tartrate (LOPRESSOR) tablet 75 mg  Every 12 Hours Scheduled         05/31/24 2357 06/01/24 0045  insulin glargine (LANTUS, SEMGLEE) injection 7 Units  Nightly,   Status:  Discontinued         05/31/24 2359 06/01/24 0003  POC Glucose Once  PROCEDURE ONCE        Comments: Complete no more than 45 minutes prior to patient eating      06/01/24 0001    06/01/24 0000  Vital Signs  Every 4 Hours       05/31/24 2356 05/31/24 2357  Procalcitonin  STAT         05/31/24 2356 05/31/24 2356  PT Consult: Eval & Treat Functional Mobility Below Baseline  Once         05/31/24 2356    05/31/24 2356  OT Consult: Eval & Treat ADL Performance Below Baseline  Once         05/31/24 2356    05/31/24 2356  Inpatient Nutrition Consult  Once        Provider:  (Not yet assigned)    05/31/24 2356 05/31/24 2356  Urine Culture - Urine, Urine, Clean Catch  Once         05/31/24 2356 05/31/24 2355  aluminum-magnesium hydroxide-simethicone (MAALOX MAX) 400-400-40 MG/5ML suspension 15 mL  Every 6 Hours PRN         05/31/24 2356 05/31/24 2355  ondansetron ODT (ZOFRAN-ODT) disintegrating tablet 4 mg  Every 6 Hours PRN        Placed in \"Or\" Linked Group    05/31/24 2356    05/31/24 2359  ondansetron (ZOFRAN) injection 4 mg  Every 6 Hours PRN        Placed in \"Or\" Linked Group    05/31/24 2356    05/31/24 9149  " "sennosides-docusate (PERICOLACE) 8.6-50 MG per tablet 2 tablet  2 Times Daily PRN        Placed in \"And\" Linked Group    05/31/24 2356 05/31/24 2355  polyethylene glycol (MIRALAX) packet 17 g  Daily PRN        Placed in \"And\" Linked Group    05/31/24 2356 05/31/24 2355  bisacodyl (DULCOLAX) EC tablet 5 mg  Daily PRN        Placed in \"And\" Linked Group    05/31/24 2356 05/31/24 2355  bisacodyl (DULCOLAX) suppository 10 mg  Daily PRN        Placed in \"And\" Linked Group    05/31/24 2356 05/31/24 2355  acetaminophen (TYLENOL) tablet 650 mg  Every 4 Hours PRN        Placed in \"Or\" Linked Group    05/31/24 2356 05/31/24 2355  acetaminophen (TYLENOL) 160 MG/5ML oral solution 650 mg  Every 4 Hours PRN        Placed in \"Or\" Linked Group    05/31/24 2356 05/31/24 2355  acetaminophen (TYLENOL) suppository 650 mg  Every 4 Hours PRN        Placed in \"Or\" Linked Group    05/31/24 2356 05/31/24 2355  Initiate & Follow Urinary Tract Infection Treatment Protocol  Continuous         05/31/24 2356 05/31/24 2355  Intake & Output  Every Shift       05/31/24 2356 05/31/24 2355  Weigh Patient  Once         05/31/24 2356 05/31/24 2355  Insert Peripheral IV  Once         05/31/24 2356 05/31/24 2355  Saline Lock & Maintain IV Access  Continuous,   Status:  Canceled         05/31/24 2356 05/31/24 2355  Place Sequential Compression Device  Once         05/31/24 2356 05/31/24 2355  Maintain Sequential Compression Device  Continuous         05/31/24 2356 05/31/24 2355  Continuous Cardiac Monitoring  Continuous        Comments: Follow Standing Orders As Outlined in Process Instructions (Open Order Report to View Full Instructions)    05/31/24 2356 05/31/24 2355  Maintain IV Access  Continuous         05/31/24 2356 05/31/24 2355  Telemetry - Place Orders & Notify Provider of Results When Patient Experiences Acute Chest Pain, Dysrhythmia or Respiratory Distress  Continuous        Comments: Open Order " "Report to View Parameters Requiring Provider Notification    05/31/24 2356 05/31/24 2355  May Be Off Telemetry for Tests  Continuous         05/31/24 2356 05/31/24 2355  Diet: Cardiac, Diabetic; Healthy Heart (2-3 Na+); Consistent Carbohydrate; Fluid Consistency: Thin (IDDSI 0)  Diet Effective Now         05/31/24 2356 05/31/24 2354  nitroglycerin (NITROSTAT) SL tablet 0.4 mg  Every 5 Minutes PRN         05/31/24 2356 05/31/24 2354  dextrose (GLUTOSE) oral gel 15 g  Every 15 Minutes PRN         05/31/24 2356 05/31/24 2354  dextrose (D50W) (25 g/50 mL) IV injection 25 g  Every 15 Minutes PRN         05/31/24 2356 05/31/24 2354  glucagon (GLUCAGEN) injection 1 mg  Every 15 Minutes PRN         05/31/24 2356 05/31/24 2354  sodium chloride 0.9 % flush 10 mL  As Needed         05/31/24 2356 05/31/24 2354  sodium chloride 0.9 % infusion 40 mL  As Needed         05/31/24 2356 05/31/24 2239  Initiate Observation Status  Once         05/31/24 2239 05/31/24 2239  Cardiac Monitoring  Continuous,   Status:  Canceled        Comments: Follow Standing Orders As Outlined in Process Instructions (Open Order Report to View Full Instructions)    05/31/24 2239 05/31/24 2237  High Sensitivity Troponin T 2Hr  PROCEDURE ONCE         05/31/24 2118 05/31/24 2147  iopamidol (ISOVUE-300) 61 % injection 100 mL  Once in Imaging         05/31/24 2131 05/31/24 2132  sodium chloride 0.9 % bolus 1,000 mL  Once         05/31/24 2116 05/31/24 2131  cefTRIAXone (ROCEPHIN) 1,000 mg in sodium chloride 0.9 % 100 mL IVPB-VTB  Once         05/31/24 2116 05/31/24 2117  Blood Culture - Blood, Hand, Right  Once        Placed in \"And\" Linked Group    05/31/24 2116 05/31/24 2117  Blood Culture - Blood, Arm, Left  Once        Placed in \"And\" Linked Group    05/31/24 2116 05/31/24 2100  Urinalysis, Microscopic Only - Urine, Clean Catch  Once         05/31/24 2059 05/31/24 2055  sodium chloride 0.9 % bolus 1,500 " mL  Once         05/31/24 2039 05/31/24 2047  CT Angiogram Chest Pulmonary Embolism  1 Time Imaging         05/31/24 2039 05/31/24 2046  ECG 12 Lead Tachycardia  Once         05/31/24 2045 05/31/24 2040  CT Cervical Spine Without Contrast  1 Time Imaging         05/31/24 2039 05/31/24 2040  CBC Auto Differential  Once         05/31/24 2039 05/31/24 2040  CT Angiogram Chest  1 Time Imaging,   Status:  Canceled         05/31/24 2039 05/31/24 2039  CBC & Differential  Once         05/31/24 2039 05/31/24 2039  Comprehensive Metabolic Panel  Once         05/31/24 2039 05/31/24 2039  Protime-INR  Once         05/31/24 2039 05/31/24 2039  Urinalysis With Microscopic If Indicated (No Culture) - Urine, Clean Catch  Once         05/31/24 2039 05/31/24 2039  High Sensitivity Troponin T  Once         05/31/24 2039 05/31/24 2039  Lactic Acid, Plasma  Once         05/31/24 2039 05/31/24 2039  Magnesium  Once         05/31/24 2039 05/31/24 2039  Phosphorus  Once         05/31/24 2039 05/31/24 2039  TSH  Once         05/31/24 2039 05/31/24 2039  T4, Free  Once         05/31/24 2039 05/31/24 2039  XR Chest 1 View  1 Time Imaging         05/31/24 2039 05/31/24 2039  CT Head Without Contrast  1 Time Imaging         05/31/24 2039 05/31/24 2037  Insert peripheral IV  Once         05/31/24 2036 05/31/24 2037  [place] Draw  Once         05/31/24 2036 05/31/24 2037  Mitchell Top  PROCEDURE ONCE         05/31/24 2036 05/31/24 2036  sodium chloride 0.9 % flush 10 mL  As Needed         05/31/24 2036 05/31/24 2028  Insert peripheral IV  Once         05/31/24 2027 05/31/24 2028  [place] Draw  Once         05/31/24 2027 05/31/24 2028  Green Top (Gel)  PROCEDURE ONCE         05/31/24 2027 05/31/24 2028  Lavender Top  PROCEDURE ONCE         05/31/24 2027 05/31/24 2028  Gold Top - SST  PROCEDURE ONCE         05/31/24 2027 05/31/24 2028  Light Blue Top  PROCEDURE ONCE          24  sodium chloride 0.9 % flush 10 mL  As Needed         24    Unscheduled  Follow Hypoglycemia Standing Orders For Blood Glucose <70 & Notify Provider of Treatment  As Needed      Comments: Follow Hypoglycemia Orders As Outlined in Process Instructions (Open Order Report to View Full Instructions)  Notify Provider Any Time Hypoglycemia Treatment is Administered    [phone]    Pending  Inpatient Case Management  Consult  Once        Provider:  (Not yet assigned)    Pending                     Physician Progress Notes (last 48 hours)        Kerley, Brian Joseph, DO at 24 1340              [facility]    PROGRESS NOTE    Name:  Que Lagunas   Age:  79 y.o.  Sex:  male  :  1945  MRN:  [id number]   Visit Number:  [id number]  Admission Date:  2024  Date Of Service:  24  Primary Care Physician:  Aurea Ayala MD     LOS: 0 days :    Chief Complaint:      Follow-up; altered mental status, falls    Subjective:    Denies any concerning pain, no shortness of air.  No questions.    Hospital Course:    Que Lagunas is a 79-year-old with a history of atrial fibrillation on Xarelto, CAD, hypertension, type 2 diabetes, vascular dementia, prior tobacco use, who presented from home with multiple complaints.  History obtained from ER record, patient, family.  Patient typically lives at home with his daughter.  He is normally ambulatory with use of a walker also uses a wheelchair at times.  He had been more confused today, and had a couple of falls.  Daughter noted increasing weakness. He was able to answer simple questions and knew he was in the hospital at time of admission. Had no complaints.     In the ER he was overall hemodynamically stable and not hypoxic.  His workup was concerning for possible urinary tract infection.  CT imaging of the chest was unremarkable.  CT of the cervical spine was unremarkable.  CT  scan of the head was unremarkable.  Patient did have blood cultures obtained.  He was given IV fluid resuscitation and antibiotic coverage with Rocephin.      Observation general floor admission 5/31/2024 with UTI, generalized weakness.    Review of Systems:     All systems were reviewed and negative except as mentioned in subjective, assessment and plan.    Vital Signs:    Temp:  [98.2 °F (36.8 °C)-101.1 °F (38.4 °C)] 98.8 °F (37.1 °C)  Heart Rate:  [105-129] 118  Resp:  [16-20] 16  BP: (102-178)/() 102/72    Intake and output:    I/O last 3 completed shifts:  In: 3415 [P.O.:480; I.V.:335; IV Piggyback:2600]  Out: -   I/O this shift:  In: 120 [P.O.:120]  Out: 200 [Urine:200]    Physical Examination:    General Appearance:  Alert and cooperative.    Head:  Atraumatic and normocephalic.   Eyes: Conjunctivae and sclerae normal, no icterus. No pallor.   Throat: No oral lesions, no thrush, oral mucosa moist.   Neck: Supple, trachea midline, no thyromegaly.   Lungs:   Breath sounds heard bilaterally equally.  No wheezing or crackles. No Pleural rub or bronchial breathing.   Heart:  Normal S1 and S2, no murmur, no gallop, no rub. No JVD.   Abdomen:   Normal bowel sounds, no masses, no organomegaly. Soft, nontender, nondistended, no rebound tenderness.   Extremities: Supple, no edema, no cyanosis, no clubbing.   Skin: Warm.   Neurologic: Alert and oriented to self and place. No facial asymmetry. Moves all four limbs. No tremors.      Laboratory results:    Results from last 7 days   Lab Units 06/01/24  0702 05/31/24 2037   SODIUM mmol/L 138 135*   POTASSIUM mmol/L 4.0 3.7   CHLORIDE mmol/L 103 94*   CO2 mmol/L 23.8 25.8   BUN mg/dL 14 17   CREATININE mg/dL 0.79 0.73*   CALCIUM mg/dL 8.7 9.2   BILIRUBIN mg/dL  --  1.1   ALK PHOS U/L  --  83   ALT (SGPT) U/L  --  11   AST (SGOT) U/L  --  15   GLUCOSE mg/dL 197* 179*     Results from last 7 days   Lab Units 06/01/24  0702 05/31/24 2037   WBC 10*3/mm3 16.92* 14.15*  "  HEMOGLOBIN g/dL 13.5 14.8   HEMATOCRIT % 40.8 44.4   PLATELETS 10*3/mm3 287 310     Results from last 7 days   Lab Units 05/31/24 2037   INR  1.33*     Results from last 7 days   Lab Units 05/31/24  2313 05/31/24 2037   HSTROP T ng/L 16 17     Results from last 7 days   Lab Units 05/31/24  2211   BLOODCX  Abnormal Stain*     No results for input(s): \"PHART\", \"TUC4AXG\", \"PO2ART\", \"NIE2YCH\", \"BASEEXCESS\" in the last 8760 hours.   I have reviewed the patient's laboratory results.    Radiology results:    XR Chest 1 View    Result Date: 6/1/2024  PROCEDURE: XR CHEST 1 VW-  HISTORY: weakness  COMPARISON: November 19, 2022.  FINDINGS: Atherosclerosis is noted. The heart is normal in size. The mediastinum is unremarkable. Prominent interstitial markings, likely chronic. No focal consolidation. There is no pneumothorax.  There are no acute osseous abnormalities.      Impression: No acute cardiopulmonary process.  Continued followup is recommended.    This report was signed and finalized on 6/1/2024 7:58 AM by Royce Willis DO.      CT Cervical Spine Without Contrast    Result Date: 5/31/2024  FINAL REPORT TECHNIQUE: Thin section axial images were obtained through the cervical spine without contrast.  Coronal and sagittal reconstructed images were then provided. CLINICAL HISTORY: fall, trauma FINDINGS: There is normal alignment and curvature.  Prevertebral soft tissues are normal.  There is no fracture.  There is severe degenerative disc disease in the mid-lower cervical spine.     Impression: No acute disease. Authenticated and Electronically Signed by Luis Enrique Preciado M.D. on 05/31/2024 10:30:22 PM    CT Head Without Contrast    Result Date: 5/31/2024  FINAL REPORT TECHNIQUE: Routine axial images through the head were obtained without contrast. CLINICAL HISTORY: fall, trauma, weakness FINDINGS: There is generalized atrophy.  There is mild to moderate ventricular dilation.  Periventricular and deep white matter low-attenuation " areas are consistent with chronic small vessel ischemia.  There is no mass or shift in midline structures. There is no intracranial hemorrhage.  There is no acute sinus or osseous abnormality.     Impression: No fracture or acute intracranial abnormality. Authenticated and Electronically Signed by Luis Enrique Preciado M.D. on 05/31/2024 10:29:22 PM    CT Angiogram Chest Pulmonary Embolism    Result Date: 5/31/2024  FINAL REPORT TECHNIQUE: Thin section axial images were obtained through the chest and during the arterial phase of IV contrast administration. Coronal 3-D MIP reconstructed images were also provided. CLINICAL HISTORY: tachycardia, chest pain, syncope, r/o PE FINDINGS: The pulmonary arteries are well-opacified and there is no filling defect to indicate pulmonary embolism. The thoracic aorta is not optimally opacified due to delayed transit of contrast through the heart, but no acute abnormality is suspected.  The lungs are clear. There is no pleural disease. Mild mediastinal adenopathy is possibly due to edema.  There is no acute osseous abnormality.     Impression: No pulmonary embolism or other acute abnormality. Authenticated and Electronically Signed by Luis Enrique Preciado M.D. on 05/31/2024 10:28:43 PM   I have reviewed the patient's radiology reports.    Medication Review:     I have reviewed the patient's active and prn medications.     Problem List:      Complicated UTI (urinary tract infection)      Assessment:     Complicated urinary tract infection, present on admission, acute  Weakness with impaired mobility and ADLs, POA  Vascular dementia  Atrial fibrillation, chronic  Type 2 diabetes mellitus     Plan:     Fever overnight.  Still having some RVR likely exacerbated by infection.    Comfortable in bed, pleasantly conversational.    Updated daughter Debo on the phone.     UTI  Bacteremia  Rocephin 7-day course.  Blood cultures Klebsiella pneumonia.  Urine culture pending.     Weakness  PT/OT.  At baseline does use a  walker and wheelchair and lives with daughter.     Dementia  Complicates all aspects of care.  Had previously been at assisted living had not done well, currently living with daughter.  Has had some issues with weight loss in the past.  Continue home medications.     Atrial fibrillation  Appears rate controlled at this time, will continue with his home metoprolol and Xarelto.  Lopressor as needed for RVR.  RVR likely triggered by infection.        Risk Assessment: High  DVT Prophylaxis: Xarelto  Code Status: Full  Diet: Carb consistent/cardiac  Discharge Plan: A couple more days awaiting sensitivities likely    Brian Joseph Kerley, DO  06/01/24  13:40 EDT    Dictated utilizing Dragon dictation.      Electronically signed by Kerley, Brian Joseph, DO at 06/01/24 1358       Consult Notes (last 48 hours)  Notes from 05/31/24 1140 through 06/02/24 1140   No notes of this type exist for this encounter.

## 2024-06-02 NOTE — THERAPY EVALUATION
Patient Name: Que Lagunas  : 1945    MRN: [id number]                              Today's Date: 2024       Admit Date: 2024    Visit Dx:     ICD-10-CM ICD-9-CM   1. Complicated UTI (urinary tract infection)  N39.0 599.0   2. Acute cystitis without hematuria  N30.00 595.0   3. Multiple falls  R29.6 V15.88     Patient Active Problem List   Diagnosis    Type 2 diabetes mellitus with hyperglycemia    Enlarged prostate    Generalized anxiety disorder    Hyperlipidemia    Hypertension    TIA (transient ischemic attack)    CAD (coronary artery disease)    Paroxysmal atrial fibrillation    Vitamin D deficiency    Restless leg syndrome    Personal history of colonic polyps    Loss of balance    Memory loss    Tubular adenoma    Complicated UTI (urinary tract infection)     Past Medical History:   Diagnosis Date    Abnormal EKG     Bifascicular block and no prior ischemia evaluation.     Anemia     Anxiety     Arthritis     Atrial fibrillation     CHADS VASc=3.  Continue Xarelto 20.  vas continue Zaroxolyn 20 has a relative 20 [place] can add Lantus 40    CAD (coronary artery disease)     Colon polyp 2015    Contact dermatitis     Diabetes     Dyslipidemia     Hypertension     Palpitations     Prostatism     Stroke     Tattoo      Past Surgical History:   Procedure Laterality Date    COLONOSCOPY  2015    EYE SURGERY      PROSTATE SURGERY      TONSILLECTOMY        General Information       Row Name 24 1129          Physical Therapy Time and Intention    Document Type evaluation  -TW     Mode of Treatment physical therapy  -TW       Row Name 24 1129          General Information    Patient Profile Reviewed yes  -TW     Prior Level of Function min assist:;max assist:;w/c or scooter  per pt he transfers into his w/c with his daughter's help and uses w/c for his mobility. Pt states he does have a rollator but limits walking due to fear of falling.  -TW     Existing  "Precautions/Restrictions fall  -       Row Name 06/02/24 1129          Living Environment    People in Home child(juan c), adult  -       Row Name 06/02/24 1129          Home Main Entrance    Number of Stairs, Main Entrance other (see comments)  -       Row Name 06/02/24 1129          Stairs Within Home, Primary    Stairs, Within Home, Primary pt states there are \"a few\" steps into home with rail  -     Number of Stairs, Within Home, Primary other (see comments)  -     Stairs Comment, Within Home, Primary Pt's home does have 2 stories but he stays on one level of the home.  -       Row Name 06/02/24 1129          Cognition    Orientation Status (Cognition) oriented to;person;place;other (see comments)  Pt does get flustered if asked too many questions all at once.  -       Row Name 06/02/24 1129          Safety Issues, Functional Mobility    Safety Issues Affecting Function (Mobility) insight into deficits/self-awareness;safety precaution awareness;safety precautions follow-through/compliance;sequencing abilities;problem-solving;friction/shear risk  -     Impairments Affecting Function (Mobility) balance;cognition;endurance/activity tolerance;strength;motor planning  -     Cognitive Impairments, Mobility Safety/Performance insight into deficits/self-awareness;problem-solving/reasoning;safety precaution awareness;safety precaution follow-through;sequencing abilities  -               User Key  (r) = Recorded By, (t) = Taken By, (c) = Cosigned By      Initials Name Provider Type    TW Komal Suh, PT Physical Therapist                   Mobility       Row Name 06/02/24 1129          Bed Mobility    Bed Mobility rolling left;rolling right;supine-sit  -TW     Rolling Left [place] (Bed Mobility) minimum assist (75% patient effort);verbal cues;nonverbal cues (demo/gesture)  -     Rolling Right [place] (Bed Mobility) verbal cues;nonverbal cues (demo/gesture);minimum assist (75% patient effort) "  -TW     Supine-Sit [place] (Bed Mobility) minimum assist (75% patient effort);verbal cues;nonverbal cues (demo/gesture)  -TW     Assistive Device (Bed Mobility) bed rails;head of bed elevated  -TW       Row Name 06/02/24 1129          Bed-Chair Transfer    Bed-Chair [place] (Transfers) minimum assist (75% patient effort);verbal cues;nonverbal cues (demo/gesture)  -TW     Assistive Device (Bed-Chair Transfers) walker, front-wheeled  -TW       Row Name 06/02/24 1129          Sit-Stand Transfer    Sit-Stand [place] (Transfers) minimum assist (75% patient effort);verbal cues;nonverbal cues (demo/gesture)  -TW     Assistive Device (Sit-Stand Transfers) walker, front-wheeled  -TW       Row Name 06/02/24 1129          Gait/Stairs (Locomotion)    [place] Level (Gait) minimum assist (75% patient effort)  -TW     Assistive Device (Gait) walker, front-wheeled  -TW     Patient was able to Ambulate yes  -TW     Distance in Feet (Gait) 3  -TW     Deviations/Abnormal Patterns (Gait) stride length decreased;festinating/shuffling  -TW     Bilateral Gait Deviations forward flexed posture;heel strike decreased  -TW               User Key  (r) = Recorded By, (t) = Taken By, (c) = Cosigned By      Initials Name Provider Type    TW Komal Suh PT Physical Therapist                   Obj/Interventions       Row Name 06/02/24 1129          Range of Motion Comprehensive    Comment, General Range of Motion BLE grossly WFLs  -TW       Row Name 06/02/24 1129          Strength Comprehensive (MMT)    Comment, General Manual Muscle Testing (MMT) Assessment BLE grossly 3/5 to 4-/5  -TW       Row Name 06/02/24 1129          Balance    Balance Assessment sitting static balance;sitting dynamic balance;standing static balance;standing dynamic balance;sit to stand dynamic balance  -TW     Static Sitting Balance standby assist  -TW     Dynamic Sitting Balance standby assist  -TW     Position, Sitting Balance unsupported;sitting  edge of bed  -TW     Sit to Stand Dynamic Balance minimal assist;verbal cues;non-verbal cues (demo/gesture)  -TW     Static Standing Balance contact guard;verbal cues  -TW     Dynamic Standing Balance minimal assist;verbal cues  -TW     Position/Device Used, Standing Balance supported;walker, front-wheeled  -TW               User Key  (r) = Recorded By, (t) = Taken By, (c) = Cosigned By      Initials Name Provider Type    TW Vincent, Komal, PT Physical Therapist                   Goals/Plan       Row Name 06/02/24 1129          Bed Mobility Goal 1 (PT)    Activity/Assistive Device (Bed Mobility Goal 1, PT) bed mobility activities, all  -TW     [place] Level/Cues Needed (Bed Mobility Goal 1, PT) supervision required  -TW     Time Frame (Bed Mobility Goal 1, PT) short term goal (STG);3 days  -TW     Progress/Outcomes (Bed Mobility Goal 1, PT) goal ongoing  -TW       Row Name 06/02/24 1129          Transfer Goal 1 (PT)    Activity/Assistive Device (Transfer Goal 1, PT) sit-to-stand/stand-to-sit;bed-to-chair/chair-to-bed;toilet;walker, rolling  -TW     [place] Level/Cues Needed (Transfer Goal 1, PT) contact guard required  -TW     Time Frame (Transfer Goal 1, PT) 10 days;long term goal (LTG)  -TW     Progress/Outcome (Transfer Goal 1, PT) goal ongoing  -TW       Row Name 06/02/24 1129          Gait Training Goal 1 (PT)    Activity/Assistive Device (Gait Training Goal 1, PT) gait (walking locomotion);assistive device use  -TW     [place] Level (Gait Training Goal 1, PT) contact guard required  -TW     Distance (Gait Training Goal 1, PT) 40 ft  -TW     Time Frame (Gait Training Goal 1, PT) long term goal (LTG);10 days  -TW     Progress/Outcome (Gait Training Goal 1, PT) goal ongoing  -TW       Row Name 06/02/24 1129          Problem Specific Goal 1 (PT)    Problem Specific Goal 1 (PT) pt to stand with FWW x 2 min with CGA  -TW     Time Frame (Problem Specific Goal 1, PT) short-term goal (STG);3 days  -TW      Progress/Outcome (Problem Specific Goal 1, PT) goal ongoing  -TW       Row Name 06/02/24 1129          Patient Education Goal (PT)    Activity (Patient Education Goal, PT) BLE ther ex 1 x 10  -TW     [place]/Cues/Accuracy (Memory Goal 2, PT) demonstrates adequately  -TW     Time Frame (Patient Education Goal, PT) long term goal (LTG);10 days  -TW     Progress/Outcome (Patient Education Goal, PT) goal ongoing  -TW       Row Name 06/02/24 1129          Therapy Assessment/Plan (PT)    Planned Therapy Interventions (PT) balance training;bed mobility training;strengthening;transfer training;gait training  -TW               User Key  (r) = Recorded By, (t) = Taken By, (c) = Cosigned By      Initials Name Provider Type    Komal Lepe, PT Physical Therapist                   Clinical Impression       Row Name 06/02/24 1129          Pain    Pretreatment Pain Rating 0/10 - no pain  -TW     Posttreatment Pain Rating 0/10 - no pain  -TW       Row Name 06/02/24 1129          Plan of Care Review    Plan of Care Reviewed With patient  -TW     Outcome Evaluation PT evaluation completed this am with pt presenting supine in bed and oriented to self and to place. Pt did inform therapist that he gets nervous when asked too many questions and also expresses fear of falling if he would get OOB. Therapist reassured pt and pt agreed to participate in PT evaluation. Pt on room air (O2 sat 95%) and had no c/o pain. Pt did need assist with getting changed and was able to roll to either side with CGA to min assist using bed rails. Pt came to sit on EOB with min assist and sat on EOB with Supervision only. For sit to stand min assist using FWW to take steps over to recliner 3 ft. Pt expressed increased confidence with mobility once up in chair. Pt presents with deficits in balance, strength, and endurance. He is expected to improve his funcitional mobility with continued PT services prior to d/c.  -TW       Row Name 06/02/24 1128           Therapy Assessment/Plan (PT)    Patient/Family Therapy Goals Statement (PT) to return home with daughter.  -TW     Rehab Potential (PT) good, to achieve stated therapy goals  -TW     Criteria for Skilled Interventions Met (PT) yes;meets criteria  -TW     Therapy Frequency (PT) 5 times/wk  -TW     Predicted Duration of Therapy Intervention (PT) 10 days  -TW       Row Name 06/02/24 1129          Vital Signs    Pre SpO2 (%) 95  -TW     O2 Delivery Pre Treatment room air  -TW     Pre Patient Position Supine  -TW     Intra Patient Position Standing  -TW     Post Patient Position Sitting  -TW       Row Name 06/02/24 1129          Positioning and Restraints    Pre-Treatment Position in bed  -TW     Post Treatment Position chair  -TW     In Chair notified nsg;reclined;call light within reach;encouraged to call for assist;exit alarm on;legs elevated  -TW               User Key  (r) = Recorded By, (t) = Taken By, (c) = Cosigned By      Initials Name Provider Type    TW Komal Suh, PT Physical Therapist                   Outcome Measures       Row Name 06/02/24 1129 06/02/24 0800       How much help from another person do you currently need...    Turning from your back to your side while in flat bed without using bedrails? 3  -TW 3  -KJ    Moving from lying on back to sitting on the side of a flat bed without bedrails? 3  -TW 3  -KJ    Moving to and from a bed to a chair (including a wheelchair)? 3  -TW 3  -KJ    Standing up from a chair using your arms (e.g., wheelchair, bedside chair)? 3  -TW 2  -KJ    Climbing 3-5 steps with a railing? 2  -TW 1  -KJ    To walk in hospital room? 3  -TW 2  -KJ    AM-PAC 6 Clicks Score (PT) 17  -TW 14  -KJ    Highest Level of Mobility Goal 5 --> Static standing  -TW 4 --> Transfer to chair/commode  -KJ      Row Name 06/02/24 1129          Functional Assessment    Outcome Measure Options AM-PAC 6 Clicks Basic Mobility (PT)  -TW               User Key  (r) = Recorded By, (t) = Taken By,  (c) = Cosigned By      Initials Name Provider Type    TW Komal Suh, PT Physical Therapist    Elizabeth Nelson, RN Registered Nurse                                 Physical Therapy Education       Title: PT OT SLP Therapies (In Progress)       Topic: Physical Therapy (In Progress)       Point: Mobility training (Done)       Learning Progress Summary             Patient Acceptance, E, VU by  at 6/2/2024 2594    Comment: Pt education purpose of PT evaluation and his inpt PT POC.                         Point: Home exercise program (Not Started)       Learner Progress:  Not documented in this visit.              Point: Body mechanics (Not Started)       Learner Progress:  Not documented in this visit.              Point: Precautions (Not Started)       Learner Progress:  Not documented in this visit.                              User Key       Initials Effective Dates Name Provider Type Discipline     06/16/21 -  Komal Suh PT Physical Therapist PT                  PT Recommendation and Plan  Planned Therapy Interventions (PT): balance training, bed mobility training, strengthening, transfer training, gait training  Plan of Care Reviewed With: patient  Outcome Evaluation: PT evaluation completed this am with pt presenting supine in bed and oriented to self and to place. Pt did inform therapist that he gets nervous when asked too many questions and also expresses fear of falling if he would get OOB. Therapist reassured pt and pt agreed to participate in PT evaluation. Pt on room air (O2 sat 95%) and had no c/o pain. Pt did need assist with getting changed and was able to roll to either side with CGA to min assist using bed rails. Pt came to sit on EOB with min assist and sat on EOB with Supervision only. For sit to stand min assist using FWW to take steps over to recliner 3 ft. Pt expressed increased confidence with mobility once up in chair. Pt presents with deficits in balance, strength, and endurance. He  is expected to improve his funcitional mobility with continued PT services prior to d/c.     Time Calculation:   PT Evaluation Complexity  History, PT Evaluation Complexity: 3 or more personal factors and/or comorbidities  Examination of Body Systems (PT Eval Complexity): total of 4 or more elements  Clinical Presentation (PT Evaluation Complexity): stable  Clinical Decision Making (PT Evaluation Complexity): low complexity  Overall Complexity (PT Evaluation Complexity): low complexity     PT Charges       Row Name 06/02/24 1129             Time Calculation    Stop Time 1129  -TW      PT Received On 06/02/24 -TW      PT Goal Re-Cert Due Date 06/12/24 -TW                User Key  (r) = Recorded By, (t) = Taken By, (c) = Cosigned By      Initials Name Provider Type    TW Komal Suh, PT Physical Therapist                  Therapy Charges for Today       Code Description Service Date Service Provider Modifiers Qty    19534654919 HC PT EVAL LOW COMPLEXITY 3 6/2/2024 Komal Suh PT GP 1            PT G-Codes  Outcome Measure Options: AM-PAC 6 Clicks Basic Mobility (PT)  AM-PAC 6 Clicks Score (PT): 17  PT Discharge Summary  Anticipated Discharge Disposition (PT): home with assist    Komal Suh PT  6/2/2024

## 2024-06-02 NOTE — PLAN OF CARE
Problem: Adult Inpatient Plan of Care  Goal: Plan of Care Review  Outcome: Ongoing, Progressing  Goal: Patient-Specific Goal (Individualized)  Outcome: Ongoing, Progressing  Goal: Absence of Hospital-Acquired Illness or Injury  Outcome: Ongoing, Progressing  Goal: Optimal Comfort and Wellbeing  Outcome: Ongoing, Progressing  Goal: Readiness for Transition of Care  Outcome: Ongoing, Progressing     Problem: Skin Injury Risk Increased  Goal: Skin Health and Integrity  Outcome: Ongoing, Progressing     Problem: Fall Injury Risk  Goal: Absence of Fall and Fall-Related Injury  Outcome: Ongoing, Progressing     Problem: UTI (Urinary Tract Infection)  Goal: Improved Infection Symptoms  Outcome: Ongoing, Progressing     Problem: Acute Confusion (Hospitalized Older Adult)  Goal: Optimal Cognitive Function  Outcome: Ongoing, Progressing     Problem: Bowel Elimination Impaired (Hospitalized Older Adult)  Goal: Effective Bowel Elimination  Outcome: Ongoing, Progressing     Problem: Depression (Hospitalized Older Adult)  Goal: Depressive Symptoms Identified and Managed  Outcome: Ongoing, Progressing     Problem: Fluid Imbalance (Hospitalized Older Adult)  Goal: Fluid Balance  Outcome: Ongoing, Progressing     Problem: Functional Ability Impaired (Hospitalized Older Adult)  Goal: Optimal Functional Ability  Outcome: Ongoing, Progressing     Problem: Oral Intake Inadequate (Hospitalized Older Adult)  Goal: Optimal Nutrition Intake  Outcome: Ongoing, Progressing     Problem: Sleep Disturbance (Hospitalized Older Adult)  Goal: Adequate Sleep/Rest  Outcome: Ongoing, Progressing     Problem: Urinary Incontinence (Hospitalized Older Adult)  Goal: Urinary Incontinence Managed  Outcome: Ongoing, Progressing     Problem: Confusion Acute  Goal: Optimal Cognitive Function  Outcome: Ongoing, Progressing     Problem: Adjustment to Illness (Sepsis/Septic Shock)  Goal: Optimal Coping  Outcome: Ongoing, Progressing     Problem: Bleeding  (Sepsis/Septic Shock)  Goal: Absence of Bleeding  Outcome: Ongoing, Progressing     Problem: Glycemic Control Impaired (Sepsis/Septic Shock)  Goal: Blood Glucose Level Within Desired Range  Outcome: Ongoing, Progressing     Problem: Infection Progression (Sepsis/Septic Shock)  Goal: Absence of Infection Signs and Symptoms  Outcome: Ongoing, Progressing     Problem: Nutrition Impaired (Sepsis/Septic Shock)  Goal: Optimal Nutrition Intake  Outcome: Ongoing, Progressing     Problem: Infection  Goal: Absence of Infection Signs and Symptoms  Outcome: Ongoing, Progressing   Goal Outcome Evaluation:

## 2024-06-02 NOTE — PLAN OF CARE
Goal Outcome Evaluation:  Plan of Care Reviewed With: patient           Outcome Evaluation: PT evaluation completed this am with pt presenting supine in bed and oriented to self and to place. Pt did inform therapist that he gets nervous when asked too many questions and also expresses fear of falling if he would get OOB. Therapist reassured pt and pt agreed to participate in PT evaluation. Pt on room air (O2 sat 95%) and had no c/o pain. Pt did need assist with getting changed and was able to roll to either side with CGA to min assist using bed rails. Pt came to sit on EOB with min assist and sat on EOB with Supervision only. For sit to stand min assist using FWW to take steps over to recliner 3 ft. Pt expressed increased confidence with mobility once up in chair. Pt presents with deficits in balance, strength, and endurance. He is expected to improve his funcitional mobility with continued PT services prior to d/c.      Anticipated Discharge Disposition (PT): home with assist

## 2024-06-03 ENCOUNTER — READMISSION MANAGEMENT (OUTPATIENT)
Dept: CALL CENTER | Facility: HOSPITAL | Age: 79
End: 2024-06-03
Payer: MEDICARE

## 2024-06-03 VITALS
DIASTOLIC BLOOD PRESSURE: 109 MMHG | BODY MASS INDEX: 23.65 KG/M2 | SYSTOLIC BLOOD PRESSURE: 142 MMHG | WEIGHT: 174.6 LBS | TEMPERATURE: 98.8 F | OXYGEN SATURATION: 96 % | RESPIRATION RATE: 16 BRPM | HEIGHT: 72 IN | HEART RATE: 116 BPM

## 2024-06-03 PROBLEM — Z74.09 IMPAIRED MOBILITY AND ADLS: Chronic | Status: ACTIVE | Noted: 2024-06-03

## 2024-06-03 PROBLEM — F03.90 DEMENTIA: Chronic | Status: ACTIVE | Noted: 2024-06-03

## 2024-06-03 PROBLEM — Z78.9 IMPAIRED MOBILITY AND ADLS: Chronic | Status: ACTIVE | Noted: 2024-06-03

## 2024-06-03 LAB
ANION GAP SERPL CALCULATED.3IONS-SCNC: 7.4 MMOL/L (ref 5–15)
BACTERIA SPEC AEROBE CULT: ABNORMAL
BACTERIA SPEC AEROBE CULT: ABNORMAL
BASOPHILS # BLD AUTO: 0.06 10*3/MM3 (ref 0–0.2)
BASOPHILS NFR BLD AUTO: 0.6 % (ref 0–1.5)
BUN SERPL-MCNC: 13 MG/DL (ref 8–23)
BUN/CREAT SERPL: 20 (ref 7–25)
CALCIUM SPEC-SCNC: 8.4 MG/DL (ref 8.6–10.5)
CHLORIDE SERPL-SCNC: 103 MMOL/L (ref 98–107)
CO2 SERPL-SCNC: 24.6 MMOL/L (ref 22–29)
CREAT SERPL-MCNC: 0.65 MG/DL (ref 0.76–1.27)
DEPRECATED RDW RBC AUTO: 48 FL (ref 37–54)
EGFRCR SERPLBLD CKD-EPI 2021: 95.3 ML/MIN/1.73
EOSINOPHIL # BLD AUTO: 0.12 10*3/MM3 (ref 0–0.4)
EOSINOPHIL NFR BLD AUTO: 1.1 % (ref 0.3–6.2)
ERYTHROCYTE [DISTWIDTH] IN BLOOD BY AUTOMATED COUNT: 14.2 % (ref 12.3–15.4)
GLUCOSE BLDC GLUCOMTR-MCNC: 141 MG/DL (ref 70–130)
GLUCOSE BLDC GLUCOMTR-MCNC: 222 MG/DL (ref 70–130)
GLUCOSE SERPL-MCNC: 145 MG/DL (ref 65–99)
GRAM STN SPEC: ABNORMAL
HCT VFR BLD AUTO: 37.8 % (ref 37.5–51)
HGB BLD-MCNC: 12.1 G/DL (ref 13–17.7)
IMM GRANULOCYTES # BLD AUTO: 0.05 10*3/MM3 (ref 0–0.05)
IMM GRANULOCYTES NFR BLD AUTO: 0.5 % (ref 0–0.5)
ISOLATED FROM: ABNORMAL
LYMPHOCYTES # BLD AUTO: 3.82 10*3/MM3 (ref 0.7–3.1)
LYMPHOCYTES NFR BLD AUTO: 35.8 % (ref 19.6–45.3)
MCH RBC QN AUTO: 29.5 PG (ref 26.6–33)
MCHC RBC AUTO-ENTMCNC: 32 G/DL (ref 31.5–35.7)
MCV RBC AUTO: 92.2 FL (ref 79–97)
MONOCYTES # BLD AUTO: 1.05 10*3/MM3 (ref 0.1–0.9)
MONOCYTES NFR BLD AUTO: 9.8 % (ref 5–12)
NEUTROPHILS NFR BLD AUTO: 5.58 10*3/MM3 (ref 1.7–7)
NEUTROPHILS NFR BLD AUTO: 52.2 % (ref 42.7–76)
NRBC BLD AUTO-RTO: 0 /100 WBC (ref 0–0.2)
PLATELET # BLD AUTO: 291 10*3/MM3 (ref 140–450)
PMV BLD AUTO: 11 FL (ref 6–12)
POTASSIUM SERPL-SCNC: 4 MMOL/L (ref 3.5–5.2)
RBC # BLD AUTO: 4.1 10*6/MM3 (ref 4.14–5.8)
SODIUM SERPL-SCNC: 135 MMOL/L (ref 136–145)
WBC NRBC COR # BLD AUTO: 10.68 10*3/MM3 (ref 3.4–10.8)

## 2024-06-03 PROCEDURE — 85025 COMPLETE CBC W/AUTO DIFF WBC: CPT | Performed by: STUDENT IN AN ORGANIZED HEALTH CARE EDUCATION/TRAINING PROGRAM

## 2024-06-03 PROCEDURE — 80048 BASIC METABOLIC PNL TOTAL CA: CPT | Performed by: STUDENT IN AN ORGANIZED HEALTH CARE EDUCATION/TRAINING PROGRAM

## 2024-06-03 PROCEDURE — 82948 REAGENT STRIP/BLOOD GLUCOSE: CPT | Performed by: FAMILY MEDICINE

## 2024-06-03 PROCEDURE — 97116 GAIT TRAINING THERAPY: CPT

## 2024-06-03 PROCEDURE — 99239 HOSP IP/OBS DSCHRG MGMT >30: CPT | Performed by: STUDENT IN AN ORGANIZED HEALTH CARE EDUCATION/TRAINING PROGRAM

## 2024-06-03 PROCEDURE — 25010000002 CEFTRIAXONE PER 250 MG: Performed by: FAMILY MEDICINE

## 2024-06-03 PROCEDURE — 97530 THERAPEUTIC ACTIVITIES: CPT

## 2024-06-03 PROCEDURE — 97165 OT EVAL LOW COMPLEX 30 MIN: CPT

## 2024-06-03 PROCEDURE — 63710000001 INSULIN LISPRO (HUMAN) PER 5 UNITS: Performed by: FAMILY MEDICINE

## 2024-06-03 PROCEDURE — 25010000002 SODIUM CHLORIDE 0.9 % WITH KCL 20 MEQ 20-0.9 MEQ/L-% SOLUTION: Performed by: FAMILY MEDICINE

## 2024-06-03 RX ORDER — CEPHALEXIN 500 MG/1
500 CAPSULE ORAL 2 TIMES DAILY
Qty: 8 CAPSULE | Refills: 0 | Status: SHIPPED | OUTPATIENT
Start: 2024-06-04

## 2024-06-03 RX ORDER — METOPROLOL TARTRATE 100 MG/1
100 TABLET ORAL 2 TIMES DAILY
Qty: 60 TABLET | Refills: 0 | Status: SHIPPED | OUTPATIENT
Start: 2024-06-03 | End: 2024-06-06 | Stop reason: SDUPTHER

## 2024-06-03 RX ORDER — INSULIN LISPRO 100 [IU]/ML
22 INJECTION, SOLUTION INTRAVENOUS; SUBCUTANEOUS DAILY
Start: 2024-06-03

## 2024-06-03 RX ADMIN — PAROXETINE HYDROCHLORIDE 20 MG: 20 TABLET, FILM COATED ORAL at 08:06

## 2024-06-03 RX ADMIN — METOPROLOL TARTRATE 75 MG: 50 TABLET, FILM COATED ORAL at 08:06

## 2024-06-03 RX ADMIN — SODIUM CHLORIDE 2000 MG: 9 INJECTION, SOLUTION INTRAVENOUS at 10:58

## 2024-06-03 RX ADMIN — POTASSIUM CHLORIDE AND SODIUM CHLORIDE 100 ML/HR: 900; 150 INJECTION, SOLUTION INTRAVENOUS at 08:06

## 2024-06-03 RX ADMIN — Medication 10 ML: at 08:08

## 2024-06-03 RX ADMIN — AMLODIPINE BESYLATE 5 MG: 5 TABLET ORAL at 08:07

## 2024-06-03 RX ADMIN — INSULIN LISPRO 4 UNITS: 100 INJECTION, SOLUTION INTRAVENOUS; SUBCUTANEOUS at 12:08

## 2024-06-03 RX ADMIN — PRAVASTATIN SODIUM 40 MG: 20 TABLET ORAL at 08:06

## 2024-06-03 NOTE — CASE MANAGEMENT/SOCIAL WORK
Spoke with ROBERT De Santiago. Faxed requested RFS and supporting information with order for Home Health services. Also told her pt had home assistance thru [facility] [employer] and was very complimentary of services.

## 2024-06-03 NOTE — PLAN OF CARE
Problem: Adult Inpatient Plan of Care  Goal: Plan of Care Review  Outcome: Ongoing, Progressing  Goal: Patient-Specific Goal (Individualized)  Outcome: Ongoing, Progressing  Goal: Absence of Hospital-Acquired Illness or Injury  Outcome: Ongoing, Progressing  Intervention: Identify and Manage Fall Risk  Recent Flowsheet Documentation  Taken 6/3/2024 0200 by Magdalena August RN  Safety Promotion/Fall Prevention: safety round/check completed  Taken 6/3/2024 0000 by Magdalena August RN  Safety Promotion/Fall Prevention: safety round/check completed  Taken 6/2/2024 2200 by Magdalena August RN  Safety Promotion/Fall Prevention: safety round/check completed  Taken 6/2/2024 2000 by Magdalena August RN  Safety Promotion/Fall Prevention: safety round/check completed  Intervention: Prevent Skin Injury  Recent Flowsheet Documentation  Taken 6/3/2024 0200 by Magdalena August RN  Body Position:   turned   right  Taken 6/3/2024 0000 by Magdalena August RN  Body Position:   side-lying   left  Taken 6/2/2024 2200 by Magdalena August RN  Body Position:   tilted   left  Taken 6/2/2024 2000 by Magdalena August RN  Body Position: sitting up in bed  Intervention: Prevent and Manage VTE (Venous Thromboembolism) Risk  Recent Flowsheet Documentation  Taken 6/3/2024 0200 by Magdalena August RN  Activity Management: activity encouraged  Taken 6/3/2024 0000 by Magdalena August RN  Activity Management: activity encouraged  Taken 6/2/2024 2200 by Magdalena August RN  Activity Management: activity encouraged  Taken 6/2/2024 2000 by Magdalena August RN  Activity Management: activity encouraged  Goal: Optimal Comfort and Wellbeing  Outcome: Ongoing, Progressing  Goal: Readiness for Transition of Care  Outcome: Ongoing, Progressing     Problem: Skin Injury Risk Increased  Goal: Skin Health and Integrity  Outcome: Ongoing, Progressing  Intervention: Optimize Skin Protection  Recent Flowsheet Documentation  Taken 6/3/2024 0200 by Magdalena August RN  Head of Bed  (HOB) Positioning: HOB elevated  Taken 6/3/2024 0000 by Magdalena August RN  Head of Bed (HOB) Positioning: HOB elevated  Taken 6/2/2024 2200 by Magdalena August RN  Head of Bed (HOB) Positioning: HOB elevated  Taken 6/2/2024 2000 by Magdalena August RN  Head of Bed (HOB) Positioning: HOB elevated     Problem: Fall Injury Risk  Goal: Absence of Fall and Fall-Related Injury  Outcome: Ongoing, Progressing  Intervention: Promote Injury-Free Environment  Recent Flowsheet Documentation  Taken 6/3/2024 0200 by Magdalena August RN  Safety Promotion/Fall Prevention: safety round/check completed  Taken 6/3/2024 0000 by Magdalena August RN  Safety Promotion/Fall Prevention: safety round/check completed  Taken 6/2/2024 2200 by Magdalena August RN  Safety Promotion/Fall Prevention: safety round/check completed  Taken 6/2/2024 2000 by Magdalena August RN  Safety Promotion/Fall Prevention: safety round/check completed     Problem: UTI (Urinary Tract Infection)  Goal: Improved Infection Symptoms  Outcome: Ongoing, Progressing     Problem: Acute Confusion (Hospitalized Older Adult)  Goal: Optimal Cognitive Function  Outcome: Ongoing, Progressing     Problem: Bowel Elimination Impaired (Hospitalized Older Adult)  Goal: Effective Bowel Elimination  Outcome: Ongoing, Progressing     Problem: Depression (Hospitalized Older Adult)  Goal: Depressive Symptoms Identified and Managed  Outcome: Ongoing, Progressing     Problem: Fluid Imbalance (Hospitalized Older Adult)  Goal: Fluid Balance  Outcome: Ongoing, Progressing     Problem: Functional Ability Impaired (Hospitalized Older Adult)  Goal: Optimal Functional Ability  Outcome: Ongoing, Progressing  Intervention: Promote Functional Ability  Recent Flowsheet Documentation  Taken 6/3/2024 0200 by Magdalena August RN  Activity Assistance Provided: assistance, 1 person  Taken 6/3/2024 0000 by Magdalena August RN  Activity Assistance Provided: assistance, 1 person  Taken 6/2/2024 2200 by Mata  CHON Nichols  Activity Assistance Provided: assistance, 1 person  Taken 6/2/2024 2000 by Magdalena Auugst RN  Activity Assistance Provided: assistance, 1 person     Problem: Oral Intake Inadequate (Hospitalized Older Adult)  Goal: Optimal Nutrition Intake  Outcome: Ongoing, Progressing     Problem: Sleep Disturbance (Hospitalized Older Adult)  Goal: Adequate Sleep/Rest  Outcome: Ongoing, Progressing     Problem: Urinary Incontinence (Hospitalized Older Adult)  Goal: Urinary Incontinence Managed  Outcome: Ongoing, Progressing     Problem: Confusion Acute  Goal: Optimal Cognitive Function  Outcome: Ongoing, Progressing     Problem: Adjustment to Illness (Sepsis/Septic Shock)  Goal: Optimal Coping  Outcome: Ongoing, Progressing     Problem: Bleeding (Sepsis/Septic Shock)  Goal: Absence of Bleeding  Outcome: Ongoing, Progressing     Problem: Glycemic Control Impaired (Sepsis/Septic Shock)  Goal: Blood Glucose Level Within Desired Range  Outcome: Ongoing, Progressing     Problem: Infection Progression (Sepsis/Septic Shock)  Goal: Absence of Infection Signs and Symptoms  Outcome: Ongoing, Progressing  Intervention: Promote Recovery  Recent Flowsheet Documentation  Taken 6/3/2024 0200 by Magdalena August RN  Activity Management: activity encouraged  Taken 6/3/2024 0000 by Magdalena August RN  Activity Management: activity encouraged  Taken 6/2/2024 2200 by Magdalena August RN  Activity Management: activity encouraged  Taken 6/2/2024 2000 by Magdalena August RN  Activity Management: activity encouraged     Problem: Nutrition Impaired (Sepsis/Septic Shock)  Goal: Optimal Nutrition Intake  Outcome: Ongoing, Progressing     Problem: Infection  Goal: Absence of Infection Signs and Symptoms  Outcome: Ongoing, Progressing   Goal Outcome Evaluation:

## 2024-06-03 NOTE — THERAPY TREATMENT NOTE
Patient Name: Que Lagunas  : 1945    MRN: [id number]                              Today's Date: 6/3/2024       Admit Date: 2024    Visit Dx:     ICD-10-CM ICD-9-CM   1. Complicated UTI (urinary tract infection)  N39.0 599.0   2. Acute cystitis without hematuria  N30.00 595.0   3. Multiple falls  R29.6 V15.88   4. Type 2 diabetes mellitus with other specified complication, with long-term current use of insulin  E11.69 250.80    Z79.4 V58.67   5. Dementia, unspecified dementia severity, unspecified dementia type, unspecified whether behavioral, psychotic, or mood disturbance or anxiety  F03.90 294.20   6. Impaired mobility and ADLs  Z74.09 V49.89    Z78.9      Patient Active Problem List   Diagnosis    Type 2 diabetes mellitus with hyperglycemia    Enlarged prostate    Generalized anxiety disorder    Hyperlipidemia    Hypertension    TIA (transient ischemic attack)    CAD (coronary artery disease)    Paroxysmal atrial fibrillation    Vitamin D deficiency    Restless leg syndrome    Personal history of colonic polyps    Loss of balance    Memory loss    Tubular adenoma    Complicated UTI (urinary tract infection)    Dementia    Impaired mobility and ADLs     Past Medical History:   Diagnosis Date    Abnormal EKG     Bifascicular block and no prior ischemia evaluation.     Anemia     Anxiety     Arthritis     Atrial fibrillation     CHADS VASc=3.  Continue Xarelto 20.  vas continue Zaroxolyn 20 has a relative 20 [place] can add Lantus 40    CAD (coronary artery disease)     Colon polyp 2015    Contact dermatitis     Diabetes     Dyslipidemia     Hypertension     Palpitations     Prostatism     Stroke     Tattoo      Past Surgical History:   Procedure Laterality Date    COLONOSCOPY  2015    EYE SURGERY      PROSTATE SURGERY      TONSILLECTOMY        General Information       Row Name 24 1143          Physical Therapy Time and Intention    Document Type therapy note (daily note)   -RM     Mode of Treatment physical therapy  -       Row Name 06/03/24 1143          General Information    Existing Precautions/Restrictions fall  -RM       Row Name 06/03/24 1143          Cognition    Orientation Status (Cognition) oriented to;person;place  -       Row Name 06/03/24 1143          Safety Issues, Functional Mobility    Safety Issues Affecting Function (Mobility) insight into deficits/self-awareness;positioning of assistive device;safety precaution awareness;safety precautions follow-through/compliance  -RM     Impairments Affecting Function (Mobility) balance;cognition;endurance/activity tolerance;strength;motor planning  -RM     Cognitive Impairments, Mobility Safety/Performance attention;awareness, need for assistance;insight into deficits/self-awareness;problem-solving/reasoning;safety precaution awareness;safety precaution follow-through  -RM               User Key  (r) = Recorded By, (t) = Taken By, (c) = Cosigned By      Initials Name Provider Type    RM Real Maradiaga, PTA Physical Therapist Assistant                   Mobility       Row Name 06/03/24 1144          Bed Mobility    Supine-Sit [place] (Bed Mobility) contact guard;verbal cues  -     Assistive Device (Bed Mobility) bed rails;head of bed elevated  -       Row Name 06/03/24 1144          Sit-Stand Transfer    Sit-Stand [place] (Transfers) minimum assist (75% patient effort);verbal cues;nonverbal cues (demo/gesture)  -     Assistive Device (Sit-Stand Transfers) walker, front-wheeled  -       Row Name 06/03/24 1144          Gait/Stairs (Locomotion)    [place] Level (Gait) minimum assist (75% patient effort)  -RM     Assistive Device (Gait) walker, front-wheeled  -RM     Patient was able to Ambulate yes  -RM     Distance in Feet (Gait) 40  -RM     Deviations/Abnormal Patterns (Gait) stride length decreased;festinating/shuffling  -RM     Bilateral Gait Deviations forward flexed posture;heel strike decreased   -RM               User Key  (r) = Recorded By, (t) = Taken By, (c) = Cosigned By      Initials Name Provider Type    Real Velazquez, DOMINIC Physical Therapist Assistant                   Obj/Interventions    No documentation.                  Goals/Plan    No documentation.                  Clinical Impression       Row Name 06/03/24 1144          Pain    Pretreatment Pain Rating 0/10 - no pain  -RM     Posttreatment Pain Rating 0/10 - no pain  -RM       Row Name 06/03/24 1144          Plan of Care Review    Plan of Care Reviewed With patient  -RM     Progress improving  -RM     Outcome Evaluation Pt supine in bed and willing to participate with treatment. Pt performed supine fto sitting EOB with cga/min a. Pt STS transfer required cga/min a with rw  and vc 's.  Pt advanced gait distance to 40' min a with rw. Pt requires several vc's for walker placement and safety during mobility. See flowsheet for details.  Cont PT per POC progressing to goals as pt tolerates.  -RM       Row Name 06/03/24 1144          Positioning and Restraints    Pre-Treatment Position in bed  -RM     Post Treatment Position chair  -RM     In Chair reclined;call light within reach;encouraged to call for assist;exit alarm on;notified nsg  -RM               User Key  (r) = Recorded By, (t) = Taken By, (c) = Cosigned By      Initials Name Provider Type    Real Velazquez, DOMINIC Physical Therapist Assistant                   Outcome Measures       Row Name 06/03/24 1151 06/03/24 0806       How much help from another person do you currently need...    Turning from your back to your side while in flat bed without using bedrails? 4  -RM 4  -CS    Moving from lying on back to sitting on the side of a flat bed without bedrails? 3  -RM 3  -CS    Moving to and from a bed to a chair (including a wheelchair)? 3  -RM 3  -CS    Standing up from a chair using your arms (e.g., wheelchair, bedside chair)? 3  -RM 3  -CS    Climbing 3-5 steps with a railing?  2  -RM 2  -CS    To walk in hospital room? 3  -RM 3  -CS    AM-PAC 6 Clicks Score (PT) 18  -RM 18  -CS    Highest Level of Mobility Goal 6 --> Walk 10 steps or more  -RM 6 --> Walk 10 steps or more  -CS      Row Name 06/03/24 1151          Functional Assessment    Outcome Measure Options AM-PAC 6 Clicks Basic Mobility (PT)  -RM               User Key  (r) = Recorded By, (t) = Taken By, (c) = Cosigned By      Initials Name Provider Type    RM Real Maradiaga, PTA Physical Therapist Assistant    Cyn Andrews LPN Licensed Nurse                                 Physical Therapy Education       Title: PT OT SLP Therapies (Resolved)       Topic: Physical Therapy (Resolved)       Point: Mobility training (Resolved)       Learning Progress Summary             Patient Acceptance, E, VU by  at 6/2/2024 1129    Comment: Pt education purpose of PT evaluation and his inpt PT POC.                         Point: Home exercise program (Resolved)       Learner Progress:  Not documented in this visit.              Point: Body mechanics (Resolved)       Learner Progress:  Not documented in this visit.              Point: Precautions (Resolved)       Learner Progress:  Not documented in this visit.                              User Key       Initials Effective Dates Name Provider Type Discipline     06/16/21 -  Komal Suh, PT Physical Therapist PT                  PT Recommendation and Plan     Plan of Care Reviewed With: patient  Progress: improving  Outcome Evaluation: Pt supine in bed and willing to participate with treatment. Pt performed supine fto sitting EOB with cga/min a. Pt STS transfer required cga/min a with rw  and vc 's.  Pt advanced gait distance to 40' min a with rw. Pt requires several vc's for walker placement and safety during mobility. See flowsheet for details.  Cont PT per POC progressing to goals as pt tolerates.     Time Calculation:         PT Charges       Row Name 06/03/24 6627              Time Calculation    Start Time 1030  -RM      Stop Time 1053  -RM      Time Calculation (min) 23 min  -RM      PT Received On 06/03/24  -RM      PT Goal Re-Cert Due Date 06/12/24  -RM         Time Calculation- PT    Total Timed Code Minutes- PT 23 minute(s)  -RM         Timed Charges    84110 - Gait Training Minutes  13  -RM      49236 - PT Therapeutic Activity Minutes 10  -RM         Total Minutes    Timed Charges Total Minutes 23  -RM       Total Minutes 23  -RM                User Key  (r) = Recorded By, (t) = Taken By, (c) = Cosigned By      Initials Name Provider Type    Real Velazquez, PTA Physical Therapist Assistant                  Therapy Charges for Today       Code Description Service Date Service Provider Modifiers Qty    50106647364 HC GAIT TRAINING EA 15 MIN 6/3/2024 Real Maradiaga, PTA GP 1    21032194650 HC PT THERAPEUTIC ACT EA 15 MIN 6/3/2024 Real Maradiaga, PTA GP 1            PT G-Codes  Outcome Measure Options: AM-PAC 6 Clicks Basic Mobility (PT)  AM-PAC 6 Clicks Score (PT): 18       Real Maradiaga PTA  6/3/2024

## 2024-06-03 NOTE — PAYOR COMM NOTE
"TO:[facility]  FROM:SRIRAM FENTON, RN PHONE [phone]  CLINICALS    Bismark Rosenthal (79 y.o. Male)       Date of Birth   1945    Social Security Number       Address   [address] [place] [address] [address] [place] [address]    Home Phone   [phone]    MRN   [id number]       Adventist   Mormonism    Marital Status                               Admission Date   5/31/24    Admission Type   Emergency    Admitting Provider   Zaria García DO    Attending Provider   Kerley, Brian Joseph, DO    [facility]       Discharge Date       Discharge Disposition       Discharge Destination                                 Attending Provider: Kerley, Brian Joseph, DO    Allergies: Lisinopril    Isolation: None   Infection: None   Code Status: CPR    Ht: 182.9 cm (72\")   Wt: 79.2 kg (174 lb 9.7 oz)    Admission Cmt: None   Principal Problem: Complicated UTI (urinary tract infection) [N39.0]                   Active Insurance as of 5/31/2024       Primary Coverage       Payor Plan Insurance Group Employer/Plan Group    [id number]        Payor Plan Address Payor Plan Phone Number Payor Plan Fax Number Effective Dates    [facility] OFFICE OF COMMUNITY CARE [phone]  1/1/2024 - None Entered    [address] [address]       [facility] [address]         Subscriber Name Subscriber Birth Date Member ID       BISMARK ROSENTHAL 1945 [id number]               Secondary Coverage       Payor Plan Insurance Group Employer/Plan Group    ANTHEM MEDICARE REPLACEMENT ANTHEM MEDICARE ADVANTAGE [id number]       Payor Plan Address Payor Plan Phone Number Payor Plan Fax Number Effective Dates    [address] [phone]  7/1/2018 - None Entered    [facility] [address]         Subscriber Name Subscriber Birth Date Member ID       BISMARK ROSENTHAL 1945 [id number]                     Emergency Contacts        (Rel.) Home Phone Work Phone Mobile " Phone    Jennifer Yancey (Daughter) -- -- [phone]    Sole Hdez (Grandchild) -- -- [phone]    AnushkaFe villagomez (Other) -- -- [phone]                 History & Physical        Zaria García DO at 24 [phone]            [facility]   HISTORY AND PHYSICAL      Name:  Que Lagunas   Age:  79 y.o.  Sex:  male  :  1945  MRN:  [id number]   Visit Number:  [id number]  Admission Date:  2024  Date Of Service:  24  Primary Care Physician:  Aurea Ayala MD    Chief Complaint:     Weakness, falls, mental status change    History Of Presenting Illness:      79-year-old with a history of atrial fibrillation, CAD, hypertension, type 2 diabetes, vascular dementia, prior tobacco use, who presented from home with multiple complaints.  History obtained from ER record, patient, family.  Patient typically lives at home with his daughter.  He is normally ambulatory with use of a walker also uses a wheelchair at times.  He had been more confused today, and had a couple of falls.  Daughter noted increasing weakness. He was able to answer simple questions and knew he was in the hospital at time of visit. Had no complaints.    In the ER he was overall hemodynamically stable and not hypoxic.  His workup was concerning for possible urinary tract infection.  CT imaging of the chest was unremarkable.  CT of the cervical spine was unremarkable.  CT scan of the head was unremarkable.  Patient did have blood cultures obtained.  He was given IV fluid resuscitation and antibiotic coverage with Rocephin.  Due to concern for complicated UTI and ongoing weakness, we were asked to admit    Review Of Systems:    All systems were reviewed and negative except as mentioned in history of presenting illness, assessment and plan.    Past Medical History: Patient  has a past medical history of Abnormal EKG, Anemia, Anxiety, Arthritis, Atrial fibrillation, CAD (coronary artery  "disease), Colon polyp (04/09/2015), Contact dermatitis, Diabetes, Dyslipidemia, Hypertension, Palpitations, Prostatism, Stroke, and Tattoo.    Past Surgical History: Patient  has a past surgical history that includes Tonsillectomy (1947); Prostate surgery; Eye surgery; and Colonoscopy (04/09/2015).    Social History: Patient  reports that he quit smoking about 23 years ago. His smoking use included cigarettes. He started smoking about 73 years ago. He has a 100 pack-year smoking history. He has never used smokeless tobacco. He reports that he does not drink alcohol and does not use drugs.    Family History:  Patient's family history has been reviewed and found to be noncontributory.     Allergies:      Lisinopril    Home Medications:    Prior to Admission Medications       Prescriptions Last Dose Informant Patient Reported? Taking?    cholecalciferol (VITAMIN D3) 1000 units tablet   Yes No    Take 1 tablet by mouth Daily. Indications: Vitamin D Deficiency    Continuous Blood Gluc Sensor (Dexcom G7 Sensor) misc   No No    1 applicator Every 10 (Ten) Days.    Patient not taking:  Reported on 1/30/2024    donepezil (ARICEPT) 10 MG tablet   No No    Take 1 tablet by mouth Every Night.    furosemide (LASIX) 20 MG tablet   No No    Take 1 tablet by mouth Every Other Day.    Patient not taking:  Reported on 1/30/2024    glucose blood (SOLUS V2 TEST) test strip   No No    Use as instructed    glucose monitor monitoring kit   No No    1 each As Needed (prn).    insulin glargine (Lantus) 100 UNIT/ML injection   No No    Inject 10 Units under the skin into the appropriate area as directed Every Night.    Insulin Lispro (humaLOG) 100 UNIT/ML injection   No No    INJECT 10 UNITS SUBCUTANEOUSLY WITH MEALS    Insulin Pen Needle (PEN NEEDLES 3/16\") 31G X 5 MM misc   No No    Use with insulin daily E11.9    Insulin Syringe-Needle U-100 (Easy Touch Insulin Syringe) 30G X 5/16\" 0.5 ML misc   No No    use as directed with insulin 4 " "times a day    Lancets misc   No No    Use to test twice daily. E11.9    LifeScan Unistik II Lancets misc   No No    1 Units 3 (Three) Times a Day.    memantine (NAMENDA) 10 MG tablet   No No    Take 1 tablet by mouth Every Night.    metFORMIN (GLUCOPHAGE) 500 MG tablet   No No    TAKE 1 TABLET BY MOUTH IN THE MORNING WITH  BREAKFAST  Indications: Type 2 Diabetes    metoprolol tartrate (LOPRESSOR) 100 MG tablet   Yes No    Take 0.75 tablets by mouth 2 (Two) Times a Day. 75mg daily  Indications: High Blood Pressure Disorder    PARoxetine (PAXIL) 20 MG tablet   No No    Take 1 tablet by mouth once daily    pravastatin (PRAVACHOL) 40 MG tablet   No No    TAKE 1 TABLET BY MOUTH ONCE DAILY IN THE EVENING    vitamin C (ASCORBIC ACID) 250 MG tablet   Yes No    Take 1 tablet by mouth Daily.    Xarelto 20 MG tablet   No No    Take 1 tablet by mouth once daily          ED Medications:    Medications   sodium chloride 0.9 % flush 10 mL (has no administration in time range)   sodium chloride 0.9 % flush 10 mL (has no administration in time range)   sodium chloride 0.9 % bolus 1,500 mL (0 mL Intravenous Stopped 5/31/24 2214)   cefTRIAXone (ROCEPHIN) 1,000 mg in sodium chloride 0.9 % 100 mL IVPB-VTB (0 mg Intravenous Stopped 5/31/24 2316)   sodium chloride 0.9 % bolus 1,000 mL (0 mL Intravenous Stopped 5/31/24 2317)   iopamidol (ISOVUE-300) 61 % injection 100 mL (100 mL Intravenous Given 5/31/24 2131)     Vital Signs:  Temp:  [98.2 °F (36.8 °C)] 98.2 °F (36.8 °C)  Heart Rate:  [105-129] 117  Resp:  [18] 18  BP: (115-161)/() 120/86        05/31/24 2018   Weight: 69.9 kg (154 lb)     Body mass index is 20.89 kg/m².    Physical Exam:     Most recent vital Signs: /86   Pulse 117   Temp 98.2 °F (36.8 °C) (Oral)   Resp 18   Ht 182.9 cm (72\")   Wt 69.9 kg (154 lb)   SpO2 100%   BMI 20.89 kg/m²     Physical Exam  Constitutional:       General: He is not in acute distress.     Appearance: He is not toxic-appearing. "   HENT:      Mouth/Throat:      Mouth: Mucous membranes are dry.      Pharynx: Oropharynx is clear.   Eyes:      Extraocular Movements: Extraocular movements intact.      Pupils: Pupils are equal, round, and reactive to light.   Cardiovascular:      Rate and Rhythm: Tachycardia present. Rhythm irregular.      Pulses: Normal pulses.      Heart sounds: No murmur heard.     No gallop.   Pulmonary:      Effort: Pulmonary effort is normal.      Breath sounds: No wheezing or rales.   Abdominal:      General: Bowel sounds are normal. There is no distension.      Tenderness: There is no abdominal tenderness. There is no guarding.   Musculoskeletal:         General: Normal range of motion.      Right lower leg: No edema.      Left lower leg: No edema.   Skin:     General: Skin is dry.      Capillary Refill: Capillary refill takes less than 2 seconds.      Findings: No lesion.   Neurological:      Mental Status: He is alert. Mental status is at baseline. He is disoriented.      Motor: Weakness present.         Laboratory data:    I have reviewed the labs done in the emergency room.    Results from last 7 days   Lab Units 05/31/24 2037   SODIUM mmol/L 135*   POTASSIUM mmol/L 3.7   CHLORIDE mmol/L 94*   CO2 mmol/L 25.8   BUN mg/dL 17   CREATININE mg/dL 0.73*   CALCIUM mg/dL 9.2   BILIRUBIN mg/dL 1.1   ALK PHOS U/L 83   ALT (SGPT) U/L 11   AST (SGOT) U/L 15   GLUCOSE mg/dL 179*     Results from last 7 days   Lab Units 05/31/24 2037   WBC 10*3/mm3 14.15*   HEMOGLOBIN g/dL 14.8   HEMATOCRIT % 44.4   PLATELETS 10*3/mm3 310     Results from last 7 days   Lab Units 05/31/24 2037   INR  1.33*     Results from last 7 days   Lab Units 05/31/24  2313 05/31/24 2037   HSTROP T ng/L 16 17                     Results from last 7 days   Lab Units 05/31/24 2053   COLOR UA  Yellow   GLUCOSE UA  250 mg/dL (1+)*   KETONES UA  Negative   BLOOD UA  Moderate (2+)*   LEUKOCYTES UA  Large (3+)*   PH, URINE  5.5   BILIRUBIN UA  Negative    UROBILINOGEN UA  1.0 E.U./dL   RBC UA /HPF None Seen   WBC UA /HPF 21-50*       Pain Management Panel          6/29/2016   Pain Management Panel   Creatinine, Urine 100        EKG:      Appears to be likely sinus tachycardia, rate of about 120.  I see no acute ST elevations or obvious ischemic changes.    Radiology:    CT Cervical Spine Without Contrast    Result Date: 5/31/2024  FINAL REPORT TECHNIQUE: Thin section axial images were obtained through the cervical spine without contrast.  Coronal and sagittal reconstructed images were then provided. CLINICAL HISTORY: fall, trauma FINDINGS: There is normal alignment and curvature.  Prevertebral soft tissues are normal.  There is no fracture.  There is severe degenerative disc disease in the mid-lower cervical spine.     No acute disease. Authenticated and Electronically Signed by Luis Enrique Preciado M.D. on 05/31/2024 10:30:22 PM    CT Head Without Contrast    Result Date: 5/31/2024  FINAL REPORT TECHNIQUE: Routine axial images through the head were obtained without contrast. CLINICAL HISTORY: fall, trauma, weakness FINDINGS: There is generalized atrophy.  There is mild to moderate ventricular dilation.  Periventricular and deep white matter low-attenuation areas are consistent with chronic small vessel ischemia.  There is no mass or shift in midline structures. There is no intracranial hemorrhage.  There is no acute sinus or osseous abnormality.     No fracture or acute intracranial abnormality. Authenticated and Electronically Signed by Luis Enrique Preciado M.D. on 05/31/2024 10:29:22 PM    CT Angiogram Chest Pulmonary Embolism    Result Date: 5/31/2024  FINAL REPORT TECHNIQUE: Thin section axial images were obtained through the chest and during the arterial phase of IV contrast administration. Coronal 3-D MIP reconstructed images were also provided. CLINICAL HISTORY: tachycardia, chest pain, syncope, r/o PE FINDINGS: The pulmonary arteries are well-opacified and there is no filling  defect to indicate pulmonary embolism. The thoracic aorta is not optimally opacified due to delayed transit of contrast through the heart, but no acute abnormality is suspected.  The lungs are clear. There is no pleural disease. Mild mediastinal adenopathy is possibly due to edema.  There is no acute osseous abnormality.     No pulmonary embolism or other acute abnormality. Authenticated and Electronically Signed by Luis Enrique Preciado M.D. on 05/31/2024 10:28:43 PM     Assessment:    Complicated urinary tract infection, present on admission, acute  Weakness with impaired mobility and ADLs, POA  Vascular dementia  Atrial fibrillation, chronic  Type 2 diabetes mellitus    Plan:    Admit for observation    UTI:  No recent urine cultures to compare, antibiotic coverage with Rocephin at 2 g.  Will send urine for culture.  Blood cultures already obtained.  Continue with gentle IV fluids.    Weakness:  Will have patient assessed by PT and OT.  At baseline does use a walker and wheelchair and lives with daughter.    Dementia:  Complicates all aspects of care.  Had previously been at assisted living had not done well, currently living with daughter.  Has had some issues with weight loss in the past.  Continue home medications    Atrial fibrillation:  Appears rate controlled at this time, will continue with his home metoprolol and Xarelto.    Otherwise currently meets observation level care with anticipated stay less than 2 midnights.  Further recommendations are predicated upon improvement of clinical condition.    Risk Assessment: High  DVT Prophylaxis: Xarelto  Code Status: Full  Diet: Carb consistent/cardiac    Advance Care Planning  ACP discussion was held with the patient during this visit. Patient has an advance directive in EMR which is still valid.            Zaria García DO  05/31/24  23:46 EDT    Dictated utilizing Dragon dictation.    Electronically signed by Zaria García DO at 06/01/24 0699           Emergency Department Notes        Preet Daniel MD at 24           EMERGENCY DEPARTMENT ENCOUNTER    Pt Name: Que Lagunas  MRN: [id number]  Pt :   1945  Room Number:  02SF/02  Date of encounter:  2024  PCP: Aurea Ayala MD  ED Provider: Preet Daniel MD    Historian: Patient, daughter      HPI:  Chief Complaint   Patient presents with    Fall    Hypertension    Weakness - Generalized     Daughter states pt has fallen twice today and denies hitting head. Pt woke up from nap and daughter states he was weak, unable to stand on his own and confused. Pt denies pain att.           Context: Que Lagunas is a 79 y.o. male who presents to the ED c/o weakness, multiple falls.  The patient fell trying to transfer from his wheelchair to his walker.  Patient has been more confused than usual on her baseline dementia background.  He generally can stand with a walker but unable to today.  Patient reports generalized weakness but denies any other complaints.  Does take Xarelto, unclear whether he had head trauma during the falls.      PAST MEDICAL HISTORY  Past Medical History:   Diagnosis Date    Abnormal EKG     Bifascicular block and no prior ischemia evaluation.     Anemia     Anxiety     Arthritis     Atrial fibrillation     CHADS VASc=3.  Continue Xarelto 20.  vas continue Zaroxolyn 20 has a relative 20 [place] can add Lantus 40    CAD (coronary artery disease)     Colon polyp 2015    Contact dermatitis     Diabetes     Dyslipidemia     Hypertension     Palpitations     Prostatism     Stroke     Tattoo          PAST SURGICAL HISTORY  Past Surgical History:   Procedure Laterality Date    COLONOSCOPY  2015    EYE SURGERY      PROSTATE SURGERY      TONSILLECTOMY           FAMILY HISTORY  Family History   Problem Relation Age of Onset    Diabetes Other     Cancer Other         NO PROSTATE CANCER HISTORY    Hypertension Other     Cancer Other      Diabetes Other     Liver disease Mother     Liver disease Father     Colon cancer Neg Hx     Stomach cancer Neg Hx          SOCIAL HISTORY  Social History     Socioeconomic History    Marital status:    Tobacco Use    Smoking status: Former     Current packs/day: 0.00     Average packs/day: 2.0 packs/day for 50.0 years (100.0 ttl pk-yrs)     Types: Cigarettes     Start date:      Quit date:      Years since quittin.4    Smokeless tobacco: Never    Tobacco comments:     STOPPED 5 YEARS AGO   Vaping Use    Vaping status: Never Used   Substance and Sexual Activity    Alcohol use: No    Drug use: No    Sexual activity: Defer         ALLERGIES  Lisinopril        REVIEW OF SYSTEMS  Review of Systems   Constitutional:  Negative for chills and fever.   HENT:  Negative for sore throat and trouble swallowing.    Eyes:  Negative for pain and redness.   Respiratory:  Negative for cough and shortness of breath.    Cardiovascular:  Negative for chest pain and leg swelling.   Gastrointestinal:  Negative for abdominal pain, nausea and vomiting.   Genitourinary:  Negative for dysuria and urgency.   Musculoskeletal:  Negative for back pain and neck pain.   Skin:  Negative for rash and wound.   Neurological:  Positive for weakness. Negative for dizziness.        All systems reviewed and negative except for those discussed in HPI.       PHYSICAL EXAM    I have reviewed the triage vital signs and nursing notes.    ED Triage Vitals [24 2018]   Temp Heart Rate Resp BP SpO2   98.2 °F (36.8 °C) (!) 129 18 (!) 161/106 93 %      Temp src Heart Rate Source Patient Position BP Location FiO2 (%)   Oral Monitor Sitting Left arm --       Physical Exam  Constitutional:       Appearance: Normal appearance. He is not ill-appearing.   HENT:      Head: Normocephalic and atraumatic.      Right Ear: External ear normal.      Left Ear: External ear normal.      Nose: Nose normal.      Mouth/Throat:      Mouth: Mucous membranes  are moist.      Pharynx: Oropharynx is clear.   Eyes:      Extraocular Movements: Extraocular movements intact.      Conjunctiva/sclera: Conjunctivae normal.      Pupils: Pupils are equal, round, and reactive to light.   Cardiovascular:      Rate and Rhythm: Normal rate and regular rhythm.      Pulses:           Radial pulses are 2+ on the right side and 2+ on the left side.      Heart sounds: No murmur heard.  Pulmonary:      Effort: Pulmonary effort is normal.      Breath sounds: Normal breath sounds.   Abdominal:      General: There is no distension.      Tenderness: There is no abdominal tenderness. There is no guarding.   Musculoskeletal:         General: No swelling or deformity.      Cervical back: Normal range of motion and neck supple.   Skin:     General: Skin is warm and dry.      Capillary Refill: Capillary refill takes less than 2 seconds.      Findings: No rash.   Neurological:      General: No focal deficit present.      Mental Status: He is alert. He is confused.      GCS: GCS eye subscore is 4. GCS verbal subscore is 5. GCS motor subscore is 6.      Cranial Nerves: Cranial nerves 2-12 are intact.      Sensory: Sensation is intact.      Motor: Motor function is intact.      Coordination: Coordination is intact.            LAB RESULTS  Recent Results (from the past 24 hour(s))   Green Top (Gel)    Collection Time: 05/31/24  8:37 PM   Result Value Ref Range    Extra Tube Hold for add-ons.    Lavender Top    Collection Time: 05/31/24  8:37 PM   Result Value Ref Range    Extra Tube hold for add-on    Gold Top - SST    Collection Time: 05/31/24  8:37 PM   Result Value Ref Range    Extra Tube Hold for add-ons.    Light Blue Top    Collection Time: 05/31/24  8:37 PM   Result Value Ref Range    Extra Tube Hold for add-ons.    Gray Top    Collection Time: 05/31/24  8:37 PM   Result Value Ref Range    Extra Tube Hold for add-ons.    Comprehensive Metabolic Panel    Collection Time: 05/31/24  8:37 PM     Specimen: Blood   Result Value Ref Range    Glucose 179 (H) 65 - 99 mg/dL    BUN 17 8 - 23 mg/dL    Creatinine 0.73 (L) 0.76 - 1.27 mg/dL    Sodium 135 (L) 136 - 145 mmol/L    Potassium 3.7 3.5 - 5.2 mmol/L    Chloride 94 (L) 98 - 107 mmol/L    CO2 25.8 22.0 - 29.0 mmol/L    Calcium 9.2 8.6 - 10.5 mg/dL    Total Protein 6.7 6.0 - 8.5 g/dL    Albumin 3.6 3.5 - 5.2 g/dL    ALT (SGPT) 11 1 - 41 U/L    AST (SGOT) 15 1 - 40 U/L    Alkaline Phosphatase 83 39 - 117 U/L    Total Bilirubin 1.1 0.0 - 1.2 mg/dL    Globulin 3.1 gm/dL    A/G Ratio 1.2 g/dL    BUN/Creatinine Ratio 23.3 7.0 - 25.0    Anion Gap 15.2 (H) 5.0 - 15.0 mmol/L    eGFR 92.0 >60.0 mL/min/1.73   Protime-INR    Collection Time: 05/31/24  8:37 PM    Specimen: Blood   Result Value Ref Range    Protime 17.1 (H) 12.3 - 15.1 Seconds    INR 1.33 (H) 0.90 - 1.10   High Sensitivity Troponin T    Collection Time: 05/31/24  8:37 PM    Specimen: Blood   Result Value Ref Range    HS Troponin T 17 <22 ng/L   Lactic Acid, Plasma    Collection Time: 05/31/24  8:37 PM    Specimen: Blood   Result Value Ref Range    Lactate 2.2 (C) 0.5 - 2.0 mmol/L   Magnesium    Collection Time: 05/31/24  8:37 PM    Specimen: Blood   Result Value Ref Range    Magnesium 1.6 1.6 - 2.4 mg/dL   Phosphorus    Collection Time: 05/31/24  8:37 PM    Specimen: Blood   Result Value Ref Range    Phosphorus 2.6 2.5 - 4.5 mg/dL   TSH    Collection Time: 05/31/24  8:37 PM    Specimen: Blood   Result Value Ref Range    TSH 1.730 0.270 - 4.200 uIU/mL   T4, Free    Collection Time: 05/31/24  8:37 PM    Specimen: Blood   Result Value Ref Range    Free T4 1.25 0.92 - 1.68 ng/dL   CBC Auto Differential    Collection Time: 05/31/24  8:37 PM    Specimen: Blood   Result Value Ref Range    WBC 14.15 (H) 3.40 - 10.80 10*3/mm3    RBC 5.00 4.14 - 5.80 10*6/mm3    Hemoglobin 14.8 13.0 - 17.7 g/dL    Hematocrit 44.4 37.5 - 51.0 %    MCV 88.8 79.0 - 97.0 fL    MCH 29.6 26.6 - 33.0 pg    MCHC 33.3 31.5 - 35.7 g/dL    RDW  13.5 12.3 - 15.4 %    RDW-SD 43.9 37.0 - 54.0 fl    MPV 10.7 6.0 - 12.0 fL    Platelets 310 140 - 450 10*3/mm3    Neutrophil % 70.3 42.7 - 76.0 %    Lymphocyte % 17.2 (L) 19.6 - 45.3 %    Monocyte % 11.0 5.0 - 12.0 %    Eosinophil % 0.4 0.3 - 6.2 %    Basophil % 0.5 0.0 - 1.5 %    Immature Grans % 0.6 (H) 0.0 - 0.5 %    Neutrophils, Absolute 9.94 (H) 1.70 - 7.00 10*3/mm3    Lymphocytes, Absolute 2.44 0.70 - 3.10 10*3/mm3    Monocytes, Absolute 1.56 (H) 0.10 - 0.90 10*3/mm3    Eosinophils, Absolute 0.05 0.00 - 0.40 10*3/mm3    Basophils, Absolute 0.07 0.00 - 0.20 10*3/mm3    Immature Grans, Absolute 0.09 (H) 0.00 - 0.05 10*3/mm3    nRBC 0.0 0.0 - 0.2 /100 WBC   Urinalysis With Microscopic If Indicated (No Culture) - Urine, Clean Catch    Collection Time: 05/31/24  8:53 PM    Specimen: Urine, Clean Catch   Result Value Ref Range    Color, UA Yellow Yellow, Straw    Appearance, UA Turbid (A) Clear    pH, UA 5.5 5.0 - 8.0    Specific Gravity, UA 1.011 1.005 - 1.030    Glucose,  mg/dL (1+) (A) Negative    Ketones, UA Negative Negative    Bilirubin, UA Negative Negative    Blood, UA Moderate (2+) (A) Negative    Protein, UA 30 mg/dL (1+) (A) Negative    Leuk Esterase, UA Large (3+) (A) Negative    Nitrite, UA Negative Negative    Urobilinogen, UA 1.0 E.U./dL 0.2 - 1.0 E.U./dL   Urinalysis, Microscopic Only - Urine, Clean Catch    Collection Time: 05/31/24  8:53 PM    Specimen: Urine, Clean Catch   Result Value Ref Range    RBC, UA None Seen None Seen, 0-2 /HPF    WBC, UA 21-50 (A) None Seen, 0-2 /HPF    Bacteria, UA 3+ (A) None Seen /HPF    Squamous Epithelial Cells, UA None Seen None Seen, 0-2 /HPF    Hyaline Casts, UA None Seen None Seen /LPF    Methodology Manual Light Microscopy        If labs were ordered, I independently reviewed the results and considered them in treating the patient.        RADIOLOGY  CT Cervical Spine Without Contrast    Result Date: 5/31/2024  FINAL REPORT TECHNIQUE: Thin section axial  images were obtained through the cervical spine without contrast.  Coronal and sagittal reconstructed images were then provided. CLINICAL HISTORY: fall, trauma FINDINGS: There is normal alignment and curvature.  Prevertebral soft tissues are normal.  There is no fracture.  There is severe degenerative disc disease in the mid-lower cervical spine.     No acute disease. Authenticated and Electronically Signed by Luis Enrique Preciado M.D. on 05/31/2024 10:30:22 PM    CT Head Without Contrast    Result Date: 5/31/2024  FINAL REPORT TECHNIQUE: Routine axial images through the head were obtained without contrast. CLINICAL HISTORY: fall, trauma, weakness FINDINGS: There is generalized atrophy.  There is mild to moderate ventricular dilation.  Periventricular and deep white matter low-attenuation areas are consistent with chronic small vessel ischemia.  There is no mass or shift in midline structures. There is no intracranial hemorrhage.  There is no acute sinus or osseous abnormality.     No fracture or acute intracranial abnormality. Authenticated and Electronically Signed by Luis Enrique Preciado M.D. on 05/31/2024 10:29:22 PM    CT Angiogram Chest Pulmonary Embolism    Result Date: 5/31/2024  FINAL REPORT TECHNIQUE: Thin section axial images were obtained through the chest and during the arterial phase of IV contrast administration. Coronal 3-D MIP reconstructed images were also provided. CLINICAL HISTORY: tachycardia, chest pain, syncope, r/o PE FINDINGS: The pulmonary arteries are well-opacified and there is no filling defect to indicate pulmonary embolism. The thoracic aorta is not optimally opacified due to delayed transit of contrast through the heart, but no acute abnormality is suspected.  The lungs are clear. There is no pleural disease. Mild mediastinal adenopathy is possibly due to edema.  There is no acute osseous abnormality.     No pulmonary embolism or other acute abnormality. Authenticated and Electronically Signed by Luis Enrique Preciado  M.D. on 05/31/2024 10:28:43 PM         PROCEDURES    Procedures    Interpretations    O2 Sat: The patients oxygen saturation was 96% on Room Air.  This was independently interpreted by me as Normal    EKG: I reviewed and independently interpreted the EKG as tachycardia rate of 120, left axis deviation, elevated QRS duration, no T wave inversions, no ST elevation    Cardiac Monitoring: I reviewed and independently interpreted the Rhythm Strip as Sinus Tachycardia rate of 105    Radiology: I ordered and independently reviewed the above noted radiographic studies.  I viewed images of CT Head which showed No intracranial hemorrhage per my independent interpretation. See radiologist's dictation for official interpretation.     Radiology: I ordered and independently reviewed the above noted radiographic studies.  I viewed images of CT cervical spine which showed No fracture per my independent interpretation. See radiologist's dictation for official interpretation    Radiology: I ordered and independently reviewed the above noted radiographic studies.  I viewed images of CT Chest which showed No pulmonary process per my independent interpretation. See radiologist's dictation for official interpretation        MEDICATIONS GIVEN IN ER    Medications   sodium chloride 0.9 % flush 10 mL (has no administration in time range)   sodium chloride 0.9 % flush 10 mL (has no administration in time range)   cefTRIAXone (ROCEPHIN) 1,000 mg in sodium chloride 0.9 % 100 mL IVPB-VTB (1,000 mg Intravenous New Bag 5/31/24 2213)   sodium chloride 0.9 % bolus 1,000 mL (1,000 mL Intravenous New Bag 5/31/24 2213)   sodium chloride 0.9 % bolus 1,500 mL (0 mL Intravenous Stopped 5/31/24 2214)   iopamidol (ISOVUE-300) 61 % injection 100 mL (100 mL Intravenous Given 5/31/24 2131)         MEDICAL DECISION MAKING, PROGRESS, and CONSULTS    All labs, if obtained, have been independently reviewed by me.  All radiology studies, if obtained, have been  reviewed by me and the radiologist dictating the report.  All EKG's, if obtained, have been independently viewed and interpreted by me      Discussion below represents my analysis of pertinent findings related to patient's condition, differential diagnosis, treatment plan and final disposition.      Differential diagnosis:    79-year-old male presented with multiple falls, weakness, confusion.  First of all concerned about trauma to the head especially given the patient is on Xarelto, some intracranial process, versus cervical spine process.  Given the tachycardia on presentation, concerned about possible PE as a cause for falls and is on Xarelto that seems somewhat less likely.  As well concerned about anemia, [place] abnormality, infectious process will obtain broad laboratory workup including lactic acid, electrolytes, CT scan head, cervical spine and chest    Additional Sources:  External (non-ED) record review:  PCP visit 1/30/2024 for multiple chronic medications       Orders placed during this visit:  Orders Placed This Encounter   Procedures    Blood Culture - Blood,    Blood Culture - Blood,    XR Chest 1 View    CT Head Without Contrast    CT Cervical Spine Without Contrast    CT Angiogram Chest Pulmonary Embolism    [place] Draw    [place] Draw    Comprehensive Metabolic Panel    Protime-INR    Urinalysis With Microscopic If Indicated (No Culture) - Urine, Clean Catch    High Sensitivity Troponin T    Lactic Acid, Plasma    Magnesium    Phosphorus    TSH    T4, Free    CBC Auto Differential    Urinalysis, Microscopic Only - Urine, Clean Catch    STAT Lactic Acid, Reflex    High Sensitivity Troponin T 2Hr    ECG 12 Lead Tachycardia    Insert peripheral IV    Insert peripheral IV    Initiate Observation Status    Green Top (Gel)    Lavender Top    Gold Top - SST    Light Blue Top    Gray Top    CBC & Differential         Additional orders considered but not ordered:  None    ED Course:    Consultants:   Hospitalist    ED Course as of 05/31/24 2239   Fri May 31, 2024   2116 Urinalysis With Microscopic If Indicated (No Culture) - Urine, Clean Catch(!)  Urine appears grossly infected, IV antibiotics and blood cultures are ordered [CS]   2125 Lactate(!!): 2.2  Lactic acid elevated, blood cultures and fluids are been ordered as well as antibiotics [CS]   2232 Patient's urine is infected, lactate and white blood cell count are elevated, remains weak and well below his baseline, given that I do not think is a good candidate for outpatient management.  Will speak to the hospitalist about admission [CS]   2239 Spoke directly to Dr. García agrees to evaluate the patient for admission [CS]      ED Course User Index  [CS] Preet Daniel MD           After my consideration of clinical presentation and any laboratory/radiology studies obtained, I discussed the findings with the patient/patient representative who is in agreement with the treatment plan and the final disposition. Risks and benefits of admission was discussed     AS OF 22:39 EDT VITALS:    BP - 115/87  HR - 105  TEMP - 98.2 °F (36.8 °C) (Oral)  O2 SATS - 96%    I reviewed the patients prescription monitoring report if available prior to discharge    DIAGNOSIS  Final diagnoses:   Acute cystitis without hematuria   Multiple falls   Complicated UTI (urinary tract infection)         DISPOSITION  ED Disposition       ED Disposition   Decision to Admit    Condition   --    Comment   Level of Care: Telemetry [5]   Diagnosis: Complicated UTI (urinary tract infection) [738114]   Admitting Physician: COREY GARCÍA [[id number]]   Attending Physician: COREY GARCÍA [[id number]]                     Please note that portions of this document were completed with voice recognition software.        Preet Daniel MD  05/31/24 2240      Electronically signed by Preet Daniel MD at 05/31/24 2240       Vital Signs (last day)       Date/Time  Temp Temp src Pulse Resp BP Patient Position SpO2    06/03/24 0700 98.8 (37.1) Oral 101 16 120/78 Lying 95    06/03/24 0213 98.8 (37.1) Oral -- 16 132/97 Lying 96    06/02/24 2235 98.8 (37.1) Oral -- 18 126/90 Lying --    06/02/24 2016 99 (37.2) Oral -- 18 127/106 Sitting --    06/02/24 1700 -- -- 116 -- -- -- 96    06/02/24 1632 98.5 (36.9) Oral 84 -- 133/90 -- 94    06/02/24 1109 -- -- -- -- 118/88 -- --    06/02/24 1100 98.6 (37) Oral 114 16 114/99 Lying 91    06/02/24 0700 98.1 (36.7) Oral 118 16 127/95 Lying 98          Current Facility-Administered Medications   Medication Dose Route Frequency Provider Last Rate Last Admin    acetaminophen (TYLENOL) tablet 650 mg  650 mg Oral Q4H PRN Zaria García DO   650 mg at 06/01/24 2020    Or    acetaminophen (TYLENOL) 160 MG/5ML oral solution 650 mg  650 mg Oral Q4H PRN Zaria García DO        Or    acetaminophen (TYLENOL) suppository 650 mg  650 mg Rectal Q4H PRN Zaria García DO        aluminum-magnesium hydroxide-simethicone (MAALOX MAX) 400-400-40 MG/5ML suspension 15 mL  15 mL Oral Q6H PRN Zaria García DO        amLODIPine (NORVASC) tablet 5 mg  5 mg Oral Q24H Zaria García DO   5 mg at 06/03/24 0807    sennosides-docusate (PERICOLACE) 8.6-50 MG per tablet 2 tablet  2 tablet Oral BID PRN Zaria García DO        And    polyethylene glycol (MIRALAX) packet 17 g  17 g Oral Daily PRN Zaria García DO        And    bisacodyl (DULCOLAX) EC tablet 5 mg  5 mg Oral Daily PRN Zaria García DO        And    bisacodyl (DULCOLAX) suppository 10 mg  10 mg Rectal Daily PRN Zaria García DO        cefTRIAXone (ROCEPHIN) 2,000 mg in sodium chloride 0.9 % 100 mL IVPB-VTB  2,000 mg Intravenous Q24H Zaria García  mL/hr at 06/02/24 1024 2,000 mg at 06/02/24 1024    dextrose (D50W) (25 g/50 mL) IV injection 25 g  25 g Intravenous Q15 Min PRN Zaria García DO         dextrose (GLUTOSE) oral gel 15 g  15 g Oral Q15 Min PRN Zaria García, DO        donepezil (ARICEPT) tablet 10 mg  10 mg Oral Nightly Zraia García, DO   10 mg at 06/02/24 2129    glucagon (GLUCAGEN) injection 1 mg  1 mg Intramuscular Q15 Min PRN Zaria García, DO        insulin glargine (LANTUS, SEMGLEE) injection 7 Units  7 Units Subcutaneous Nightly Zaria García, DO   7 Units at 06/02/24 2129    Insulin Lispro (humaLOG) injection 2-9 Units  2-9 Units Subcutaneous 4x Daily AC & at Bedtime Zaria García DO   2 Units at 06/02/24 2129    memantine (NAMENDA) tablet 10 mg  10 mg Oral Nightly Zaria García, DO   10 mg at 06/02/24 2129    metoprolol tartrate (LOPRESSOR) injection 5 mg  5 mg Intravenous Q5 Min PRN Kerley, Brian Joseph, DO   5 mg at 06/01/24 1554    metoprolol tartrate (LOPRESSOR) tablet 75 mg  75 mg Oral Q12H Zaria García, DO   75 mg at 06/03/24 0806    nitroglycerin (NITROSTAT) SL tablet 0.4 mg  0.4 mg Sublingual Q5 Min PRN Zaria García,         ondansetron ODT (ZOFRAN-ODT) disintegrating tablet 4 mg  4 mg Oral Q6H PRN Zaria García DO        Or    ondansetron (ZOFRAN) injection 4 mg  4 mg Intravenous Q6H PRN Zaria García, DO        PARoxetine (PAXIL) tablet 20 mg  20 mg Oral Daily Zaria García, DO   20 mg at 06/03/24 0806    pravastatin (PRAVACHOL) tablet 40 mg  40 mg Oral Daily Zaria García, DO   40 mg at 06/03/24 0806    rivaroxaban (XARELTO) tablet 20 mg  20 mg Oral Daily With Dinner Zaria García, DO   20 mg at 06/02/24 1723    sodium chloride 0.9 % flush 10 mL  10 mL Intravenous PRN Preet Daniel MD        sodium chloride 0.9 % flush 10 mL  10 mL Intravenous PRN Preet Daniel MD        sodium chloride 0.9 % flush 10 mL  10 mL Intravenous Q12H Zaria García, DO   10 mL at 06/03/24 0808    sodium chloride 0.9 % flush 10 mL  10 mL Intravenous  PRN Zaria García Rosendo, DO        sodium chloride 0.9 % infusion 40 mL  40 mL Intravenous PRN Zaria García Rosendo, DO        sodium chloride 0.9 % with KCl 20 mEq/L infusion  100 mL/hr Intravenous Continuous Zaria García,  mL/hr at 06/03/24 0806 100 mL/hr at 06/03/24 0806     Lab Results (last 24 hours)       Procedure Component Value Units Date/Time    POC Glucose 4x Daily Before Meals & at Bedtime [[id number]]  (Abnormal) Collected: 06/03/24 0712    Specimen: Blood Updated: 06/03/24 0726     Glucose 141 mg/dL      Comment: Serial Number: [id number]:  [id number]       Blood Culture - Blood, Arm, Left [[id number]]  (Abnormal)  (Susceptibility) Collected: 05/31/24 2211    Specimen: Blood from Arm, Left Updated: 06/03/24 0632     Blood Culture Klebsiella pneumoniae ssp pneumoniae     Isolated from Pediatric Bottle     Gram Stain Pediatric Bottle Gram negative bacilli    Susceptibility        Klebsiella pneumoniae ssp pneumoniae      KANIKA      Amoxicillin + Clavulanate Susceptible      Ampicillin Resistant      Ampicillin + Sulbactam Susceptible      Cefepime Susceptible      Ceftazidime Susceptible      Ceftriaxone Susceptible      Gentamicin Susceptible      Levofloxacin Susceptible      Piperacillin + Tazobactam Susceptible      Trimethoprim + Sulfamethoxazole Susceptible                       Susceptibility Comments       Klebsiella pneumoniae ssp pneumoniae    Cefazolin sensitivity will not be reported for Enterobacteriaceae in non-urine isolates. If cefazolin is preferred, please call the microbiology lab to request an E-test.  With the exception of urinary-sourced infections, aminoglycosides should not be used as monotherapy.               Basic Metabolic Panel [[id number]]  (Abnormal) Collected: 06/03/24 0536    Specimen: Blood Updated: 06/03/24 0607     Glucose 145 mg/dL      BUN 13 mg/dL      Creatinine 0.65 mg/dL      Sodium 135 mmol/L      Potassium 4.0 mmol/L      Chloride 103 mmol/L       CO2 24.6 mmol/L      Calcium 8.4 mg/dL      BUN/Creatinine Ratio 20.0     Anion Gap 7.4 mmol/L      eGFR 95.3 mL/min/1.73     Narrative:      GFR Normal >60  Chronic Kidney Disease <60  Kidney Failure <15    The GFR formula is only valid for adults with stable renal function between ages 18 and 70.    CBC & Differential [[id number]]  (Abnormal) Collected: 06/03/24 0536    Specimen: Blood Updated: 06/03/24 0546    Narrative:      The following orders were created for panel order CBC & Differential.  Procedure                               Abnormality         Status                     ---------                               -----------         ------                     CBC Auto Differential[[id number]]        Abnormal            Final result                 Please view results for these tests on the individual orders.    CBC Auto Differential [[id number]]  (Abnormal) Collected: 06/03/24 0536    Specimen: Blood Updated: 06/03/24 0546     WBC 10.68 10*3/mm3      RBC 4.10 10*6/mm3      Hemoglobin 12.1 g/dL      Hematocrit 37.8 %      MCV 92.2 fL      MCH 29.5 pg      MCHC 32.0 g/dL      RDW 14.2 %      RDW-SD 48.0 fl      MPV 11.0 fL      Platelets 291 10*3/mm3      Neutrophil % 52.2 %      Lymphocyte % 35.8 %      Monocyte % 9.8 %      Eosinophil % 1.1 %      Basophil % 0.6 %      Immature Grans % 0.5 %      Neutrophils, Absolute 5.58 10*3/mm3      Lymphocytes, Absolute 3.82 10*3/mm3      Monocytes, Absolute 1.05 10*3/mm3      Eosinophils, Absolute 0.12 10*3/mm3      Basophils, Absolute 0.06 10*3/mm3      Immature Grans, Absolute 0.05 10*3/mm3      nRBC 0.0 /100 WBC     Blood Culture - Blood, Hand, Right [[id number]]  (Normal) Collected: 05/31/24 2211    Specimen: Blood from Hand, Right Updated: 06/02/24 2231     Blood Culture No growth at 2 days    POC Glucose Once [[id number]]  (Abnormal) Collected: 06/02/24 2049    Specimen: Blood Updated: 06/02/24 2056     Glucose 196 mg/dL      Comment: Serial Number:  [id number]:  [id number]       POC Glucose Once [[id number]]  (Abnormal) Collected: 24 1617    Specimen: Blood Updated: 24 1622     Glucose 257 mg/dL      Comment: Serial Number: [id number]:  [id number]       POC Glucose Once [[id number]]  (Abnormal) Collected: 24 1128    Specimen: Blood Updated: 24 1130     Glucose 228 mg/dL      Comment: Serial Number: [id number]:  [id number]       Urine Culture - Urine, Urine, Clean Catch [[id number]]  (Abnormal) Collected: 24 2053    Specimen: Urine, Clean Catch Updated: 24 0953     Urine Culture >100,000 CFU/mL Gram Negative Bacilli    Narrative:      Colonization of the urinary tract without infection is common. Treatment is discouraged unless the patient is symptomatic, pregnant, or undergoing an invasive urologic procedure.             Physician Progress Notes (last 72 hours)        Kerley, Brian Joseph, DO at 24 1440              [facility]    [facility]    Name:  Que Lagunas   Age:  79 y.o.  Sex:  male  :  1945  MRN:  [id number]   Visit Number:  [id number]  Admission Date:  2024  Date Of Service:  24  Primary Care Physician:  Aurea Ayala MD     LOS: 0 days :    Chief Complaint:      Follow-up; altered mental status, falls    Subjective:    Denies any concerning pain, no shortness of air.  No questions.    Hospital Course:    Que Lagunas is a 79-year-old with a history of atrial fibrillation on Xarelto, CAD, hypertension, type 2 diabetes, vascular dementia, prior tobacco use, who presented from home with multiple complaints.  History obtained from ER record, patient, family.  Patient typically lives at home with his daughter.  He is normally ambulatory with use of a walker also uses a wheelchair at times.  He had been more confused today, and had a couple of falls.  Daughter noted increasing weakness. He was able to answer simple questions and knew he was in the  hospital at time of admission. Had no complaints.     In the ER he was overall hemodynamically stable and not hypoxic.  His workup was concerning for possible urinary tract infection.  CT imaging of the chest was unremarkable.  CT of the cervical spine was unremarkable.  CT scan of the head was unremarkable.  Patient did have blood cultures obtained.  He was given IV fluid resuscitation and antibiotic coverage with Rocephin.      Observation general floor admission 5/31/2024 with UTI, generalized weakness.    Review of Systems:     All systems were reviewed and negative except as mentioned in subjective, assessment and plan.    Vital Signs:    Temp:  [97.4 °F (36.3 °C)-99 °F (37.2 °C)] 98.6 °F (37 °C)  Heart Rate:  [114-124] 114  Resp:  [16] 16  BP: (114-128)/(77-99) 118/88    Intake and output:    I/O last 3 completed shifts:  In: 5200 [P.O.:600; I.V.:2000; IV Piggyback:2600]  Out: 200 [Urine:200]  I/O this shift:  In: 840 [P.O.:840]  Out: -     Physical Examination:    General Appearance:  Alert and cooperative.    Head:  Atraumatic and normocephalic.   Eyes: Conjunctivae and sclerae normal, no icterus. No pallor.   Throat: No oral lesions, no thrush, oral mucosa moist.   Neck: Supple, trachea midline, no thyromegaly.   Lungs:   Breath sounds heard bilaterally equally.  No wheezing or crackles. No Pleural rub or bronchial breathing.   Heart:  Normal S1 and S2, no murmur, no gallop, no rub. No JVD.   Abdomen:   Normal bowel sounds, no masses, no organomegaly. Soft, nontender, nondistended, no rebound tenderness.   Extremities: Supple, no edema, no cyanosis, no clubbing.   Skin: Warm.   Neurologic: Alert and oriented to self and place. No facial asymmetry. Moves all four limbs. No tremors.      Laboratory results:    Results from last 7 days   Lab Units 06/02/24  0622 06/01/24  0702 05/31/24  2037   SODIUM mmol/L 144 138 135*   POTASSIUM mmol/L 4.1 4.0 3.7   CHLORIDE mmol/L 110* 103 94*   CO2 mmol/L 25.9 23.8 25.8  "  BUN mg/dL 14 14 17   CREATININE mg/dL 0.71* 0.79 0.73*   CALCIUM mg/dL 8.5* 8.7 9.2   BILIRUBIN mg/dL  --   --  1.1   ALK PHOS U/L  --   --  83   ALT (SGPT) U/L  --   --  11   AST (SGOT) U/L  --   --  15   GLUCOSE mg/dL 118* 197* 179*     Results from last 7 days   Lab Units 06/02/24  0622 06/01/24  0702 05/31/24 2037   WBC 10*3/mm3 9.45 16.92* 14.15*   HEMOGLOBIN g/dL 12.6* 13.5 14.8   HEMATOCRIT % 38.7 40.8 44.4   PLATELETS 10*3/mm3 283 287 310     Results from last 7 days   Lab Units 05/31/24 2037   INR  1.33*     Results from last 7 days   Lab Units 05/31/24  2313 05/31/24 2037   HSTROP T ng/L 16 17     Results from last 7 days   Lab Units 05/31/24 2211 05/31/24 2053   BLOODCX  Gram Negative Bacilli*  No growth at 24 hours  --    URINECX   --  >100,000 CFU/mL Gram Negative Bacilli*     No results for input(s): \"PHART\", \"BKT8CFF\", \"PO2ART\", \"HCC2DGX\", \"BASEEXCESS\" in the last 8760 hours.   I have reviewed the patient's laboratory results.    Radiology results:    XR Chest 1 View    Result Date: 6/1/2024  PROCEDURE: XR CHEST 1 VW-  HISTORY: weakness  COMPARISON: November 19, 2022.  FINDINGS: Atherosclerosis is noted. The heart is normal in size. The mediastinum is unremarkable. Prominent interstitial markings, likely chronic. No focal consolidation. There is no pneumothorax.  There are no acute osseous abnormalities.      Impression: No acute cardiopulmonary process.  Continued followup is recommended.    This report was signed and finalized on 6/1/2024 7:58 AM by Royce Willis DO.      CT Cervical Spine Without Contrast    Result Date: 5/31/2024  FINAL REPORT TECHNIQUE: Thin section axial images were obtained through the cervical spine without contrast.  Coronal and sagittal reconstructed images were then provided. CLINICAL HISTORY: fall, trauma FINDINGS: There is normal alignment and curvature.  Prevertebral soft tissues are normal.  There is no fracture.  There is severe degenerative disc disease in the " mid-lower cervical spine.     Impression: No acute disease. Authenticated and Electronically Signed by Luis Enrique Preciado M.D. on 05/31/2024 10:30:22 PM    CT Head Without Contrast    Result Date: 5/31/2024  FINAL REPORT TECHNIQUE: Routine axial images through the head were obtained without contrast. CLINICAL HISTORY: fall, trauma, weakness FINDINGS: There is generalized atrophy.  There is mild to moderate ventricular dilation.  Periventricular and deep white matter low-attenuation areas are consistent with chronic small vessel ischemia.  There is no mass or shift in midline structures. There is no intracranial hemorrhage.  There is no acute sinus or osseous abnormality.     Impression: No fracture or acute intracranial abnormality. Authenticated and Electronically Signed by Luis Enrique Preciado M.D. on 05/31/2024 10:29:22 PM    CT Angiogram Chest Pulmonary Embolism    Result Date: 5/31/2024  FINAL REPORT TECHNIQUE: Thin section axial images were obtained through the chest and during the arterial phase of IV contrast administration. Coronal 3-D MIP reconstructed images were also provided. CLINICAL HISTORY: tachycardia, chest pain, syncope, r/o PE FINDINGS: The pulmonary arteries are well-opacified and there is no filling defect to indicate pulmonary embolism. The thoracic aorta is not optimally opacified due to delayed transit of contrast through the heart, but no acute abnormality is suspected.  The lungs are clear. There is no pleural disease. Mild mediastinal adenopathy is possibly due to edema.  There is no acute osseous abnormality.     Impression: No pulmonary embolism or other acute abnormality. Authenticated and Electronically Signed by Luis Enrique Preciado M.D. on 05/31/2024 10:28:43 PM   I have reviewed the patient's radiology reports.    Medication Review:     I have reviewed the patient's active and prn medications.     Problem List:      Complicated UTI (urinary tract infection)      Assessment:     Complicated urinary tract  infection, present on admission, acute  Weakness with impaired mobility and ADLs, POA  Vascular dementia  Atrial fibrillation, chronic  Type 2 diabetes mellitus     Plan:     Comfortable in bed, pleasantly conversational.    Updated daughter Debo on the phone.     UTI  Bacteremia  Rocephin 7-day course.  Blood cultures Klebsiella pneumonia.  Urine culture pending.     Weakness  PT/OT.  At baseline does use a walker and wheelchair and lives with daughter.     Dementia  Complicates all aspects of care.  Had previously been at assisted living had not done well, currently living with daughter.  Has had some issues with weight loss in the past.  Continue home medications.     Atrial fibrillation  Appears rate controlled at this time, will continue with his home metoprolol and Xarelto.  Lopressor as needed for RVR.  RVR likely triggered by infection.        Risk Assessment: High  DVT Prophylaxis: Xarelto  Code Status: Full  Diet: Carb consistent/cardiac  Discharge Plan: A couple more days awaiting sensitivities likely. [facility].    Brian Joseph Kerley, DO  24  14:40 EDT    Dictated utilizing Dragon dictation.      Electronically signed by Kerley, Brian Joseph, DO at 24 1503       Kerley, Brian Joseph, DO at 24 1340              [facility]    [facility]ESS [facility]    Name:  Que Lagunas   Age:  79 y.o.  Sex:  male  :  1945  MRN:  [id number]   Visit Number:  [id number]  Admission Date:  2024  Date Of Service:  24  Primary Care Physician:  Aurea Ayala MD     LOS: 0 days :    Chief Complaint:      Follow-up; altered mental status, falls    Subjective:    Denies any concerning pain, no shortness of air.  No questions.    Hospital Course:    Que Lagunas is a 79-year-old with a history of atrial fibrillation on Xarelto, CAD, hypertension, type 2 diabetes, vascular dementia, prior tobacco use, who presented from home with multiple complaints.  History obtained from ER  record, patient, family.  Patient typically lives at home with his daughter.  He is normally ambulatory with use of a walker also uses a wheelchair at times.  He had been more confused today, and had a couple of falls.  Daughter noted increasing weakness. He was able to answer simple questions and knew he was in the hospital at time of admission. Had no complaints.     In the ER he was overall hemodynamically stable and not hypoxic.  His workup was concerning for possible urinary tract infection.  CT imaging of the chest was unremarkable.  CT of the cervical spine was unremarkable.  CT scan of the head was unremarkable.  Patient did have blood cultures obtained.  He was given IV fluid resuscitation and antibiotic coverage with Rocephin.      Observation general floor admission 5/31/2024 with UTI, generalized weakness.    Review of Systems:     All systems were reviewed and negative except as mentioned in subjective, assessment and plan.    Vital Signs:    Temp:  [98.2 °F (36.8 °C)-101.1 °F (38.4 °C)] 98.8 °F (37.1 °C)  Heart Rate:  [105-129] 118  Resp:  [16-20] 16  BP: (102-178)/() 102/72    Intake and output:    I/O last 3 completed shifts:  In: 3415 [P.O.:480; I.V.:335; IV Piggyback:2600]  Out: -   I/O this shift:  In: 120 [P.O.:120]  Out: 200 [Urine:200]    Physical Examination:    General Appearance:  Alert and cooperative.    Head:  Atraumatic and normocephalic.   Eyes: Conjunctivae and sclerae normal, no icterus. No pallor.   Throat: No oral lesions, no thrush, oral mucosa moist.   Neck: Supple, trachea midline, no thyromegaly.   Lungs:   Breath sounds heard bilaterally equally.  No wheezing or crackles. No Pleural rub or bronchial breathing.   Heart:  Normal S1 and S2, no murmur, no gallop, no rub. No JVD.   Abdomen:   Normal bowel sounds, no masses, no organomegaly. Soft, nontender, nondistended, no rebound tenderness.   Extremities: Supple, no edema, no cyanosis, no clubbing.   Skin: Warm.  "  Neurologic: Alert and oriented to self and place. No facial asymmetry. Moves all four limbs. No tremors.      Laboratory results:    Results from last 7 days   Lab Units 06/01/24 0702 05/31/24 2037   SODIUM mmol/L 138 135*   POTASSIUM mmol/L 4.0 3.7   CHLORIDE mmol/L 103 94*   CO2 mmol/L 23.8 25.8   BUN mg/dL 14 17   CREATININE mg/dL 0.79 0.73*   CALCIUM mg/dL 8.7 9.2   BILIRUBIN mg/dL  --  1.1   ALK PHOS U/L  --  83   ALT (SGPT) U/L  --  11   AST (SGOT) U/L  --  15   GLUCOSE mg/dL 197* 179*     Results from last 7 days   Lab Units 06/01/24 0702 05/31/24 2037   WBC 10*3/mm3 16.92* 14.15*   HEMOGLOBIN g/dL 13.5 14.8   HEMATOCRIT % 40.8 44.4   PLATELETS 10*3/mm3 287 310     Results from last 7 days   Lab Units 05/31/24 2037   INR  1.33*     Results from last 7 days   Lab Units 05/31/24  2313 05/31/24 2037   HSTROP T ng/L 16 17     Results from last 7 days   Lab Units 05/31/24  2211   BLOODCX  Abnormal Stain*     No results for input(s): \"PHART\", \"GJE9FLA\", \"PO2ART\", \"ZCY8RKT\", \"BASEEXCESS\" in the last 8760 hours.   I have reviewed the patient's laboratory results.    Radiology results:    XR Chest 1 View    Result Date: 6/1/2024  PROCEDURE: XR CHEST 1 VW-  HISTORY: weakness  COMPARISON: November 19, 2022.  FINDINGS: Atherosclerosis is noted. The heart is normal in size. The mediastinum is unremarkable. Prominent interstitial markings, likely chronic. No focal consolidation. There is no pneumothorax.  There are no acute osseous abnormalities.      Impression: No acute cardiopulmonary process.  Continued followup is recommended.    This report was signed and finalized on 6/1/2024 7:58 AM by Royce Willis DO.      CT Cervical Spine Without Contrast    Result Date: 5/31/2024  FINAL REPORT TECHNIQUE: Thin section axial images were obtained through the cervical spine without contrast.  Coronal and sagittal reconstructed images were then provided. CLINICAL HISTORY: fall, trauma FINDINGS: There is normal alignment and " curvature.  Prevertebral soft tissues are normal.  There is no fracture.  There is severe degenerative disc disease in the mid-lower cervical spine.     Impression: No acute disease. Authenticated and Electronically Signed by Luis Enrique Preciado M.D. on 05/31/2024 10:30:22 PM    CT Head Without Contrast    Result Date: 5/31/2024  FINAL REPORT TECHNIQUE: Routine axial images through the head were obtained without contrast. CLINICAL HISTORY: fall, trauma, weakness FINDINGS: There is generalized atrophy.  There is mild to moderate ventricular dilation.  Periventricular and deep white matter low-attenuation areas are consistent with chronic small vessel ischemia.  There is no mass or shift in midline structures. There is no intracranial hemorrhage.  There is no acute sinus or osseous abnormality.     Impression: No fracture or acute intracranial abnormality. Authenticated and Electronically Signed by Luis Enrique Preciado M.D. on 05/31/2024 10:29:22 PM    CT Angiogram Chest Pulmonary Embolism    Result Date: 5/31/2024  FINAL REPORT TECHNIQUE: Thin section axial images were obtained through the chest and during the arterial phase of IV contrast administration. Coronal 3-D MIP reconstructed images were also provided. CLINICAL HISTORY: tachycardia, chest pain, syncope, r/o PE FINDINGS: The pulmonary arteries are well-opacified and there is no filling defect to indicate pulmonary embolism. The thoracic aorta is not optimally opacified due to delayed transit of contrast through the heart, but no acute abnormality is suspected.  The lungs are clear. There is no pleural disease. Mild mediastinal adenopathy is possibly due to edema.  There is no acute osseous abnormality.     Impression: No pulmonary embolism or other acute abnormality. Authenticated and Electronically Signed by Luis Enrique Preciado M.D. on 05/31/2024 10:28:43 PM   I have reviewed the patient's radiology reports.    Medication Review:     I have reviewed the patient's active and prn  medications.     Problem List:      Complicated UTI (urinary tract infection)      Assessment:     Complicated urinary tract infection, present on admission, acute  Weakness with impaired mobility and ADLs, POA  Vascular dementia  Atrial fibrillation, chronic  Type 2 diabetes mellitus     Plan:     Fever overnight.  Still having some RVR likely exacerbated by infection.    Comfortable in bed, pleasantly conversational.    Updated daughter Debo on the phone.     UTI  Bacteremia  Rocephin 7-day course.  Blood cultures Klebsiella pneumonia.  Urine culture pending.     Weakness  PT/OT.  At baseline does use a walker and wheelchair and lives with daughter.     Dementia  Complicates all aspects of care.  Had previously been at assisted living had not done well, currently living with daughter.  Has had some issues with weight loss in the past.  Continue home medications.     Atrial fibrillation  Appears rate controlled at this time, will continue with his home metoprolol and Xarelto.  Lopressor as needed for RVR.  RVR likely triggered by infection.        Risk Assessment: High  DVT Prophylaxis: Xarelto  Code Status: Full  Diet: Carb consistent/cardiac  Discharge Plan: A couple more days awaiting sensitivities likely    Brian Joseph Kerley, DO  06/01/24  13:40 EDT    Dictated utilizing Dragon dictation.      Electronically signed by Kerley, Brian Joseph, DO at 06/01/24 1358       Consult Notes (last 72 hours)  Notes from 05/31/24 0823 through 06/03/24 0823   No notes of this type exist for this encounter.

## 2024-06-03 NOTE — THERAPY DISCHARGE NOTE
Acute Care - Occupational Therapy Discharge  [facility]    Patient Name: Que Lagunas  : 1945    MRN: [id number]                              Today's Date: 6/3/2024       Admit Date: 2024    Visit Dx:     ICD-10-CM ICD-9-CM   1. Complicated UTI (urinary tract infection)  N39.0 599.0   2. Acute cystitis without hematuria  N30.00 595.0   3. Multiple falls  R29.6 V15.88   4. Type 2 diabetes mellitus with other specified complication, with long-term current use of insulin  E11.69 250.80    Z79.4 V58.67   5. Dementia, unspecified dementia severity, unspecified dementia type, unspecified whether behavioral, psychotic, or mood disturbance or anxiety  F03.90 294.20   6. Impaired mobility and ADLs  Z74.09 V49.89    Z78.9      Patient Active Problem List   Diagnosis    Type 2 diabetes mellitus with hyperglycemia    Enlarged prostate    Generalized anxiety disorder    Hyperlipidemia    Hypertension    TIA (transient ischemic attack)    CAD (coronary artery disease)    Paroxysmal atrial fibrillation    Vitamin D deficiency    Restless leg syndrome    Personal history of colonic polyps    Loss of balance    Memory loss    Tubular adenoma    Complicated UTI (urinary tract infection)    Dementia    Impaired mobility and ADLs     Past Medical History:   Diagnosis Date    Abnormal EKG     Bifascicular block and no prior ischemia evaluation.     Anemia     Anxiety     Arthritis     Atrial fibrillation     CHADS VASc=3.  Continue Xarelto 20.  vas continue Zaroxolyn 20 has a relative 20 [place] can add Lantus 40    CAD (coronary artery disease)     Colon polyp 2015    Contact dermatitis     Diabetes     Dyslipidemia     Hypertension     Palpitations     Prostatism     Stroke     Tattoo      Past Surgical History:   Procedure Laterality Date    COLONOSCOPY  2015    EYE SURGERY      PROSTATE SURGERY      TONSILLECTOMY        General Information       Row Name 24 1235          OT Time and Intention     Document Type discharge evaluation/summary  -SD     Mode of Treatment occupational therapy  -SD       Row Name 06/03/24 1235          General Information    Patient Profile Reviewed yes  -SD     Prior Level of Function min assist:;all household mobility;transfer;ADL's  Patient lives with his daughter, CHIVO and granddaughter. Has in home caregivers 3 days/week  -SD     Existing Precautions/Restrictions fall  -SD     Barriers to Rehab medically complex;previous functional deficit;cognitive status  -SD       Row Name 06/03/24 1235          Living Environment    People in Home child(juan c), adult  -SD       Row Name 06/03/24 1235          Stairs Within Home, Primary    Stairs, Within Home, Primary unknown  -SD     Stairs Comment, Within Home, Primary unknown  -SD       Row Name 06/03/24 1235          Cognition    Orientation Status (Cognition) oriented to;person;place;verbal cues/prompts needed for orientation  -SD       Row Name 06/03/24 1235          Safety Issues, Functional Mobility    Safety Issues Affecting Function (Mobility) safety precautions follow-through/compliance;safety precaution awareness  -SD     Impairments Affecting Function (Mobility) balance;endurance/activity tolerance;strength  -SD               User Key  (r) = Recorded By, (t) = Taken By, (c) = Cosigned By      Initials Name Provider Type    SD Huma Goldsmith OT Occupational Therapist                   Mobility/ADL's       Row Name 06/03/24 1238          Bed Mobility    Comment, (Bed Mobility) sitting in chair  -SD       Row Name 06/03/24 1238          Transfers    Transfers sit-stand transfer  -SD       Row Name 06/03/24 1238          Sit-Stand Transfer    Sit-Stand Arthur (Transfers) contact guard;verbal cues  -SD     Assistive Device (Sit-Stand Transfers) walker, front-wheeled  -SD       Row Name 06/03/24 1238          Functional Mobility    Functional Mobility- Ind. Level not tested  -SD       Row Name 06/03/24 1238           Activities of Daily Living    BADL Assessment/Intervention bathing;upper body dressing;lower body dressing;grooming;feeding;toileting  -SD       Row Name 06/03/24 1238          Bathing Assessment/Intervention    [place] Level (Bathing) minimum assist (75% patient effort);moderate assist (50% patient effort)  -SD     Comment, (Bathing) has in home caregivers 3 days a week or daughter assists him. Has a shower chair  -SD       Row Name 06/03/24 1238          Upper Body Dressing Assessment/Training    [place] Level (Upper Body Dressing) standby assist  -SD       Row Name 06/03/24 1238          Lower Body Dressing Assessment/Training    [place] Level (Lower Body Dressing) don;doff;socks;standby assist;pants/bottoms;contact guard assist  -SD       Row Name 06/03/24 1238          Grooming Assessment/Training    [place] Level (Grooming) standby assist;set up  -SD       Row Name 06/03/24 1238          Self-Feeding Assessment/Training    [place] Level (Feeding) set up  -SD       Row Name 06/03/24 1238          Toileting Assessment/Training    [place] Level (Toileting) maximum assist (25% patient effort)  -SD     Comment, (Toileting) patient reports he is mostly incontinent. Wears briefs at home.  -SD               User Key  (r) = Recorded By, (t) = Taken By, (c) = Cosigned By      Initials Name Provider Type    Huma Blum OT Occupational Therapist                   Obj/Interventions       Row Name 06/03/24 1245          Range of Motion Comprehensive    General Range of Motion bilateral upper extremity ROM WFL  -SD       Row Name 06/03/24 1245          Strength Comprehensive (MMT)    General Manual Muscle Testing (MMT) Assessment upper extremity strength deficits identified  -SD     Comment, General Manual Muscle Testing (MMT) Assessment UB 4-/5  -SD               User Key  (r) = Recorded By, (t) = Taken By, (c) = Cosigned By      Initials Name Provider Type    Huma Blum OT  Occupational Therapist                   Goals/Plan    No documentation.                  Clinical Impression       Row Name 06/03/24 1245          Pain Assessment    Pretreatment Pain Rating 0/10 - no pain  -SD     Posttreatment Pain Rating 0/10 - no pain  -SD       Row Name 06/03/24 1245          Plan of Care Review    Plan of Care Reviewed With patient  -SD     Progress no change  -SD     Outcome Evaluation OT eval completed. Patient is sitting in chair, oriented to person and place, denies pain at rest. Patient's BP noted to be elevated, notified RN. Patient reports he lives with his daughter, son in law and granddaughter. Patient has in home caregivers 3 days/week. Walks with a rollator, also has a WC and walk in shower. Patient reports he is mostly incontinent, wears depends at home. Patient able to shana/doff socks with SBA, performed sit to stand from chair with CGA. Patient requires min-mod A for bathing, max A for toileting. Patient to return home, would benefit from HH services. Patient to be DC'd from OT services at this time as DC occurring on same day as eval.  -SD       Row Name 06/03/24 1245          Therapy Assessment/Plan (OT)    Patient/Family Therapy Goal Statement (OT) home  -SD     Rehab Potential (OT) good, to achieve stated therapy goals  -SD     Criteria for Skilled Therapeutic Interventions Met (OT) skilled treatment is necessary  -SD     Therapy Frequency (OT) 3 times/wk  5 times if indicated  -SD       Row Name 06/03/24 1245          Therapy Plan Review/Discharge Plan (OT)    Anticipated Discharge Disposition (OT) home with home health;home with assist  -SD       Row Name 06/03/24 1245          Vital Signs    Pre Systolic BP Rehab 149  -SD     Pre Treatment Diastolic   -SD     Pretreatment Heart Rate (beats/min) 116  -SD     O2 Delivery Pre Treatment room air  -SD       Row Name 06/03/24 1245          Positioning and Restraints    Pre-Treatment Position sitting in chair/recliner  -SD      Post Treatment Position chair  -SD     In Chair sitting;call light within reach;encouraged to call for assist;exit alarm on  -SD               User Key  (r) = Recorded By, (t) = Taken By, (c) = Cosigned By      Initials Name Provider Type    Huma Blum OT Occupational Therapist                   Outcome Measures       Row Name 06/03/24 1257          How much help from another is currently needed...    Putting on and taking off regular lower body clothing? 3  -SD     Bathing (including washing, rinsing, and drying) 2  -SD     Toileting (which includes using toilet bed pan or urinal) 2  -SD     Putting on and taking off regular upper body clothing 3  -SD     Taking care of personal grooming (such as brushing teeth) 3  -SD     Eating meals 4  -SD     AM-PAC 6 Clicks Score (OT) 17  -SD       Row Name 06/03/24 1151 06/03/24 0806       How much help from another person do you currently need...    Turning from your back to your side while in flat bed without using bedrails? 4  -RM 4  -CS    Moving from lying on back to sitting on the side of a flat bed without bedrails? 3  -RM 3  -CS    Moving to and from a bed to a chair (including a wheelchair)? 3  -RM 3  -CS    Standing up from a chair using your arms (e.g., wheelchair, bedside chair)? 3  -RM 3  -CS    Climbing 3-5 steps with a railing? 2  -RM 2  -CS    To walk in hospital room? 3  -RM 3  -CS    AM-PAC 6 Clicks Score (PT) 18  -RM 18  -CS    Highest Level of Mobility Goal 6 --> Walk 10 steps or more  -RM 6 --> Walk 10 steps or more  -CS      Row Name 06/03/24 1257 06/03/24 1151       Functional Assessment    Outcome Measure Options AM-PAC 6 Clicks Daily Activity (OT)  -SD AM-PAC 6 Clicks Basic Mobility (PT)  -RM              User Key  (r) = Recorded By, (t) = Taken By, (c) = Cosigned By      Initials Name Provider Type    Real Velazquez, PTA Physical Therapist Assistant    Huma Blum, OT Occupational Therapist    Cyn Andrews LPN  Licensed Nurse                    OT Recommendation and Plan  Therapy Frequency (OT): 3 times/wk (5 times if indicated)  Plan of Care Review  Plan of Care Reviewed With: patient  Progress: no change  Outcome Evaluation: OT eval completed. Patient is sitting in chair, oriented to person and place, denies pain at rest. Patient's BP noted to be elevated, notified RN. Patient reports he lives with his daughter, son in law and granddaughter. Patient has in home caregivers 3 days/week. Walks with a rollator, also has a WC and walk in shower. Patient reports he is mostly incontinent, wears depends at home. Patient able to shana/doff socks with SBA, performed sit to stand from chair with CGA. Patient requires min-mod A for bathing, max A for toileting. Patient to return home, would benefit from HH services. Patient to be DC'd from OT services at this time as DC occurring on same day as eval.  Plan of Care Reviewed With: patient  Outcome Evaluation: OT eval completed. Patient is sitting in chair, oriented to person and place, denies pain at rest. Patient's BP noted to be elevated, notified RN. Patient reports he lives with his daughter, son in law and granddaughter. Patient has in home caregivers 3 days/week. Walks with a rollator, also has a WC and walk in shower. Patient reports he is mostly incontinent, wears depends at home. Patient able to shana/doff socks with SBA, performed sit to stand from chair with CGA. Patient requires min-mod A for bathing, max A for toileting. Patient to return home, would benefit from HH services. Patient to be DC'd from OT services at this time as DC occurring on same day as eval.     Time Calculation:   Evaluation Complexity (OT)  Review Occupational Profile/Medical/Therapy History Complexity: brief/low complexity  Assessment, Occupational Performance/Identification of Deficit Complexity: 1-3 performance deficits  Clinical Decision Making Complexity (OT): problem focused assessment/low  complexity  Overall Complexity of Evaluation (OT): low complexity     Time Calculation- OT       Row Name 06/03/24 1257 06/03/24 1151          Time Calculation- OT    OT Start Time 1115  -SD --     OT Received On 06/03/24  -SD --        Timed Charges    39280 - Gait Training Minutes  -- 13  -RM        Untimed Charges    OT Eval/Re-eval Minutes 45  -SD --        Total Minutes    Timed Charges Total Minutes -- 13  -RM     Untimed Charges Total Minutes 45  -SD --      Total Minutes 45  -SD 13  -RM               User Key  (r) = Recorded By, (t) = Taken By, (c) = Cosigned By      Initials Name Provider Type    RM Real Maradiaga, PTA Physical Therapist Assistant    SD Huma Goldsmith OT Occupational Therapist                  Therapy Charges for Today       Code Description Service Date Service Provider Modifiers Qty    04404217546 HC OT EVAL LOW COMPLEXITY 3 6/3/2024 Huma Goldsmith OT GO 1               OT Discharge Summary  Anticipated Discharge Disposition (OT): home with home health, home with assist    Huma Goldsmith OT  6/3/2024

## 2024-06-03 NOTE — OUTREACH NOTE
Prep Survey      Flowsheet Row Responses   [facility] patient discharged from? [place]   Is LACE score < 7 ? No   Eligibility [facility]   Date of Admission 05/31/24   Date of Discharge 06/03/24   Discharge Disposition Home or Self Care   Discharge diagnosis Complicated UTI (urinary tract infection)   Does the patient have one of the following disease processes/diagnoses(primary or secondary)? Other   Does the patient have Home health ordered? Yes   What is the Home health agency?  [facility] arranging [facility]??   Is there a DME ordered? No   Prep survey completed? Yes            Beatrice DAMIAN - Registered Nurse

## 2024-06-03 NOTE — CASE MANAGEMENT/SOCIAL WORK
Case Management Discharge Note           Provided Post Acute Provider List?: N/A  Provided Post Acute Provider Quality & Resource List?: N/A    Selected Continued Care - Discharged on 6/3/2024 Admission date: 5/31/2024 - Discharge disposition: Home-Health Care Svc      Destination    No services have been selected for the patient.                Durable Medical Equipment    No services have been selected for the patient.                Dialysis/Infusion    No services have been selected for the patient.                Home Medical Care    No services have been selected for the patient.   [facility]; [facility] will [facility]             Therapy    No services have been selected for the patient.                Community Resources    No services have been selected for the patient.                Community & DME    No services have been selected for the patient.                    Transportation Services  Private: Car    Final Discharge Disposition Code: 06 - home with home health care

## 2024-06-03 NOTE — DISCHARGE SUMMARY
[facility]   DISCHARGE SUMMARY      Name:  Que Lagunas   Age:  79 y.o.  Sex:  male  :  1945  MRN:  [id number]   Visit Number:  [id number]    Admission Date:  2024  Date of Discharge:  6/3/2024  Primary Care Physician:  Aurea Ayala MD    Important issues to note:    -Continue Keflex.  -Increased metoprolol to 100 mg twice daily.    Discharge Diagnoses:     UTI  Bacteremia  Impaired mobility and ADLs  Dementia  Atrial fibrillation    Problem List:     Active Hospital Problems    Diagnosis  POA    **Complicated UTI (urinary tract infection) [N39.0]  Yes    Dementia [F03.90]  Yes    Impaired mobility and ADLs [Z74.09, Z78.9]  Yes      Resolved Hospital Problems   No resolved problems to display.     Presenting Problem:    Chief Complaint   Patient presents with    Fall    Hypertension    Weakness - Generalized     Daughter states pt has fallen twice today and denies hitting head. Pt woke up from nap and daughter states he was weak, unable to stand on his own and confused. Pt denies pain att.       Consults:     Consulting Physician(s)                     None              Procedures Performed:    none    History of presenting illness/Hospital Course:    Que Lagunas is a 79-year-old with a history of atrial fibrillation on Xarelto, CAD, hypertension, type 2 diabetes, vascular dementia, prior tobacco use, who presented from home with multiple complaints.  History obtained from ER record, patient, family.  Patient typically lives at home with his daughter.  He is normally ambulatory with use of a walker also uses a wheelchair at times.  He had been more confused today, and had a couple of falls.  Daughter noted increasing weakness. He was able to answer simple questions and knew he was in the hospital at time of admission. Had no complaints.     In the ER he was overall hemodynamically stable and not hypoxic.  His workup was concerning for possible urinary tract  infection.  CT imaging of the chest was unremarkable.  CT of the cervical spine was unremarkable.  CT scan of the head was unremarkable.  Patient did have blood cultures obtained.  He was given IV fluid resuscitation and antibiotic coverage with Rocephin.       Observation general floor admission 5/31/2024 with UTI, generalized weakness.    Stable for discharge home with home health 6/3/2024.  See conditions itemized for details.    UTI  Bacteremia  Continue Keflex at discharge to complete total antibiotic course 7 days, received Rocephin during hospitalization.  Blood cultures Klebsiella pneumonia.  Urine culture Klebsiella pneumonia.     Impaired mobility and ADLs  Continue home health.  PT/OT.  At baseline does use a walker and wheelchair and lives with daughter.     Dementia  Complicates all aspects of care.  Had previously been at assisted living had not done well, currently living with daughter.  Has had some issues with weight loss in the past.  Continued home medications.     Atrial fibrillation  Increased dose of home metoprolol to tartrate to 100 mg twice daily.  This will also help his blood pressure.  Follow-up with PCP.  Continue Xarelto.    Vital Signs:    Temp:  [98.5 °F (36.9 °C)-99 °F (37.2 °C)] 98.8 °F (37.1 °C)  Heart Rate:  [] 116  Resp:  [16-18] 16  BP: (120-142)/() 142/109    Physical Exam:    General Appearance:  Alert and cooperative.    Head:  Atraumatic and normocephalic.   Eyes: Conjunctivae and sclerae normal, no icterus. No pallor.   Ears:  Ears with no abnormalities noted.   Throat: No oral lesions, no thrush, oral mucosa moist.   Neck: Supple, trachea midline, no thyromegaly.   Back:   No kyphoscoliosis present. No tenderness to palpation.   Lungs:   Breath sounds heard bilaterally equally.  No crackles or wheezing. No Pleural rub or bronchial breathing.   Heart:  Normal S1 and S2, no murmur, no gallop, no rub. No JVD.   Abdomen:   Normal bowel sounds, no masses, no  organomegaly. Soft, nontender, nondistended, no rebound tenderness.   Extremities: Supple, no edema, no cyanosis, no clubbing.   Pulses: Pulses palpable bilaterally.   Skin: Warm.   Neurologic: Alert and oriented to self. No facial asymmetry. Moves all four limbs. No tremors.     Pertinent Lab Results:     Results from last 7 days   Lab Units 06/03/24  0536 06/02/24  0622 06/01/24  0702 05/31/24 2037   SODIUM mmol/L 135* 144 138 135*   POTASSIUM mmol/L 4.0 4.1 4.0 3.7   CHLORIDE mmol/L 103 110* 103 94*   CO2 mmol/L 24.6 25.9 23.8 25.8   BUN mg/dL 13 14 14 17   CREATININE mg/dL 0.65* 0.71* 0.79 0.73*   CALCIUM mg/dL 8.4* 8.5* 8.7 9.2   BILIRUBIN mg/dL  --   --   --  1.1   ALK PHOS U/L  --   --   --  83   ALT (SGPT) U/L  --   --   --  11   AST (SGOT) U/L  --   --   --  15   GLUCOSE mg/dL 145* 118* 197* 179*     Results from last 7 days   Lab Units 06/03/24  0536 06/02/24  0622 06/01/24  0702   WBC 10*3/mm3 10.68 9.45 16.92*   HEMOGLOBIN g/dL 12.1* 12.6* 13.5   HEMATOCRIT % 37.8 38.7 40.8   PLATELETS 10*3/mm3 291 283 287     Results from last 7 days   Lab Units 05/31/24 2037   INR  1.33*     Results from last 7 days   Lab Units 05/31/24  2313 05/31/24 2037   HSTROP T ng/L 16 17                     Results from last 7 days   Lab Units 05/31/24 2211 05/31/24 2053   BLOODCX  Klebsiella pneumoniae ssp pneumoniae*  No growth at 2 days  --    URINECX   --  >100,000 CFU/mL Klebsiella pneumoniae ssp pneumoniae*       Pertinent Radiology Results:    Imaging Results (All)       Procedure Component Value Units Date/Time    XR Chest 1 View [[id number]] Collected: 06/01/24 0757     Updated: 06/01/24 0800    Narrative:      PROCEDURE: XR CHEST 1 VW-     HISTORY: weakness     COMPARISON: November 19, 2022.     FINDINGS: Atherosclerosis is noted. The heart is normal in size. The  mediastinum is unremarkable. Prominent interstitial markings, likely  chronic. No focal consolidation. There is no pneumothorax.  There are no  acute  osseous abnormalities.       Impression:      No acute cardiopulmonary process.     Continued followup is recommended.           This report was signed and finalized on 6/1/2024 7:58 AM by Royce Willis DO.       CT Cervical Spine Without Contrast [[id number]] Collected: 05/31/24 2221     Updated: 05/31/24 2231    Narrative:      FINAL REPORT    TECHNIQUE:  Thin section axial images were obtained through the cervical  spine without contrast.  Coronal and sagittal reconstructed  images were then provided.    CLINICAL HISTORY:  fall, trauma    FINDINGS:  There is normal alignment and curvature.  Prevertebral soft  tissues are normal.  There is no fracture.  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      Impression:      No acute disease.    Authenticated and Electronically Signed by Luis Enrique Preciado M.D. on  05/31/2024 10:30:22 PM    CT Head Without Contrast [[id number]] Collected: 05/31/24 2222     Updated: 05/31/24 2230    Narrative:      FINAL REPORT    TECHNIQUE:  Routine axial images through the head were obtained without  contrast.    CLINICAL HISTORY:  fall, trauma, weakness    FINDINGS:  There is generalized atrophy.  There is mild to moderate  ventricular dilation.  Periventricular and deep white matter  low-attenuation areas are consistent with chronic small vessel  ischemia.  There is no mass or shift in midline structures.  There is no intracranial hemorrhage.  There is no acute sinus or  osseous abnormality.      Impression:      No fracture or acute intracranial abnormality.    Authenticated and Electronically Signed by Luis Enrique Preciado M.D. on  05/31/2024 10:29:22 PM    CT Angiogram Chest Pulmonary Embolism [[id number]] Collected: 05/31/24 2222     Updated: 05/31/24 2229    Narrative:      FINAL REPORT    TECHNIQUE:  Thin section axial images were obtained through the chest and  during the arterial phase of IV contrast administration. Coronal  3-D MIP reconstructed images were also  provided.    CLINICAL HISTORY:  tachycardia, chest pain, syncope, r/o PE    FINDINGS:  The pulmonary arteries are well-opacified and there is no  filling defect to indicate pulmonary embolism. The thoracic  aorta is not optimally opacified due to delayed transit of  contrast through the heart, but no acute abnormality is  suspected.  The lungs are clear. There is no pleural disease.  Mild mediastinal adenopathy is possibly due to edema.  There is  no acute osseous abnormality.      Impression:      No pulmonary embolism or other acute abnormality.    Authenticated and Electronically Signed by Luis Enrique Preciado M.D. on  05/31/2024 10:28:43 PM            Echo:    Results for orders placed during the hospital encounter of 03/18/22    Adult Transthoracic Echo Complete W/ Cont if Necessary Per Protocol    Interpretation Summary  · Left ventricular wall thickness is consistent with mild concentric hypertrophy.  · Estimated left ventricular EF = 66% Left ventricular systolic function is normal.  · Left ventricular diastolic function is consistent with (grade II w/high LAP) pseudonormalization.    Condition on Discharge:      Stable.    Code status during the hospital stay:    Code Status and Medical Interventions:   Ordered at: 06/01/24 0644     Code Status (Patient has no pulse and is not breathing):    CPR (Attempt to Resuscitate)     Medical Interventions (Patient has pulse or is breathing):    Full Support     Discharge Disposition:    Home-Health Care [facility]    Discharge Medications:       Discharge Medications        New Medications        Instructions Start Date   cephalexin 500 MG capsule  Commonly known as: Keflex   500 mg, Oral, 2 Times Daily   Start Date: June 4, 2024     metoprolol tartrate 100 MG tablet  Commonly known as: LOPRESSOR   100 mg, Oral, 2 Times Daily             Continue These Medications        Instructions Start Date   donepezil 10 MG tablet  Commonly known as: ARICEPT   10 mg, Oral, Nightly      glucose  "blood test strip  Commonly known as: Solus V2 Test   Use as instructed      glucose monitor monitoring kit   1 each, Does not apply, As Needed      insulin glargine 100 UNIT/ML injection  Commonly known as: Lantus   10 Units, Subcutaneous, Nightly      Insulin Lispro 100 UNIT/ML injection  Commonly known as: humaLOG   22 Units, Subcutaneous, Daily      Insulin Syringe-Needle U-100 30G X 5/16\" 0.5 ML misc  Commonly known as: Easy Touch Insulin Syringe   use as directed with insulin 4 times a day      Lancets misc   Use to test twice daily. E11.9      LifeScan Unistik II Lancets misc   1 Units, Does not apply, 3 Times Daily      memantine 10 MG tablet  Commonly known as: NAMENDA   10 mg, Oral, Nightly      metFORMIN 500 MG tablet  Commonly known as: GLUCOPHAGE   TAKE 1 TABLET BY MOUTH IN THE MORNING WITH  BREAKFAST      PARoxetine 20 MG tablet  Commonly known as: PAXIL   20 mg, Oral, Daily      Pen Needles 3/16\" 31G X 5 MM misc   Use with insulin daily E11.9      pravastatin 40 MG tablet  Commonly known as: PRAVACHOL   TAKE 1 TABLET BY MOUTH ONCE DAILY IN THE EVENING      Xarelto 20 MG tablet  Generic drug: rivaroxaban   Take 1 tablet by mouth once daily             Discharge Diet:     Diet Instructions       Diet: Cardiac Diets, Diabetic Diets; Healthy Heart (2-3 Na+); Regular (IDDSI 7); Thin (IDDSI 0); Consistent Carbohydrate      Discharge Diet:  Cardiac Diets  Diabetic Diets       Cardiac Diet: Healthy Heart (2-3 Na+)    Texture: Regular (IDDSI 7)    Fluid Consistency: Thin (IDDSI 0)    Diabetic Diet: Consistent Carbohydrate          Activity at Discharge:     Activity Instructions       Activity as Tolerated            Follow-up Appointments:    Additional Instructions for the Follow-ups that You Need to Schedule       Ambulatory Referral to Home Health (Hospital)   As directed      Face to Face Visit Date: 6/3/2024   Follow-up provider for Plan of Care?: I treated the patient in an acute care facility and will " not continue treatment after discharge.   Follow-up provider: AUREA HUI [[id number]]   Reason/Clinical Findings: Impaired mobility and ADLs, dementia   Describe mobility limitations that make leaving home difficult: Impaired mobility and ADLs, dementia   Nursing/Therapeutic Services Requested: Physical Therapy Occupational Therapy   PT orders: Total joint pathway Strengthening Transfer training Gait Training Therapeutic exercise Home safety assessment   Weight Bearing Status: As Tolerated   Occupational orders: Activities of daily living Energy conservation Strengthening Cognition Fine motor Home safety assessment   Frequency: 1 Week 1               Follow-up Information       Aurea Hui MD Follow up in 2 day(s).    Specialties: Family Medicine, Emergency Medicine, Urgent Care  Contact information:  [address] [facility]  [address] [place] [address] [id number] [phone]                           Future Appointments   Date Time Provider Department Center   6/5/2024 10:30 AM Christy Basurto APRN [facility]   6/25/2024  9:45 AM Nic Hollingsworth MD [facility]   7/1/2024 11:15 AM Charles Topete MD [facility]     Test Results Pending at Discharge:    Pending Labs       Order Current Status    Blood Culture - Blood, Hand, Right Preliminary result               Brian Joseph Kerley, DO  06/03/24  11:29 EDT    Time: I spent 35 minutes on this discharge activity which included: face-to-face encounter with the patient, reviewing the data in the system, coordination of the care with the nursing staff as well as consultants, documentation, and entering orders.     Dictated utilizing Dragon dictation.

## 2024-06-03 NOTE — PLAN OF CARE
Goal Outcome Evaluation:  Plan of Care Reviewed With: patient        Progress: no change  Outcome Evaluation: OT eval completed. Patient is sitting in chair, oriented to person and place, denies pain at rest. Patient's BP noted to be elevated, notified RN. Patient reports he lives with his daughter, son in law and granddaughter. Patient has in home caregivers 3 days/week. Walks with a rollator, also has a WC and walk in shower. Patient reports he is mostly incontinent, wears depends at home. Patient able to shana/doff socks with SBA, performed sit to stand from chair with CGA. Patient requires min-mod A for bathing, max A for toileting. Patient to return home, would benefit from HH services. Patient to be DC'd from OT services at this time as DC occurring on same day as eval.      Anticipated Discharge Disposition (OT): home with home health, home with assist

## 2024-06-03 NOTE — CONSULTS
"Dietitian Assessment    Patient Name: Que Lagunas  YOB: 1945  MRN: [id number]  Admission date: 5/31/2024    Comment:        Clinical Nutrition Assessment      Reason for Assessment Consult for wt loss   H&P  Past Medical History:   Diagnosis Date    Abnormal EKG     Bifascicular block and no prior ischemia evaluation.     Anemia     Anxiety     Arthritis     Atrial fibrillation     CHADS VASc=3.  Continue Xarelto 20.  vas continue Zaroxolyn 20 has a relative 20 [place] can add Lantus 40    CAD (coronary artery disease)     Colon polyp 04/09/2015    Contact dermatitis     Diabetes     Dyslipidemia     Hypertension     Palpitations     Prostatism     Stroke     Tattoo        Past Surgical History:   Procedure Laterality Date    COLONOSCOPY  04/09/2015    EYE SURGERY      PROSTATE SURGERY      TONSILLECTOMY  1947            Current Problems        Encounter Information        Trending Narrative     6/3: Pt admitted d/t UTI. Pt noted for having wt loss previously. Pt CBW is close to wt on 7/13/2023. Pt's wt may fluctuate d/t being in wheelchair/walker and RD unsure on accuracy of wts in EMR. Average PO intake 75% x 4 meals. Good PO intake. No low blood glucose levels during admission. ONS not warranted a this time. RD to f/up.     Anthropometrics        Current Height, Weight Height: 182.9 cm (72\")  Weight: 79.2 kg (174 lb 9.7 oz) (06/03/24 0300)   Trending Weight Hx     This admission:              PTA:     Wt Readings from Last 30 Encounters:   06/03/24 0300 79.2 kg (174 lb 9.7 oz)   05/31/24 2347 73.5 kg (162 lb 0.6 oz)   05/31/24 2018 69.9 kg (154 lb)   01/30/24 1705 74.8 kg (165 lb)   07/19/23 1613 79.4 kg (175 lb)   07/13/23 0831 79.2 kg (174 lb 9.6 oz)   06/19/23 1633 79.6 kg (175 lb 6.4 oz)   04/19/23 1649 84.4 kg (186 lb)   03/01/23 1712 86.6 kg (191 lb)   02/24/23 0959 86.2 kg (190 lb)   02/15/23 1625 84.8 kg (187 lb)   02/02/23 1336 88.9 kg (196 lb)   01/28/23 0931 88.9 kg (196 lb) "   01/26/23 1315 88.9 kg (196 lb)   12/15/22 1656 96.2 kg (212 lb)   11/19/22 1343 90.7 kg (200 lb)   07/13/22 0823 93.4 kg (206 lb)   06/06/22 0943 91.6 kg (202 lb)   04/25/22 0857 88.7 kg (195 lb 9.6 oz)   04/13/22 1637 91.2 kg (201 lb)   03/18/22 1351 94.3 kg (207 lb 14.3 oz)   03/02/22 1602 94.3 kg (208 lb)   11/03/21 1708 86.6 kg (191 lb)   10/25/21 0840 87.3 kg (192 lb 6.4 oz)   09/01/21 1001 82.3 kg (181 lb 6.4 oz)   07/26/21 0834 82.6 kg (182 lb 3.2 oz)   07/01/21 1005 80.3 kg (177 lb)   06/02/21 0923 82.1 kg (181 lb)   05/17/21 0827 80.6 kg (177 lb 9.6 oz)   04/27/21 0805 79.4 kg (175 lb)   04/19/21 1109 79.8 kg (176 lb)   03/25/21 1026 77.6 kg (171 lb)      BMI kg/m2 Body mass index is 23.68 kg/m².     Labs        Pertinent Labs     Results from last 7 days   Lab Units 06/03/24  0536 06/02/24  0622 06/01/24  0702 05/31/24  2037   SODIUM mmol/L 135* 144 138 135*   POTASSIUM mmol/L 4.0 4.1 4.0 3.7   CHLORIDE mmol/L 103 110* 103 94*   CO2 mmol/L 24.6 25.9 23.8 25.8   BUN mg/dL 13 14 14 17   CREATININE mg/dL 0.65* 0.71* 0.79 0.73*   CALCIUM mg/dL 8.4* 8.5* 8.7 9.2   BILIRUBIN mg/dL  --   --   --  1.1   ALK PHOS U/L  --   --   --  83   ALT (SGPT) U/L  --   --   --  11   AST (SGOT) U/L  --   --   --  15   GLUCOSE mg/dL 145* 118* 197* 179*       Results from last 7 days   Lab Units 06/03/24  0536 06/01/24  0702 05/31/24 2037   MAGNESIUM mg/dL  --   --  1.6   PHOSPHORUS mg/dL  --   --  2.6   HEMOGLOBIN g/dL 12.1*   < > 14.8   HEMATOCRIT % 37.8   < > 44.4    < > = values in this interval not displayed.       Lab Results   Component Value Date    HGBA1C 6.5 (A) 01/30/2024            Medications       Scheduled Medications amLODIPine, 5 mg, Oral, Q24H  cefTRIAXone, 2,000 mg, Intravenous, Q24H  donepezil, 10 mg, Oral, Nightly  insulin glargine, 7 Units, Subcutaneous, Nightly  insulin lispro, 2-9 Units, Subcutaneous, 4x Daily AC & at Bedtime  memantine, 10 mg, Oral, Nightly  metoprolol tartrate, 75 mg, Oral,  Q12H  PARoxetine, 20 mg, Oral, Daily  pravastatin, 40 mg, Oral, Daily  rivaroxaban, 20 mg, Oral, Daily With Dinner  sodium chloride, 10 mL, Intravenous, Q12H        Infusions sodium chloride 0.9 % with KCl 20 mEq, 100 mL/hr, Last Rate: 100 mL/hr (06/02/24 2129)         PRN Medications   acetaminophen **OR** acetaminophen **OR** acetaminophen    aluminum-magnesium hydroxide-simethicone    senna-docusate sodium **AND** polyethylene glycol **AND** bisacodyl **AND** bisacodyl    dextrose    dextrose    glucagon (human recombinant)    metoprolol tartrate    nitroglycerin    ondansetron ODT **OR** ondansetron    sodium chloride    sodium chloride    sodium chloride    sodium chloride     Physical Findings        Trending Physical   Appearance, NFPE    --  Edema  1+     Bowel Function LBM: 6/2     Tubes Peripheral IV     Chewing/Swallowing WNL     Skin WNL       Estimated/Assessed Needs       Energy Requirements    EST Needs, Method, Wt used 3503-3820 kcal (25-30 kcal/kg CBW)       Protein Requirements    EST Needs, Method, Wt used 63-79 g pro (.8-1 g pro/kg CBW)       Fluid Requirements     Estimated Needs (mL/day) 0243-6226 mL       Current Nutrition Orders & Evaluation of Intake       Oral Nutrition     Food Allergies NKFA   Current PO Diet Diet: Cardiac, Diabetic; Healthy Heart (2-3 Na+); Consistent Carbohydrate; Fluid Consistency: Thin (IDDSI 0)   Supplement    PO Evaluation     Trending % PO Intake 75% x 4 meals       Nutrition Diagnosis         Nutrition Dx Problem 1 No nutrition dx      Nutrition Dx Problem 2        Intervention Goal         Intervention Goal(s) Maintain CBW  PO intake meet >50% of estimated needs     Nutrition Intervention        RD Action No action at this time     Nutrition Prescription          Diet Prescription CCD, HH   Supplement Prescription      Enteral Prescription        TPN Prescription      Monitor/Evaluation        Monitor Per protocol, I&O, PO intake, Pertinent labs, Weight, Skin  status, GI status, Symptoms, POC/GOC, Swallow function, Hemodynamic stability     RD to f/up    Electronically signed by:  Danuta Dunbar RD  06/03/24 07:15 EDT

## 2024-06-03 NOTE — PLAN OF CARE
Goal Outcome Evaluation:  Plan of Care Reviewed With: patient        Progress: improving  Outcome Evaluation: Pt supine in bed and willing to participate with treatment. Pt performed supine fto sitting EOB with cga/min a. Pt STS transfer required cga/min a with rw  and vc 's.  Pt advanced gait distance to 40' min a with rw. Pt requires several vc's for walker placement and safety during mobility. See flowsheet for details.  Cont PT per POC progressing to goals as pt tolerates.

## 2024-06-04 ENCOUNTER — TRANSITIONAL CARE MANAGEMENT TELEPHONE ENCOUNTER (OUTPATIENT)
Dept: CALL CENTER | Facility: HOSPITAL | Age: 79
End: 2024-06-04
Payer: MEDICARE

## 2024-06-04 NOTE — PAYOR COMM NOTE
"[facility]:  [facility]  From: Kylah Martinez RN  Phone: [phone]  Fax: [phone]  NPI: [id number]  TIN: [id number]  Member ID: [id number]   MRN: [id number]    Bismark Rosenthal (79 y.o. Male)       Date of Birth   1945    Social Security Number       Address   [address] [place] [address] [address] [place] [address]    Home Phone   [phone]    MRN   [id number]       L[employer]   Fa[employer]    Marital Status                               Admission Date   5/31/24    Admission Type   Emergency    Admitting Provider   Zaria García DO    Attending Provider       Department, Room/Bed   [facility] TELEMETRY 3, 313/1       Discharge Date   6/3/2024    Discharge Disposition   Home-Health Care [facility]    Discharge Destination   Home                              Attending Provider: (none)   Allergies: Lisinopril    Isolation: None   Infection: None   Code Status: Prior    Ht: 182.9 cm (72\")   Wt: 79.2 kg (174 lb 9.7 oz)    Admission Cmt: None   Principal Problem: Complicated UTI (urinary tract infection) [N39.0]                   Active Insurance as of 5/31/2024       Primary Coverage       Payor Plan Insurance Group Employer/Plan Group    [employer] [id number]        Payor Plan Address Payor Plan Phone Number Payor Plan Fax Number Effective Dates    [facility] OFFICE OF COMMUNITY CARE [phone]  1/1/2024 - None Entered    [address] [address]       [facility] [address]         Subscriber Name Subscriber Birth Date Member ID       BISMARK ROSENTHAL 1945 [id number]                     Emergency Contacts        (Rel.) Home Phone Work Phone Mobile Phone    BcJennifer (Daughter) -- -- [phone]    Sole Hdez (Grandchild) -- -- [phone]    Fe Callejas (Other) -- -- [phone]                 Discharge Summary        Kerley, Brian Joseph, DO at 06/03/24 [facility]              [facility]   DISCHARGE SUMMARY      Name:  Bismark Rosenthal   Age:  79 y.o.  Sex:  " male  :  1945  MRN:  [id number]   Visit Number:  [id number]    Admission Date:  2024  Date of Discharge:  6/3/2024  Primary Care Physician:  Aurea Ayala MD    Important issues to note:    -Continue Keflex.  -Increased metoprolol to 100 mg twice daily.    Discharge Diagnoses:     UTI  Bacteremia  Impaired mobility and ADLs  Dementia  Atrial fibrillation    Problem List:     Active Hospital Problems    Diagnosis  POA    **Complicated UTI (urinary tract infection) [N39.0]  Yes    Dementia [F03.90]  Yes    Impaired mobility and ADLs [Z74.09, Z78.9]  Yes      Resolved Hospital Problems   No resolved problems to display.     Presenting Problem:    Chief Complaint   Patient presents with    Fall    Hypertension    Weakness - Generalized     Daughter states pt has fallen twice today and denies hitting head. Pt woke up from nap and daughter states he was weak, unable to stand on his own and confused. Pt denies pain att.       Consults:     Consulting Physician(s)                     None              Procedures Performed:    none    History of presenting illness/Hospital Course:    Que Lagunas is a 79-year-old with a history of atrial fibrillation on Xarelto, CAD, hypertension, type 2 diabetes, vascular dementia, prior tobacco use, who presented from home with multiple complaints.  History obtained from ER record, patient, family.  Patient typically lives at home with his daughter.  He is normally ambulatory with use of a walker also uses a wheelchair at times.  He had been more confused today, and had a couple of falls.  Daughter noted increasing weakness. He was able to answer simple questions and knew he was in the hospital at time of admission. Had no complaints.     In the ER he was overall hemodynamically stable and not hypoxic.  His workup was concerning for possible urinary tract infection.  CT imaging of the chest was unremarkable.  CT of the cervical spine was unremarkable.  CT scan of the head  was unremarkable.  Patient did have blood cultures obtained.  He was given IV fluid resuscitation and antibiotic coverage with Rocephin.       Observation general floor admission 5/31/2024 with UTI, generalized weakness.    Stable for discharge home with home health 6/3/2024.  See conditions itemized for details.    UTI  Bacteremia  Continue Keflex at discharge to complete total antibiotic course 7 days, received Rocephin during hospitalization.  Blood cultures Klebsiella pneumonia.  Urine culture Klebsiella pneumonia.     Impaired mobility and ADLs  Continue home health.  PT/OT.  At baseline does use a walker and wheelchair and lives with daughter.     Dementia  Complicates all aspects of care.  Had previously been at assisted living had not done well, currently living with daughter.  Has had some issues with weight loss in the past.  Continued home medications.     Atrial fibrillation  Increased dose of home metoprolol to tartrate to 100 mg twice daily.  This will also help his blood pressure.  Follow-up with PCP.  Continue Xarelto.    Vital Signs:    Temp:  [98.5 °F (36.9 °C)-99 °F (37.2 °C)] 98.8 °F (37.1 °C)  Heart Rate:  [] 116  Resp:  [16-18] 16  BP: (120-142)/() 142/109    Physical Exam:    General Appearance:  Alert and cooperative.    Head:  Atraumatic and normocephalic.   Eyes: Conjunctivae and sclerae normal, no icterus. No pallor.   Ears:  Ears with no abnormalities noted.   Throat: No oral lesions, no thrush, oral mucosa moist.   Neck: Supple, trachea midline, no thyromegaly.   Back:   No kyphoscoliosis present. No tenderness to palpation.   Lungs:   Breath sounds heard bilaterally equally.  No crackles or wheezing. No Pleural rub or bronchial breathing.   Heart:  Normal S1 and S2, no murmur, no gallop, no rub. No JVD.   Abdomen:   Normal bowel sounds, no masses, no organomegaly. Soft, nontender, nondistended, no rebound tenderness.   Extremities: Supple, no edema, no cyanosis, no clubbing.    Pulses: Pulses palpable bilaterally.   Skin: Warm.   Neurologic: Alert and oriented to self. No facial asymmetry. Moves all four limbs. No tremors.     Pertinent Lab Results:     Results from last 7 days   Lab Units 06/03/24  0536 06/02/24 0622 06/01/24  0702 05/31/24 2037   SODIUM mmol/L 135* 144 138 135*   POTASSIUM mmol/L 4.0 4.1 4.0 3.7   CHLORIDE mmol/L 103 110* 103 94*   CO2 mmol/L 24.6 25.9 23.8 25.8   BUN mg/dL 13 14 14 17   CREATININE mg/dL 0.65* 0.71* 0.79 0.73*   CALCIUM mg/dL 8.4* 8.5* 8.7 9.2   BILIRUBIN mg/dL  --   --   --  1.1   ALK PHOS U/L  --   --   --  83   ALT (SGPT) U/L  --   --   --  11   AST (SGOT) U/L  --   --   --  15   GLUCOSE mg/dL 145* 118* 197* 179*     Results from last 7 days   Lab Units 06/03/24  0536 06/02/24  0622 06/01/24 0702   WBC 10*3/mm3 10.68 9.45 16.92*   HEMOGLOBIN g/dL 12.1* 12.6* 13.5   HEMATOCRIT % 37.8 38.7 40.8   PLATELETS 10*3/mm3 291 283 287     Results from last 7 days   Lab Units 05/31/24 2037   INR  1.33*     Results from last 7 days   Lab Units 05/31/24  2313 05/31/24 2037   HSTROP T ng/L 16 17                     Results from last 7 days   Lab Units 05/31/24 2211 05/31/24 2053   BLOODCX  Klebsiella pneumoniae ssp pneumoniae*  No growth at 2 days  --    URINECX   --  >100,000 CFU/mL Klebsiella pneumoniae ssp pneumoniae*       Pertinent Radiology Results:    Imaging Results (All)       Procedure Component Value Units Date/Time    XR Chest 1 View [[id number]] Collected: 06/01/24 0757     Updated: 06/01/24 0800    Narrative:      PROCEDURE: XR CHEST 1 VW-     HISTORY: weakness     COMPARISON: November 19, 2022.     FINDINGS: Atherosclerosis is noted. The heart is normal in size. The  mediastinum is unremarkable. Prominent interstitial markings, likely  chronic. No focal consolidation. There is no pneumothorax.  There are no  acute osseous abnormalities.       Impression:      No acute cardiopulmonary process.     Continued followup is recommended.            This report was signed and finalized on 6/1/2024 7:58 AM by Royce Willis DO.       CT Cervical Spine Without Contrast [[id number]] Collected: 05/31/24 2221     Updated: 05/31/24 2231    Narrative:      FINAL REPORT    TECHNIQUE:  Thin section axial images were obtained through the cervical  spine without contrast.  Coronal and sagittal reconstructed  images were then provided.    CLINICAL HISTORY:  fall, trauma    FINDINGS:  There is normal alignment and curvature.  Prevertebral soft  tissues are normal.  There is no fracture.  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      Impression:      No acute disease.    Authenticated and Electronically Signed by Luis Enrique Preciado M.D. on  05/31/2024 10:30:22 PM    CT Head Without Contrast [[id number]] Collected: 05/31/24 2222     Updated: 05/31/24 2230    Narrative:      FINAL REPORT    TECHNIQUE:  Routine axial images through the head were obtained without  contrast.    CLINICAL HISTORY:  fall, trauma, weakness    FINDINGS:  There is generalized atrophy.  There is mild to moderate  ventricular dilation.  Periventricular and deep white matter  low-attenuation areas are consistent with chronic small vessel  ischemia.  There is no mass or shift in midline structures.  There is no intracranial hemorrhage.  There is no acute sinus or  osseous abnormality.      Impression:      No fracture or acute intracranial abnormality.    Authenticated and Electronically Signed by Luis Enrique Preciado M.D. on  05/31/2024 10:29:22 PM    CT Angiogram Chest Pulmonary Embolism [[id number]] Collected: 05/31/24 2222     Updated: 05/31/24 2229    Narrative:      FINAL REPORT    TECHNIQUE:  Thin section axial images were obtained through the chest and  during the arterial phase of IV contrast administration. Coronal  3-D MIP reconstructed images were also provided.    CLINICAL HISTORY:  tachycardia, chest pain, syncope, r/o PE    FINDINGS:  The pulmonary arteries are well-opacified  and there is no  filling defect to indicate pulmonary embolism. The thoracic  aorta is not optimally opacified due to delayed transit of  contrast through the heart, but no acute abnormality is  suspected.  The lungs are clear. There is no pleural disease.  Mild mediastinal adenopathy is possibly due to edema.  There is  no acute osseous abnormality.      Impression:      No pulmonary embolism or other acute abnormality.    Authenticated and Electronically Signed by Luis Enrique Preciado M.D. on  05/31/2024 10:28:43 PM            Echo:    Results for orders placed during the hospital encounter of 03/18/22    Adult Transthoracic Echo Complete W/ Cont if Necessary Per Protocol    Interpretation Summary  · Left ventricular wall thickness is consistent with mild concentric hypertrophy.  · Estimated left ventricular EF = 66% Left ventricular systolic function is normal.  · Left ventricular diastolic function is consistent with (grade II w/high LAP) pseudonormalization.    Condition on Discharge:      Stable.    Code status during the hospital stay:    Code Status and Medical Interventions:   Ordered at: 06/01/24 0644     Code Status (Patient has no pulse and is not breathing):    CPR (Attempt to Resuscitate)     Medical Interventions (Patient has pulse or is breathing):    Full Support     Discharge Disposition:    Home-Health Care [facility]    Discharge Medications:       Discharge Medications        New Medications        Instructions Start Date   cephalexin 500 MG capsule  Commonly known as: Keflex   500 mg, Oral, 2 Times Daily   Start Date: June 4, 2024     metoprolol tartrate 100 MG tablet  Commonly known as: LOPRESSOR   100 mg, Oral, 2 Times Daily             Continue These Medications        Instructions Start Date   donepezil 10 MG tablet  Commonly known as: ARICEPT   10 mg, Oral, Nightly      glucose blood test strip  Commonly known as: Solus V2 Test   Use as instructed      glucose monitor monitoring kit   1 each, Does not  "apply, As Needed      insulin glargine 100 UNIT/ML injection  Commonly known as: Lantus   10 Units, Subcutaneous, Nightly      Insulin Lispro 100 UNIT/ML injection  Commonly known as: humaLOG   22 Units, Subcutaneous, Daily      Insulin Syringe-Needle U-100 30G X 5/16\" 0.5 ML misc  Commonly known as: Easy Touch Insulin Syringe   use as directed with insulin 4 times a day      Lancets misc   Use to test twice daily. E11.9      LifeScan Unistik II Lancets misc   1 Units, Does not apply, 3 Times Daily      memantine 10 MG tablet  Commonly known as: NAMENDA   10 mg, Oral, Nightly      metFORMIN 500 MG tablet  Commonly known as: GLUCOPHAGE   TAKE 1 TABLET BY MOUTH IN THE MORNING WITH  BREAKFAST      PARoxetine 20 MG tablet  Commonly known as: PAXIL   20 mg, Oral, Daily      Pen Needles 3/16\" 31G X 5 MM misc   Use with insulin daily E11.9      pravastatin 40 MG tablet  Commonly known as: PRAVACHOL   TAKE 1 TABLET BY MOUTH ONCE DAILY IN THE EVENING      Xarelto 20 MG tablet  Generic drug: rivaroxaban   Take 1 tablet by mouth once daily             Discharge Diet:     Diet Instructions       Diet: Cardiac Diets, Diabetic Diets; Healthy Heart (2-3 Na+); Regular (IDDSI 7); Thin (IDDSI 0); Consistent Carbohydrate      Discharge Diet:  Cardiac Diets  Diabetic Diets       Cardiac Diet: Healthy Heart (2-3 Na+)    Texture: Regular (IDDSI 7)    Fluid Consistency: Thin (IDDSI 0)    Diabetic Diet: Consistent Carbohydrate          Activity at Discharge:     Activity Instructions       Activity as Tolerated            Follow-up Appointments:    Additional Instructions for the Follow-ups that You Need to Schedule       Ambulatory Referral to Home Health (Hospital)   As directed      Face to Face Visit Date: 6/3/2024   Follow-up provider for Plan of Care?: I treated the patient in an acute care facility and will not continue treatment after discharge.   Follow-up provider: ANTWAN HUI [[id number]]   Reason/Clinical Findings: Impaired " mobility and ADLs, dementia   Describe mobility limitations that make leaving home difficult: Impaired mobility and ADLs, dementia   Nursing/Therapeutic Services Requested: Physical Therapy Occupational Therapy   PT orders: Total joint pathway Strengthening Transfer training Gait Training Therapeutic exercise Home safety assessment   Weight Bearing Status: As Tolerated   Occupational orders: Activities of daily living Energy conservation Strengthening Cognition Fine motor Home safety assessment   Frequency: 1 Week 1               Follow-up Information       Aurea Ayala MD Follow up in 2 day(s).    Specialties: Family Medicine, Emergency Medicine, Urgent Care  Contact information:  [address] [place], [place] [address] [phone]                           Future Appointments   Date Time Provider Department Center   6/5/2024 10:30 AM Christy Basurto APRN E [facility]   6/25/2024  9:45 AM Nic Hollingsworth MD E [facility]   7/1/2024 11:15 AM Charles Topete MD E [facility]     Test Results Pending at Discharge:    Pending Labs       Order Current Status    Blood Culture - Blood, Hand, Right Preliminary result               Brian Joseph Kerley, DO  06/03/24  11:29 EDT    Time: I spent 35 minutes on this discharge activity which included: face-to-face encounter with the patient, reviewing the data in the system, coordination of the care with the nursing staff as well as consultants, documentation, and entering orders.     Dictated utilizing Dragon dictation.      Electronically signed by Kerley, Brian Joseph, DO at 06/03/24 6518

## 2024-06-04 NOTE — OUTREACH NOTE
Call Center TCM Note      Flowsheet Row Responses   [facility] patient discharged from? [facility]   Does the patient have one of the following disease processes/diagnoses(primary or secondary)? Other   TCM attempt successful? Yes   Call start time 1327   Call end time 1331   Discharge diagnosis UTI  Bacteremia  Impaired mobility and ADLs  Dementia  Atrial fibrillation   Is patient permission given to speak with other caregiver? Yes   Person spoke with today (if not patient) and relationship Jennifer,  Daughter   Meds reviewed with patient/caregiver? Yes  [New: keflex, metoprolol tartrate]   Does the patient have all medications ordered at discharge? Yes   Is the patient taking all medications as directed (includes completed medication regime)? Yes   Comments PCP Dr Ayala. Hospital follow up in place for 6/6/24  330pm with PCP   Does the patient have an appointment with their PCP within 7-14 days of discharge? Yes   What is the Home health agency?  [facility]   Has home health visited the patient within 72 hours of discharge? Call prior to 72 hours   Psychosocial issues? No   Psychosocial comments Has [facility] home care benefits   Did the patient receive a copy of their discharge instructions? Yes   Nursing interventions Reviewed instructions with patient   What is the patient's perception of their health status since discharge? Improving   Is the patient/caregiver able to teach back signs and symptoms related to disease process for when to call PCP? Yes   Is the patient/caregiver able to teach back signs and symptoms related to disease process for when to call 911? Yes   Is the patient/caregiver able to teach back the hierarchy of who to call/visit for symptoms/problems? PCP, Specialist, Home health nurse, Urgent Care, ED, 911 Yes   If the patient is a current smoker, are they able to teach back resources for cessation? Not a smoker   TCM call completed? Yes   Wrap up additional comments Neurology  appt 6/5  1030am< New patient with Gastroenterology 6/25, Cardiology appt 7/1   Call end time 1331   Would this patient benefit from a Referral to [facility] Social Work? No   Is the patient interested in additional calls from an ambulatory ? No            Sandy Chapman RN    6/4/2024, 13:34 EDT

## 2024-06-05 LAB — BACTERIA SPEC AEROBE CULT: NORMAL

## 2024-06-06 ENCOUNTER — OFFICE VISIT (OUTPATIENT)
Dept: INTERNAL MEDICINE | Facility: CLINIC | Age: 79
End: 2024-06-06
Payer: MEDICARE

## 2024-06-06 VITALS
HEIGHT: 72 IN | RESPIRATION RATE: 17 BRPM | OXYGEN SATURATION: 95 % | SYSTOLIC BLOOD PRESSURE: 116 MMHG | HEART RATE: 88 BPM | BODY MASS INDEX: 21.94 KG/M2 | WEIGHT: 162 LBS | DIASTOLIC BLOOD PRESSURE: 82 MMHG

## 2024-06-06 DIAGNOSIS — I48.0 PAROXYSMAL ATRIAL FIBRILLATION: ICD-10-CM

## 2024-06-06 DIAGNOSIS — E11.65 TYPE 2 DIABETES MELLITUS WITH HYPERGLYCEMIA, WITHOUT LONG-TERM CURRENT USE OF INSULIN: ICD-10-CM

## 2024-06-06 DIAGNOSIS — N39.0 COMPLICATED UTI (URINARY TRACT INFECTION): Primary | ICD-10-CM

## 2024-06-06 LAB
EXPIRATION DATE: ABNORMAL
HBA1C MFR BLD: 7.1 % (ref 4.5–5.7)
Lab: ABNORMAL

## 2024-06-06 PROCEDURE — 83036 HEMOGLOBIN GLYCOSYLATED A1C: CPT | Performed by: STUDENT IN AN ORGANIZED HEALTH CARE EDUCATION/TRAINING PROGRAM

## 2024-06-06 PROCEDURE — 3051F HG A1C>EQUAL 7.0%<8.0%: CPT | Performed by: STUDENT IN AN ORGANIZED HEALTH CARE EDUCATION/TRAINING PROGRAM

## 2024-06-06 PROCEDURE — 99495 TRANSJ CARE MGMT MOD F2F 14D: CPT | Performed by: STUDENT IN AN ORGANIZED HEALTH CARE EDUCATION/TRAINING PROGRAM

## 2024-06-06 PROCEDURE — 3074F SYST BP LT 130 MM HG: CPT | Performed by: STUDENT IN AN ORGANIZED HEALTH CARE EDUCATION/TRAINING PROGRAM

## 2024-06-06 PROCEDURE — 3079F DIAST BP 80-89 MM HG: CPT | Performed by: STUDENT IN AN ORGANIZED HEALTH CARE EDUCATION/TRAINING PROGRAM

## 2024-06-06 PROCEDURE — 1126F AMNT PAIN NOTED NONE PRSNT: CPT | Performed by: STUDENT IN AN ORGANIZED HEALTH CARE EDUCATION/TRAINING PROGRAM

## 2024-06-06 PROCEDURE — 1111F DSCHRG MED/CURRENT MED MERGE: CPT | Performed by: STUDENT IN AN ORGANIZED HEALTH CARE EDUCATION/TRAINING PROGRAM

## 2024-06-06 RX ORDER — METOPROLOL TARTRATE 100 MG/1
100 TABLET ORAL 2 TIMES DAILY
Qty: 180 TABLET | Refills: 1 | Status: SHIPPED | OUTPATIENT
Start: 2024-06-06

## 2024-06-06 NOTE — ASSESSMENT & PLAN NOTE
A1c today 7.1, stable on current medication and dosage. Will continue current management. Refill medication as necessary.  Insulin glargine 10 units at night, lispro 22 units daily  Decrease sugar and carbohydrates in diet

## 2024-06-06 NOTE — ASSESSMENT & PLAN NOTE
Continue metoprolol tartrate 100 mg twice a day, continue Xarelto 20 mg daily  Follows with cardiology

## 2024-06-06 NOTE — PROGRESS NOTES
Transitional Care Follow Up Visit  Subjective     Que Boyd Lagunas is a 79 y.o. male who presents for a transitional care management visit.    Within 48 business hours after discharge our office contacted him via telephone to coordinate his care and needs.      I reviewed and discussed the details of that call along with the discharge summary, hospital problems, inpatient lab results, inpatient diagnostic studies, and consultation reports with Que.     Current outpatient and discharge medications have been reconciled for the patient.  Reviewed by: Aurea Ayala MD          6/3/2024     4:50 PM   Date of TCM Phone Call   [facility]   Date of Admission 5/31/2024   Date of Discharge 6/3/2024   Discharge Disposition Home or Self Care     Risk for Readmission (LACE) Score: 8 (6/3/2024  6:00 AM)      History of Present Illness   Course During Hospital Stay:     Patient was noted to be more confused.  Daughter noted increasing weakness.  He was eventually brought to the ER.  He was able to answer simple questions and knew he was in the hospital at time of admission.      In the ER he was overall hemodynamically stable and not hypoxic.  His workup was concerning for possible urinary tract infection.  CT imaging of the chest was unremarkable.  CT of the cervical spine was unremarkable.  CT scan of the head was unremarkable.  Patient did have blood cultures obtained.  He was given IV fluid resuscitation and antibiotic coverage with Rocephin.       Observation general floor admission 5/31/2024 with UTI, generalized weakness.  Was eventually managed with IV Rocephin and continued Keflex ensuring discharged to complete 7 days.  Blood culture showed Klebsiella pneumonia, urine culture Klebsiella pneumonia.    A-fib: Metoprolol dose was increased to 100 mg twice a day to also help with his blood pressure, advised to continue Xarelto.  Today heart rate is elevated at 120.  But denies any chest pain,  "difficulty breathing, palpitations.    Dementia and impaired mobility currently working with home health for physical therapy and Occupational Therapy.  Does use a walker and a wheelchair.  Currently lives with daughter.    Wants A1c checked today.  He continues to be on Humalog 22 units daily and Lantus 10 units nightly.    The following portions of the patient's history were reviewed and updated as appropriate: allergies, current medications, past family history, past medical history, past social history, past surgical history, and problem list.    Review of Systems   All other systems reviewed and are negative.      Objective   /82   Pulse 88   Resp 17   Ht 182.9 cm (72.01\")   Wt 73.5 kg (162 lb)   SpO2 95%   BMI 21.97 kg/m²   Physical Exam  Vitals and nursing note reviewed.   Constitutional:       Appearance: Normal appearance.   HENT:      Head: Normocephalic and atraumatic.   Cardiovascular:      Rate and Rhythm: Normal rate and regular rhythm.      Pulses: Normal pulses.      Heart sounds: Normal heart sounds.   Pulmonary:      Effort: Pulmonary effort is normal. No respiratory distress.      Breath sounds: Normal breath sounds. No wheezing, rhonchi or rales.   Abdominal:      General: Abdomen is flat. Bowel sounds are normal.      Palpations: Abdomen is soft.      Tenderness: There is no abdominal tenderness. There is no guarding.   Musculoskeletal:      Cervical back: Neck supple.   Skin:     General: Skin is warm.      Capillary Refill: Capillary refill takes less than 2 seconds.   Neurological:      General: No focal deficit present.      Mental Status: He is alert. Mental status is at baseline.   Psychiatric:         Mood and Affect: Mood normal.         Behavior: Behavior normal.         Assessment & Plan   Diagnoses and all orders for this visit:    1. Complicated UTI (urinary tract infection) (Primary)  Assessment & Plan:  Improved, currently still on Keflex.  Advised to continue as " prescribed.      2. Paroxysmal atrial fibrillation  Assessment & Plan:  Continue metoprolol tartrate 100 mg twice a day, continue Xarelto 20 mg daily  Follows with cardiology    Orders:  -     metoprolol tartrate (LOPRESSOR) 100 MG tablet; Take 1 tablet by mouth 2 (Two) Times a Day.  Dispense: 180 tablet; Refill: 1    3. Type 2 diabetes mellitus with hyperglycemia, without long-term current use of insulin  Assessment & Plan:  A1c today 7.1, stable on current medication and dosage. Will continue current management. Refill medication as necessary.  Insulin glargine 10 units at night, lispro 22 units daily  Decrease sugar and carbohydrates in diet

## 2024-06-13 ENCOUNTER — APPOINTMENT (OUTPATIENT)
Dept: CT IMAGING | Facility: HOSPITAL | Age: 79
End: 2024-06-13
Payer: OTHER GOVERNMENT

## 2024-06-13 ENCOUNTER — HOSPITAL ENCOUNTER (EMERGENCY)
Facility: HOSPITAL | Age: 79
Discharge: HOME | End: 2024-06-13
Attending: EMERGENCY MEDICINE
Payer: OTHER GOVERNMENT

## 2024-06-13 ENCOUNTER — APPOINTMENT (OUTPATIENT)
Dept: GENERAL RADIOLOGY | Facility: HOSPITAL | Age: 79
End: 2024-06-13
Payer: OTHER GOVERNMENT

## 2024-06-13 VITALS
RESPIRATION RATE: 18 BRPM | OXYGEN SATURATION: 100 % | TEMPERATURE: 98.1 F | DIASTOLIC BLOOD PRESSURE: 84 MMHG | HEART RATE: 95 BPM | WEIGHT: 160.94 LBS | BODY MASS INDEX: 21.8 KG/M2 | HEIGHT: 72 IN | SYSTOLIC BLOOD PRESSURE: 109 MMHG

## 2024-06-13 DIAGNOSIS — F03.90 DEMENTIA, UNSPECIFIED DEMENTIA SEVERITY, UNSPECIFIED DEMENTIA TYPE, UNSPECIFIED WHETHER BEHAVIORAL, PSYCHOTIC, OR MOOD DISTURBANCE OR ANXIETY: Primary | ICD-10-CM

## 2024-06-13 LAB
ALBUMIN SERPL-MCNC: 3.3 G/DL (ref 3.5–5.2)
ALBUMIN/GLOB SERPL: 1.1 G/DL
ALP SERPL-CCNC: 79 U/L (ref 39–117)
ALT SERPL W P-5'-P-CCNC: 16 U/L (ref 1–41)
ANION GAP SERPL CALCULATED.3IONS-SCNC: 9.8 MMOL/L (ref 5–15)
AST SERPL-CCNC: 25 U/L (ref 1–40)
BACTERIA UR QL AUTO: ABNORMAL /HPF
BASOPHILS # BLD AUTO: 0.07 10*3/MM3 (ref 0–0.2)
BASOPHILS NFR BLD AUTO: 0.6 % (ref 0–1.5)
BILIRUB SERPL-MCNC: 1.2 MG/DL (ref 0–1.2)
BILIRUB UR QL STRIP: NEGATIVE
BUN SERPL-MCNC: 16 MG/DL (ref 8–23)
BUN/CREAT SERPL: 23.9 (ref 7–25)
CALCIUM SPEC-SCNC: 9.2 MG/DL (ref 8.6–10.5)
CHLORIDE SERPL-SCNC: 100 MMOL/L (ref 98–107)
CLARITY UR: CLEAR
CO2 SERPL-SCNC: 26.2 MMOL/L (ref 22–29)
COLOR UR: ABNORMAL
CREAT SERPL-MCNC: 0.67 MG/DL (ref 0.76–1.27)
DEPRECATED RDW RBC AUTO: 47.8 FL (ref 37–54)
EGFRCR SERPLBLD CKD-EPI 2021: 94.4 ML/MIN/1.73
EOSINOPHIL # BLD AUTO: 0.08 10*3/MM3 (ref 0–0.4)
EOSINOPHIL NFR BLD AUTO: 0.7 % (ref 0.3–6.2)
ERYTHROCYTE [DISTWIDTH] IN BLOOD BY AUTOMATED COUNT: 14.4 % (ref 12.3–15.4)
GLOBULIN UR ELPH-MCNC: 3.1 GM/DL
GLUCOSE SERPL-MCNC: 139 MG/DL (ref 65–99)
GLUCOSE UR STRIP-MCNC: NEGATIVE MG/DL
HCT VFR BLD AUTO: 40 % (ref 37.5–51)
HGB BLD-MCNC: 13.2 G/DL (ref 13–17.7)
HGB UR QL STRIP.AUTO: ABNORMAL
HOLD SPECIMEN: NORMAL
HOLD SPECIMEN: NORMAL
HYALINE CASTS UR QL AUTO: ABNORMAL /LPF
IMM GRANULOCYTES # BLD AUTO: 0.08 10*3/MM3 (ref 0–0.05)
IMM GRANULOCYTES NFR BLD AUTO: 0.7 % (ref 0–0.5)
KETONES UR QL STRIP: ABNORMAL
LEUKOCYTE ESTERASE UR QL STRIP.AUTO: ABNORMAL
LYMPHOCYTES # BLD AUTO: 2.85 10*3/MM3 (ref 0.7–3.1)
LYMPHOCYTES NFR BLD AUTO: 23.2 % (ref 19.6–45.3)
MAGNESIUM SERPL-MCNC: 1.8 MG/DL (ref 1.6–2.4)
MCH RBC QN AUTO: 30.1 PG (ref 26.6–33)
MCHC RBC AUTO-ENTMCNC: 33 G/DL (ref 31.5–35.7)
MCV RBC AUTO: 91.1 FL (ref 79–97)
MONOCYTES # BLD AUTO: 1.38 10*3/MM3 (ref 0.1–0.9)
MONOCYTES NFR BLD AUTO: 11.2 % (ref 5–12)
NEUTROPHILS NFR BLD AUTO: 63.6 % (ref 42.7–76)
NEUTROPHILS NFR BLD AUTO: 7.84 10*3/MM3 (ref 1.7–7)
NITRITE UR QL STRIP: NEGATIVE
NRBC BLD AUTO-RTO: 0 /100 WBC (ref 0–0.2)
PH UR STRIP.AUTO: 6.5 [PH] (ref 5–8)
PLATELET # BLD AUTO: 359 10*3/MM3 (ref 140–450)
PMV BLD AUTO: 11.1 FL (ref 6–12)
POTASSIUM SERPL-SCNC: 4.1 MMOL/L (ref 3.5–5.2)
PROT SERPL-MCNC: 6.4 G/DL (ref 6–8.5)
PROT UR QL STRIP: ABNORMAL
RBC # BLD AUTO: 4.39 10*6/MM3 (ref 4.14–5.8)
RBC # UR STRIP: ABNORMAL /HPF
REF LAB TEST METHOD: ABNORMAL
SODIUM SERPL-SCNC: 136 MMOL/L (ref 136–145)
SP GR UR STRIP: 1.02 (ref 1–1.03)
SQUAMOUS #/AREA URNS HPF: ABNORMAL /HPF
TROPONIN T SERPL HS-MCNC: 19 NG/L
UROBILINOGEN UR QL STRIP: ABNORMAL
WBC # UR STRIP: ABNORMAL /HPF
WBC NRBC COR # BLD AUTO: 12.3 10*3/MM3 (ref 3.4–10.8)
WHOLE BLOOD HOLD COAG: NORMAL
WHOLE BLOOD HOLD SPECIMEN: NORMAL

## 2024-06-13 PROCEDURE — P9612 CATHETERIZE FOR URINE SPEC: HCPCS

## 2024-06-13 PROCEDURE — 85025 COMPLETE CBC W/AUTO DIFF WBC: CPT | Performed by: PHYSICIAN ASSISTANT

## 2024-06-13 PROCEDURE — 93005 ELECTROCARDIOGRAM TRACING: CPT | Performed by: PHYSICIAN ASSISTANT

## 2024-06-13 PROCEDURE — 71045 X-RAY EXAM CHEST 1 VIEW: CPT

## 2024-06-13 PROCEDURE — 99284 EMERGENCY DEPT VISIT MOD MDM: CPT

## 2024-06-13 PROCEDURE — 83735 ASSAY OF MAGNESIUM: CPT | Performed by: PHYSICIAN ASSISTANT

## 2024-06-13 PROCEDURE — 81001 URINALYSIS AUTO W/SCOPE: CPT | Performed by: PHYSICIAN ASSISTANT

## 2024-06-13 PROCEDURE — 36415 COLL VENOUS BLD VENIPUNCTURE: CPT

## 2024-06-13 PROCEDURE — 84484 ASSAY OF TROPONIN QUANT: CPT | Performed by: PHYSICIAN ASSISTANT

## 2024-06-13 PROCEDURE — 70450 CT HEAD/BRAIN W/O DYE: CPT

## 2024-06-13 PROCEDURE — 80053 COMPREHEN METABOLIC PANEL: CPT | Performed by: PHYSICIAN ASSISTANT

## 2024-06-13 NOTE — ED PROVIDER NOTES
Subjective:  Chief Complaint:  AMS    History of Present Illness:  Patient is a 89-year-old male with history of anemia, anxiety, A-fib on Xarelto, CAD, diabetes, dyslipidemia, hypertension presenting to the ER with complaints of increased confusion.  Evidently a home health nurse had called EMS.  He was found to be hypotensive in route per nursing staff.  Patient denies any acute symptoms and is alert and oriented to person and place but not time.  States someone was worried about him and called the ambulance.  Denies any chest pain, shortness of breath, abdominal pain, or any symptoms at this time.      Nurses Notes reviewed and agree, including vitals, allergies, social history and prior medical history.     REVIEW OF SYSTEMS: All systems reviewed and not pertinent unless noted.  Review of Systems   Psychiatric/Behavioral:  Positive for confusion.    All other systems reviewed and are negative.      Past Medical History:   Diagnosis Date    Abnormal EKG     Bifascicular block and no prior ischemia evaluation.     Anemia     Anxiety     Arthritis     Atrial fibrillation     CHADS VASc=3.  Continue Xarelto 20.  vas continue Zaroxolyn 20 has a relative 20 [place] can add Lantus 40    CAD (coronary artery disease)     Colon polyp 2015    Contact dermatitis     Diabetes     Dyslipidemia     Hypertension     Palpitations     Prostatism     Stroke     Tattoo        Allergies:    Lisinopril      Past Surgical History:   Procedure Laterality Date    COLONOSCOPY  2015    EYE SURGERY      PROSTATE SURGERY      TONSILLECTOMY  89         Social History     Socioeconomic History    Marital status:    Tobacco Use    Smoking status: Former     Current packs/day: 0.00     Average packs/day: 2.0 packs/day for 50.0 years (100.0 ttl pk-yrs)     Types: Cigarettes     Start date:      Quit date:      Years since quittin.4    Smokeless tobacco: Never    Tobacco comments:     STOPPED 5 YEARS AGO  "  Vaping Use    Vaping status: Never Used   Substance and Sexual Activity    Alcohol use: No    Drug use: No    Sexual activity: Defer         Family History   Problem Relation Age of Onset    Diabetes Other     Cancer Other         NO PROSTATE CANCER HISTORY    Hypertension Other     Cancer Other     Diabetes Other     Liver disease Mother     Liver disease Father     Colon cancer Neg Hx     Stomach cancer Neg Hx        Objective  Physical Exam:  BP 98/76   Pulse 93   Temp 98.1 °F (36.7 °C) (Oral)   Resp 18   Ht 182 cm (71.65\")   Wt 73 kg (160 lb 15 oz)   SpO2 96%   BMI 22.04 kg/m²      Physical Exam  Vitals and nursing note reviewed.   Constitutional:       General: He is not in acute distress.     Appearance: He is not toxic-appearing.   HENT:      Head: Normocephalic and atraumatic.   Eyes:      Extraocular Movements: Extraocular movements intact.   Cardiovascular:      Rate and Rhythm: Normal rate.   Pulmonary:      Effort: Pulmonary effort is normal. No respiratory distress.   Abdominal:      General: There is no distension.      Palpations: Abdomen is soft.   Musculoskeletal:         General: Normal range of motion.      Cervical back: Normal range of motion and neck supple.   Skin:     General: Skin is warm and dry.   Neurological:      Mental Status: He is alert.      Comments: Oriented to person and place but not time   Psychiatric:         Mood and Affect: Mood normal.         Behavior: Behavior normal.         Procedures    ED Course:    ED Course as of 06/13/24 1602   Thu Jun 13, 2024   1310 EKG: I reviewed and independently interpreted the EKG as:  A-fib rate of 70 bpm, left axis deviation, elevated QRS duration right bundle branch block, no ST elevation, no T wave inversions [CS]      ED Course User Index  [CS] Preet Daniel MD       Lab Results (last 24 hours)       Procedure Component Value Units Date/Time    CBC & Differential [[id number]]  (Abnormal) Collected: 06/13/24 1304    " Specimen: Blood Updated: 06/13/24 1324    Narrative:      The following orders were created for panel order CBC & Differential.  Procedure                               Abnormality         Status                     ---------                               -----------         ------                     CBC Auto Differential[[id number]]        Abnormal            Final result                 Please view results for these tests on the individual orders.    Comprehensive Metabolic Panel [[id number]]  (Abnormal) Collected: 06/13/24 1304    Specimen: Blood Updated: 06/13/24 1340     Glucose 139 mg/dL      BUN 16 mg/dL      Creatinine 0.67 mg/dL      Sodium 136 mmol/L      Potassium 4.1 mmol/L      Comment: Specimen hemolyzed.  Result may be falsely elevated.        Chloride 100 mmol/L      CO2 26.2 mmol/L      Calcium 9.2 mg/dL      Total Protein 6.4 g/dL      Albumin 3.3 g/dL      ALT (SGPT) 16 U/L      Comment: Specimen hemolyzed.  Result may  be falsely elevated.        AST (SGOT) 25 U/L      Comment: Specimen hemolyzed.  Result may be falsely elevated.        Alkaline Phosphatase 79 U/L      Total Bilirubin 1.2 mg/dL      Globulin 3.1 gm/dL      A/G Ratio 1.1 g/dL      BUN/Creatinine Ratio 23.9     Anion Gap 9.8 mmol/L      eGFR 94.4 mL/min/1.73     Narrative:      GFR Normal >60  Chronic Kidney Disease <60  Kidney Failure <15    The GFR formula is only valid for adults with stable renal function between ages 18 and 70.    Single High Sensitivity Troponin T [[id number]]  (Normal) Collected: 06/13/24 1304    Specimen: Blood Updated: 06/13/24 1341     HS Troponin T 19 ng/L      Comment: Specimen hemolyzed.  Results may be falsely decreased.       Narrative:      High Sensitive Troponin T Reference Range:  <14.0 ng/L- Negative Female for AMI  <22.0 ng/L- Negative Male for AMI  >=14 - Abnormal Female indicating possible myocardial injury.  >=22 - Abnormal Male indicating possible myocardial injury.   Clinicians would have to  utilize clinical acumen, EKG, Troponin, and serial changes to determine if it is an Acute Myocardial Infarction or myocardial injury due to an underlying chronic condition.         Magnesium [[id number]]  (Normal) Collected: 06/13/24 1304    Specimen: Blood Updated: 06/13/24 1341     Magnesium 1.8 mg/dL     CBC Auto Differential [[id number]]  (Abnormal) Collected: 06/13/24 1304    Specimen: Blood Updated: 06/13/24 1324     WBC 12.30 10*3/mm3      RBC 4.39 10*6/mm3      Hemoglobin 13.2 g/dL      Hematocrit 40.0 %      MCV 91.1 fL      MCH 30.1 pg      MCHC 33.0 g/dL      RDW 14.4 %      RDW-SD 47.8 fl      MPV 11.1 fL      Platelets 359 10*3/mm3      Neutrophil % 63.6 %      Lymphocyte % 23.2 %      Monocyte % 11.2 %      Eosinophil % 0.7 %      Basophil % 0.6 %      Immature Grans % 0.7 %      Neutrophils, Absolute 7.84 10*3/mm3      Lymphocytes, Absolute 2.85 10*3/mm3      Monocytes, Absolute 1.38 10*3/mm3      Eosinophils, Absolute 0.08 10*3/mm3      Basophils, Absolute 0.07 10*3/mm3      Immature Grans, Absolute 0.08 10*3/mm3      nRBC 0.0 /100 WBC     Urinalysis With Microscopic If Indicated (No Culture) - Straight Cath [[id number]]  (Abnormal) Collected: 06/13/24 1528    Specimen: Urine from Straight Cath Updated: 06/13/24 1540     Color, UA Dark Yellow     Appearance, UA Clear     pH, UA 6.5     Specific Gravity, UA 1.017     Glucose, UA Negative     Ketones, UA Trace     Bilirubin, UA Negative     Blood, UA Large (3+)     Protein, UA Trace     Leuk Esterase, UA Trace     Nitrite, UA Negative     Urobilinogen, UA 4.0 E.U./dL    Urinalysis, Microscopic Only - Straight Cath [[id number]]  (Abnormal) Collected: 06/13/24 1528    Specimen: Urine from Straight Cath Updated: 06/13/24 1545     RBC, UA 21-50 /HPF      WBC, UA 0-2 /HPF      Bacteria, UA None Seen /HPF      Squamous Epithelial Cells, UA 3-6 /HPF      Hyaline Casts, UA None Seen /LPF      Methodology Manual Light Microscopy             CT Head Without  Contrast    Result Date: 6/13/2024  PROCEDURE: CT HEAD WO CONTRAST-  HISTORY: increased confusion  COMPARISON: May 31, 2024..  TECHNIQUE: Multiple axial CT images were performed from the foramen magnum to the vertex. Individualized dose reduction techniques using automated exposure control or adjustment of mA and/or kV according to the patient size were employed.  FINDINGS: There is moderate, age-appropriate, stable generalized cerebral atrophy. The ventricles are enlarged. There is mild to moderate small vessel ischemic disease similar to the prior exam. There is no evidence of edema or hemorrhage.  No masses are identified. No extra-axial fluid is seen. The paranasal sinuses are unremarkable.      Impression: Atrophy  without acute process.     CTDI: 30.6 mGy DLP:564.42 mGy.cm  This report was signed and finalized on 6/13/2024 3:03 PM by Ne Peters MD.      XR Chest 1 View    Result Date: 6/13/2024  PROCEDURE: XR CHEST 1 VW-  HISTORY: AMS  COMPARISON: May 31, 2024..  FINDINGS: The heart is normal in size. The lungs are clear. The mediastinum is unremarkable. There is no pneumothorax.  There are no acute osseous abnormalities.      Impression: No acute cardiopulmonary process.      This report was signed and finalized on 6/13/2024 2:15 PM by Ne Peters MD.          MDM  Patient evaluated in the ER for increased confusion per home health nurse aide.  Patient discharged 10 days ago after an admission for complicated UTI.  Noted to have dementia.  Differential diagnosis includes was not limited to dementia, UTI, intracranial abnormality, among others.  Initial plan includes CBC, CMP, troponin, magnesium, urinalysis, chest x-ray, EKG, CT head.    Urinalysis not indicative of an infectious process.  Labs are nonactionable.  Chest x-ray unremarkable per radiology.  CT head acutely unremarkable per radiology.  Patient denying any symptoms.  Appears stable for discharge.  Recommended follow-up with PCP for further  outpatient evaluation as needed.  Precautions were given for return to the ER for any new or worsening symptoms.    Final diagnoses:   Dementia, unspecified dementia severity, unspecified dementia type, unspecified whether behavioral, psychotic, or mood disturbance or anxiety          Dinora Delgadillo PA-C  06/13/24 1608

## 2024-06-13 NOTE — DISCHARGE INSTRUCTIONS
Follow-up with your PCP for further outpatient evaluation if symptoms persist.  Return to the ER for new or worsening symptoms or acute concerns.

## 2024-06-20 ENCOUNTER — HOME HEALTH ADMISSION (OUTPATIENT)
Dept: HOME HEALTH SERVICES | Facility: HOME HEALTHCARE | Age: 79
End: 2024-06-20
Payer: OTHER GOVERNMENT

## 2024-06-20 ENCOUNTER — READMISSION MANAGEMENT (OUTPATIENT)
Dept: CALL CENTER | Facility: HOSPITAL | Age: 79
End: 2024-06-20
Payer: MEDICARE

## 2024-06-20 ENCOUNTER — TRANSCRIBE ORDERS (OUTPATIENT)
Dept: HOME HEALTH SERVICES | Facility: HOME HEALTHCARE | Age: 79
End: 2024-06-20
Payer: MEDICARE

## 2024-06-20 DIAGNOSIS — R53.1 WEAKNESS: ICD-10-CM

## 2024-06-20 DIAGNOSIS — F03.90 SENILE DEMENTIA, UNCOMPLICATED: Primary | ICD-10-CM

## 2024-06-20 NOTE — OUTREACH NOTE
Medical Week 2 Survey      Flowsheet Row Responses   [facility] patient discharged from? [facility]   Does the patient have one of the following disease processes/diagnoses(primary or secondary)? Other   Week 2 attempt successful? Yes   Call start time 0900   Call end time 0904   Medication alerts for this patient antibiotics complete.   Meds reviewed with patient/caregiver? Yes   Is the patient taking all medications as directed (includes completed medication regime)? Yes   Comments regarding appointments Pt has had pcp f/u.   Has the patient kept scheduled appointments due by today? Yes   What is the patient's perception of their health status since discharge? Improving   Week 2 Call Completed? Yes   Wrap up additional comments Daughter reports pt has been back to ED last week. Pt has completed present antibiotics. Daughter encouraged to make repeat visit with pcp.   Call end time 0904            Beatrice HARRIS - Registered Nurse

## 2024-06-25 DIAGNOSIS — E11.65 TYPE 2 DIABETES MELLITUS WITH HYPERGLYCEMIA, WITHOUT LONG-TERM CURRENT USE OF INSULIN: ICD-10-CM

## 2024-06-25 RX ORDER — INSULIN GLARGINE 100 [IU]/ML
INJECTION, SOLUTION SUBCUTANEOUS
Qty: 30 ML | Refills: 0 | Status: SHIPPED | OUTPATIENT
Start: 2024-06-25

## 2024-06-28 ENCOUNTER — HOME CARE VISIT (OUTPATIENT)
Dept: HOME HEALTH SERVICES | Facility: HOME HEALTHCARE | Age: 79
End: 2024-06-28

## 2024-07-10 ENCOUNTER — TELEPHONE (OUTPATIENT)
Dept: INTERNAL MEDICINE | Facility: CLINIC | Age: 79
End: 2024-07-10
Payer: MEDICARE

## 2024-07-15 ENCOUNTER — OFFICE VISIT (OUTPATIENT)
Dept: INTERNAL MEDICINE | Facility: CLINIC | Age: 79
End: 2024-07-15
Payer: MEDICARE

## 2024-07-15 VITALS
TEMPERATURE: 98 F | HEIGHT: 72 IN | RESPIRATION RATE: 16 BRPM | DIASTOLIC BLOOD PRESSURE: 60 MMHG | WEIGHT: 146 LBS | SYSTOLIC BLOOD PRESSURE: 102 MMHG | OXYGEN SATURATION: 98 % | BODY MASS INDEX: 19.77 KG/M2 | HEART RATE: 79 BPM

## 2024-07-15 DIAGNOSIS — F03.B3 MODERATE DEMENTIA WITH MOOD DISTURBANCE, UNSPECIFIED DEMENTIA TYPE: Chronic | ICD-10-CM

## 2024-07-15 DIAGNOSIS — Z74.09 IMPAIRED MOBILITY AND ADLS: Chronic | ICD-10-CM

## 2024-07-15 DIAGNOSIS — I73.9 PERIPHERAL VASCULAR DISEASE, UNSPECIFIED: ICD-10-CM

## 2024-07-15 DIAGNOSIS — E78.2 MIXED HYPERLIPIDEMIA: ICD-10-CM

## 2024-07-15 DIAGNOSIS — R53.82 CHRONIC FATIGUE: Primary | ICD-10-CM

## 2024-07-15 DIAGNOSIS — I10 PRIMARY HYPERTENSION: ICD-10-CM

## 2024-07-15 DIAGNOSIS — E11.65 TYPE 2 DIABETES MELLITUS WITH HYPERGLYCEMIA, WITHOUT LONG-TERM CURRENT USE OF INSULIN: ICD-10-CM

## 2024-07-15 DIAGNOSIS — F41.1 GENERALIZED ANXIETY DISORDER: ICD-10-CM

## 2024-07-15 DIAGNOSIS — E55.9 VITAMIN D DEFICIENCY: ICD-10-CM

## 2024-07-15 DIAGNOSIS — R63.4 UNINTENTIONAL WEIGHT LOSS: ICD-10-CM

## 2024-07-15 DIAGNOSIS — Z78.9 IMPAIRED MOBILITY AND ADLS: Chronic | ICD-10-CM

## 2024-07-15 DIAGNOSIS — R60.0 BILATERAL LOWER EXTREMITY EDEMA: ICD-10-CM

## 2024-07-15 DIAGNOSIS — I48.0 PAROXYSMAL ATRIAL FIBRILLATION: ICD-10-CM

## 2024-07-15 PROBLEM — N39.0 COMPLICATED UTI (URINARY TRACT INFECTION): Status: RESOLVED | Noted: 2024-05-31 | Resolved: 2024-07-15

## 2024-07-15 PROBLEM — R23.0 PERIPHERAL CYANOSIS: Status: RESOLVED | Noted: 2024-07-15 | Resolved: 2024-07-15

## 2024-07-15 PROBLEM — I87.2 VENOUS INSUFFICIENCY: Status: RESOLVED | Noted: 2024-07-15 | Resolved: 2024-07-15

## 2024-07-15 PROBLEM — I87.2 VENOUS INSUFFICIENCY: Status: ACTIVE | Noted: 2024-07-15

## 2024-07-15 PROBLEM — R23.0 PERIPHERAL CYANOSIS: Status: ACTIVE | Noted: 2024-07-15

## 2024-07-15 LAB
BILIRUB BLD-MCNC: NEGATIVE MG/DL
CLARITY, POC: ABNORMAL
COLOR UR: YELLOW
EXPIRATION DATE: ABNORMAL
EXPIRATION DATE: NORMAL
GLUCOSE UR STRIP-MCNC: NEGATIVE MG/DL
KETONES UR QL: NEGATIVE
LEUKOCYTE EST, POC: ABNORMAL
Lab: ABNORMAL
Lab: NORMAL
NITRITE UR-MCNC: NEGATIVE MG/ML
PH UR: 6.5 [PH] (ref 5–8)
POC CREATININE URINE: NORMAL
POC MICROALBUMIN URINE: NORMAL
PROT UR STRIP-MCNC: NEGATIVE MG/DL
RBC # UR STRIP: NEGATIVE /UL
SP GR UR: 1.01 (ref 1–1.03)
UROBILINOGEN UR QL: NORMAL

## 2024-07-15 PROCEDURE — 1126F AMNT PAIN NOTED NONE PRSNT: CPT | Performed by: STUDENT IN AN ORGANIZED HEALTH CARE EDUCATION/TRAINING PROGRAM

## 2024-07-15 PROCEDURE — G2211 COMPLEX E/M VISIT ADD ON: HCPCS | Performed by: STUDENT IN AN ORGANIZED HEALTH CARE EDUCATION/TRAINING PROGRAM

## 2024-07-15 PROCEDURE — 82044 UR ALBUMIN SEMIQUANTITATIVE: CPT | Performed by: STUDENT IN AN ORGANIZED HEALTH CARE EDUCATION/TRAINING PROGRAM

## 2024-07-15 PROCEDURE — 3078F DIAST BP <80 MM HG: CPT | Performed by: STUDENT IN AN ORGANIZED HEALTH CARE EDUCATION/TRAINING PROGRAM

## 2024-07-15 PROCEDURE — 81003 URINALYSIS AUTO W/O SCOPE: CPT | Performed by: STUDENT IN AN ORGANIZED HEALTH CARE EDUCATION/TRAINING PROGRAM

## 2024-07-15 PROCEDURE — 99214 OFFICE O/P EST MOD 30 MIN: CPT | Performed by: STUDENT IN AN ORGANIZED HEALTH CARE EDUCATION/TRAINING PROGRAM

## 2024-07-15 PROCEDURE — 3074F SYST BP LT 130 MM HG: CPT | Performed by: STUDENT IN AN ORGANIZED HEALTH CARE EDUCATION/TRAINING PROGRAM

## 2024-07-15 NOTE — PROGRESS NOTES
Office Note     Name: Que Lagunas    : 1945     MRN: [id number]     Chief Complaint  feet (Pts feet are blue, x1 month ) and Fatigue (Weakness and weightloss. )    Subjective     History of Present Illness:  Que Lagunas is a 79 y.o. male who presents today for chronic conditions and dysuria.    Hypertension Lasix 20 mg every other day, metoprolol, usually <140/90  Paroxysmal atrial fibrillation on Xarelto, metoprolol  Hyperlipidemia on pravastatin  Diabetes type 2: Lantus 10 units in AM, Humalog 10 units in morning and noon, metformin 500 once a day. Last A1c 7.1%  Vitamin D deficiency on vitamin D  Memory loss on donepezil and memantine, follows with neurology  GERD on esomeprazole  Iron deficiency anemia stable at this time has discontinued ferrous sulfate  Anxiety and depression on paroxetine for years now, stable symptoms     Previous smoker 100py history, quit 23 years ago  Last PSA in  was normal  Colonoscopy : 2 polyps removed, <10mm, tubular adenoma without dysplasia Patient was advised to repeat colonoscopy in 5 to 10 years but at that time patient elected to have colonoscopy after 10 years and is due 2025.    Also noted Weight loss: 15 lbs since last visit  Bilateral lower extremity is noted to be blue from all toes to MTP area. No claudication. Uses a walker. No ulcerations or discharge.  Recently had two episodes of UTI. Currently denies dysuria, burning when urinating and problems with urination. No fever or flank pain.     Family History:   Family History   Problem Relation Age of Onset    Diabetes Other     Cancer Other         NO PROSTATE CANCER HISTORY    Hypertension Other     Cancer Other     Diabetes Other     Liver disease Mother     Liver disease Father     Colon cancer Neg Hx     Stomach cancer Neg Hx        Social History:   Social History     Socioeconomic History    Marital status:    Tobacco Use    Smoking status: Former     Current packs/day:  "0.00     Average packs/day: 2.0 packs/day for 50.0 years (100.0 ttl pk-yrs)     Types: Cigarettes     Start date:      Quit date:      Years since quittin.5    Smokeless tobacco: Never    Tobacco comments:     STOPPED 5 YEARS AGO   Vaping Use    Vaping status: Never Used   Substance and Sexual Activity    Alcohol use: No    Drug use: No    Sexual activity: Defer       Health Maintenance   Topic Date Due    DIABETIC EYE EXAM  2024    ANNUAL WELLNESS VISIT  2024    LIPID PANEL  2024    URINE MICROALBUMIN  2024    COVID-19 Vaccine (2023- season) 2025 (Originally 2023)    RSV Vaccine - Adults (1 - 1-dose 60+ series) 2025 (Originally 3/18/2004)    TDAP/TD VACCINES (1 - Tdap) 2025 (Originally 3/18/1963)    ZOSTER VACCINE (1 of 2) 2026 (Originally 3/18/1994)    INFLUENZA VACCINE  2024    HEMOGLOBIN A1C  2024    COLORECTAL CANCER SCREENING  2025    Pneumococcal Vaccine 65+  Completed       Objective     Vital Signs  /60 (BP Location: Left arm, Patient Position: Sitting, Cuff Size: Adult)   Pulse 79   Temp 98 °F (36.7 °C)   Resp 16   Ht 182.9 cm (72\")   Wt 66.2 kg (146 lb)   SpO2 98%   BMI 19.80 kg/m²   Estimated body mass index is 19.8 kg/m² as calculated from the following:    Height as of this encounter: 182.9 cm (72\").    Weight as of this encounter: 66.2 kg (146 lb).  BMI is within normal parameters. No other follow-up for BMI required.    Physical Exam  Vitals and nursing note reviewed.   Constitutional:       Appearance: Normal appearance.   HENT:      Head: Normocephalic and atraumatic.   Cardiovascular:      Rate and Rhythm: Normal rate and regular rhythm.      Pulses: Normal pulses.      Heart sounds: Normal heart sounds.   Pulmonary:      Effort: Pulmonary effort is normal. No respiratory distress.      Breath sounds: Normal breath sounds. No wheezing, rhonchi or rales.   Abdominal:      General: Abdomen is flat. " Bowel sounds are normal.      Palpations: Abdomen is soft.      Tenderness: There is no abdominal tenderness. There is no guarding.   Musculoskeletal:      Cervical back: Neck supple.      Comments: Minimal purple/bluish color of tips of toes up to MTP area. No pain or tenderness. No swelling   Skin:     General: Skin is warm.      Capillary Refill: Capillary refill takes less than 2 seconds.   Neurological:      General: No focal deficit present.      Mental Status: He is alert. Mental status is at baseline.   Psychiatric:         Mood and Affect: Mood normal.         Behavior: Behavior normal.          Procedures     Assessment and Plan     Diagnoses and all orders for this visit:    1. Chronic fatigue (Primary)  Assessment & Plan:  Obtain labs    Orders:  -     CBC & Differential  -     Comprehensive Metabolic Panel  -     Vitamin D,25-Hydroxy    2. Bilateral lower extremity edema  Assessment & Plan:  Continue Lasix for now    Orders:  -     US Ankle / Brachial Indices Extremity Complete; Future    3. Peripheral vascular disease, unspecified  Assessment & Plan:  Obtain ultrasound with GINO, if normal likely to be venous insufficiency    Orders:  -     US Ankle / Brachial Indices Extremity Complete; Future    4. Paroxysmal atrial fibrillation  Assessment & Plan:  Follows with cardiology on Xarelto and metoprolol      5. Primary hypertension  Assessment & Plan:  Stable on current medication and dosage. Will continue current management. Refill medication as necessary.Lasix every other day, metoprolol      Orders:  -     CBC & Differential  -     Comprehensive Metabolic Panel  -     TSH Rfx On Abnormal To Free T4    6. Mixed hyperlipidemia  Assessment & Plan:  Stable on current medication and dosage. Will continue current management. Refill medication as necessary.  Pravastatin have labs done prior to next visit     Orders:  -     Lipid Panel    7. Type 2 diabetes mellitus with hyperglycemia, without long-term current  use of insulin  Assessment & Plan:  Last A1c was at goal  Continue Lantus 10 units in the morning and Humalog 10 units twice a day and metformin 500 once a day.  If still less than 8% next visit, consider discontinuing metformin.      Orders:  -     Hemoglobin A1c  -     Ambulatory Referral to Nutrition Services    8. Generalized anxiety disorder  Assessment & Plan:  Stable on current medication and dosage. Will continue current management. Refill medication as necessary.  Paxil       9. Moderate dementia with mood disturbance, unspecified dementia type  Assessment & Plan:  Continues to follow with neurology on donepezil and memantine       10. Impaired mobility and ADLs  Assessment & Plan:  Uses a walker, continue      11. Unintentional weight loss  Assessment & Plan:  Referred to nutritionist    Orders:  -     Ambulatory Referral to Nutrition Services    12. Vitamin D deficiency  Assessment & Plan:  Unsure if taking vitamin D, will continue to monitor repeat labs    Orders:  -     Vitamin D,25-Hydroxy         Counseling was given to patient for the following topics: instructions for management, risks and benefits of treatment options, and importance of treatment compliance.    Follow Up  Return in about 3 months (around 10/15/2024) for Medicare Wellness.    MD ANUJA Cuellar MR  [facility]  [address]  [place] [address] [phone]

## 2024-07-15 NOTE — ASSESSMENT & PLAN NOTE
Stable on current medication and dosage. Will continue current management. Refill medication as necessary.  Paxil

## 2024-07-15 NOTE — ASSESSMENT & PLAN NOTE
Last A1c was at goal  Continue Lantus 10 units in the morning and Humalog 10 units twice a day and metformin 500 once a day.  If still less than 8% next visit, consider discontinuing metformin.

## 2024-07-15 NOTE — ASSESSMENT & PLAN NOTE
Stable on current medication and dosage. Will continue current management. Refill medication as necessary.Lasix every other day, metoprolol

## 2024-07-15 NOTE — ASSESSMENT & PLAN NOTE
Stable on current medication and dosage. Will continue current management. Refill medication as necessary.  Pravastatin have labs done prior to next visit

## 2024-08-14 DIAGNOSIS — E55.9 VITAMIN D DEFICIENCY: Primary | ICD-10-CM

## 2024-08-14 DIAGNOSIS — F41.1 GENERALIZED ANXIETY DISORDER: ICD-10-CM

## 2024-08-14 LAB
25(OH)D3+25(OH)D2 SERPL-MCNC: 28.6 NG/ML (ref 30–100)
ALBUMIN SERPL-MCNC: 3.9 G/DL (ref 3.8–4.8)
ALP SERPL-CCNC: 106 IU/L (ref 44–121)
ALT SERPL-CCNC: 17 IU/L (ref 0–44)
AST SERPL-CCNC: 20 IU/L (ref 0–40)
BASOPHILS # BLD AUTO: 0.1 X10E3/UL (ref 0–0.2)
BASOPHILS NFR BLD AUTO: 1 %
BILIRUB SERPL-MCNC: 0.4 MG/DL (ref 0–1.2)
BUN SERPL-MCNC: 23 MG/DL (ref 8–27)
BUN/CREAT SERPL: 26 (ref 10–24)
CALCIUM SERPL-MCNC: 9.8 MG/DL (ref 8.6–10.2)
CHLORIDE SERPL-SCNC: 96 MMOL/L (ref 96–106)
CHOLEST SERPL-MCNC: 134 MG/DL (ref 100–199)
CO2 SERPL-SCNC: 25 MMOL/L (ref 20–29)
CREAT SERPL-MCNC: 0.9 MG/DL (ref 0.76–1.27)
EGFRCR SERPLBLD CKD-EPI 2021: 86 ML/MIN/1.73
EOSINOPHIL # BLD AUTO: 0.1 X10E3/UL (ref 0–0.4)
EOSINOPHIL NFR BLD AUTO: 1 %
ERYTHROCYTE [DISTWIDTH] IN BLOOD BY AUTOMATED COUNT: 13 % (ref 11.6–15.4)
GLOBULIN SER CALC-MCNC: 2.8 G/DL (ref 1.5–4.5)
GLUCOSE SERPL-MCNC: 183 MG/DL (ref 70–99)
HBA1C MFR BLD: 7.8 % (ref 4.8–5.6)
HCT VFR BLD AUTO: 47.3 % (ref 37.5–51)
HDLC SERPL-MCNC: 45 MG/DL
HGB BLD-MCNC: 15 G/DL (ref 13–17.7)
IMM GRANULOCYTES # BLD AUTO: 0 X10E3/UL (ref 0–0.1)
IMM GRANULOCYTES NFR BLD AUTO: 0 %
LDLC SERPL CALC-MCNC: 69 MG/DL (ref 0–99)
LYMPHOCYTES # BLD AUTO: 3.7 X10E3/UL (ref 0.7–3.1)
LYMPHOCYTES NFR BLD AUTO: 33 %
MCH RBC QN AUTO: 28.8 PG (ref 26.6–33)
MCHC RBC AUTO-ENTMCNC: 31.7 G/DL (ref 31.5–35.7)
MCV RBC AUTO: 91 FL (ref 79–97)
MONOCYTES # BLD AUTO: 0.8 X10E3/UL (ref 0.1–0.9)
MONOCYTES NFR BLD AUTO: 8 %
NEUTROPHILS # BLD AUTO: 6.3 X10E3/UL (ref 1.4–7)
NEUTROPHILS NFR BLD AUTO: 57 %
PLATELET # BLD AUTO: 500 X10E3/UL (ref 150–450)
POTASSIUM SERPL-SCNC: 4.4 MMOL/L (ref 3.5–5.2)
PROT SERPL-MCNC: 6.7 G/DL (ref 6–8.5)
RBC # BLD AUTO: 5.21 X10E6/UL (ref 4.14–5.8)
SODIUM SERPL-SCNC: 139 MMOL/L (ref 134–144)
TRIGL SERPL-MCNC: 111 MG/DL (ref 0–149)
TSH SERPL DL<=0.005 MIU/L-ACNC: 2.74 UIU/ML (ref 0.45–4.5)
VLDLC SERPL CALC-MCNC: 20 MG/DL (ref 5–40)
WBC # BLD AUTO: 11 X10E3/UL (ref 3.4–10.8)

## 2024-08-14 RX ORDER — PAROXETINE HYDROCHLORIDE 20 MG/1
20 TABLET, FILM COATED ORAL DAILY
Qty: 90 TABLET | Refills: 1 | Status: SHIPPED | OUTPATIENT
Start: 2024-08-14

## 2024-08-16 ENCOUNTER — HOSPITAL ENCOUNTER (OUTPATIENT)
Dept: ULTRASOUND IMAGING | Facility: HOSPITAL | Age: 79
Discharge: HOME | End: 2024-08-16
Payer: MEDICARE

## 2024-08-16 DIAGNOSIS — I73.9 PERIPHERAL VASCULAR DISEASE, UNSPECIFIED: ICD-10-CM

## 2024-08-16 DIAGNOSIS — Z79.4 TYPE 2 DIABETES MELLITUS WITH OTHER SPECIFIED COMPLICATION, WITH LONG-TERM CURRENT USE OF INSULIN: ICD-10-CM

## 2024-08-16 DIAGNOSIS — R60.0 BILATERAL LOWER EXTREMITY EDEMA: ICD-10-CM

## 2024-08-16 DIAGNOSIS — E11.69 TYPE 2 DIABETES MELLITUS WITH OTHER SPECIFIED COMPLICATION, WITH LONG-TERM CURRENT USE OF INSULIN: ICD-10-CM

## 2024-08-16 PROCEDURE — 93923 UPR/LXTR ART STDY 3+ LVLS: CPT

## 2024-08-16 RX ORDER — INSULIN LISPRO 100 [IU]/ML
INJECTION, SOLUTION INTRAVENOUS; SUBCUTANEOUS
Qty: 40 ML | Refills: 1 | Status: SHIPPED | OUTPATIENT
Start: 2024-08-16

## 2024-08-19 ENCOUNTER — OFFICE VISIT (OUTPATIENT)
Dept: CARDIOLOGY | Facility: CLINIC | Age: 79
End: 2024-08-19
Payer: MEDICARE

## 2024-08-19 VITALS
HEIGHT: 72 IN | SYSTOLIC BLOOD PRESSURE: 130 MMHG | DIASTOLIC BLOOD PRESSURE: 76 MMHG | WEIGHT: 142.2 LBS | HEART RATE: 85 BPM | BODY MASS INDEX: 19.26 KG/M2 | OXYGEN SATURATION: 95 %

## 2024-08-19 DIAGNOSIS — E11.65 TYPE 2 DIABETES MELLITUS WITH HYPERGLYCEMIA, WITHOUT LONG-TERM CURRENT USE OF INSULIN: ICD-10-CM

## 2024-08-19 DIAGNOSIS — I10 PRIMARY HYPERTENSION: ICD-10-CM

## 2024-08-19 DIAGNOSIS — E78.2 MIXED HYPERLIPIDEMIA: ICD-10-CM

## 2024-08-19 DIAGNOSIS — I73.9 PERIPHERAL VASCULAR DISEASE, UNSPECIFIED: ICD-10-CM

## 2024-08-19 DIAGNOSIS — I25.10 CORONARY ARTERY DISEASE INVOLVING NATIVE CORONARY ARTERY OF NATIVE HEART WITHOUT ANGINA PECTORIS: Primary | ICD-10-CM

## 2024-08-19 DIAGNOSIS — I73.9 PERIPHERAL VASCULAR DISEASE: Primary | ICD-10-CM

## 2024-08-19 PROCEDURE — 3078F DIAST BP <80 MM HG: CPT | Performed by: INTERNAL MEDICINE

## 2024-08-19 PROCEDURE — 3075F SYST BP GE 130 - 139MM HG: CPT | Performed by: INTERNAL MEDICINE

## 2024-08-19 PROCEDURE — 99214 OFFICE O/P EST MOD 30 MIN: CPT | Performed by: INTERNAL MEDICINE

## 2024-08-19 NOTE — PROGRESS NOTES
[facility] Cardiology  Office Progress Note  Que Lagunas  1945  [address] [place] [address]       Visit Date: 08/19/24    PCP: Aurea Ayala MD  [address]  [facility] [address]    IDENTIFICATION: A 79 y.o. male  ,retired [employer]  , former fishing enthusiast     PROBLEM LIST:   CAD/dyspnea  3/20 two 2D echo: LVEF = 66%.  LV wall thickness-mild concentric hypertrophy.  LV diastolic dysfunction (grade 2) pseudonormalization.  Echo 3/22: EF 66%, mild conc LVH, LV diastolic fx  With grade II w/high LAP psudonormalization  5/24 CTA chest no PE dense MV CAC  Atrial Fibrillation  TIA:   April 2016, evaluation at [facility] with paroxysmal atrial fibrillation and negative CT of the head and neck.   Abnormal EKG:  Bifascicular block and no prior ischemia evaluation.   4/16 echo EF 60%  2/2023 Unintended weight loss - 25 lbs over 2 months, decreased PO intake  Dyslipidemia  A. 8/24 134/111/45/69  Diabetes mellitus, onset at age 49 and is requiring insulin x12 years.  8/24 A1c 7.8   Hypertension, presumed essential.   2/23 [facility] ED syncopal episode due to hypoglycemia  12/13/18 AAA screen WNL   8/24 GINO 0.9 bilat R SHEILA no flow  Prostatism.   Remote tonsillectomy and eye surgery.  Nicotine addiction, cessation 2011 with a 56 pack year history previously.  Loss of balance and memory  Dementia-vascular [facility]  Restless leg syndrome  Tiny Left lower lobe Lung nodules and moderate emphysematous changes        CC:   Chief Complaint   Patient presents with    Coronary Artery Disease       Allergies  Allergies   Allergen Reactions    Lisinopril Anaphylaxis       Current Medications    Current Outpatient Medications:     cholecalciferol (VITAMIN D3) 250 MCG (15151 UT) capsule, Take 1 capsule by mouth Daily., Disp: 30 capsule, Rfl: 11    donepezil (ARICEPT) 10 MG tablet, Take 1 tablet by mouth Every Night., Disp: 90 tablet, Rfl: 0    glucose blood (SOLUS  "V2 TEST) test strip, Use as instructed, Disp: 100 each, Rfl: 12    glucose monitor monitoring kit, 1 each As Needed (prn)., Disp: 1 each, Rfl: 0    Insulin Lispro (humaLOG) 100 UNIT/ML injection, INJECT 10 UNITS SUBCUTANEOUSLY WITH MEALS, Disp: 40 mL, Rfl: 1    Insulin Pen Needle (PEN NEEDLES 3/16\") 31G X 5 MM misc, Use with insulin daily E11.9, Disp: 100 each, Rfl: 5    Insulin Syringe-Needle U-100 (Easy Touch Insulin Syringe) 30G X 5/16\" 0.5 ML misc, use as directed with insulin 4 times a day, Disp: 100 each, Rfl: 11    Lancets misc, Use to test twice daily. E11.9, Disp: 100 each, Rfl: 12    Lantus 100 UNIT/ML injection, INJECT 10 UNITS SUBCUTANEOUSLY AT NIGHT, Disp: 30 mL, Rfl: 0    LifeScan Unistik II Lancets misc, 1 Units 3 (Three) Times a Day., Disp: 100 each, Rfl: 5    memantine (NAMENDA) 10 MG tablet, Take 1 tablet by mouth Every Night., Disp: 90 tablet, Rfl: 0    metFORMIN (GLUCOPHAGE) 500 MG tablet, TAKE 1 TABLET BY MOUTH IN THE MORNING WITH  BREAKFAST  Indications: Type 2 Diabetes, Disp: 90 tablet, Rfl: 3    metoprolol tartrate (LOPRESSOR) 100 MG tablet, Take 1 tablet by mouth 2 (Two) Times a Day., Disp: 180 tablet, Rfl: 1    PARoxetine (PAXIL) 20 MG tablet, Take 1 tablet by mouth once daily, Disp: 90 tablet, Rfl: 1    pravastatin (PRAVACHOL) 40 MG tablet, TAKE 1 TABLET BY MOUTH ONCE DAILY IN THE EVENING, Disp: 90 tablet, Rfl: 1    Xarelto 20 MG tablet, Take 1 tablet by mouth once daily, Disp: 30 tablet, Rfl: 11      History of Present Illness   Que Lagunas is a 79 y.o. year old male here for follow up on CAD, paroxysmal afib, htn, and hyperlipidemia.  He is companied by his daughter with his primary caregiver.  He notes no overt issues.  Recently had a CAT scan of his chest revealed no evidence of pulmonary embolus.  He continues with frailty and poor appetite  OBJECTIVE:  Vitals:    08/19/24 1117   BP: 130/76   BP Location: Left arm   Patient Position: Standing   Cuff Size: Adult   Pulse: 85 " "  SpO2: 95%   Weight: 64.5 kg (142 lb 3.2 oz)   Height: 182.9 cm (72.01\")       Body mass index is 19.28 kg/m².    Constitutional:       Appearance: Not in distress. Frail. Chronically ill-appearing.   Pulmonary:      Effort: Pulmonary effort is normal.      Breath sounds: Normal breath sounds. No wheezing. No rhonchi. No rales.   Chest:      Chest wall: Not tender to palpatation.   Cardiovascular:      PMI at left midclavicular line. Normal rate. Regular rhythm. Normal S1. Normal S2.       Murmurs: There is no murmur.      No gallop.  No click. No rub.   Pulses:     Intact distal pulses.   Edema:     Peripheral edema absent.   Skin:     General: Skin is warm and dry.   Neurological:      General: No focal deficit present.      Mental Status: Alert and oriented to person, place and time.         Diagnostic Data:  Procedures      ASSESSMENT:   Diagnosis Plan   1. Coronary artery disease involving native coronary artery of native heart without angina pectoris        2. Primary hypertension        3. Mixed hyperlipidemia        4. Peripheral vascular disease, unspecified        5. Type 2 diabetes mellitus with hyperglycemia, without long-term current use of insulin              PLAN:  Hypertension-controlled on current metoprolol    Paroxysmal atrial fibrillation- Patient on Xarelto.  Rate controlled with metoprolol    CAD- Patient with no obstructive symptoms. Continue risk factor management.    Hyperlipidemia- Lipid panel in January at goal. Continue statin therapy.    Type 2 diabetes-controlled on insulin metformin     Charles Topete MD [facility]  "

## 2024-08-21 ENCOUNTER — TELEPHONE (OUTPATIENT)
Dept: INTERNAL MEDICINE | Facility: CLINIC | Age: 79
End: 2024-08-21
Payer: MEDICARE

## 2024-08-21 DIAGNOSIS — I73.9 PERIPHERAL VASCULAR DISEASE: Primary | ICD-10-CM

## 2024-08-21 NOTE — TELEPHONE ENCOUNTER
"Patient calls to report that her infectious disease doctor, Dr. Granados is the only one to prescribe antibiotics due to allergies. Patient says primary care provider is aware of this. Patient says that provider, Leena Khan prescribed Bactrim to her in November and patient's GI provider prescribed her  Doxycycline. Patient states that she wants her provider to be aware as primary care provider \"should know that only my infectious disease doctor should be prescribing these\".    Patient states she was told to stay in bed, but patient says \"this isn't a good idea for me\" as it causes her to be weak and patient feels she needs physical therapy. Patient says she had to cancel CT scan today due to her ongoing diarrhea.     Patient requests Lasix due to fluid retention. Patient reports gaining 8 pounds (patient doesn't know within what time frame).    Please advise.     Thank you,  Dawood Ortez, Triage RN [facility]  8:38 AM 12/20/2023     " Daughter called back due to result note to request referral for vascular surgery.

## 2024-09-09 DIAGNOSIS — E78.2 MIXED HYPERLIPIDEMIA: ICD-10-CM

## 2024-09-09 RX ORDER — PRAVASTATIN SODIUM 40 MG
TABLET ORAL
Qty: 90 TABLET | Refills: 1 | Status: SHIPPED | OUTPATIENT
Start: 2024-09-09

## 2024-09-17 DIAGNOSIS — F01.50 VASCULAR DEMENTIA WITHOUT BEHAVIORAL DISTURBANCE: ICD-10-CM

## 2024-09-17 RX ORDER — MEMANTINE HYDROCHLORIDE 10 MG/1
10 TABLET ORAL NIGHTLY
Qty: 90 TABLET | Refills: 0 | Status: SHIPPED | OUTPATIENT
Start: 2024-09-17

## 2024-10-02 ENCOUNTER — HOSPITAL ENCOUNTER (OUTPATIENT)
Dept: NUTRITION | Facility: HOSPITAL | Age: 79
Discharge: HOME | End: 2024-10-02
Payer: OTHER GOVERNMENT

## 2024-10-03 NOTE — PROGRESS NOTES
Adult Nutrition  Assessment    Patient Name:  Que Lagunas  YOB: 1945  MRN: [id number]  Admit Date:  10/2/2024    Assessment Date:  10/3/2024    Comments:      Met w/ patient and patient's daughter for initial nutrition assessment regarding diabetes. Patient's daughter reports patient A1C is 6.8% - after review of patient's chart, his most recent A1C is 7.8%. Patient's daughter states that patient hasn't been eating as much as he used to and has lost 10# since May. Patient mostly grazes throughout the day.     Provided diabetes education handout regarding healthy meal planning with recommended carbohydrate portion sizes. Provided high protein and high calorie nutrition therapy and recipes. Recommended patient consume 200g-220g of carbohydrates per day and to portion out carbohydrates at meal times. Patient's daughter agreeable and notes goals are feasible and denies any nutrition-related questions/concerns at this time. RD available PRN.      Electronically signed by:  Ludmila Martell RD  10/03/24 08:19 EDT

## 2024-11-07 DIAGNOSIS — F01.50 VASCULAR DEMENTIA WITHOUT BEHAVIORAL DISTURBANCE: ICD-10-CM

## 2024-11-07 RX ORDER — DONEPEZIL HYDROCHLORIDE 10 MG/1
10 TABLET, FILM COATED ORAL NIGHTLY
Qty: 90 TABLET | Refills: 1 | Status: SHIPPED | OUTPATIENT
Start: 2024-11-07

## 2024-12-03 ENCOUNTER — OFFICE VISIT (OUTPATIENT)
Dept: INTERNAL MEDICINE | Facility: CLINIC | Age: 79
End: 2024-12-03
Payer: MEDICARE

## 2024-12-03 VITALS
OXYGEN SATURATION: 98 % | BODY MASS INDEX: 20.45 KG/M2 | HEART RATE: 70 BPM | HEIGHT: 72 IN | DIASTOLIC BLOOD PRESSURE: 78 MMHG | WEIGHT: 151 LBS | RESPIRATION RATE: 16 BRPM | SYSTOLIC BLOOD PRESSURE: 116 MMHG | TEMPERATURE: 97.3 F

## 2024-12-03 DIAGNOSIS — F41.1 GENERALIZED ANXIETY DISORDER: ICD-10-CM

## 2024-12-03 DIAGNOSIS — E78.2 MIXED HYPERLIPIDEMIA: ICD-10-CM

## 2024-12-03 DIAGNOSIS — I10 PRIMARY HYPERTENSION: ICD-10-CM

## 2024-12-03 DIAGNOSIS — Z91.81 AT MODERATE RISK FOR FALL: ICD-10-CM

## 2024-12-03 DIAGNOSIS — N39.0 RECURRENT UTI: ICD-10-CM

## 2024-12-03 DIAGNOSIS — F03.B3 MODERATE DEMENTIA WITH MOOD DISTURBANCE, UNSPECIFIED DEMENTIA TYPE: ICD-10-CM

## 2024-12-03 DIAGNOSIS — Z00.00 MEDICARE ANNUAL WELLNESS VISIT, SUBSEQUENT: Primary | ICD-10-CM

## 2024-12-03 DIAGNOSIS — E11.69 TYPE 2 DIABETES MELLITUS WITH OTHER SPECIFIED COMPLICATION, WITH LONG-TERM CURRENT USE OF INSULIN: ICD-10-CM

## 2024-12-03 DIAGNOSIS — Z79.4 TYPE 2 DIABETES MELLITUS WITH OTHER SPECIFIED COMPLICATION, WITH LONG-TERM CURRENT USE OF INSULIN: ICD-10-CM

## 2024-12-03 DIAGNOSIS — I73.9 PERIPHERAL VASCULAR DISEASE: ICD-10-CM

## 2024-12-03 DIAGNOSIS — I48.0 PAROXYSMAL ATRIAL FIBRILLATION: ICD-10-CM

## 2024-12-03 LAB
BILIRUB BLD-MCNC: NEGATIVE MG/DL
CLARITY, POC: ABNORMAL
COLOR UR: YELLOW
EXPIRATION DATE: ABNORMAL
EXPIRATION DATE: ABNORMAL
GLUCOSE UR STRIP-MCNC: ABNORMAL MG/DL
HBA1C MFR BLD: 8.4 % (ref 4.5–5.7)
KETONES UR QL: NEGATIVE
LEUKOCYTE EST, POC: ABNORMAL
Lab: ABNORMAL
Lab: ABNORMAL
NITRITE UR-MCNC: NEGATIVE MG/ML
PH UR: 6 [PH] (ref 5–8)
PROT UR STRIP-MCNC: ABNORMAL MG/DL
RBC # UR STRIP: ABNORMAL /UL
SP GR UR: 1.01 (ref 1–1.03)
UROBILINOGEN UR QL: ABNORMAL

## 2024-12-03 PROCEDURE — G0439 PPPS, SUBSEQ VISIT: HCPCS | Performed by: NURSE PRACTITIONER

## 2024-12-03 PROCEDURE — 1159F MED LIST DOCD IN RCRD: CPT | Performed by: NURSE PRACTITIONER

## 2024-12-03 PROCEDURE — 3052F HG A1C>EQUAL 8.0%<EQUAL 9.0%: CPT | Performed by: NURSE PRACTITIONER

## 2024-12-03 PROCEDURE — 99397 PER PM REEVAL EST PAT 65+ YR: CPT | Performed by: NURSE PRACTITIONER

## 2024-12-03 PROCEDURE — 3078F DIAST BP <80 MM HG: CPT | Performed by: NURSE PRACTITIONER

## 2024-12-03 PROCEDURE — 81003 URINALYSIS AUTO W/O SCOPE: CPT | Performed by: NURSE PRACTITIONER

## 2024-12-03 PROCEDURE — 1170F FXNL STATUS ASSESSED: CPT | Performed by: NURSE PRACTITIONER

## 2024-12-03 PROCEDURE — 83036 HEMOGLOBIN GLYCOSYLATED A1C: CPT | Performed by: NURSE PRACTITIONER

## 2024-12-03 PROCEDURE — 1126F AMNT PAIN NOTED NONE PRSNT: CPT | Performed by: NURSE PRACTITIONER

## 2024-12-03 PROCEDURE — 1160F RVW MEDS BY RX/DR IN RCRD: CPT | Performed by: NURSE PRACTITIONER

## 2024-12-03 PROCEDURE — 3074F SYST BP LT 130 MM HG: CPT | Performed by: NURSE PRACTITIONER

## 2024-12-03 RX ORDER — PAROXETINE 20 MG/1
20 TABLET, FILM COATED ORAL DAILY
Qty: 90 TABLET | Refills: 1 | Status: SHIPPED | OUTPATIENT
Start: 2024-12-03

## 2024-12-03 RX ORDER — METOPROLOL TARTRATE 100 MG/1
100 TABLET ORAL 2 TIMES DAILY
Qty: 180 TABLET | Refills: 1 | Status: SHIPPED | OUTPATIENT
Start: 2024-12-03

## 2024-12-03 RX ORDER — PRAVASTATIN SODIUM 40 MG
40 TABLET ORAL EVERY EVENING
Qty: 90 TABLET | Refills: 1 | Status: SHIPPED | OUTPATIENT
Start: 2024-12-03

## 2024-12-03 NOTE — ASSESSMENT & PLAN NOTE
Continue pravastatin. Counseled on Mediterranean diet and exercise as tolerated. Follow-up in 3 months.   Orders:    pravastatin (PRAVACHOL) 40 MG tablet; Take 1 tablet by mouth Every Evening.

## 2024-12-03 NOTE — ASSESSMENT & PLAN NOTE
Continue Aricept and Namenda as prescribed. Keep following with neurology. Ensure patient has caregiver at home at all times.

## 2024-12-03 NOTE — PROGRESS NOTES
" Subjective   The ABCs of the Annual Wellness Visit  Medicare Wellness Visit      Que Lagunas is a 79 y.o. patient who presents for a Medicare Wellness Visit.    The following portions of the patient's history were reviewed and   updated as appropriate: allergies, current medications, past family history, past medical history, past social history, past surgical history, and problem list.    Compared to one year ago, the patient's physical   health is worse.  Compared to one year ago, the patient's mental   health is the same.    Recent Hospitalizations:  This patient has had a [facility] admission record on file within the last 365 days.  Current Medical Providers:  Patient Care Team:  Aurea Ayala MD as PCP - General (Family Medicine)  Alayna Lynch MD as Consulting Physician (General Surgery)  Charles Topete MD as Consulting Physician (Cardiology)    Outpatient Medications Prior to Visit   Medication Sig Dispense Refill    cholecalciferol (VITAMIN D3) 250 MCG (71361 UT) capsule Take 1 capsule by mouth Daily. 30 capsule 11    donepezil (ARICEPT) 10 MG tablet TAKE 1 TABLET BY MOUTH ONCE DAILY AT NIGHT 90 tablet 1    glucose blood (SOLUS V2 TEST) test strip Use as instructed 100 each 12    glucose monitor monitoring kit 1 each As Needed (prn). 1 each 0    Insulin Lispro (humaLOG) 100 UNIT/ML injection INJECT 10 UNITS SUBCUTANEOUSLY WITH MEALS 40 mL 1    Insulin Pen Needle (PEN NEEDLES 3/16\") 31G X 5 MM misc Use with insulin daily E11.9 100 each 5    Insulin Syringe-Needle U-100 (Easy Touch Insulin Syringe) 30G X 5/16\" 0.5 ML misc use as directed with insulin 4 times a day 100 each 11    Lancets misc Use to test twice daily. E11.9 100 each 12    Lantus 100 UNIT/ML injection INJECT 10 UNITS SUBCUTANEOUSLY AT NIGHT 30 mL 0    LifeScan Unistik II Lancets misc 1 Units 3 (Three) Times a Day. 100 each 5    memantine (NAMENDA) 10 MG tablet TAKE 1 TABLET BY MOUTH ONCE DAILY AT NIGHT 90 tablet 0    " "metoprolol tartrate (LOPRESSOR) 100 MG tablet Take 1 tablet by mouth 2 (Two) Times a Day. 180 tablet 1    PARoxetine (PAXIL) 20 MG tablet Take 1 tablet by mouth once daily 90 tablet 1    pravastatin (PRAVACHOL) 40 MG tablet TAKE 1 TABLET BY MOUTH ONCE DAILY IN THE EVENING 90 tablet 1    Xarelto 20 MG tablet Take 1 tablet by mouth once daily 30 tablet 11    metFORMIN (GLUCOPHAGE) 500 MG tablet TAKE 1 TABLET BY MOUTH IN THE MORNING WITH  BREAKFAST  Indications: Type 2 Diabetes 90 tablet 3     No facility-administered medications prior to visit.     No opioid medication identified on active medication list. I have reviewed chart for other potential  high risk medication/s and harmful drug interactions in the elderly.      Aspirin is not on active medication list.  Aspirin use is not indicated based on review of current medical condition/s. Risk of harm outweighs potential benefits.  .    Patient Active Problem List   Diagnosis    Type 2 diabetes mellitus with hyperglycemia    Enlarged prostate    Generalized anxiety disorder    Hyperlipidemia    Hypertension    TIA (transient ischemic attack)    CAD (coronary artery disease)    Paroxysmal atrial fibrillation    Vitamin D deficiency    Restless leg syndrome    Personal history of colonic polyps    Loss of balance    Memory loss    Bilateral lower extremity edema    Tubular adenoma    Dementia    Impaired mobility and ADLs    Chronic fatigue    Peripheral vascular disease     Advance Care Planning   Advance Directive is on file.  ACP discussion was held with the patient during this visit. Patient has an advance directive in EMR which is still valid.       Objective   Vitals:    12/03/24 0821 12/03/24 0850   BP: 144/92 116/78   Pulse: 70    Resp: 16    Temp: 97.3 °F (36.3 °C)    SpO2: 98%    Weight: 68.5 kg (151 lb)    Height: 182.9 cm (72\")    PainSc: 0-No pain        Estimated body mass index is 20.48 kg/m² as calculated from the following:    Height as of this " "encounter: 182.9 cm (72\").    Weight as of this encounter: 68.5 kg (151 lb).    BMI is within normal parameters. No other follow-up for BMI required.       Does the patient have evidence of cognitive impairment? Yes  Lab Results   Component Value Date    HGBA1C 8.4 (A) 2024                                                                                                Health  Risk Assessment    Smoking Status:  Social History     Tobacco Use   Smoking Status Former    Current packs/day: 0.00    Average packs/day: 2.0 packs/day for 50.0 years (100.0 ttl pk-yrs)    Types: Cigarettes    Start date:     Quit date:     Years since quittin.9    Passive exposure: Past   Smokeless Tobacco Never   Tobacco Comments    STOPPED 5 YEARS AGO     Alcohol Consumption:  Social History     Substance and Sexual Activity   Alcohol Use No       Fall Risk Screen    STEADI Fall Risk Assessment was completed, and patient is at MODERATE risk for falls. Assessment completed on:12/3/2024    Depression Screening     Little interest or pleasure in doing things? Not at all   Feeling down, depressed, or hopeless? Not at all   PHQ-2 Total Score 0      Health Habits and Functional and Cognitive Screenin/3/2024     8:17 AM   Functional & Cognitive Status   Do you have difficulty preparing food and eating? No   Do you have difficulty bathing yourself, getting dressed or grooming yourself? No   Do you have difficulty using the toilet? Yes   Do you have difficulty moving around from place to place? Yes   Do you have trouble with steps or getting out of a bed or a chair? Yes   Current Diet Diabetic Diet   Dental Exam Not up to date   Eye Exam Not up to date   Exercise (times per week) 0 times per week   Current Exercises Include No Regular Exercise   Do you need help using the phone?  Yes   Are you deaf or do you have serious difficulty hearing?  No   Do you need help to go to places out of walking distance? Yes   Do you need " help shopping? Yes   Do you need help preparing meals?  Yes   Do you need help with housework?  Yes   Do you need help with laundry? Yes   Do you need help taking your medications? Yes   Do you need help managing money? Yes   Do you ever drive or ride in a car without wearing a seat belt? No   Have you felt unusual stress, anger or loneliness in the last month? No   Who do you live with? Child   If you need help, do you have trouble finding someone available to you? No   Have you been bothered in the last four weeks by sexual problems? No   Do you have difficulty concentrating, remembering or making decisions? Yes           Age-appropriate Screening Schedule:  Refer to the list below for future screening recommendations based on patient's age, sex and/or medical conditions. Orders for these recommended tests are listed in the plan section. The patient has been provided with a written plan.    Health Maintenance List  Health Maintenance   Topic Date Due    DIABETIC EYE EXAM  03/23/2024    ANNUAL WELLNESS VISIT  04/19/2024    COLORECTAL CANCER SCREENING  04/07/2025    COVID-19 Vaccine (4 - 2024-25 season) 02/22/2025 (Originally 9/1/2024)    RSV Vaccine - Adults (1 - 1-dose 75+ series) 06/06/2025 (Originally 3/18/2019)    TDAP/TD VACCINES (1 - Tdap) 07/08/2025 (Originally 3/18/1964)    ZOSTER VACCINE (1 of 2) 07/07/2026 (Originally 3/18/1994)    HEMOGLOBIN A1C  06/03/2025    URINE MICROALBUMIN  07/15/2025    LIPID PANEL  08/13/2025    INFLUENZA VACCINE  Completed    Pneumococcal Vaccine 65+  Completed                                                                                                                                                CMS Preventative Services Quick Reference  Risk Factors Identified During Encounter  Fall Risk-High or Moderate: Discussed Fall Prevention in the home and Information on Fall Prevention Shared in After Visit Summary  Polypharmacy: Medication List reviewed and Medications are  appropriate for patient    The above risks/problems have been discussed with the patient.  Pertinent information has been shared with the patient in the After Visit Summary.  An After Visit Summary and PPPS were made available to the patient.    Follow Up:   Next Medicare Wellness visit to be scheduled in 1 year.    Additional E&M Note during same encounter follows:  Patient has additional, significant, and separately identifiable condition(s)/problem(s) that require work above and beyond the Medicare Wellness Visit     Chief Complaint  Medicare Wellness-subsequent (Check A1C and Patients daughter wants to make sure UTI has resolved.)    Subjective     HPI  Max is also being seen today for an annual adult preventative physical exam.  and Max is also being seen today for additional medical problem/s.  Patient is accompanied to visit by his daughter who is his primary caregiver.   Diabetes: complaint with prandial humalog and nightly long acting insulin. Stopped metformin at last visit due to improving A1c, reports he had tolerated the medication well. Due for A1c in office today.  Does monitor blood sugar at home, his fasting blood glucose has been averaging 150-200.   Denies polydipsia, polyuria, polyphagia, slow wound healing, hypoglycemia.   Has had diabetic eye exam in past year. Had ulceration on toe recently that was treated by podiatry and has healed - no open wounds.  HTN: compliant with metoprolol.   Monitors at home daily, averaging 140s/90s.   Denies headaches, blurred vision, shortness of breath, chest pain, lower extremity swelling.   Hyperlipidemia: compliant with pravastatin. Denies myalgia and dark urine.   Paroxysmal Afib: compliant with metoprolol and Xarelto. Following with cardiology. Denies khadijah bleeding, hematochezia, hematuria, fatigue.   Anxiety: compliant with paroxetine. Daughter and patient both endorses this has helped stabilize his mood. Denies anxiety attacks and suicidal and homicidal  "ideation.    Dementia: compliant with Aricept and Namenda for memory. Continues to follow with neurology. Daughter denies new onset confusion or behavior change.   Daughter is concerned because the patient has had three urinary tract infections over the course of the year, one of which he was hospitalized for. Most recently he was seen two weeks ago at [facility] urgent [facility] and diagnosed with a UTI and started on antibiotics. He has finished the full course of antibiotics, and daughter would like repeat urinalysis to make sure it has totally cleared up. Doesn't know which antibiotic he was placed on. Denies dysuria, hematuria, frequency, urgency, hesitancy, flank pain, suprapubic pain, new onset confusion.   No other acute complaints today.      Review of Systems   Constitutional:  Negative for appetite change and fatigue.   HENT:  Negative for trouble swallowing.    Eyes:  Negative for visual disturbance.   Respiratory:  Negative for shortness of breath.    Cardiovascular:  Negative for chest pain and leg swelling.   Gastrointestinal:  Negative for abdominal pain.   Endocrine: Negative for polydipsia, polyphagia and polyuria.   Genitourinary:  Negative for difficulty urinating, dysuria, flank pain, frequency, hematuria and urgency.   Musculoskeletal:  Negative for myalgias.   All other systems reviewed and are negative.       Objective   Vital Signs:  /78   Pulse 70   Temp 97.3 °F (36.3 °C)   Resp 16   Ht 182.9 cm (72\")   Wt 68.5 kg (151 lb)   SpO2 98%   BMI 20.48 kg/m²   Physical Exam  Vitals and nursing note reviewed.   Constitutional:       General: He is not in acute distress.     Appearance: Normal appearance. He is normal weight. He is not ill-appearing, toxic-appearing or diaphoretic.   HENT:      Head: Normocephalic and atraumatic.      Mouth/Throat:      Mouth: Mucous membranes are moist.      Pharynx: No posterior oropharyngeal erythema.   Eyes:      General: No scleral icterus.     Extraocular " Movements: Extraocular movements intact.      Pupils: Pupils are equal, round, and reactive to light.   Cardiovascular:      Rate and Rhythm: Normal rate and regular rhythm.      Heart sounds: Normal heart sounds. No murmur heard.  Pulmonary:      Effort: Pulmonary effort is normal. No respiratory distress.      Breath sounds: Normal breath sounds. No stridor. No wheezing, rhonchi or rales.   Chest:      Chest wall: No tenderness.   Abdominal:      General: Abdomen is flat. Bowel sounds are normal. There is no distension.      Palpations: Abdomen is soft. There is no mass.      Tenderness: There is no abdominal tenderness. There is no guarding or rebound.      Hernia: No hernia is present.   Musculoskeletal:         General: Deformity present.      Cervical back: Normal range of motion. No tenderness.      Right lower leg: No edema.      Left lower leg: No edema.      Comments: Ambulates with walker   Lymphadenopathy:      Cervical: No cervical adenopathy.   Skin:     General: Skin is warm and dry.   Neurological:      General: No focal deficit present.      Mental Status: He is alert.   Psychiatric:         Mood and Affect: Mood normal.         Behavior: Behavior normal.         Thought Content: Thought content does not include homicidal or suicidal ideation. Thought content does not include homicidal or suicidal plan.         Cognition and Memory: Memory is impaired (at baseline.).             Assessment and Plan   Additional age appropriate preventative wellness advice topics were discussed during today's preventative wellness exam(some topics already addressed during AWV portion of the note above):    Physical Activity: Advised cardiovascular activity 150 minutes per week as tolerated. (example brisk walk for 30 minutes, 5 days a week).     Nutrition: Discussed nutrition plan with patient. Information shared in after visit summary. Goal is for a well balanced diet to enhance overall health.     Healthy Weight:  Discussed current and goal BMI with patient. Steps to attain this goal discussed. Information shared in after visit summary.       Type 2 diabetes mellitus with other specified complication, with long-term current use of insulin  HgbA1c updated in office today. Continue insulin regimen as prescribed. Restart metformin daily. Discussed possible side effects of medication. Counseled on diabetic diet, exercise as tolerated, medication compliance, home monitoring, annual eye exams, frequent foot exams. Follow-up in 3 months.     Orders:    POC Glycosylated Hemoglobin (Hb A1C)    metFORMIN (GLUCOPHAGE) 500 MG tablet; TAKE 1 TABLET BY MOUTH IN THE MORNING WITH  BREAKFAST  Indications: Type 2 Diabetes    Medicare annual wellness visit, subsequent  Preventative maintenance discussed during visit.        At moderate risk for fall  Dicussed always using walker at home to ambulate. Counseled on in home fall prevention strategies, slow sit to stand exercise.        Recurrent UTI  Brief Urine Lab Results  (Last result in the past 365 days)        Color   Clarity   Blood   Leuk Est   Nitrite   Protein   CREAT   Urine HCG        12/03/24 0844 Yellow   Cloudy   1+   500 Susan/ul   Negative   Trace                   Urinalysis completed in office today, will send for culture asymptomatic therefore hold on further antibiotics until culture is reviewed. Discussed that urinalysis findings and UTIs could be multifactorial. If continues to get recurrent UTIs will consider referral to urology for further prostate evaluation.  Orders:    POCT urinalysis dipstick, automated    Urine Culture - Urine, Urine, Clean Catch    Mixed hyperlipidemia  Continue pravastatin. Counseled on Mediterranean diet and exercise as tolerated. Follow-up in 3 months.   Orders:    pravastatin (PRAVACHOL) 40 MG tablet; Take 1 tablet by mouth Every Evening.    Peripheral vascular disease  Counseled on wearing compression socks and elevating legs when possible.         Generalized anxiety disorder  Continue paroxetine as prescribed. Counseled on daily sun exposure, stress management, medication compliance, healthy diet, exercise as tolerated. Instructed to report immediately to the emergency department if suicidal or homicidal ideation occurs, daughter verbalized understanding.   Orders:    PARoxetine (PAXIL) 20 MG tablet; Take 1 tablet by mouth Daily.    Primary hypertension  Continue metoprolol as prescribed. Counseled on DASH diet, exercise as tolerated, stress management, home monitoring, medication compliance. Follow-up in 3 months.        Paroxysmal atrial fibrillation  Continue metoprolol and xarelto as prescribed. Counseled on monitoring for signs and symptoms of bleedings.   Orders:    metoprolol tartrate (LOPRESSOR) 100 MG tablet; Take 1 tablet by mouth 2 (Two) Times a Day.    Moderate dementia with mood disturbance, unspecified dementia type  Continue Aricept and Namenda as prescribed. Keep following with neurology. Ensure patient has caregiver at home at all times.          This note accurately reflects work and decisions made by me.    Follow Up   Return in about 3 months (around 3/3/2025) for Next scheduled follow up.  Patient was given instructions and counseling regarding his condition or for health maintenance advice. Please see specific information pulled into the AVS if appropriate.

## 2024-12-03 NOTE — PATIENT INSTRUCTIONS
Advance Care Planning and Advance Directives     You make decisions on a daily basis - decisions about where you want to live, your career, your home, your life. Perhaps one of the most important decisions you face is your choice for future medical care. Take time to talk with your family and your healthcare team and start planning today.  Advance Care Planning is a process that can help you:  Understand possible future healthcare decisions in light of your own experiences  Reflect on those decision in light of your goals and values  Discuss your decisions with those closest to you and the healthcare professionals that care for you  Make a plan by creating a document that reflects your wishes    Surrogate Decision Maker  In the event of a medical emergency, which has left you unable to communicate or to make your own decisions, you would need someone to make decisions for you.  It is important to discuss your preferences for medical treatment with this person while you are in good health.     Qualities of a surrogate decision maker:  Willing to take on this role and responsibility  Knows what you want for future medical care  Willing to follow your wishes even if they don't agree with them  Able to make difficult medical decisions under stressful circumstances    Advance Directives  These are legal documents you can create that will guide your healthcare team and decision maker(s) when needed. These documents can be stored in the electronic medical record.    Living Will - a legal document to guide your care if you have a terminal condition or a serious illness and are unable to communicate. States vary by statute in document names/types, but most forms may include one or more of the following:        -  Directions regarding life-prolonging treatments        -  Directions regarding artificially provided nutrition/hydration        -  Choosing a healthcare decision maker        -  Direction regarding organ/tissue  donation    Durable Power of  for Healthcare - this document names an -in-fact to make medical decisions for you, but it may also allow this person to make personal and financial decisions for you. Please seek the advice of an  if you need this type of document.    **Advance Directives are not required and no one may discriminate against you if you do not sign one.    Medical Orders  Many states allow specific forms/orders signed by your physician to record your wishes for medical treatment in your current state of health. This form, signed in personal communication with your physician, addresses resuscitation and other medical interventions that you may or may not want.      For more information or to schedule a time with a [facility] Advance Care Planning Facilitator contact: [facility] or call [phone] and someone will contact you directly.  Fall Prevention in the Home, Adult  Falls can cause injuries and affect people of all ages. There are many simple things that you can do to make your home safe and to help prevent falls.  If you need it, ask for help making these changes.  What actions can I take to prevent falls?  General information  Use good lighting in all rooms. Make sure to:  Replace any light bulbs that burn out.  Turn on lights if it is dark and use night-lights.  Keep items that you use often in easy-to-reach places. Lower the shelves around your home if needed.  Move furniture so that there are clear paths around it.  Do not keep throw rugs or other things on the floor that can make you trip.  If any of your floors are uneven, fix them.  Add color or contrast paint or tape to clearly kimberli and help you see:  Grab bars or handrails.  First and last steps of staircases.  Where the edge of each step is.  If you use a ladder or stepladder:  Make sure that it is fully opened. Do not climb a closed ladder.  Make sure the sides of the ladder are locked in  place.  Have someone hold the ladder while you use it.  Know where your pets are as you move through your home.  What can I do in the bathroom?         Keep the floor dry. Clean up any water that is on the floor right away.  Remove soap buildup in the bathtub or shower. Buildup makes bathtubs and showers slippery.  Use non-skid mats or decals on the floor of the bathtub or shower.  Attach bath mats securely with double-sided, non-slip rug tape.  If you need to sit down while you are in the shower, use a non-slip stool.  Install grab bars by the toilet and in the bathtub and shower. Do not use towel bars as grab bars.  What can I do in the bedroom?  Make sure that you have a light by your bed that is easy to reach.  Do not use any sheets or blankets on your bed that hang to the floor.  Have a firm bench or chair with side arms that you can use for support when you get dressed.  What can I do in the kitchen?  Clean up any spills right away.  If you need to reach something above you, use a sturdy step stool that has a grab bar.  Keep electrical cables out of the way.  Do not use floor polish or wax that makes floors slippery.  What can I do with my stairs?  Do not leave anything on the stairs.  Make sure that you have a light switch at the top and the bottom of the stairs. Have them installed if you do not have them.  Make sure that there are handrails on both sides of the stairs. Fix handrails that are broken or loose. Make sure that handrails are as long as the staircases.  Install non-slip stair treads on all stairs in your home if they do not have carpet.  Avoid having throw rugs at the top or bottom of stairs, or secure the rugs with carpet tape to prevent them from moving.  Choose a carpet design that does not hide the edge of steps on the stairs. Make sure that carpet is firmly attached to the stairs. Fix any carpet that is loose or worn.  What can I do on the outside of my home?  Use bright outdoor  lighting.  Repair the edges of walkways and driveways and fix any cracks. Clear paths of anything that can make you trip, such as tools or rocks.  Add color or contrast paint or tape to clearly kimberli and help you see high doorway thresholds.  Trim any bushes or trees on the main path into your home.  Check that handrails are securely fastened and in good repair. Both sides of all steps should have handrails.  Install guardrails along the edges of any raised decks or porches.  Have leaves, snow, and ice cleared regularly. Use sand, salt, or ice melt on walkways during winter months if you live where there is ice and snow.  In the garage, clean up any spills right away, including grease or oil spills.  What other actions can I take?  Review your medicines with your health care provider. Some medicines can make you confused or feel dizzy. This can increase your chance of falling.  Wear closed-toe shoes that fit well and support your feet. Wear shoes that have rubber soles and low heels.  Use a cane, walker, scooter, or crutches that help you move around if needed.  Talk with your provider about other ways that you can decrease your risk of falls. This may include seeing a physical therapist to learn to do exercises to improve movement and strength.  Where to find more information  Centers for Disease Control and [place]: cdc.gov  [place] on Aging: naomi.nih.gov  [place] on Aging: naomi.nih.gov  Contact a health care provider if:  You are afraid of falling at home.  You feel weak, drowsy, or dizzy at home.  You fall at home.  Get help right away if you:  Lose consciousness or have trouble moving after a fall.  Have a fall that causes a head injury.  These symptoms may be an emergency. Get help right away. Call 911.  Do not wait to see if the symptoms will go away.  Do not drive yourself to the hospital.  This information is not intended to replace advice given to you by your health care  provider. Make sure you discuss any questions you have with your health care provider.  Document Revised: 08/21/2023 Document Reviewed: 08/21/2023  Elsevier Patient Education © 2024 [employer]    Sit-to-Stand Exercise    The sit-to-stand exercise (also known as the chair stand or chair rise exercise) strengthens your lower body and helps you maintain or improve your mobility and independence. The end goal is to do the sit-to-stand exercise without using your hands. This will be easier as you become stronger. You should always talk with your health care provider before starting any exercise program, especially if you have had recent surgery.  Do the exercise exactly as told by your health care provider and adjust it as directed. It is normal to feel mild stretching, pulling, tightness, or discomfort as you do this exercise, but you should stop right away if you feel sudden pain or your pain gets worse. Do not begin doing this exercise until told by your health care provider.  What the sit-to-stand exercise does  The sit-to-stand exercise helps to strengthen the muscles in your thighs and the muscles in the center of your body that give you stability (core muscles). This exercise is especially helpful if:  You have had knee or hip surgery.  You have trouble getting up from a chair, out of a car, or off the toilet due to muscle weakness.  How to do the sit-to-stand exercise  Sit toward the front edge of a sturdy chair without armrests. Your knees should be bent and your feet should be flat on the floor and shoulder-width apart and underneath your hips.  Place your hands lightly on each side of the seat. Keep your back and neck as straight as possible, with your chest slightly forward.  Breathe in slowly. Lean forward and slightly shift your weight to the front of your feet.  Breathe out as you slowly stand up. Try not to support any weight with your hands.  Stand and pause for a full breath in and out.  Breathe in  as you sit down slowly. Tighten your core and abdominal muscles to control your lowering as much as possible. You should lower yourself back to the chair slowly, not just drop back into the seat.  Breathe out slowly.  Do this exercise 10-15 times. If needed, do it fewer times until you build up strength.  Rest for 1 minute, then do another set of 10-15 repetitions.  To change the difficulty of the sit-to-stand exercise  If the exercise is too difficult, use a chair with sturdy armrests, and push off the armrests to help you come to the standing position. You can also use the armrests to help slowly lower yourself back to sitting. As this gets easier, try to use your arms less. You can also place a firm cushion or pillow on the chair to make the surface higher.  If this exercise is too easy, do not use your arms to help raise or lower yourself. You can also wear a weighted vest, use hand weights, increase your repetitions, or try a lower chair.  General tips  You may feel tired when starting an exercise routine. This is normal.  You may have muscle soreness that lasts a few days. This is normal. As you get stronger, you may not feel muscle soreness.  Use smooth, steady movements.  Do not  hold your breath during strength exercises. This can cause unsafe changes in your blood pressure.  Breathe in slowly through your nose, and breathe out slowly through your mouth.  Summary  Strengthening your lower body is an important step to help you move safely and independently.  The sit-to-stand exercise helps strengthen the muscles in your thighs and core.  You should always talk with your health care provider before starting any exercise program, especially if you have had recent surgery.  This information is not intended to replace advice given to you by your health care provider. Make sure you discuss any questions you have with your health care provider.  Document Revised: 04/10/2022 Document Reviewed: 04/10/2022  [employer]  Patient Education 2024 [employer] Inc.      Medicare Wellness  Personal Prevention Plan of Service     Date of Office Visit:    Encounter Provider:  SUKI Ayala  Place of Service:  [facility]  Patient Name: Que Lagunas  :  1945    As part of the Medicare Wellness portion of your visit today, we are providing you with this personalized preventive plan of services (PPPS). This plan is based upon recommendations of the [place] Preventive Services Task Force (USPSTF) and the Advisory Committee on Immunization Practices (ACIP).    This lists the preventive care services that should be considered, and provides dates of when you are due. Items listed as completed are up-to-date and do not require any further intervention.    Health Maintenance   Topic Date Due   • DIABETIC EYE EXAM  2024   • COLORECTAL CANCER SCREENING  2025   • COVID-19 Vaccine ( season) 2025 (Originally 2024)   • RSV Vaccine - Adults (1 - 1-dose 75+ series) 2025 (Originally 3/18/2019)   • TDAP/TD VACCINES (1 - Tdap) 2025 (Originally 3/18/1964)   • ZOSTER VACCINE (1 of 2) 2026 (Originally 3/18/1994)   • HEMOGLOBIN A1C  2025   • URINE MICROALBUMIN  07/15/2025   • LIPID PANEL  2025   • ANNUAL WELLNESS VISIT  2025   • INFLUENZA VACCINE  Completed   • Pneumococcal Vaccine 65+  Completed       Orders Placed This Encounter   Procedures   • Urine Culture - Urine, Urine, Clean Catch     Order Specific Question:   Release to patient     Answer:   Routine Release [[id number]]   • POC Glycosylated Hemoglobin (Hb A1C)     Order Specific Question:   Release to patient     Answer:   Routine Release [[id number]]   • POCT urinalysis dipstick, automated     Order Specific Question:   Release to patient     Answer:   Routine Release [[id number]]       Return in about 6 months (around 6/3/2025) for Next scheduled follow up.            Prevention in the Home, Adult  Falls can cause injuries and affect people of all ages. There are many simple things that you can do to make your home safe and to help prevent falls.  If you need it, ask for help making these changes.  What actions can I take to prevent falls?  General information  Use good lighting in all rooms. Make sure to:  Replace any light bulbs that burn out.  Turn on lights if it is dark and use night-lights.  Keep items that you use often in easy-to-reach places. Lower the shelves around your home if needed.  Move furniture so that there are clear paths around it.  Do not keep throw rugs or other things on the floor that can make you trip.  If any of your floors are uneven, fix them.  Add color or contrast paint or tape to clearly kimberli and help you see:  Grab bars or handrails.  First and last steps of staircases.  Where the edge of each step is.  If you use a ladder or stepladder:  Make sure that it is fully opened. Do not climb a closed ladder.  Make sure the sides of the ladder are locked in place.  Have someone hold the ladder while you use it.  Know where your pets are as you move through your home.  What can I do in the bathroom?         Keep the floor dry. Clean up any water that is on the floor right away.  Remove soap buildup in the bathtub or shower. Buildup makes bathtubs and showers slippery.  Use non-skid mats or decals on the floor of the bathtub or shower.  Attach bath mats securely with double-sided, non-slip rug tape.  If you need to sit down while you are in the shower, use a non-slip stool.  Install grab bars by the toilet and in the bathtub and shower. Do not use towel bars as grab bars.  What can I do in the bedroom?  Make sure that you have a light by your bed that is easy to reach.  Do not use any sheets or blankets on your bed that hang to the floor.  Have a firm bench or chair with side arms that you can use for support when you get dressed.  What can I do in the  kitchen?  Clean up any spills right away.  If you need to reach something above you, use a sturdy step stool that has a grab bar.  Keep electrical cables out of the way.  Do not use floor polish or wax that makes floors slippery.  What can I do with my stairs?  Do not leave anything on the stairs.  Make sure that you have a light switch at the top and the bottom of the stairs. Have them installed if you do not have them.  Make sure that there are handrails on both sides of the stairs. Fix handrails that are broken or loose. Make sure that handrails are as long as the staircases.  Install non-slip stair treads on all stairs in your home if they do not have carpet.  Avoid having throw rugs at the top or bottom of stairs, or secure the rugs with carpet tape to prevent them from moving.  Choose a carpet design that does not hide the edge of steps on the stairs. Make sure that carpet is firmly attached to the stairs. Fix any carpet that is loose or worn.  What can I do on the outside of my home?  Use bright outdoor lighting.  Repair the edges of walkways and driveways and fix any cracks. Clear paths of anything that can make you trip, such as tools or rocks.  Add color or contrast paint or tape to clearly kimberli and help you see high doorway thresholds.  Trim any bushes or trees on the main path into your home.  Check that handrails are securely fastened and in good repair. Both sides of all steps should have handrails.  Install guardrails along the edges of any raised decks or porches.  Have leaves, snow, and ice cleared regularly. Use sand, salt, or ice melt on walkways during winter months if you live where there is ice and snow.  In the garage, clean up any spills right away, including grease or oil spills.  What other actions can I take?  Review your medicines with your health care provider. Some medicines can make you confused or feel dizzy. This can increase your chance of falling.  Wear closed-toe shoes that fit  well and support your feet. Wear shoes that have rubber soles and low heels.  Use a cane, walker, scooter, or crutches that help you move around if needed.  Talk with your provider about other ways that you can decrease your risk of falls. This may include seeing a physical therapist to learn to do exercises to improve movement and strength.  Where to find more information  Centers for Disease Control and Prevention, [place]: cdc.gov  National [place] on Aging: naomi.nih.gov  National [place] on Aging: naomi.nih.gov  Contact a health care provider if:  You are afraid of falling at home.  You feel weak, drowsy, or dizzy at home.  You fall at home.  Get help right away if you:  Lose consciousness or have trouble moving after a fall.  Have a fall that causes a head injury.  These symptoms may be an emergency. Get help right away. Call 911.  Do not wait to see if the symptoms will go away.  Do not drive yourself to the hospital.  This information is not intended to replace advice given to you by your health care provider. Make sure you discuss any questions you have with your health care provider.  Document Revised: 08/21/2023 Document Reviewed: 08/21/2023  Elsevier Patient Education © 2024 [employer] Inc.

## 2024-12-03 NOTE — ASSESSMENT & PLAN NOTE
Continue paroxetine as prescribed. Counseled on daily sun exposure, stress management, medication compliance, healthy diet, exercise as tolerated. Instructed to report immediately to the emergency department if suicidal or homicidal ideation occurs, daughter verbalized understanding.   Orders:    PARoxetine (PAXIL) 20 MG tablet; Take 1 tablet by mouth Daily.

## 2024-12-03 NOTE — ASSESSMENT & PLAN NOTE
Continue metoprolol as prescribed. Counseled on DASH diet, exercise as tolerated, stress management, home monitoring, medication compliance. Follow-up in 3 months.

## 2024-12-03 NOTE — ASSESSMENT & PLAN NOTE
Continue metoprolol and xarelto as prescribed. Counseled on monitoring for signs and symptoms of bleedings.   Orders:    metoprolol tartrate (LOPRESSOR) 100 MG tablet; Take 1 tablet by mouth 2 (Two) Times a Day.

## 2024-12-07 ENCOUNTER — TELEPHONE (OUTPATIENT)
Dept: INTERNAL MEDICINE | Facility: CLINIC | Age: 79
End: 2024-12-07
Payer: MEDICARE

## 2024-12-07 DIAGNOSIS — N30.01 ACUTE CYSTITIS WITH HEMATURIA: Primary | ICD-10-CM

## 2024-12-07 LAB
BACTERIA UR CULT: ABNORMAL
BACTERIA UR CULT: ABNORMAL
OTHER ANTIBIOTIC SUSC ISLT: ABNORMAL

## 2024-12-07 RX ORDER — CIPROFLOXACIN 250 MG/1
250 TABLET, FILM COATED ORAL 2 TIMES DAILY
Qty: 14 TABLET | Refills: 0 | Status: SHIPPED | OUTPATIENT
Start: 2024-12-07 | End: 2024-12-14

## 2024-12-07 NOTE — TELEPHONE ENCOUNTER
----- Message from Josey Pierce sent at 12/7/2024  8:35 AM EST -----  Please call with result.   Urine culture is positive for bacteria. Only susceptible to a few antibiotics. I recommend ciprofloxacin twice daily with food for 7 days. Careful with strenuous activity can cause tendon ruptures. If reoccurs after this I would recommend seeing a urologist.

## 2024-12-07 NOTE — TELEPHONE ENCOUNTER
"Relay     \"Per Josey---Urine culture is positive for bacteria. Only susceptible to a few antibiotics. I recommend ciprofloxacin twice daily with food for 7 days. Careful with strenuous activity can cause tendon ruptures. If UTI symptoms reoccurs after this I would recommend seeing a urologist. \"                 "

## 2024-12-10 ENCOUNTER — APPOINTMENT (OUTPATIENT)
Dept: GENERAL RADIOLOGY | Facility: HOSPITAL | Age: 79
End: 2024-12-10
Payer: OTHER GOVERNMENT

## 2024-12-10 ENCOUNTER — HOSPITAL ENCOUNTER (EMERGENCY)
Facility: HOSPITAL | Age: 79
Discharge: HOME | End: 2024-12-10
Attending: STUDENT IN AN ORGANIZED HEALTH CARE EDUCATION/TRAINING PROGRAM | Admitting: STUDENT IN AN ORGANIZED HEALTH CARE EDUCATION/TRAINING PROGRAM
Payer: OTHER GOVERNMENT

## 2024-12-10 ENCOUNTER — APPOINTMENT (OUTPATIENT)
Dept: CT IMAGING | Facility: HOSPITAL | Age: 79
End: 2024-12-10
Payer: OTHER GOVERNMENT

## 2024-12-10 VITALS
DIASTOLIC BLOOD PRESSURE: 55 MMHG | WEIGHT: 145.06 LBS | TEMPERATURE: 98 F | SYSTOLIC BLOOD PRESSURE: 97 MMHG | OXYGEN SATURATION: 97 % | BODY MASS INDEX: 19.65 KG/M2 | HEART RATE: 83 BPM | HEIGHT: 72 IN | RESPIRATION RATE: 18 BRPM

## 2024-12-10 DIAGNOSIS — N30.00 ACUTE CYSTITIS WITHOUT HEMATURIA: Primary | ICD-10-CM

## 2024-12-10 DIAGNOSIS — R11.2 NAUSEA AND VOMITING, UNSPECIFIED VOMITING TYPE: ICD-10-CM

## 2024-12-10 LAB
ALBUMIN SERPL-MCNC: 4.4 G/DL (ref 3.5–5.2)
ALBUMIN/GLOB SERPL: 1.4 G/DL
ALP SERPL-CCNC: 126 U/L (ref 39–117)
ALT SERPL W P-5'-P-CCNC: 19 U/L (ref 1–41)
ANION GAP SERPL CALCULATED.3IONS-SCNC: 11.1 MMOL/L (ref 5–15)
AST SERPL-CCNC: 20 U/L (ref 1–40)
BACTERIA UR QL AUTO: ABNORMAL /HPF
BASOPHILS # BLD AUTO: 0.07 10*3/MM3 (ref 0–0.2)
BASOPHILS NFR BLD AUTO: 0.5 % (ref 0–1.5)
BILIRUB SERPL-MCNC: 1.1 MG/DL (ref 0–1.2)
BILIRUB UR QL STRIP: NEGATIVE
BUN SERPL-MCNC: 18 MG/DL (ref 8–23)
BUN/CREAT SERPL: 23.1 (ref 7–25)
CALCIUM SPEC-SCNC: 9.4 MG/DL (ref 8.6–10.5)
CHLORIDE SERPL-SCNC: 100 MMOL/L (ref 98–107)
CLARITY UR: ABNORMAL
CO2 SERPL-SCNC: 28.9 MMOL/L (ref 22–29)
COLOR UR: YELLOW
CREAT SERPL-MCNC: 0.78 MG/DL (ref 0.76–1.27)
D-LACTATE SERPL-SCNC: 1.2 MMOL/L (ref 0.5–2)
DEPRECATED RDW RBC AUTO: 50.5 FL (ref 37–54)
EGFRCR SERPLBLD CKD-EPI 2021: 90.2 ML/MIN/1.73
EOSINOPHIL # BLD AUTO: 0.09 10*3/MM3 (ref 0–0.4)
EOSINOPHIL NFR BLD AUTO: 0.6 % (ref 0.3–6.2)
ERYTHROCYTE [DISTWIDTH] IN BLOOD BY AUTOMATED COUNT: 15.4 % (ref 12.3–15.4)
GEN 5 1HR TROPONIN T REFLEX: 14 NG/L
GLOBULIN UR ELPH-MCNC: 3.1 GM/DL
GLUCOSE SERPL-MCNC: 183 MG/DL (ref 65–99)
GLUCOSE UR STRIP-MCNC: NEGATIVE MG/DL
HCT VFR BLD AUTO: 47.5 % (ref 37.5–51)
HGB BLD-MCNC: 15.6 G/DL (ref 13–17.7)
HGB UR QL STRIP.AUTO: ABNORMAL
HYALINE CASTS UR QL AUTO: ABNORMAL /LPF
IMM GRANULOCYTES # BLD AUTO: 0.08 10*3/MM3 (ref 0–0.05)
IMM GRANULOCYTES NFR BLD AUTO: 0.6 % (ref 0–0.5)
KETONES UR QL STRIP: NEGATIVE
LEUKOCYTE ESTERASE UR QL STRIP.AUTO: ABNORMAL
LIPASE SERPL-CCNC: 14 U/L (ref 13–60)
LYMPHOCYTES # BLD AUTO: 2.56 10*3/MM3 (ref 0.7–3.1)
LYMPHOCYTES NFR BLD AUTO: 17.9 % (ref 19.6–45.3)
MCH RBC QN AUTO: 29.4 PG (ref 26.6–33)
MCHC RBC AUTO-ENTMCNC: 32.8 G/DL (ref 31.5–35.7)
MCV RBC AUTO: 89.5 FL (ref 79–97)
MONOCYTES # BLD AUTO: 1.32 10*3/MM3 (ref 0.1–0.9)
MONOCYTES NFR BLD AUTO: 9.2 % (ref 5–12)
NEUTROPHILS NFR BLD AUTO: 10.2 10*3/MM3 (ref 1.7–7)
NEUTROPHILS NFR BLD AUTO: 71.2 % (ref 42.7–76)
NITRITE UR QL STRIP: NEGATIVE
NRBC BLD AUTO-RTO: 0 /100 WBC (ref 0–0.2)
PH UR STRIP.AUTO: 6.5 [PH] (ref 5–8)
PLATELET # BLD AUTO: 282 10*3/MM3 (ref 140–450)
PMV BLD AUTO: 10.8 FL (ref 6–12)
POTASSIUM SERPL-SCNC: 3.9 MMOL/L (ref 3.5–5.2)
PROT SERPL-MCNC: 7.5 G/DL (ref 6–8.5)
PROT UR QL STRIP: ABNORMAL
RBC # BLD AUTO: 5.31 10*6/MM3 (ref 4.14–5.8)
RBC # UR STRIP: ABNORMAL /HPF
REF LAB TEST METHOD: ABNORMAL
SODIUM SERPL-SCNC: 140 MMOL/L (ref 136–145)
SP GR UR STRIP: 1.01 (ref 1–1.03)
SQUAMOUS #/AREA URNS HPF: ABNORMAL /HPF
TROPONIN T DELTA: 0 NG/L
TROPONIN T SERPL HS-MCNC: 14 NG/L
UROBILINOGEN UR QL STRIP: ABNORMAL
WBC # UR STRIP: ABNORMAL /HPF
WBC NRBC COR # BLD AUTO: 14.32 10*3/MM3 (ref 3.4–10.8)

## 2024-12-10 PROCEDURE — 25510000001 IOPAMIDOL 61 % SOLUTION: Performed by: STUDENT IN AN ORGANIZED HEALTH CARE EDUCATION/TRAINING PROGRAM

## 2024-12-10 PROCEDURE — 84484 ASSAY OF TROPONIN QUANT: CPT

## 2024-12-10 PROCEDURE — 81001 URINALYSIS AUTO W/SCOPE: CPT

## 2024-12-10 PROCEDURE — 74177 CT ABD & PELVIS W/CONTRAST: CPT

## 2024-12-10 PROCEDURE — 25010000002 ONDANSETRON PER 1 MG

## 2024-12-10 PROCEDURE — 99285 EMERGENCY DEPT VISIT HI MDM: CPT | Performed by: STUDENT IN AN ORGANIZED HEALTH CARE EDUCATION/TRAINING PROGRAM

## 2024-12-10 PROCEDURE — 83690 ASSAY OF LIPASE: CPT

## 2024-12-10 PROCEDURE — 36415 COLL VENOUS BLD VENIPUNCTURE: CPT

## 2024-12-10 PROCEDURE — 25010000002 CEFTRIAXONE PER 250 MG

## 2024-12-10 PROCEDURE — 85025 COMPLETE CBC W/AUTO DIFF WBC: CPT

## 2024-12-10 PROCEDURE — 96375 TX/PRO/DX INJ NEW DRUG ADDON: CPT

## 2024-12-10 PROCEDURE — 87086 URINE CULTURE/COLONY COUNT: CPT

## 2024-12-10 PROCEDURE — 93005 ELECTROCARDIOGRAM TRACING: CPT

## 2024-12-10 PROCEDURE — 71046 X-RAY EXAM CHEST 2 VIEWS: CPT

## 2024-12-10 PROCEDURE — 96365 THER/PROPH/DIAG IV INF INIT: CPT

## 2024-12-10 PROCEDURE — 87040 BLOOD CULTURE FOR BACTERIA: CPT

## 2024-12-10 PROCEDURE — 83605 ASSAY OF LACTIC ACID: CPT

## 2024-12-10 PROCEDURE — 87186 SC STD MICRODIL/AGAR DIL: CPT

## 2024-12-10 PROCEDURE — 87077 CULTURE AEROBIC IDENTIFY: CPT

## 2024-12-10 PROCEDURE — 25810000003 SODIUM CHLORIDE 0.9 % SOLUTION

## 2024-12-10 PROCEDURE — 80053 COMPREHEN METABOLIC PANEL: CPT

## 2024-12-10 RX ORDER — CEFUROXIME AXETIL 500 MG/1
500 TABLET ORAL 2 TIMES DAILY
Qty: 20 TABLET | Refills: 0 | Status: SHIPPED | OUTPATIENT
Start: 2024-12-10 | End: 2024-12-20

## 2024-12-10 RX ORDER — IOPAMIDOL 612 MG/ML
100 INJECTION, SOLUTION INTRAVASCULAR
Status: COMPLETED | OUTPATIENT
Start: 2024-12-10 | End: 2024-12-10

## 2024-12-10 RX ORDER — ONDANSETRON 4 MG/1
4 TABLET, ORALLY DISINTEGRATING ORAL EVERY 8 HOURS PRN
Qty: 21 TABLET | Refills: 0 | Status: SHIPPED | OUTPATIENT
Start: 2024-12-10 | End: 2024-12-17

## 2024-12-10 RX ORDER — ONDANSETRON 2 MG/ML
4 INJECTION INTRAMUSCULAR; INTRAVENOUS ONCE
Status: COMPLETED | OUTPATIENT
Start: 2024-12-10 | End: 2024-12-10

## 2024-12-10 RX ADMIN — SODIUM CHLORIDE 2000 MG: 9 INJECTION, SOLUTION INTRAVENOUS at 17:18

## 2024-12-10 RX ADMIN — SODIUM CHLORIDE 1000 ML: 9 INJECTION, SOLUTION INTRAVENOUS at 14:50

## 2024-12-10 RX ADMIN — IOPAMIDOL 100 ML: 612 INJECTION, SOLUTION INTRAVENOUS at 17:10

## 2024-12-10 RX ADMIN — ONDANSETRON 4 MG: 2 INJECTION INTRAMUSCULAR; INTRAVENOUS at 14:50

## 2024-12-10 NOTE — ED PROVIDER NOTES
EMERGENCY DEPARTMENT ENCOUNTER    Pt Name: Que Lagunas  MRN: [id number]  Pt :   1945  Room Number:    Date of encounter:  12/10/2024  PCP: Aurea Ayala MD  ED Provider: Alphonse Nj PA-C    Historian: Patient, nursing notes      HPI:  Chief Complaint: Nausea and vomiting        Context: Que Lagunas is a 79 y.o. male who presents to the ED c/o nausea and vomiting that occurred after breakfast.  Patient reports mild epigastric abdominal pain but no other complaints today.  Patient states he started spontaneously vomiting after eating breakfast.  Patient is denying any current chest pain or shortness of breath, lightheadedness, dizziness, syncopal episode, hematemesis, dysuria, urinary frequency or urgency.      PAST MEDICAL HISTORY  Past Medical History:   Diagnosis Date    Abnormal EKG     Bifascicular block and no prior ischemia evaluation.     Anemia     Anxiety     Arthritis     Atrial fibrillation     CHADS VASc=3.  Continue Xarelto 20.  vas continue Zaroxolyn 20 has a relative 20 Knobloch can add Lantus 40    CAD (coronary artery disease)     Colon polyp 2015    Contact dermatitis     Diabetes     Dyslipidemia     Hypertension     Palpitations     Prostatism     Stroke     Tattoo          PAST SURGICAL HISTORY  Past Surgical History:   Procedure Laterality Date    COLONOSCOPY  2015    EYE SURGERY      PROSTATE SURGERY      TONSILLECTOMY           FAMILY HISTORY  Family History   Problem Relation Age of Onset    Diabetes Other     Cancer Other         NO PROSTATE CANCER HISTORY    Hypertension Other     Cancer Other     Diabetes Other     Liver disease Mother     Liver disease Father     Colon cancer Neg Hx     Stomach cancer Neg Hx          SOCIAL HISTORY  Social History     Socioeconomic History    Marital status:    Tobacco Use    Smoking status: Former     Current packs/day: 0.00     Average packs/day: 2.0 packs/day for 50.0 years (100.0 ttl pk-yrs)      Types: Cigarettes     Start date:      Quit date:      Years since quittin.9     Passive exposure: Past    Smokeless tobacco: Never    Tobacco comments:     STOPPED 5 YEARS AGO   Vaping Use    Vaping status: Never Used   Substance and Sexual Activity    Alcohol use: No    Drug use: No    Sexual activity: Defer         ALLERGIES  Lisinopril        REVIEW OF SYSTEMS  Review of Systems   Constitutional:  Negative for chills and fever.   HENT:  Negative for congestion and sore throat.    Respiratory:  Negative for cough and shortness of breath.    Cardiovascular:  Negative for chest pain.   Gastrointestinal:  Positive for abdominal pain, nausea and vomiting.   Genitourinary:  Negative for dysuria.   Musculoskeletal:  Negative for back pain.   Skin:  Negative for wound.   Neurological:  Negative for dizziness and headaches.   Psychiatric/Behavioral:  Negative for confusion.    All other systems reviewed and are negative.         All systems reviewed and negative except for those discussed in HPI.       PHYSICAL EXAM    I have reviewed the triage vital signs and nursing notes.    ED Triage Vitals     Vitals:    12/10/24 1906   BP:    Pulse: 83   Resp:    Temp:    SpO2: 97%         Physical Exam  Vitals and nursing note reviewed.   Constitutional:       General: He is not in acute distress.     Appearance: Normal appearance. He is not ill-appearing, toxic-appearing or diaphoretic.   HENT:      Head: Normocephalic and atraumatic.      Right Ear: External ear normal.      Left Ear: External ear normal.      Nose: No congestion or rhinorrhea.      Mouth/Throat:      Mouth: Mucous membranes are moist.      Pharynx: Oropharynx is clear.   Eyes:      General: No scleral icterus.     Conjunctiva/sclera: Conjunctivae normal.   Cardiovascular:      Rate and Rhythm: Normal rate.      Heart sounds: Normal heart sounds.   Pulmonary:      Effort: Pulmonary effort is normal. No respiratory distress.      Breath sounds:  Normal breath sounds. No stridor. No wheezing.   Abdominal:      Palpations: Abdomen is soft.      Tenderness: There is abdominal tenderness. There is no right CVA tenderness, left CVA tenderness, guarding or rebound.   Musculoskeletal:      Cervical back: Normal range of motion and neck supple.      Right lower leg: No edema.      Left lower leg: No edema.   Skin:     Findings: No rash.   Neurological:      Mental Status: He is alert.             LAB RESULTS  Recent Results (from the past 24 hours)   CBC Auto Differential    Collection Time: 12/10/24  1:02 PM    Specimen: Blood   Result Value Ref Range    WBC 14.32 (H) 3.40 - 10.80 10*3/mm3    RBC 5.31 4.14 - 5.80 10*6/mm3    Hemoglobin 15.6 13.0 - 17.7 g/dL    Hematocrit 47.5 37.5 - 51.0 %    MCV 89.5 79.0 - 97.0 fL    MCH 29.4 26.6 - 33.0 pg    MCHC 32.8 31.5 - 35.7 g/dL    RDW 15.4 12.3 - 15.4 %    RDW-SD 50.5 37.0 - 54.0 fl    MPV 10.8 6.0 - 12.0 fL    Platelets 282 140 - 450 10*3/mm3    Neutrophil % 71.2 42.7 - 76.0 %    Lymphocyte % 17.9 (L) 19.6 - 45.3 %    Monocyte % 9.2 5.0 - 12.0 %    Eosinophil % 0.6 0.3 - 6.2 %    Basophil % 0.5 0.0 - 1.5 %    Immature Grans % 0.6 (H) 0.0 - 0.5 %    Neutrophils, Absolute 10.20 (H) 1.70 - 7.00 10*3/mm3    Lymphocytes, Absolute 2.56 0.70 - 3.10 10*3/mm3    Monocytes, Absolute 1.32 (H) 0.10 - 0.90 10*3/mm3    Eosinophils, Absolute 0.09 0.00 - 0.40 10*3/mm3    Basophils, Absolute 0.07 0.00 - 0.20 10*3/mm3    Immature Grans, Absolute 0.08 (H) 0.00 - 0.05 10*3/mm3    nRBC 0.0 0.0 - 0.2 /100 WBC   Urinalysis With Culture If Indicated - Urine, Clean Catch    Collection Time: 12/10/24  2:55 PM    Specimen: Urine, Clean Catch   Result Value Ref Range    Color, UA Yellow Yellow, Straw    Appearance, UA Turbid (A) Clear    pH, UA 6.5 5.0 - 8.0    Specific Gravity, UA 1.015 1.005 - 1.030    Glucose, UA Negative Negative    Ketones, UA Negative Negative    Bilirubin, UA Negative Negative    Blood, UA Moderate (2+) (A) Negative     Protein, UA 30 mg/dL (1+) (A) Negative    Leuk Esterase, UA Large (3+) (A) Negative    Nitrite, UA Negative Negative    Urobilinogen, UA 1.0 E.U./dL 0.2 - 1.0 E.U./dL   Urinalysis, Microscopic Only - Urine, Clean Catch    Collection Time: 12/10/24  2:55 PM    Specimen: Urine, Clean Catch   Result Value Ref Range    RBC, UA Unable to determine due to loaded field (A) None Seen, 0-2 /HPF    WBC, UA Too Numerous to Count (A) None Seen, 0-2 /HPF    Bacteria, UA Unable to determine due to loaded field (A) None Seen /HPF    Squamous Epithelial Cells, UA Unable to determine due to loaded field (A) None Seen, 0-2 /HPF    Hyaline Casts, UA Unable to determine due to loaded field None Seen /LPF    Methodology Manual Light Microscopy    Comprehensive Metabolic Panel    Collection Time: 12/10/24  4:25 PM    Specimen: Blood   Result Value Ref Range    Glucose 183 (H) 65 - 99 mg/dL    BUN 18 8 - 23 mg/dL    Creatinine 0.78 0.76 - 1.27 mg/dL    Sodium 140 136 - 145 mmol/L    Potassium 3.9 3.5 - 5.2 mmol/L    Chloride 100 98 - 107 mmol/L    CO2 28.9 22.0 - 29.0 mmol/L    Calcium 9.4 8.6 - 10.5 mg/dL    Total Protein 7.5 6.0 - 8.5 g/dL    Albumin 4.4 3.5 - 5.2 g/dL    ALT (SGPT) 19 1 - 41 U/L    AST (SGOT) 20 1 - 40 U/L    Alkaline Phosphatase 126 (H) 39 - 117 U/L    Total Bilirubin 1.1 0.0 - 1.2 mg/dL    Globulin 3.1 gm/dL    A/G Ratio 1.4 g/dL    BUN/Creatinine Ratio 23.1 7.0 - 25.0    Anion Gap 11.1 5.0 - 15.0 mmol/L    eGFR 90.2 >60.0 mL/min/1.73   Lipase    Collection Time: 12/10/24  4:25 PM    Specimen: Blood   Result Value Ref Range    Lipase 14 13 - 60 U/L   High Sensitivity Troponin T    Collection Time: 12/10/24  4:25 PM    Specimen: Blood   Result Value Ref Range    HS Troponin T 14 <22 ng/L   Lactic Acid, Plasma    Collection Time: 12/10/24  4:25 PM    Specimen: Blood   Result Value Ref Range    Lactate 1.2 0.5 - 2.0 mmol/L   High Sensitivity Troponin T 1Hr    Collection Time: 12/10/24  6:05 PM    Specimen: Blood    Result Value Ref Range    HS Troponin T 14 <22 ng/L    Troponin T Delta 0 Abnormal if >/=3 ng/L       If labs were ordered, I independently reviewed the results and considered them in treating the patient.        RADIOLOGY  CT Abdomen Pelvis With Contrast    Result Date: 12/10/2024  FINAL REPORT TECHNIQUE: null CLINICAL HISTORY: N/V/epigastric pain COMPARISON: null FINDINGS: CT abdomen and pelvis with contrast Comparison: None Findings: There is minimal bibasilar atelectasis. Liver, gallbladder, pancreas, spleen, adrenal glands, and kidneys are unremarkable. The bladder is mildly distended. There is diffuse bladder wall thickening and trabeculation. There is a 2.6 cm left lateral bladder diverticulum. There appears to be mild  fat stranding around the bladder. There is mild sigmoid diverticulosis. There is no acute diverticulitis. The appendix is normal. The remainder of the gastrointestinal tract is unremarkable. There is no free fluid or free air. There are no enlarged lymph nodes. The right testicle is currently retracted into the inguinal canal. The aorta is normal in diameter. There is diffuse calcified plaque. There are degenerative changes in the lumbar spine. There is no acute fracture or suspicious lytic or sclerotic lesion.     Impression: 1. Mildly distended bladder with bladder wall thickening, trabeculation, and bladder diverticulum consistent with chronic bladder outlet obstruction. There also appears to be mild fat stranding around the bladder. Correlate for cystitis. 2. Additional chronic findings as above. Authenticated and Electronically Signed by Erwin Romero MD on 12/10/2024 06:55:31 PM    XR Chest 2 View    Result Date: 12/10/2024  PROCEDURE: XR CHEST 2 VW-  HISTORY: nausea,vomiting, epigastric pain  COMPARISON: June 13, 2024..  FINDINGS: The heart is normal in size. The lungs are clear. The mediastinum is unremarkable. There is no pneumothorax.  There are no acute osseous abnormalities.  Aortic mural calcifications noted.      No acute cardiopulmonary process.        This report was signed and finalized on 12/10/2024 12:02 PM by Ne Peters MD.       I ordered and independently reviewed the above noted radiographic studies.      I viewed images of chest x-ray which showed no acute cardiopulmonary process per my independent interpretation.    I viewed images of the CT abdomen and pelvis scan which showed findings consistent with cystitis per my independent interpretation    See radiologist's dictation for official interpretation.        PROCEDURES    Procedures    ECG 12 Lead Chest Pain   Final Result          MEDICATIONS GIVEN IN ER    Medications   sodium chloride 0.9 % bolus 1,000 mL (0 mL Intravenous Stopped 12/10/24 1718)   ondansetron (ZOFRAN) injection 4 mg (4 mg Intravenous Given 12/10/24 1450)   cefTRIAXone (ROCEPHIN) 2,000 mg in sodium chloride 0.9 % 100 mL IVPB-VTB (0 mg Intravenous Stopped 12/10/24 1805)   iopamidol (ISOVUE-300) 61 % injection 100 mL (100 mL Intravenous Given 12/10/24 1710)         MEDICAL DECISION MAKING, PROGRESS, and CONSULTS    All labs, if obtained, have been independently reviewed by me.  All radiology studies, if obtained, have been reviewed by me and the radiologist dictating the report.  All EKG's, if obtained, have been independently viewed and interpreted by me/my attending physician.      Discussion below represents my analysis of pertinent findings related to patient's condition, differential diagnosis, treatment plan and final disposition.    Patient is an 79-year-old man that presented to the ER for evaluation of sudden onset nausea and vomiting that occurred after eating breakfast.  On arrival today the patient is upright alert and oriented, in no acute distress resting comfortably in the exam chair and his vitals and pulse oximetry are all stable and within normal limits.  He has mild epigastric tenderness to palpation otherwise his physical exam is  reassuring.  Plan will be for lab workup, abdominal pain labs, cardiac labs, chest x-ray and a CT abdomen and pelvis scan.  Patient given IV fluids and Zofran.    CBC showed a leukocytosis of 14.3.  Hemoglobin hematocrit normal.  CMP clinically unremarkable.  Lipase normal lowering suspicion for pancreatitis.  Lactic normal lowering suspicion for sepsis.  Urinalysis however was positive for significant pyuria and leukocyte esterase positive.  Due to suspicion for acute cystitis patient was started on IV Rocephin.  CT abdomen and pelvis per radiologist report showed findings consistent with cystitis but no other acute intra-abdominal pathology.  As such I feel the patient is stable for discharge, he has tolerated both liquid and food during the ED course and appears to have no longer any vomiting symptoms.  Will prescribe Zofran at discharge along with prescription for Ceftin and recommendations to follow-up with urology particularly as there was findings consistent with chronic urinary obstruction on CT scan.  Patient and his daughter at bedside verbalized understanding of and agreement with this plan of care.                     Differential diagnosis:    Differential diagnosis included but was not limited to gastritis, GERD, bowel obstruction, gastroenteritis, colitis, foodborne illness      Additional sources:    - Discussed/ obtained information from independent historians: None    - External (non-ED) record review: I performed an extensive chart review of the patient's internal medicine records from his primary care provider left most recent office visit note from 12/3/2024 revealed patient does have a history of recurrent UTIs, hyperlipidemia, peripheral vascular disease, hypertension and A-fib along with vascular dementia.    - Chronic or social conditions impacting care: None    Orders placed during this visit:  Orders Placed This Encounter   Procedures    Urine Culture - Urine,    Blood Culture - Blood,     Blood Culture - Blood,    XR Chest 2 View    CT Abdomen Pelvis With Contrast    Comprehensive Metabolic Panel    Lipase    Urinalysis With Culture If Indicated - Urine, Clean Catch    High Sensitivity Troponin T    CBC Auto Differential    Urinalysis, Microscopic Only - Urine, Clean Catch    Lactic Acid, Plasma    High Sensitivity Troponin T 1Hr    Ambulatory Referral to Urology    NPO Diet NPO Type: Strict NPO    ECG 12 Lead Chest Pain    CBC & Differential         Additional orders considered but not ordered: None      ED Course:    Consultants: None    ED Course as of 12/10/24 2047   Tue Dec 10, 2024   1302 EKG interpreted by me, atrial fibrillation with right bundle branch block, no concerning ST changes noted, rate of 74, abnormal EKG [JE]   1325 WBC(!): 14.32 [TG]   1546 WBC, UA(!): Too Numerous to Count [TG]   1546 Leukocytes, UA(!): Large (3+) [TG]      ED Course User Index  [JE] Boyd Rocha MD  [TG] Alphonse Nj PA-C              Shared Decision Making:  After my consideration of clinical presentation and any laboratory/radiology studies obtained, I discussed the findings with the patient/patient representative who is in agreement with the treatment plan and the final disposition.   Risks and benefits of discharge and/or observation/admission were discussed.       AS OF 20:47 EST VITALS:    BP - 97/55  HR - 83  TEMP - 98 °F (36.7 °C) (Oral)  O2 SATS - 97%                  DIAGNOSIS  Final diagnoses:   Acute cystitis without hematuria   Nausea and vomiting, unspecified vomiting type         DISPOSITION  Discharge home      Please note that portions of this document were completed with voice recognition software.      Alphonse Nj PA-C  12/10/24 2049

## 2024-12-11 NOTE — DISCHARGE INSTRUCTIONS
Please follow-up with your primary care provider as well as a urologist for further evaluation of your urinary tract infection and the possibility of a chronic urinary obstruction.    Zofran can be taken dissolvable tablets every 8 hours as needed for nausea symptoms.

## 2024-12-13 LAB — BACTERIA SPEC AEROBE CULT: ABNORMAL

## 2024-12-15 LAB
BACTERIA SPEC AEROBE CULT: NORMAL
BACTERIA SPEC AEROBE CULT: NORMAL

## 2024-12-15 NOTE — PROGRESS NOTES
Tried to call patient.  Was unable to reach patient.  Unable to leave message.  Will try it later date

## 2024-12-18 ENCOUNTER — TELEPHONE (OUTPATIENT)
Dept: UROLOGY | Facility: CLINIC | Age: 79
End: 2024-12-18

## 2024-12-18 NOTE — TELEPHONE ENCOUNTER
Hub staff attempted to follow warm transfer process and was unsuccessful     Caller: Jennifer Yancey      Relationship to patient: DAUGHTER    Best call back number: [phone] (home)      Patient is needing: PATIENT CALLING GLENN BACK TO SET UP APPT.  PLEASE CALL DAUGHTER BACK TO SCHEDULE ASAP.  THANK YOU.

## 2024-12-26 DIAGNOSIS — F01.50 VASCULAR DEMENTIA WITHOUT BEHAVIORAL DISTURBANCE: ICD-10-CM

## 2024-12-26 RX ORDER — MEMANTINE HYDROCHLORIDE 10 MG/1
10 TABLET ORAL NIGHTLY
Qty: 90 TABLET | Refills: 0 | OUTPATIENT
Start: 2024-12-26

## 2025-01-07 ENCOUNTER — OFFICE VISIT (OUTPATIENT)
Dept: UROLOGY | Facility: CLINIC | Age: 80
End: 2025-01-07
Payer: MEDICARE

## 2025-01-07 ENCOUNTER — TELEPHONE (OUTPATIENT)
Dept: UROLOGY | Facility: CLINIC | Age: 80
End: 2025-01-07

## 2025-01-07 VITALS
TEMPERATURE: 97 F | HEART RATE: 104 BPM | DIASTOLIC BLOOD PRESSURE: 80 MMHG | HEIGHT: 72 IN | SYSTOLIC BLOOD PRESSURE: 116 MMHG | WEIGHT: 148 LBS | OXYGEN SATURATION: 96 % | BODY MASS INDEX: 20.05 KG/M2

## 2025-01-07 DIAGNOSIS — N40.0 BENIGN PROSTATIC HYPERPLASIA WITHOUT LOWER URINARY TRACT SYMPTOMS: Primary | ICD-10-CM

## 2025-01-07 DIAGNOSIS — N39.0 RECURRENT UTI: ICD-10-CM

## 2025-01-07 LAB
BILIRUB BLD-MCNC: NEGATIVE MG/DL
CLARITY, POC: CLEAR
COLOR UR: YELLOW
EXPIRATION DATE: ABNORMAL
GLUCOSE UR STRIP-MCNC: ABNORMAL MG/DL
KETONES UR QL: NEGATIVE
LEUKOCYTE EST, POC: NEGATIVE
Lab: ABNORMAL
NITRITE UR-MCNC: NEGATIVE MG/ML
PH UR: 6 [PH] (ref 5–8)
PROT UR STRIP-MCNC: NEGATIVE MG/DL
RBC # UR STRIP: NEGATIVE /UL
SP GR UR: 1.02 (ref 1–1.03)
UROBILINOGEN UR QL: ABNORMAL

## 2025-01-07 PROCEDURE — 51798 US URINE CAPACITY MEASURE: CPT | Performed by: NURSE PRACTITIONER

## 2025-01-07 PROCEDURE — 1160F RVW MEDS BY RX/DR IN RCRD: CPT | Performed by: NURSE PRACTITIONER

## 2025-01-07 PROCEDURE — 99214 OFFICE O/P EST MOD 30 MIN: CPT | Performed by: NURSE PRACTITIONER

## 2025-01-07 PROCEDURE — 3074F SYST BP LT 130 MM HG: CPT | Performed by: NURSE PRACTITIONER

## 2025-01-07 PROCEDURE — 1159F MED LIST DOCD IN RCRD: CPT | Performed by: NURSE PRACTITIONER

## 2025-01-07 PROCEDURE — 3079F DIAST BP 80-89 MM HG: CPT | Performed by: NURSE PRACTITIONER

## 2025-01-07 PROCEDURE — 81003 URINALYSIS AUTO W/O SCOPE: CPT | Performed by: NURSE PRACTITIONER

## 2025-01-07 RX ORDER — METHENAMINE HIPPURATE 1000 MG/1
1 TABLET ORAL 2 TIMES DAILY WITH MEALS
Qty: 60 TABLET | Refills: 11 | Status: SHIPPED | OUTPATIENT
Start: 2025-01-07

## 2025-01-07 NOTE — PROGRESS NOTES
Office Visit Males LUTS      Patient Name: Que Lagunas  : 1945   MRN: [id number]     Chief Complaint:   Chief Complaint   Patient presents with    spot on prostate       Referring Provider: Alphonse Nj PA-C    History of Present Illness: Que Lagunas is a 79 y.o. male who presents today with history of UTI who presents with LUTS. Recently seen in the ER and treated for UTI. History of prostate surgery many years ago but unsure what the surgery was  Reports at least 3 Utis in the last year, does not have dysuria but has increase in mental status changes   Drinks 3-4 bottles of water daily along with some coffee, and Tyede   Daughter is present for visit patient has mild dementia    IPSS Questionnaire (AUA-7):  Incomplete emptying  Over the past month, how often have you had a sensation of not emptying your bladder completely after you finished urinating?: Not at all (25)  Frequency  Over the past month, how often have you had to urinate again less than two hours after you finishied urinating ?: Less than 1 time in 5 (25)  Intermittency  Over the past month, how often have you found you stopped and started again several time when you urinated ?: Not at all (25)  Urgency  Over the last month, how often have you found it difficult  have you found it difficult to postpone urination ?: Almost always (25)  Weak Stream  Over the past month, how often have you had a weak urinary stream ?: Not at all (25)  Straining  Over the past month, how often have you had to push or strain to begin urination ?: Not at all (25)  Nocturia  Over the past month, how many times did you most typically get up to urinate from the time you went to bed until the time you got up in the morning ?: None (25)  Quality of life due to urinary symptoms  If you were to spend the rest of your life with your urinary condition the way it is now, how  would feel about that?: Mostly satisfied (25 143)    Scores  Total IPSS Score: 6 (25 143)     Does wear pullups that are soaked when he wakes in the am   Subjective      Review of System:   As noted in HPI    Past Medical History:   Past Medical History:   Diagnosis Date    Abnormal EKG     Bifascicular block and no prior ischemia evaluation.     Anemia     Anxiety     Arthritis     Atrial fibrillation     CHADS VASc=3.  Continue Xarelto 20.  vas continue Zaroxolyn 20 has a relative 20 Knobloch can add Lantus 40    CAD (coronary artery disease)     Colon polyp 2015    Contact dermatitis     Diabetes     Dyslipidemia     Hypertension     Palpitations     Prostatism     Stroke     Tattoo        Past Surgical History:   Past Surgical History:   Procedure Laterality Date    COLONOSCOPY  2015    EYE SURGERY      PROSTATE SURGERY      TONSILLECTOMY         Family History:   Family History   Problem Relation Age of Onset    Diabetes Other     Cancer Other         NO PROSTATE CANCER HISTORY    Hypertension Other     Cancer Other     Diabetes Other     Liver disease Mother     Liver disease Father     Colon cancer Neg Hx     Stomach cancer Neg Hx        Social History:   Social History     Socioeconomic History    Marital status:    Tobacco Use    Smoking status: Former     Current packs/day: 0.00     Average packs/day: 2.0 packs/day for 50.0 years (100.0 ttl pk-yrs)     Types: Cigarettes     Start date:      Quit date:      Years since quittin.0     Passive exposure: Past    Smokeless tobacco: Never    Tobacco comments:     STOPPED 5 YEARS AGO   Vaping Use    Vaping status: Never Used   Substance and Sexual Activity    Alcohol use: No    Drug use: No    Sexual activity: Defer       Medications:     Current Outpatient Medications:     cholecalciferol (VITAMIN D3) 250 MCG (36154 UT) capsule, Take 1 capsule by mouth Daily., Disp: 30 capsule, Rfl: 11    donepezil (ARICEPT) 10 MG  "tablet, TAKE 1 TABLET BY MOUTH ONCE DAILY AT NIGHT, Disp: 90 tablet, Rfl: 1    glucose blood (SOLUS V2 TEST) test strip, Use as instructed, Disp: 100 each, Rfl: 12    glucose monitor monitoring kit, 1 each As Needed (prn)., Disp: 1 each, Rfl: 0    Insulin Lispro (humaLOG) 100 UNIT/ML injection, INJECT 10 UNITS SUBCUTANEOUSLY WITH MEALS, Disp: 40 mL, Rfl: 1    Insulin Pen Needle (PEN NEEDLES 3/16\") 31G X 5 MM misc, Use with insulin daily E11.9, Disp: 100 each, Rfl: 5    Insulin Syringe-Needle U-100 (Easy Touch Insulin Syringe) 30G X 5/16\" 0.5 ML misc, use as directed with insulin 4 times a day, Disp: 100 each, Rfl: 11    Lancets misc, Use to test twice daily. E11.9, Disp: 100 each, Rfl: 12    Lantus 100 UNIT/ML injection, INJECT 10 UNITS SUBCUTANEOUSLY AT NIGHT, Disp: 30 mL, Rfl: 0    LifeScan Unistik II Lancets misc, 1 Units 3 (Three) Times a Day., Disp: 100 each, Rfl: 5    memantine (NAMENDA) 10 MG tablet, TAKE 1 TABLET BY MOUTH ONCE DAILY AT NIGHT, Disp: 90 tablet, Rfl: 0    metFORMIN (GLUCOPHAGE) 500 MG tablet, TAKE 1 TABLET BY MOUTH IN THE MORNING WITH  BREAKFAST  Indications: Type 2 Diabetes, Disp: 90 tablet, Rfl: 3    metoprolol tartrate (LOPRESSOR) 100 MG tablet, Take 1 tablet by mouth 2 (Two) Times a Day., Disp: 180 tablet, Rfl: 1    PARoxetine (PAXIL) 20 MG tablet, Take 1 tablet by mouth Daily., Disp: 90 tablet, Rfl: 1    pravastatin (PRAVACHOL) 40 MG tablet, Take 1 tablet by mouth Every Evening., Disp: 90 tablet, Rfl: 1    Xarelto 20 MG tablet, Take 1 tablet by mouth once daily, Disp: 30 tablet, Rfl: 11    methenamine (HIPREX) 1 g tablet, Take 1 tablet by mouth 2 (Two) Times a Day With Meals., Disp: 60 tablet, Rfl: 11    Allergies:   Allergies   Allergen Reactions    Lisinopril Anaphylaxis       Objective     Physical Exam:   Vital Signs:   Vitals:    01/07/25 1410   BP: 116/80   Pulse: 104   Temp: 97 °F (36.1 °C)   SpO2: 96%   Weight: 67.1 kg (148 lb)   Height: 182.9 cm (72\")     Body mass index is 20.07 " kg/m².     Physical Exam  Vitals and nursing note reviewed.   Constitutional:       General: He is not in acute distress.     Appearance: Normal appearance. He is not ill-appearing.   Pulmonary:      Effort: Pulmonary effort is normal. No respiratory distress.   Skin:     General: Skin is warm and dry.   Neurological:      General: No focal deficit present.      Mental Status: He is alert and oriented to person, place, and time.   Psychiatric:         Mood and Affect: Mood normal.         Behavior: Behavior normal. Behavior is cooperative.      Comments: Mild dementia          Labs  Lab Results   Component Value Date    PSA 0.886 03/01/2021    PSA 0.939 02/28/2020    PSA 0.962 11/09/2018       Brief Urine Lab Results  (Last result in the past 365 days)        Color   Clarity   Blood   Leuk Est   Nitrite   Protein   CREAT   Urine HCG        01/07/25 1428 Yellow   Clear   Negative   Negative   Negative   Negative                   PVR  Post-void residual performed by staff - 7ml    Assessment / Plan      Diagnoses and all orders for this visit:    1. Benign prostatic hyperplasia without lower urinary tract symptoms (Primary)  -     POC Urinalysis Dipstick, Automated    2. Recurrent UTI  -     methenamine (HIPREX) 1 g tablet; Take 1 tablet by mouth 2 (Two) Times a Day With Meals.  Dispense: 60 tablet; Refill: 11     Assessment  Mr. Lagunas is a 79 y.o. male who presents with LUTS, primarily recurrent UTIs. Patient has a history of prostate surgery many years ago but is having recurrent UTIs culture proven. IPSS is mild and today PVR is normal. We discussed recommendation for BPH work up and medication management verses further surgical intervention.   We discussed option for starting finasteride if he is not interested in surgery but will hold and have work up before considering a medication.   On CT patient does have a bladder diverticulum this can also increase risk for rUTIs we discussed preventative strategies  patient does well with fluid intake can try hiprex for UTI prevention patient has a normal eGFR.     Cystoscopy: Dr. Lechuga will take a small flexible cystoscope into the urethra and into the bladder.  This will show if there is any bladder damage and if your prostate is obstructing your urine flow.    TRUS (transrectal ultrasound): small rectal ultrasound probe is placed into the rectum.  This allows Dr. Lechuga to measure the size of the prostate.     Uroflow: you will urinate into a funnel and we test your urine stream strength.       Plan  1.  Follow up for cystoscopy, TRUS, Uroflow, RORO, GE  2. Start Hiprex 1 tablet 2 times daily with meals for UTI prevention    Follow Up:   Return for Dr. Lechuga cystoscopy, TRUS, and uroflo.    SUKI Page, NP-C  [facility] [place]

## 2025-01-07 NOTE — TELEPHONE ENCOUNTER
Called [facility] number is on patients chart. To see if the patient is willing to come in early. LVM to Jennifer Boyce MA

## 2025-03-19 ENCOUNTER — OFFICE VISIT (OUTPATIENT)
Dept: INTERNAL MEDICINE | Facility: CLINIC | Age: 80
End: 2025-03-19
Payer: OTHER GOVERNMENT

## 2025-03-19 VITALS
WEIGHT: 150 LBS | TEMPERATURE: 98 F | SYSTOLIC BLOOD PRESSURE: 145 MMHG | OXYGEN SATURATION: 95 % | BODY MASS INDEX: 20.32 KG/M2 | HEART RATE: 115 BPM | RESPIRATION RATE: 18 BRPM | DIASTOLIC BLOOD PRESSURE: 97 MMHG | HEIGHT: 72 IN

## 2025-03-19 DIAGNOSIS — I48.0 PAROXYSMAL ATRIAL FIBRILLATION: ICD-10-CM

## 2025-03-19 DIAGNOSIS — N40.0 ENLARGED PROSTATE: ICD-10-CM

## 2025-03-19 DIAGNOSIS — Z12.11 COLON CANCER SCREENING: ICD-10-CM

## 2025-03-19 DIAGNOSIS — E11.65 TYPE 2 DIABETES MELLITUS WITH HYPERGLYCEMIA, WITHOUT LONG-TERM CURRENT USE OF INSULIN: ICD-10-CM

## 2025-03-19 DIAGNOSIS — D36.9 TUBULAR ADENOMA: ICD-10-CM

## 2025-03-19 DIAGNOSIS — Z78.9 IMPAIRED MOBILITY AND ADLS: Chronic | ICD-10-CM

## 2025-03-19 DIAGNOSIS — Z74.09 IMPAIRED MOBILITY AND ADLS: Chronic | ICD-10-CM

## 2025-03-19 DIAGNOSIS — I25.10 CORONARY ARTERY DISEASE INVOLVING NATIVE CORONARY ARTERY OF NATIVE HEART WITHOUT ANGINA PECTORIS: ICD-10-CM

## 2025-03-19 DIAGNOSIS — I10 PRIMARY HYPERTENSION: Primary | ICD-10-CM

## 2025-03-19 DIAGNOSIS — E78.2 MIXED HYPERLIPIDEMIA: ICD-10-CM

## 2025-03-19 DIAGNOSIS — F41.1 GENERALIZED ANXIETY DISORDER: ICD-10-CM

## 2025-03-19 DIAGNOSIS — F03.B3 MODERATE DEMENTIA WITH MOOD DISTURBANCE, UNSPECIFIED DEMENTIA TYPE: Chronic | ICD-10-CM

## 2025-03-19 LAB
EXPIRATION DATE: ABNORMAL
HBA1C MFR BLD: 7.1 % (ref 4.5–5.7)
Lab: ABNORMAL

## 2025-03-19 NOTE — PROGRESS NOTES
Office Note     Name: Que Lagunas    : 1945     MRN: [id number]     Chief Complaint  Diabetes (6 month follow up/)    Subjective     History of Present Illness:  Que Lagunas is a 80 y.o. male who presents today for chronic conditions    Hypertension Lasix 20 mg every other day, metoprolol, usually <140/90  Paroxysmal atrial fibrillation on Xarelto, metoprolol  Hyperlipidemia on pravastatin  Diabetes type 2: Lantus 10 units in AM, Humalog 10-12-12, metformin 500 once a day. Last A1c 8.4. usually 159 at home, no BG >170.  A1c today 7.1  Vitamin D deficiency on vitamin D  Memory loss on donepezil and memantine, follows with neurology  GERD on esomeprazole  Iron deficiency anemia stable at this time has discontinued ferrous sulfate  Anxiety and depression on paroxetine for years now, stable symptoms  BPH follows urology on methenamine     Previous smoker 100py history, quit 23 years ago  Last PSA in  was normal  Colonoscopy : 2 polyps removed, <10mm, tubular adenoma without dysplasia Patient was advised to repeat colonoscopy in 5 to 10 years but at that time patient elected to have colonoscopy after 10 years and is due 2025.     Family History:   Family History   Problem Relation Age of Onset   • Diabetes Other    • Cancer Other         NO PROSTATE CANCER HISTORY   • Hypertension Other    • Cancer Other    • Diabetes Other    • Liver disease Mother    • Liver disease Father    • Colon cancer Neg Hx    • Stomach cancer Neg Hx        Social History:   Social History     Socioeconomic History   • Marital status:    Tobacco Use   • Smoking status: Former     Current packs/day: 0.00     Average packs/day: 2.0 packs/day for 50.0 years (100.0 ttl pk-yrs)     Types: Cigarettes     Start date:      Quit date:      Years since quittin.2     Passive exposure: Past   • Smokeless tobacco: Never   • Tobacco comments:     STOPPED 5 YEARS AGO   Vaping Use   • Vaping status: Never  "Used   Substance and Sexual Activity   • Alcohol use: No   • Drug use: No   • Sexual activity: Defer       Health Maintenance   Topic Date Due   • URINE MICROALBUMIN-CREATININE RATIO (uACR)  Never done   • DIABETIC EYE EXAM  03/23/2024   • COLORECTAL CANCER SCREENING  04/07/2025   • HEMOGLOBIN A1C  06/03/2025   • RSV Vaccine - Adults (1 - 1-dose 75+ series) 06/06/2025 (Originally 3/18/2019)   • TDAP/TD VACCINES (1 - Tdap) 07/08/2025 (Originally 3/18/1964)   • COVID-19 Vaccine (4 - 2024-25 season) 03/18/2026 (Originally 9/1/2024)   • ZOSTER VACCINE (1 of 2) 07/07/2026 (Originally 3/18/1994)   • LIPID PANEL  08/13/2025   • ANNUAL WELLNESS VISIT  12/03/2025   • INFLUENZA VACCINE  Completed   • Pneumococcal Vaccine 50+  Completed       Patient Care Team:  Aurea Ayala MD as PCP - General (Family Medicine)  Alayna Lynch MD as Consulting Physician (General Surgery)  Charles Topete MD as Consulting Physician (Cardiology)    Objective     Vital Signs  /97   Pulse 115   Temp 98 °F (36.7 °C) (Infrared)   Resp 18   Ht 182.9 cm (72.01\")   Wt 68 kg (150 lb)   SpO2 95%   BMI 20.34 kg/m²   Estimated body mass index is 20.34 kg/m² as calculated from the following:    Height as of this encounter: 182.9 cm (72.01\").    Weight as of this encounter: 68 kg (150 lb).  BMI is within normal parameters. No other follow-up for BMI required.    Physical Exam  Vitals and nursing note reviewed.   Constitutional:       Appearance: Normal appearance.   HENT:      Head: Normocephalic and atraumatic.   Cardiovascular:      Rate and Rhythm: Normal rate and regular rhythm.      Pulses: Normal pulses.      Heart sounds: Normal heart sounds.   Pulmonary:      Effort: Pulmonary effort is normal. No respiratory distress.      Breath sounds: Normal breath sounds. No wheezing, rhonchi or rales.   Abdominal:      General: Abdomen is flat. Bowel sounds are normal.      Palpations: Abdomen is soft.      Tenderness: There is no " abdominal tenderness. There is no guarding.   Musculoskeletal:      Cervical back: Neck supple.   Skin:     General: Skin is warm.      Capillary Refill: Capillary refill takes less than 2 seconds.   Neurological:      General: No focal deficit present.      Mental Status: He is alert. Mental status is at baseline.   Psychiatric:         Mood and Affect: Mood normal.         Behavior: Behavior normal.          Procedures     Assessment and Plan     Diagnoses and all orders for this visit:    1. Primary hypertension (Primary)  Assessment & Plan:  Stable on metoprolol follows with cardiology       2. Mixed hyperlipidemia  Assessment & Plan:  Stable on pravastatin follows with cardiology       3. Coronary artery disease involving native coronary artery of native heart without angina pectoris  Assessment & Plan:  Stable at this time follows with cardiology       4. Paroxysmal atrial fibrillation  Assessment & Plan:  Follows with cardiology on metoprolol and Xarelto      5. Type 2 diabetes mellitus with hyperglycemia, without long-term current use of insulin  Assessment & Plan:  A1c improved continue Humalog 10-, metformin 500 once a day and Lantus 10 units in the morning   Stable on current medication and dosage. Will continue current management. Refill medication as necessary.        6. Generalized anxiety disorder  Assessment & Plan:  Stable on current medication and dosage. Will continue current management. Refill medication as necessary.  Paroxetine       7. Moderate dementia with mood disturbance, unspecified dementia type  Assessment & Plan:  Follows with neurology on donepezil and memantine       8. Impaired mobility and ADLs  Assessment & Plan:  Uses a walker, continue.  High fall risk.  Fall precautions advised      9. Colon cancer screening  -     Ambulatory Referral For Screening Colonoscopy    10. Tubular adenoma  Assessment & Plan:  Referred to GI for possible follow-up colonoscopy    Orders:  -      Ambulatory Referral For Screening Colonoscopy    11. Enlarged prostate  Assessment & Plan:  Follows with urology on methenamine           Counseling was given to patient and family for the following topics: risks and benefits of treatment options and importance of treatment compliance.    Follow Up  Return in about 6 months (around 9/19/2025) for Annual.    MD ANUJA Cuellar  [facility]  [address] [place] [id number] [phone]

## 2025-03-19 NOTE — ASSESSMENT & PLAN NOTE
A1c improved continue Humalog 10-, metformin 500 once a day and Lantus 10 units in the morning   Stable on current medication and dosage. Will continue current management. Refill medication as necessary.

## 2025-04-22 DIAGNOSIS — E11.69 TYPE 2 DIABETES MELLITUS WITH OTHER SPECIFIED COMPLICATION, WITH LONG-TERM CURRENT USE OF INSULIN: ICD-10-CM

## 2025-04-22 DIAGNOSIS — Z79.4 TYPE 2 DIABETES MELLITUS WITH OTHER SPECIFIED COMPLICATION, WITH LONG-TERM CURRENT USE OF INSULIN: ICD-10-CM

## 2025-04-22 RX ORDER — INSULIN LISPRO 100 [IU]/ML
INJECTION, SOLUTION INTRAVENOUS; SUBCUTANEOUS
Qty: 40 ML | Refills: 1 | Status: SHIPPED | OUTPATIENT
Start: 2025-04-22

## 2025-05-04 DIAGNOSIS — F01.50 VASCULAR DEMENTIA WITHOUT BEHAVIORAL DISTURBANCE: ICD-10-CM

## 2025-05-05 RX ORDER — DONEPEZIL HYDROCHLORIDE 10 MG/1
10 TABLET, FILM COATED ORAL NIGHTLY
Qty: 90 TABLET | Refills: 0 | OUTPATIENT
Start: 2025-05-05

## 2025-05-27 ENCOUNTER — HOSPITAL ENCOUNTER (OUTPATIENT)
Dept: CT IMAGING | Facility: HOSPITAL | Age: 80
Discharge: HOME | End: 2025-05-27
Admitting: STUDENT IN AN ORGANIZED HEALTH CARE EDUCATION/TRAINING PROGRAM
Payer: MEDICARE

## 2025-05-27 ENCOUNTER — OFFICE VISIT (OUTPATIENT)
Dept: INTERNAL MEDICINE | Facility: CLINIC | Age: 80
End: 2025-05-27
Payer: OTHER GOVERNMENT

## 2025-05-27 VITALS
WEIGHT: 153 LBS | OXYGEN SATURATION: 96 % | SYSTOLIC BLOOD PRESSURE: 141 MMHG | BODY MASS INDEX: 20.72 KG/M2 | RESPIRATION RATE: 15 BRPM | DIASTOLIC BLOOD PRESSURE: 95 MMHG | TEMPERATURE: 97.8 F | HEIGHT: 72 IN | HEART RATE: 115 BPM

## 2025-05-27 DIAGNOSIS — M25.571 ACUTE RIGHT ANKLE PAIN: ICD-10-CM

## 2025-05-27 DIAGNOSIS — S09.90XA TRAUMATIC INJURY OF HEAD, INITIAL ENCOUNTER: Primary | ICD-10-CM

## 2025-05-27 PROCEDURE — 70450 CT HEAD/BRAIN W/O DYE: CPT

## 2025-05-27 PROCEDURE — 90715 TDAP VACCINE 7 YRS/> IM: CPT | Performed by: STUDENT IN AN ORGANIZED HEALTH CARE EDUCATION/TRAINING PROGRAM

## 2025-05-27 PROCEDURE — 90471 IMMUNIZATION ADMIN: CPT | Performed by: STUDENT IN AN ORGANIZED HEALTH CARE EDUCATION/TRAINING PROGRAM

## 2025-05-27 PROCEDURE — 99214 OFFICE O/P EST MOD 30 MIN: CPT | Performed by: STUDENT IN AN ORGANIZED HEALTH CARE EDUCATION/TRAINING PROGRAM

## 2025-05-27 RX ORDER — TETANUS TOXOID, REDUCED DIPHTHERIA TOXOID AND ACELLULAR PERTUSSIS VACCINE, ADSORBED 5; 2.5; 8; 8; 2.5 [IU]/.5ML; [IU]/.5ML; UG/.5ML; UG/.5ML; UG/.5ML
0.5 SUSPENSION INTRAMUSCULAR ONCE
Qty: 0.5 ML | Refills: 0 | Status: SHIPPED | OUTPATIENT
Start: 2025-05-27 | End: 2025-05-27

## 2025-05-27 NOTE — PROGRESS NOTES
Office Note     Name: Que Lagunas    : 1945     MRN: [id number]     Chief Complaint  Fall (Fell late  night. Laceration to the back of the head.)    Subjective     History of Present Illness:  Que Lagunas is a 80 y.o. male who presents today for head trauma x 2 days ago, no confusion, headache, nausea, vomiting.   Notes drowsiness more today while food was on his lap. He is on xarelto.   Also notes some pain on right ankle after the fall    Family History:   Family History   Problem Relation Age of Onset    Diabetes Other     Cancer Other         NO PROSTATE CANCER HISTORY    Hypertension Other     Cancer Other     Diabetes Other     Liver disease Mother     Liver disease Father     Colon cancer Neg Hx     Stomach cancer Neg Hx        Social History:   Social History     Socioeconomic History    Marital status:    Tobacco Use    Smoking status: Former     Current packs/day: 0.00     Average packs/day: 2.0 packs/day for 50.0 years (100.0 ttl pk-yrs)     Types: Cigarettes     Start date:      Quit date:      Years since quittin.4     Passive exposure: Past    Smokeless tobacco: Never    Tobacco comments:     STOPPED 5 YEARS AGO   Vaping Use    Vaping status: Never Used   Substance and Sexual Activity    Alcohol use: No    Drug use: No    Sexual activity: Defer       Health Maintenance   Topic Date Due    URINE MICROALBUMIN-CREATININE RATIO (uACR)  Never done    DIABETIC FOOT EXAM  2021    DIABETIC EYE EXAM  2024    COLORECTAL CANCER SCREENING  2025    RSV Vaccine - Adults (1 - 1-dose 75+ series) 2025 (Originally 3/18/2019)    ZOSTER VACCINE (1 of 2) 2026 (Originally 3/18/1994)    INFLUENZA VACCINE  2025    LIPID PANEL  2025    HEMOGLOBIN A1C  2025    COVID-19 Vaccine ( - - season) 10/15/2025    ANNUAL WELLNESS VISIT  2025    TDAP/TD VACCINES (2 - Td or Tdap) 2032    Pneumococcal Vaccine 50+  Completed  "      Patient Care Team:  Aurea Ayala MD as PCP - General (Family Medicine)  Alayna Lynch MD as Consulting Physician (General Surgery)  Charles Topete MD as Consulting Physician (Cardiology)    Objective     Vital Signs  /95   Pulse 115   Temp 97.8 °F (36.6 °C) (Infrared)   Resp 15   Ht 182.9 cm (72.01\")   Wt 69.4 kg (153 lb)   SpO2 96%   BMI 20.75 kg/m²   Estimated body mass index is 20.75 kg/m² as calculated from the following:    Height as of this encounter: 182.9 cm (72.01\").    Weight as of this encounter: 69.4 kg (153 lb).  BMI is within normal parameters. No other follow-up for BMI required.    Physical Exam  Vitals reviewed.   Constitutional:       General: He is not in acute distress.     Appearance: Normal appearance. He is not ill-appearing or toxic-appearing.   HENT:      Head: Normocephalic.   Musculoskeletal:      Comments: Right ankle. Minimal swelling, nonpitting, normal pedal pulses   Neurological:      Mental Status: He is alert.          Procedures     Assessment and Plan     Diagnoses and all orders for this visit:    1. Traumatic injury of head, initial encounter (Primary)  Assessment & Plan:  Due to high bleeding risk on xarelto, obtain STAT CT head today - scheduled for 7pm tonight.  Give tdap in office    Orders:  -     CT Head Without Contrast    2. Acute right ankle pain  Assessment & Plan:  Likely right ankle sprain from fall  Keep area compressed  Elevate legs  Obtain XR right ankle to r/o fracture after trauma    Orders:  -     XR Ankle 3+ View Right; Future    Other orders  -     Tdap (Boostrix) 5-2.5-18.5 LF-MCG/0.5 injection; Inject 0.5 mL into the appropriate muscle as directed by prescriber 1 (One) Time for 1 dose.  Dispense: 0.5 mL; Refill: 0         Counseling was given to patient for the following topics: instructions for management, risks and benefits of treatment options, and importance of treatment compliance.    Follow Up  No follow-ups on " file.    MD ANUJA Cuellar [facility]  [address] [place] [id number] [phone]

## 2025-05-27 NOTE — ASSESSMENT & PLAN NOTE
Likely right ankle sprain from fall  Keep area compressed  Elevate legs  Obtain XR right ankle to r/o fracture after trauma

## 2025-05-27 NOTE — LETTER
[facility]  Vaccine Consent Form    Patient Name:  Que Lagunas  Patient :  1945        Screening Checklist  The following questions should be completed prior to vaccination. If you answer “yes” to any question, it does not necessarily mean you should not be vaccinated. It just means we may need to clarify or ask more questions. If a question is unclear, please ask your healthcare provider to explain it.    Yes No   Any fever or moderate to severe illness today (mild illness and/or antibiotic treatment are not contraindications)?     Do you have a history of a serious reaction to any previous vaccinations, such as anaphylaxis, encephalopathy within 7 days, Guillain-[place] syndrome within 6 weeks, seizure?     Have you received any live vaccine(s) (e.g MMR, TEVIN) or any other vaccines in the last month (to ensure duplicate doses aren't given)?     Do you have an anaphylactic allergy to latex (DTaP, DTaP-IPV, Hep A, Hep B, MenB, RV, Td, Tdap), baker’s yeast (Hep B, HPV), polysorbates (RSV, nirsevimab, PCV 20, Rotavirrus, Tdap, Shingrix), or gelatin (TEVIN, MMR)?     Do you have an anaphylactic allergy to neomycin (Rabies, TEVIN, MMR, IPV, Hep A), polymyxin B (IPV), or streptomycin (IPV)?      Any cancer, leukemia, AIDS, or other immune system disorder? (TEVIN, MMR, RV)     Do you have a parent, brother, or sister with an immune system problem (if immune competence of vaccine recipient clinically verified, can proceed)? (MMR, TEVIN)     Any recent steroid treatments for >2 weeks, chemotherapy, or radiation treatment? (TEVIN, MMR)     Have you received antibody-containing blood transfusions or IVIG in the past 11 months (recommended interval is dependent on product)? (MMR, TEVIN)     Have you taken antiviral drugs (acyclovir, famciclovir, valacyclovir for TEVIN) in the last 24 or 48 hours, respectively?      Are you pregnant or planning to become pregnant within 1 month? (TEVIN, MMR, HPV, IPV, MenB, Abrexvy; For Hep B-  "refer to Engerix-B; For RSV - Abrysvo is indicated for 32-36 weeks of pregnancy from September to January)     For infants, have you ever been told your child has had intussusception or a medical emergency involving obstruction of the intestine (Rotavirus)? If not for an infant, can skip this question.         *Ordering Physicians/APC should be consulted if \"yes\" is checked by the patient or guardian above.  I have received, read, and understand the Vaccine Information Statement (VIS) for each vaccine ordered.  I have considered my or my child's health status as well as the health status of my close contacts.  I have taken the opportunity to discuss my vaccine questions with my or my child's health care provider.   I have requested that the ordered vaccine(s) be given to me or my child.  I understand the benefits and risks of the vaccines.  I understand that I should remain in the clinic for 15 minutes after receiving the vaccine(s).  _________________________________________________________  Signature of Patient or Parent/Legal Guardian ____________________  Date   "

## 2025-05-27 NOTE — ASSESSMENT & PLAN NOTE
Due to high bleeding risk on xarelto, obtain STAT CT head today - scheduled for 7pm tonight.  Give tdap in office

## 2025-05-28 NOTE — PROGRESS NOTES
I tried calling jesse, no answer. No acute bleed or hemorrhage on CT scan that could be from the fall. However, incidentally, there is some volume loss on area of his brain that could cause his balance concerns and drowsiness. Will refer him to neurosurgery

## 2025-06-04 ENCOUNTER — TELEPHONE (OUTPATIENT)
Dept: GASTROENTEROLOGY | Facility: CLINIC | Age: 80
End: 2025-06-04
Payer: OTHER GOVERNMENT

## 2025-06-17 DIAGNOSIS — F01.50 VASCULAR DEMENTIA WITHOUT BEHAVIORAL DISTURBANCE: ICD-10-CM

## 2025-06-17 RX ORDER — DONEPEZIL HYDROCHLORIDE 10 MG/1
10 TABLET, FILM COATED ORAL NIGHTLY
Qty: 90 TABLET | Refills: 0 | Status: SHIPPED | OUTPATIENT
Start: 2025-06-17

## 2025-06-20 ENCOUNTER — TELEPHONE (OUTPATIENT)
Dept: GASTROENTEROLOGY | Facility: CLINIC | Age: 80
End: 2025-06-20
Payer: OTHER GOVERNMENT

## 2025-06-20 NOTE — TELEPHONE ENCOUNTER
Hub staff attempted to follow warm transfer process and was unsuccessful     Caller: Jennifer Yancey    Relationship to patient: Emergency Contact    Best call back number: [phone]    Patient is needing: PATIENT SCOTTTR JENNIFER CALLED RETURNING MYNOR PHONE CALL. PLEASE LEAVE A DETAILED MESSAGE PER DGTR

## 2025-08-01 DIAGNOSIS — F41.1 GENERALIZED ANXIETY DISORDER: ICD-10-CM

## 2025-08-01 RX ORDER — PAROXETINE 20 MG/1
20 TABLET, FILM COATED ORAL DAILY
Qty: 90 TABLET | Refills: 1 | Status: SHIPPED | OUTPATIENT
Start: 2025-08-01

## 2025-08-20 DIAGNOSIS — I48.0 PAROXYSMAL ATRIAL FIBRILLATION: ICD-10-CM

## 2025-08-20 DIAGNOSIS — E55.9 VITAMIN D DEFICIENCY: ICD-10-CM

## 2025-08-20 RX ORDER — METOPROLOL TARTRATE 100 MG/1
100 TABLET ORAL 2 TIMES DAILY
Qty: 180 TABLET | Refills: 1 | Status: SHIPPED | OUTPATIENT
Start: 2025-08-20

## 2025-08-24 DIAGNOSIS — E78.2 MIXED HYPERLIPIDEMIA: ICD-10-CM

## 2025-08-25 RX ORDER — PRAVASTATIN SODIUM 40 MG
40 TABLET ORAL EVERY EVENING
Qty: 90 TABLET | Refills: 0 | Status: SHIPPED | OUTPATIENT
Start: 2025-08-25